# Patient Record
Sex: MALE | Race: WHITE | Employment: OTHER | URBAN - METROPOLITAN AREA
[De-identification: names, ages, dates, MRNs, and addresses within clinical notes are randomized per-mention and may not be internally consistent; named-entity substitution may affect disease eponyms.]

---

## 2017-02-22 ENCOUNTER — HOSPITAL ENCOUNTER (OUTPATIENT)
Dept: GENERAL RADIOLOGY | Age: 72
Discharge: HOME | End: 2017-02-22
Payer: MEDICARE

## 2017-02-22 DIAGNOSIS — J84.112 UIP (USUAL INTERSTITIAL PNEUMONITIS) (HCC): Chronic | ICD-10-CM

## 2017-02-22 DIAGNOSIS — J44.9 CHRONIC OBSTRUCTIVE PULMONARY DISEASE, UNSPECIFIED COPD TYPE (HCC): Chronic | ICD-10-CM

## 2017-02-22 PROCEDURE — 71020 XR CHEST PA LAT: CPT

## 2017-02-28 ENCOUNTER — HOSPITAL ENCOUNTER (OUTPATIENT)
Dept: LAB | Age: 72
Discharge: HOME | End: 2017-02-28

## 2017-02-28 PROCEDURE — 88305 TISSUE EXAM BY PATHOLOGIST: CPT | Performed by: INTERNAL MEDICINE

## 2017-11-09 PROBLEM — J01.91 ACUTE RECURRENT SINUSITIS: Status: ACTIVE | Noted: 2017-11-09

## 2017-11-27 PROBLEM — G47.9 SLEEP DISTURBANCE: Status: ACTIVE | Noted: 2017-11-27

## 2017-11-27 PROBLEM — N40.1 BENIGN PROSTATIC HYPERPLASIA WITH LOWER URINARY TRACT SYMPTOMS: Status: ACTIVE | Noted: 2017-11-27

## 2017-11-27 PROBLEM — H61.21 IMPACTED CERUMEN OF RIGHT EAR: Status: ACTIVE | Noted: 2017-11-27

## 2017-11-27 PROBLEM — Z12.5 PROSTATE CANCER SCREENING: Status: ACTIVE | Noted: 2017-11-27

## 2017-11-27 PROBLEM — I71.40 ABDOMINAL AORTIC ANEURYSM (AAA) WITHOUT RUPTURE: Status: ACTIVE | Noted: 2017-11-27

## 2017-12-14 ENCOUNTER — HOSPITAL ENCOUNTER (OUTPATIENT)
Dept: ULTRASOUND IMAGING | Age: 72
Discharge: HOME | End: 2017-12-14
Attending: INTERNAL MEDICINE
Payer: MEDICARE

## 2017-12-14 DIAGNOSIS — I71.40 ABDOMINAL AORTIC ANEURYSM (AAA) WITHOUT RUPTURE: ICD-10-CM

## 2017-12-14 PROCEDURE — 76700 US EXAM ABDOM COMPLETE: CPT

## 2017-12-14 PROCEDURE — 93978 VASCULAR STUDY: CPT

## 2018-06-04 PROBLEM — Z12.83 SKIN EXAM FOR MALIGNANT NEOPLASM: Status: ACTIVE | Noted: 2018-06-04

## 2019-04-30 ENCOUNTER — HOSPITAL ENCOUNTER (OUTPATIENT)
Dept: CT IMAGING | Age: 74
Discharge: HOME | End: 2019-04-30
Attending: OTOLARYNGOLOGY

## 2019-04-30 DIAGNOSIS — J32.9 CHRONIC SINUSITIS, UNSPECIFIED LOCATION: ICD-10-CM

## 2019-05-13 PROBLEM — R26.89 BALANCE PROBLEM: Status: ACTIVE | Noted: 2019-05-13

## 2019-05-13 PROBLEM — J32.9 CHRONIC SINUSITIS: Status: ACTIVE | Noted: 2019-05-13

## 2019-11-13 ENCOUNTER — HOSPITAL ENCOUNTER (INPATIENT)
Age: 74
LOS: 1 days | Discharge: HOME | DRG: 687 | End: 2019-11-16
Attending: EMERGENCY MEDICINE | Admitting: HOSPITALIST
Payer: MEDICARE

## 2019-11-13 ENCOUNTER — APPOINTMENT (OUTPATIENT)
Dept: ULTRASOUND IMAGING | Age: 74
DRG: 687 | End: 2019-11-13
Attending: EMERGENCY MEDICINE
Payer: MEDICARE

## 2019-11-13 ENCOUNTER — APPOINTMENT (OUTPATIENT)
Dept: CT IMAGING | Age: 74
DRG: 687 | End: 2019-11-13
Attending: EMERGENCY MEDICINE
Payer: MEDICARE

## 2019-11-13 ENCOUNTER — APPOINTMENT (OUTPATIENT)
Dept: GENERAL RADIOLOGY | Age: 74
DRG: 687 | End: 2019-11-13
Attending: EMERGENCY MEDICINE
Payer: MEDICARE

## 2019-11-13 DIAGNOSIS — R31.9 HEMATURIA, UNSPECIFIED TYPE: ICD-10-CM

## 2019-11-13 DIAGNOSIS — R33.9 URINARY RETENTION: ICD-10-CM

## 2019-11-13 DIAGNOSIS — N28.89 RIGHT RENAL MASS: Primary | ICD-10-CM

## 2019-11-13 DIAGNOSIS — Z87.09 HISTORY OF PULMONARY FIBROSIS: ICD-10-CM

## 2019-11-13 DIAGNOSIS — R09.02 HYPOXIA: ICD-10-CM

## 2019-11-13 LAB
ABO + RH BLD: NORMAL
ALBUMIN SERPL-MCNC: 4 G/DL (ref 3.2–4.6)
ALBUMIN/GLOB SERPL: 1 {RATIO} (ref 1.2–3.5)
ALP SERPL-CCNC: 89 U/L (ref 50–136)
ALT SERPL-CCNC: 19 U/L (ref 12–65)
ANION GAP SERPL CALC-SCNC: 10 MMOL/L (ref 7–16)
APPEARANCE UR: ABNORMAL
AST SERPL-CCNC: 15 U/L (ref 15–37)
BACTERIA URNS QL MICRO: 0 /HPF
BASOPHILS # BLD: 0.1 K/UL (ref 0–0.2)
BASOPHILS NFR BLD: 0 % (ref 0–2)
BILIRUB SERPL-MCNC: 0.8 MG/DL (ref 0.2–1.1)
BILIRUB UR QL: ABNORMAL
BLOOD GROUP ANTIBODIES SERPL: NORMAL
BUN SERPL-MCNC: 28 MG/DL (ref 8–23)
CALCIUM SERPL-MCNC: 9.3 MG/DL (ref 8.3–10.4)
CASTS URNS QL MICRO: 0 /LPF
CHLORIDE SERPL-SCNC: 102 MMOL/L (ref 98–107)
CO2 SERPL-SCNC: 25 MMOL/L (ref 21–32)
COLOR UR: ABNORMAL
CREAT SERPL-MCNC: 1.22 MG/DL (ref 0.8–1.5)
CRYSTALS URNS QL MICRO: 0 /LPF
DIFFERENTIAL METHOD BLD: ABNORMAL
EOSINOPHIL # BLD: 0 K/UL (ref 0–0.8)
EOSINOPHIL NFR BLD: 0 % (ref 0.5–7.8)
EPI CELLS #/AREA URNS HPF: NORMAL /HPF
ERYTHROCYTE [DISTWIDTH] IN BLOOD BY AUTOMATED COUNT: 13 % (ref 11.9–14.6)
GLOBULIN SER CALC-MCNC: 3.9 G/DL (ref 2.3–3.5)
GLUCOSE BLD STRIP.AUTO-MCNC: 205 MG/DL (ref 65–100)
GLUCOSE SERPL-MCNC: 137 MG/DL (ref 65–100)
GLUCOSE UR STRIP.AUTO-MCNC: NEGATIVE MG/DL
HCT VFR BLD AUTO: 50.8 % (ref 41.1–50.3)
HGB BLD-MCNC: 17.8 G/DL (ref 13.6–17.2)
HGB UR QL STRIP: ABNORMAL
IMM GRANULOCYTES # BLD AUTO: 0.1 K/UL (ref 0–0.5)
IMM GRANULOCYTES NFR BLD AUTO: 0 % (ref 0–5)
KETONES UR QL STRIP.AUTO: 40 MG/DL
LEUKOCYTE ESTERASE UR QL STRIP.AUTO: NEGATIVE
LYMPHOCYTES # BLD: 1.3 K/UL (ref 0.5–4.6)
LYMPHOCYTES NFR BLD: 9 % (ref 13–44)
MCH RBC QN AUTO: 33.1 PG (ref 26.1–32.9)
MCHC RBC AUTO-ENTMCNC: 35 G/DL (ref 31.4–35)
MCV RBC AUTO: 94.6 FL (ref 79.6–97.8)
MONOCYTES # BLD: 0.8 K/UL (ref 0.1–1.3)
MONOCYTES NFR BLD: 6 % (ref 4–12)
MUCOUS THREADS URNS QL MICRO: 0 /LPF
NEUTS SEG # BLD: 12.6 K/UL (ref 1.7–8.2)
NEUTS SEG NFR BLD: 85 % (ref 43–78)
NITRITE UR QL STRIP.AUTO: POSITIVE
NRBC # BLD: 0 K/UL (ref 0–0.2)
PH UR STRIP: 6 [PH] (ref 5–9)
PLATELET # BLD AUTO: 183 K/UL (ref 150–450)
PMV BLD AUTO: 10.5 FL (ref 9.4–12.3)
POTASSIUM SERPL-SCNC: 4.1 MMOL/L (ref 3.5–5.1)
PROT SERPL-MCNC: 7.9 G/DL (ref 6.3–8.2)
PROT UR STRIP-MCNC: 100 MG/DL
RBC # BLD AUTO: 5.37 M/UL (ref 4.23–5.6)
RBC #/AREA URNS HPF: >100 /HPF
SODIUM SERPL-SCNC: 137 MMOL/L (ref 136–145)
SP GR UR REFRACTOMETRY: 1.02 (ref 1–1.02)
SPECIMEN EXP DATE BLD: NORMAL
TROPONIN I SERPL-MCNC: <0.02 NG/ML (ref 0.02–0.05)
UROBILINOGEN UR QL STRIP.AUTO: 0.2 EU/DL (ref 0.2–1)
WBC # BLD AUTO: 14.8 K/UL (ref 4.3–11.1)
WBC URNS QL MICRO: NORMAL /HPF

## 2019-11-13 PROCEDURE — 74011000250 HC RX REV CODE- 250: Performed by: UROLOGY

## 2019-11-13 PROCEDURE — 74011250636 HC RX REV CODE- 250/636: Performed by: FAMILY MEDICINE

## 2019-11-13 PROCEDURE — 96361 HYDRATE IV INFUSION ADD-ON: CPT | Performed by: EMERGENCY MEDICINE

## 2019-11-13 PROCEDURE — 77030005546 HC CATH URETH FOL 3W BARD -A

## 2019-11-13 PROCEDURE — 77030019927 HC TBNG IRR CYSTO BAXT -A

## 2019-11-13 PROCEDURE — 74011000250 HC RX REV CODE- 250: Performed by: EMERGENCY MEDICINE

## 2019-11-13 PROCEDURE — 51702 INSERT TEMP BLADDER CATH: CPT | Performed by: EMERGENCY MEDICINE

## 2019-11-13 PROCEDURE — 74011250637 HC RX REV CODE- 250/637: Performed by: FAMILY MEDICINE

## 2019-11-13 PROCEDURE — 84484 ASSAY OF TROPONIN QUANT: CPT

## 2019-11-13 PROCEDURE — 93005 ELECTROCARDIOGRAM TRACING: CPT | Performed by: EMERGENCY MEDICINE

## 2019-11-13 PROCEDURE — 82962 GLUCOSE BLOOD TEST: CPT

## 2019-11-13 PROCEDURE — 77030020263 HC SOL INJ SOD CL0.9% LFCR 1000ML

## 2019-11-13 PROCEDURE — 77030010545

## 2019-11-13 PROCEDURE — 85025 COMPLETE CBC W/AUTO DIFF WBC: CPT

## 2019-11-13 PROCEDURE — 94640 AIRWAY INHALATION TREATMENT: CPT

## 2019-11-13 PROCEDURE — 80053 COMPREHEN METABOLIC PANEL: CPT

## 2019-11-13 PROCEDURE — 99218 HC RM OBSERVATION: CPT

## 2019-11-13 PROCEDURE — 74177 CT ABD & PELVIS W/CONTRAST: CPT

## 2019-11-13 PROCEDURE — 74011636320 HC RX REV CODE- 636/320: Performed by: EMERGENCY MEDICINE

## 2019-11-13 PROCEDURE — 74011250636 HC RX REV CODE- 250/636: Performed by: EMERGENCY MEDICINE

## 2019-11-13 PROCEDURE — 76870 US EXAM SCROTUM: CPT

## 2019-11-13 PROCEDURE — 74011000258 HC RX REV CODE- 258: Performed by: EMERGENCY MEDICINE

## 2019-11-13 PROCEDURE — 77030005518 HC CATH URETH FOL 2W BARD -B

## 2019-11-13 PROCEDURE — 77030018836 HC SOL IRR NACL ICUM -A

## 2019-11-13 PROCEDURE — 96374 THER/PROPH/DIAG INJ IV PUSH: CPT | Performed by: EMERGENCY MEDICINE

## 2019-11-13 PROCEDURE — 71046 X-RAY EXAM CHEST 2 VIEWS: CPT

## 2019-11-13 PROCEDURE — 74011250636 HC RX REV CODE- 250/636: Performed by: UROLOGY

## 2019-11-13 PROCEDURE — 36415 COLL VENOUS BLD VENIPUNCTURE: CPT

## 2019-11-13 PROCEDURE — 74011636637 HC RX REV CODE- 636/637: Performed by: FAMILY MEDICINE

## 2019-11-13 PROCEDURE — 74176 CT ABD & PELVIS W/O CONTRAST: CPT

## 2019-11-13 PROCEDURE — 81003 URINALYSIS AUTO W/O SCOPE: CPT

## 2019-11-13 PROCEDURE — 96375 TX/PRO/DX INJ NEW DRUG ADDON: CPT | Performed by: EMERGENCY MEDICINE

## 2019-11-13 PROCEDURE — 99285 EMERGENCY DEPT VISIT HI MDM: CPT | Performed by: EMERGENCY MEDICINE

## 2019-11-13 PROCEDURE — 81015 MICROSCOPIC EXAM OF URINE: CPT

## 2019-11-13 PROCEDURE — 86900 BLOOD TYPING SEROLOGIC ABO: CPT

## 2019-11-13 PROCEDURE — 96375 TX/PRO/DX INJ NEW DRUG ADDON: CPT

## 2019-11-13 RX ORDER — HYDROCODONE BITARTRATE AND ACETAMINOPHEN 5; 325 MG/1; MG/1
1 TABLET ORAL
Status: DISCONTINUED | OUTPATIENT
Start: 2019-11-13 | End: 2019-11-16 | Stop reason: HOSPADM

## 2019-11-13 RX ORDER — ALBUTEROL SULFATE 0.83 MG/ML
2.5 SOLUTION RESPIRATORY (INHALATION)
Status: DISCONTINUED | OUTPATIENT
Start: 2019-11-13 | End: 2019-11-16 | Stop reason: HOSPADM

## 2019-11-13 RX ORDER — ACETAMINOPHEN 325 MG/1
650 TABLET ORAL
Status: DISCONTINUED | OUTPATIENT
Start: 2019-11-13 | End: 2019-11-16 | Stop reason: HOSPADM

## 2019-11-13 RX ORDER — SODIUM CHLORIDE 0.9 % (FLUSH) 0.9 %
10 SYRINGE (ML) INJECTION
Status: COMPLETED | OUTPATIENT
Start: 2019-11-13 | End: 2019-11-13

## 2019-11-13 RX ORDER — SODIUM CHLORIDE 9 MG/ML
100 INJECTION, SOLUTION INTRAVENOUS CONTINUOUS
Status: DISPENSED | OUTPATIENT
Start: 2019-11-14 | End: 2019-11-14

## 2019-11-13 RX ORDER — NALOXONE HYDROCHLORIDE 0.4 MG/ML
0.4 INJECTION, SOLUTION INTRAMUSCULAR; INTRAVENOUS; SUBCUTANEOUS AS NEEDED
Status: DISCONTINUED | OUTPATIENT
Start: 2019-11-13 | End: 2019-11-16 | Stop reason: HOSPADM

## 2019-11-13 RX ORDER — LORAZEPAM 2 MG/ML
1 INJECTION INTRAMUSCULAR ONCE
Status: COMPLETED | OUTPATIENT
Start: 2019-11-13 | End: 2019-11-13

## 2019-11-13 RX ORDER — CARVEDILOL 12.5 MG/1
12.5 TABLET ORAL 2 TIMES DAILY WITH MEALS
Status: DISCONTINUED | OUTPATIENT
Start: 2019-11-14 | End: 2019-11-16 | Stop reason: HOSPADM

## 2019-11-13 RX ORDER — INSULIN LISPRO 100 [IU]/ML
INJECTION, SOLUTION INTRAVENOUS; SUBCUTANEOUS
Status: DISCONTINUED | OUTPATIENT
Start: 2019-11-13 | End: 2019-11-16 | Stop reason: HOSPADM

## 2019-11-13 RX ORDER — LEVOTHYROXINE SODIUM 88 UG/1
88 TABLET ORAL
Status: DISCONTINUED | OUTPATIENT
Start: 2019-11-14 | End: 2019-11-16 | Stop reason: HOSPADM

## 2019-11-13 RX ORDER — ONDANSETRON 2 MG/ML
4 INJECTION INTRAMUSCULAR; INTRAVENOUS
Status: DISCONTINUED | OUTPATIENT
Start: 2019-11-13 | End: 2019-11-16 | Stop reason: HOSPADM

## 2019-11-13 RX ORDER — IBUPROFEN 200 MG
1 TABLET ORAL EVERY 24 HOURS
Status: DISCONTINUED | OUTPATIENT
Start: 2019-11-13 | End: 2019-11-16 | Stop reason: HOSPADM

## 2019-11-13 RX ORDER — MORPHINE SULFATE 2 MG/ML
4 INJECTION, SOLUTION INTRAMUSCULAR; INTRAVENOUS ONCE
Status: COMPLETED | OUTPATIENT
Start: 2019-11-13 | End: 2019-11-13

## 2019-11-13 RX ORDER — AZELASTINE HYDROCHLORIDE, FLUTICASONE PROPIONATE 137; 50 UG/1; UG/1
2 SPRAY, METERED NASAL 2 TIMES DAILY
Status: DISCONTINUED | OUTPATIENT
Start: 2019-11-13 | End: 2019-11-16 | Stop reason: HOSPADM

## 2019-11-13 RX ORDER — ASPIRIN 81 MG/1
81 TABLET ORAL DAILY
Status: DISCONTINUED | OUTPATIENT
Start: 2019-11-14 | End: 2019-11-16 | Stop reason: HOSPADM

## 2019-11-13 RX ORDER — LIDOCAINE HYDROCHLORIDE 20 MG/ML
JELLY TOPICAL ONCE
Status: COMPLETED | OUTPATIENT
Start: 2019-11-13 | End: 2019-11-13

## 2019-11-13 RX ORDER — PRAVASTATIN SODIUM 20 MG/1
80 TABLET ORAL
Status: DISCONTINUED | OUTPATIENT
Start: 2019-11-13 | End: 2019-11-16 | Stop reason: HOSPADM

## 2019-11-13 RX ORDER — ASPIRIN 81 MG/1
162 TABLET ORAL DAILY
Status: DISCONTINUED | OUTPATIENT
Start: 2019-11-14 | End: 2019-11-13

## 2019-11-13 RX ORDER — IPRATROPIUM BROMIDE AND ALBUTEROL SULFATE 2.5; .5 MG/3ML; MG/3ML
3 SOLUTION RESPIRATORY (INHALATION)
Status: COMPLETED | OUTPATIENT
Start: 2019-11-13 | End: 2019-11-13

## 2019-11-13 RX ORDER — ONDANSETRON 2 MG/ML
4 INJECTION INTRAMUSCULAR; INTRAVENOUS
Status: COMPLETED | OUTPATIENT
Start: 2019-11-13 | End: 2019-11-13

## 2019-11-13 RX ADMIN — SODIUM CHLORIDE 1000 ML: 900 INJECTION, SOLUTION INTRAVENOUS at 14:25

## 2019-11-13 RX ADMIN — SODIUM CHLORIDE 100 ML: 900 INJECTION, SOLUTION INTRAVENOUS at 18:12

## 2019-11-13 RX ADMIN — METHYLPREDNISOLONE SODIUM SUCCINATE 125 MG: 125 INJECTION, POWDER, FOR SOLUTION INTRAMUSCULAR; INTRAVENOUS at 18:44

## 2019-11-13 RX ADMIN — ONDANSETRON 4 MG: 2 INJECTION INTRAMUSCULAR; INTRAVENOUS at 14:24

## 2019-11-13 RX ADMIN — SODIUM CHLORIDE 100 ML/HR: 900 INJECTION, SOLUTION INTRAVENOUS at 21:22

## 2019-11-13 RX ADMIN — INSULIN LISPRO 4 UNITS: 100 INJECTION, SOLUTION INTRAVENOUS; SUBCUTANEOUS at 22:45

## 2019-11-13 RX ADMIN — LIDOCAINE HYDROCHLORIDE: 20 JELLY TOPICAL at 22:52

## 2019-11-13 RX ADMIN — MORPHINE SULFATE 4 MG: 2 INJECTION, SOLUTION INTRAMUSCULAR; INTRAVENOUS at 14:24

## 2019-11-13 RX ADMIN — Medication 10 ML: at 18:12

## 2019-11-13 RX ADMIN — LORAZEPAM 1 MG: 2 INJECTION, SOLUTION INTRAMUSCULAR; INTRAVENOUS at 22:38

## 2019-11-13 RX ADMIN — PRAVASTATIN SODIUM 80 MG: 20 TABLET ORAL at 22:37

## 2019-11-13 RX ADMIN — IPRATROPIUM BROMIDE AND ALBUTEROL SULFATE 3 ML: .5; 3 SOLUTION RESPIRATORY (INHALATION) at 18:38

## 2019-11-13 RX ADMIN — IOPAMIDOL 100 ML: 755 INJECTION, SOLUTION INTRAVENOUS at 18:11

## 2019-11-13 NOTE — ED PROVIDER NOTES
Patient is a 68 yo male who presents with blood in urine. Patient states began about 2 days ago after a dog ran into his groin area. States about 2 hours later he had some blood and clots in his urine and then improved until yesterday afternoon again with blood and clots. States pain in his right lower back as well. Denies any vomiting however has had some nausea, no fevers or chills, no further complaints. Patient is not on blood thinners. Patient seen by me briefly in triage to begin workup until further evaluation and management by another provider once a room becomes available. The history is provided by the patient. No  was used. Blood in Urine    This is a new problem. The current episode started more than 2 days ago. The problem occurs every urination. The problem has not changed since onset. The quality of the pain is described as burning. The pain is at a severity of 2/10. The pain is mild. There has been no fever. Associated symptoms include hematuria, flank pain and abdominal pain. Pertinent negatives include no chills, no sweats, no nausea, no vomiting, no discharge, no frequency, no hesitancy, no urgency, no penile discharge and no back pain. He has tried nothing for the symptoms. The treatment provided no relief. Past Medical History:   Diagnosis Date    Abnormal chest x-ray 3/19/2013    Adenopathy 3/19/2013    Lymph glands    Allergic rhinitis, cause unspecified 3/19/2013    CAD (coronary artery disease)     Chronic airway obstruction, not elsewhere classified 3/19/2013    COPD     Diabetes ([address]     Diabetes mellitus ([address] 3/19/2013    Hashimoto's disease     Pulmonary fibrosis ([address]     Sinus problem     Thyroid disease     Hypothyroid    Tobacco use disorder 3/19/2013    Unspecified essential hypertension 3/19/2013    Unspecified hypothyroidism 3/19/2013       History reviewed. No pertinent surgical history.       Family History:   Problem Relation Age of Onset    Diabetes Other        Social History     Socioeconomic History    Marital status:      Spouse name: Not on file    Number of children: Not on file    Years of education: Not on file    Highest education level: Not on file   Occupational History    Not on file   Social Needs    Financial resource strain: Not on file    Food insecurity:     Worry: Not on file     Inability: Not on file    Transportation needs:     Medical: Not on file     Non-medical: Not on file   Tobacco Use    Smoking status: Current Every Day Smoker     Packs/day: 0.50     Years: 40.00     Pack years: 20.00     Types: Cigarettes    Smokeless tobacco: Never Used    Tobacco comment: 4-5 cigarettes per qd   Substance and Sexual Activity    Alcohol use: Yes    Drug use: No    Sexual activity: Not on file   Lifestyle    Physical activity:     Days per week: Not on file     Minutes per session: Not on file    Stress: Not on file   Relationships    Social connections:     Talks on phone: Not on file     Gets together: Not on file     Attends Yarsanism service: Not on file     Active member of club or organization: Not on file     Attends meetings of clubs or organizations: Not on file     Relationship status: Not on file    Intimate partner violence:     Fear of current or ex partner: Not on file     Emotionally abused: Not on file     Physically abused: Not on file     Forced sexual activity: Not on file   Other Topics Concern    Not on file   Social History Narrative    Not on file         ALLERGIES: Augmentin [amoxicillin-pot clavulanate]; Chantix [varenicline]; Symbicort [budesonide-formoterol]; and Wellbutrin [bupropion hcl]    Review of Systems   Constitutional: Negative for chills, fatigue and fever. Respiratory: Negative for cough and shortness of breath. Cardiovascular: Negative for chest pain and palpitations. Gastrointestinal: Positive for abdominal pain.  Negative for blood in stool, constipation, diarrhea, nausea and vomiting. Genitourinary: Positive for flank pain, hematuria and testicular pain. Negative for dysuria, frequency, hesitancy, penile discharge, penile pain, penile swelling, scrotal swelling and urgency. Musculoskeletal: Negative for back pain, myalgias and neck pain. Skin: Negative for rash and wound. Neurological: Negative for dizziness, syncope, light-headedness and headaches. Vitals:    11/13/19 1324   BP: (!) 169/94   Pulse: 87   Resp: 16   SpO2: 92%   Weight: 84.4 kg (186 lb)   Height: 5' 10\" (1.778 m)            Physical Exam   Constitutional: He is oriented to person, place, and time. He appears well-developed and well-nourished. Well appearing and in NAD. HENT:   Head: Normocephalic. Mouth/Throat: Oropharynx is clear and moist.   MMM. Eyes: Pupils are equal, round, and reactive to light. Neck: No JVD present. Cardiovascular: Normal rate, regular rhythm, normal heart sounds and intact distal pulses. Pulmonary/Chest: Effort normal and breath sounds normal.   CTAB. Abdominal: Soft. Soft, NTND. No rebound or guarding. Moderate R CVAT. Genitourinary: Penis normal. No penile tenderness. Genitourinary Comments: Mild right testicular swelling and tenderness to patient. No overlying warmth or erythema noted. No evidence of cellulitis or Kali's gangrene. Musculoskeletal: Normal range of motion. Neurological: He is alert and oriented to person, place, and time. No cranial nerve deficit. Strength 5/5 throughout. Normal sensory. Skin: Skin is warm and dry. No rash. Nursing note and vitals reviewed. MDM  Number of Diagnoses or Management Options  Hematuria, unspecified type: new and requires workup  Right renal mass: new and requires workup  Urinary retention: new and requires workup  Diagnosis management comments: Patient reports history of pulmonary fibrosis. States not on any oxygen at home.   Bedside ultrasound with evidence of cyst/mass to right kidney. Patient with urinary retention unable to provide any urine at this time. Creatinine 1.22. Previous creatinine around 1. White blood cell count 14.8. UA pending. CT urogram with findings of 6.7 cm mass midpole right kidney with highly suspicious for malignancy with blood in the renal collecting system and a 2 centimeter blood clot in the urinary bladder. Additionally there is indeterminate 2 cm exophytic hyperattenuating mass off the left kidney. Urology has been consulted. Patient unable to provide any urine at this time. Will place three-way Bryant at this time for irrigation.  ==============================  Dr. Ernestina Vasques on call for urology. Recommends obtaining CT abdomen and pelvis with IV contrast.  Dr. Lita Prado instructed on need to follow-up on CT abdomen pelvis and Dr. Mejias Fort recommendations. On reexamination patient found to be extremely hypoxic. Patient placed on 4 L O2. O2 sats improved to 90% on 4 L. Order placed for DuoNeb, Solu-Medrol, chest x-ray, EKG. Trop added on at this time. Hospitalist consulted for admission. Amount and/or Complexity of Data Reviewed  Clinical lab tests: ordered and reviewed  Tests in the radiology section of CPT®: ordered and reviewed  Tests in the medicine section of CPT®: ordered and reviewed  Review and summarize past medical records: yes  Discuss the patient with other providers: yes  Independent visualization of images, tracings, or specimens: yes    Risk of Complications, Morbidity, and/or Mortality  Presenting problems: moderate  Diagnostic procedures: moderate  Management options: moderate    Patient Progress  Patient progress: stable    ED Course as of Nov 13 1716 Wed Nov 13, 2019   1542 CT urogram IMPRESSION:   1) A 6.7 cm mass midpole right kidney highly suspicious for a malignancy with  blood in the renal collecting system and a 2 cm blood clot in the urinary  bladder.   2) A 3.2 cm abdominal aortic aneurysm. 3) Indeterminate 2 cm exophytic hyperattenuating mass off the left kidney. [DF]   [id number] Urology consulted. Patient attempting to provide urine sample. Bedside ultrasound with around 200 to 300 cc in bladder.    [DF]   1550 US IMPRESSION: Negative for testicular torsion. Small right-sided hydrocele. Small  epididymal cyst on the left. [DF]   1634 Dr. Bud Marinelli requests CT abdomen pelvis with IV contrast.     [DF]      ED Course User Index  [DF] Tracey Salinas MD       Procedures    Results Include:    Recent Results (from the past 24 hour(s))   CBC WITH AUTOMATED DIFF    Collection Time: 11/13/19  1:27 PM   Result Value Ref Range    WBC 14.8 (H) 4.3 - 11.1 K/uL    RBC 5.37 4.23 - 5.6 M/uL    HGB 17.8 (H) 13.6 - 17.2 g/dL    HCT 50.8 (H) 41.1 - 50.3 %    MCV 94.6 79.6 - 97.8 FL    MCH 33.1 (H) 26.1 - 32.9 PG    MCHC 35.0 31.4 - 35.0 g/dL    RDW 13.0 11.9 - 14.6 %    PLATELET 798 098 - 888 K/uL    MPV 10.5 9.4 - 12.3 FL    ABSOLUTE NRBC 0.00 0.0 - 0.2 K/uL    DF AUTOMATED      NEUTROPHILS 85 (H) 43 - 78 %    LYMPHOCYTES 9 (L) 13 - 44 %    MONOCYTES 6 4.0 - 12.0 %    EOSINOPHILS 0 (L) 0.5 - 7.8 %    BASOPHILS 0 0.0 - 2.0 %    IMMATURE GRANULOCYTES 0 0.0 - 5.0 %    ABS. NEUTROPHILS 12.6 (H) 1.7 - 8.2 K/UL    ABS. LYMPHOCYTES 1.3 0.5 - 4.6 K/UL    ABS. MONOCYTES 0.8 0.1 - 1.3 K/UL    ABS. EOSINOPHILS 0.0 0.0 - 0.8 K/UL    ABS. BASOPHILS 0.1 0.0 - 0.2 K/UL    ABS. IMM.  GRANS. 0.1 0.0 - 0.5 K/UL   METABOLIC PANEL, COMPREHENSIVE    Collection Time: 11/13/19  1:27 PM   Result Value Ref Range    Sodium 137 136 - 145 mmol/L    Potassium 4.1 3.5 - 5.1 mmol/L    Chloride 102 98 - 107 mmol/L    CO2 25 21 - 32 mmol/L    Anion gap 10 7 - 16 mmol/L    Glucose 137 (H) 65 - 100 mg/dL    BUN 28 (H) 8 - 23 MG/DL    Creatinine 1.22 0.8 - 1.5 MG/DL    GFR est AA >60 >60 ml/min/1.73m2    GFR est non-AA >60 >60 ml/min/1.73m2    Calcium 9.3 8.3 - 10.4 MG/DL    Bilirubin, total 0.8 0.2 - 1.1 MG/DL    ALT (SGPT) 19 12 - 65 U/L    AST (SGOT) 15 15 - 37 U/L    Alk. phosphatase 89 50 - 136 U/L    Protein, total 7.9 6.3 - 8.2 g/dL    Albumin 4.0 3.2 - 4.6 g/dL    Globulin 3.9 (H) 2.3 - 3.5 g/dL    A-G Ratio 1.0 (L) 1.2 - 3.5                   Des Stuart MD; 11/13/2019 @2:21 PM Voice dictation software was used during the making of this note. This software is not perfect and grammatical and other typographical errors may be present.   This note has not been proofread for errors.  ===================================================================

## 2019-11-13 NOTE — ED TRIAGE NOTES
Pt states he has been urinating blood and blood clots since Monday afternoon. Seen at  and sent to ER. States a dog ran into his crotch on Monday afternoon and 2 hours later he began urinating blood. Having back pain.

## 2019-11-14 ENCOUNTER — ANESTHESIA EVENT (OUTPATIENT)
Dept: SURGERY | Age: 74
DRG: 687 | End: 2019-11-14
Payer: MEDICARE

## 2019-11-14 LAB
ANION GAP SERPL CALC-SCNC: 9 MMOL/L (ref 7–16)
ATRIAL RATE: 87 BPM
BASOPHILS # BLD: 0 K/UL (ref 0–0.2)
BASOPHILS NFR BLD: 0 % (ref 0–2)
BUN SERPL-MCNC: 25 MG/DL (ref 8–23)
CALCIUM SERPL-MCNC: 8.3 MG/DL (ref 8.3–10.4)
CALCULATED P AXIS, ECG09: 60 DEGREES
CALCULATED R AXIS, ECG10: 63 DEGREES
CALCULATED T AXIS, ECG11: 53 DEGREES
CHLORIDE SERPL-SCNC: 108 MMOL/L (ref 98–107)
CO2 SERPL-SCNC: 24 MMOL/L (ref 21–32)
CREAT SERPL-MCNC: 1.27 MG/DL (ref 0.8–1.5)
DIAGNOSIS, 93000: NORMAL
DIFFERENTIAL METHOD BLD: ABNORMAL
EOSINOPHIL # BLD: 0 K/UL (ref 0–0.8)
EOSINOPHIL NFR BLD: 0 % (ref 0.5–7.8)
ERYTHROCYTE [DISTWIDTH] IN BLOOD BY AUTOMATED COUNT: 13.1 % (ref 11.9–14.6)
GLUCOSE BLD STRIP.AUTO-MCNC: 113 MG/DL (ref 65–100)
GLUCOSE BLD STRIP.AUTO-MCNC: 117 MG/DL (ref 65–100)
GLUCOSE BLD STRIP.AUTO-MCNC: 141 MG/DL (ref 65–100)
GLUCOSE BLD STRIP.AUTO-MCNC: 167 MG/DL (ref 65–100)
GLUCOSE SERPL-MCNC: 144 MG/DL (ref 65–100)
HCT VFR BLD AUTO: 47.3 % (ref 41.1–50.3)
HGB BLD-MCNC: 16.1 G/DL (ref 13.6–17.2)
IMM GRANULOCYTES # BLD AUTO: 0.1 K/UL (ref 0–0.5)
IMM GRANULOCYTES NFR BLD AUTO: 1 % (ref 0–5)
LYMPHOCYTES # BLD: 1.1 K/UL (ref 0.5–4.6)
LYMPHOCYTES NFR BLD: 9 % (ref 13–44)
MCH RBC QN AUTO: 32.6 PG (ref 26.1–32.9)
MCHC RBC AUTO-ENTMCNC: 34 G/DL (ref 31.4–35)
MCV RBC AUTO: 95.7 FL (ref 79.6–97.8)
MONOCYTES # BLD: 0.9 K/UL (ref 0.1–1.3)
MONOCYTES NFR BLD: 8 % (ref 4–12)
NEUTS SEG # BLD: 9.4 K/UL (ref 1.7–8.2)
NEUTS SEG NFR BLD: 82 % (ref 43–78)
NRBC # BLD: 0 K/UL (ref 0–0.2)
P-R INTERVAL, ECG05: 172 MS
PLATELET # BLD AUTO: 163 K/UL (ref 150–450)
PMV BLD AUTO: 10.6 FL (ref 9.4–12.3)
POTASSIUM SERPL-SCNC: 4.4 MMOL/L (ref 3.5–5.1)
Q-T INTERVAL, ECG07: 358 MS
QRS DURATION, ECG06: 88 MS
QTC CALCULATION (BEZET), ECG08: 430 MS
RBC # BLD AUTO: 4.94 M/UL (ref 4.23–5.6)
SODIUM SERPL-SCNC: 141 MMOL/L (ref 136–145)
VENTRICULAR RATE, ECG03: 87 BPM
WBC # BLD AUTO: 11.4 K/UL (ref 4.3–11.1)

## 2019-11-14 PROCEDURE — 74011250637 HC RX REV CODE- 250/637: Performed by: FAMILY MEDICINE

## 2019-11-14 PROCEDURE — 94760 N-INVAS EAR/PLS OXIMETRY 1: CPT

## 2019-11-14 PROCEDURE — 77010033678 HC OXYGEN DAILY

## 2019-11-14 PROCEDURE — 85025 COMPLETE CBC W/AUTO DIFF WBC: CPT

## 2019-11-14 PROCEDURE — 36415 COLL VENOUS BLD VENIPUNCTURE: CPT

## 2019-11-14 PROCEDURE — 74011250636 HC RX REV CODE- 250/636: Performed by: UROLOGY

## 2019-11-14 PROCEDURE — 74011636637 HC RX REV CODE- 636/637: Performed by: FAMILY MEDICINE

## 2019-11-14 PROCEDURE — 77030018836 HC SOL IRR NACL ICUM -A

## 2019-11-14 PROCEDURE — 94640 AIRWAY INHALATION TREATMENT: CPT

## 2019-11-14 PROCEDURE — 80048 BASIC METABOLIC PNL TOTAL CA: CPT

## 2019-11-14 PROCEDURE — 74011250636 HC RX REV CODE- 250/636: Performed by: HOSPITALIST

## 2019-11-14 PROCEDURE — 99218 HC RM OBSERVATION: CPT

## 2019-11-14 PROCEDURE — 74011250636 HC RX REV CODE- 250/636: Performed by: FAMILY MEDICINE

## 2019-11-14 PROCEDURE — 74011000250 HC RX REV CODE- 250: Performed by: UROLOGY

## 2019-11-14 PROCEDURE — 82962 GLUCOSE BLOOD TEST: CPT

## 2019-11-14 PROCEDURE — 96375 TX/PRO/DX INJ NEW DRUG ADDON: CPT

## 2019-11-14 RX ORDER — LIDOCAINE HYDROCHLORIDE 20 MG/ML
JELLY TOPICAL ONCE
Status: COMPLETED | OUTPATIENT
Start: 2019-11-14 | End: 2019-11-14

## 2019-11-14 RX ORDER — HYDROMORPHONE HYDROCHLORIDE 1 MG/ML
0.5 INJECTION, SOLUTION INTRAMUSCULAR; INTRAVENOUS; SUBCUTANEOUS ONCE
Status: COMPLETED | OUTPATIENT
Start: 2019-11-14 | End: 2019-11-14

## 2019-11-14 RX ORDER — SODIUM CHLORIDE 9 MG/ML
100 INJECTION, SOLUTION INTRAVENOUS CONTINUOUS
Status: DISPENSED | OUTPATIENT
Start: 2019-11-14 | End: 2019-11-15

## 2019-11-14 RX ADMIN — SODIUM CHLORIDE 100 ML/HR: 900 INJECTION, SOLUTION INTRAVENOUS at 08:06

## 2019-11-14 RX ADMIN — PRAVASTATIN SODIUM 80 MG: 20 TABLET ORAL at 21:50

## 2019-11-14 RX ADMIN — LIDOCAINE HYDROCHLORIDE: 20 JELLY TOPICAL at 01:10

## 2019-11-14 RX ADMIN — HYDROMORPHONE HYDROCHLORIDE 0.5 MG: 1 INJECTION, SOLUTION INTRAMUSCULAR; INTRAVENOUS; SUBCUTANEOUS at 01:49

## 2019-11-14 RX ADMIN — ASPIRIN 81 MG: 81 TABLET ORAL at 08:50

## 2019-11-14 RX ADMIN — INSULIN LISPRO 2 UNITS: 100 INJECTION, SOLUTION INTRAVENOUS; SUBCUTANEOUS at 16:57

## 2019-11-14 RX ADMIN — SODIUM CHLORIDE 100 ML/HR: 900 INJECTION, SOLUTION INTRAVENOUS at 16:41

## 2019-11-14 RX ADMIN — CARVEDILOL 12.5 MG: 12.5 TABLET, FILM COATED ORAL at 08:50

## 2019-11-14 RX ADMIN — LEVOTHYROXINE SODIUM 88 MCG: 88 TABLET ORAL at 05:37

## 2019-11-14 RX ADMIN — CARVEDILOL 12.5 MG: 12.5 TABLET, FILM COATED ORAL at 16:43

## 2019-11-14 RX ADMIN — TIOTROPIUM BROMIDE 18 MCG: 18 CAPSULE ORAL; RESPIRATORY (INHALATION) at 08:29

## 2019-11-14 NOTE — PROGRESS NOTES
11/13/19 2056   Dual Skin Pressure Injury Assessment   Dual Skin Pressure Injury Assessment WDL   Second Care Provider (Based on [address] [place] [address]) Melissa Chamberlain RN   Dual skin assessment with Melissa Chamberlain RN, patient skin is intact. No signs of skin breakdown.

## 2019-11-14 NOTE — PROGRESS NOTES
END OF SHIFT NOTE:    INTAKE/OUTPUT  11/13 0701 - 11/14 0700  In: -   Out: 2700 [Urine:2700]  Voiding: NO  Catheter: YES  Color: clear  Drain:              DIET  regular    Flatus: Patient does have flatus present. Stool:  0 occurrences. Characteristics:       Ambulating  No, pt refused ambulation when offered, pt stood at bedside. Emesis: 0 occurrences. Characteristics:          VITAL SIGNS  Patient Vitals for the past 12 hrs:   Temp Pulse Resp BP SpO2   11/14/19 1548 98.3 °F (36.8 °C) 83 20 121/69 93 %   11/14/19 1102 98 °F (36.7 °C) 81 20 133/68 91 %   11/14/19 0830     93 %   11/14/19 0710 97.6 °F (36.4 °C) 86 20 114/69 95 %       Pain Assessment  Pain Intensity 1: 0 (11/14/19 1400)  Pain Location 1: Back     Patient Stated Pain Goal: 0      Hourly rounds completed this shift, will give report to oncoming nurse. Pt and wife aware of surgery tomorrow and NPO at MN.       Scooter Grant RN

## 2019-11-14 NOTE — H&P
Hospitalist Admission History and Physical     NAME:  Clarinda Fabry   Age:  69 y.o.  :   1945   MRN:   [id number]  PCP: Aquilino Amin MD  Consulting MD:  Treatment Team: Attending Provider: Vandana Suarez DO; Primary Nurse: Jose M Joya RN    Chief Complaint   Patient presents with    Blood in Urine         HPI:   Patient is a 69 y.o. male who presented to the ED for cc hematuria after his dog ran into his right groin area two days ago. Patient has been passing dark blood in urine with clot formation along with being unable to urinate starting today. Patient also having pain in his right lower back. Hx of current tobacco abuse 1 pack per day, HTN, CAD, pulmonary fibrosis, DM type II, depression, and hypothyroidism. Patient also noted to have one time episode of hypoxia 78% on RA. Currently satting well on 2L O2. Vitals - stable    Labs- WBC 14.8, hg 17.8, UA with moderate blood, WBC 5-10. CT abdomen pelvis with contrast showed - 6.7 cm mass midpole right kidney highly suspicious for a malignancy with blood in the renal collecting system and a 2 cm blood clot in the urinary  Bladder. A 3.2 cm abdominal aortic aneurysm. Indeterminate 2 cm exophytic hyperattenuating mass off the left kidney. Past Medical History:   Diagnosis Date    Abnormal chest x-ray 3/19/2013    Adenopathy 3/19/2013    Lymph glands    Allergic rhinitis, cause unspecified 3/19/2013    CAD (coronary artery disease)     Chronic airway obstruction, not elsewhere classified 3/19/2013    COPD     Diabetes ([address]     Diabetes mellitus ([address] 3/19/2013    Hashimoto's disease     Pulmonary fibrosis ([address]     Sinus problem     Thyroid disease     Hypothyroid    Tobacco use disorder 3/19/2013    Unspecified essential hypertension 3/19/2013    Unspecified hypothyroidism 3/19/2013        History reviewed. No pertinent surgical history.      Family History   Problem Relation Age of Onset    Diabetes Other Social History     Social History Narrative    Not on file        Social History     Tobacco Use    Smoking status: Current Every Day Smoker     Packs/day: 0.50     Years: 40.00     Pack years: 20.00     Types: Cigarettes    Smokeless tobacco: Never Used    Tobacco comment: 4-5 cigarettes per qd   Substance Use Topics    Alcohol use: Yes        Social History     Substance and Sexual Activity   Drug Use No         Allergies   Allergen Reactions    Augmentin [Amoxicillin-Pot Clavulanate] Nausea Only     Pt states he is not allergic      Chantix [Varenicline] Other (comments)     [place]    Symbicort [Budesonide-Formoterol] Other (comments)     Thrush in mouth    Wellbutrin [Bupropion Hcl] Hives       Prior to Admission medications    Medication Sig Start Date End Date Taking? Authorizing Provider   levothyroxine (SYNTHROID) 88 mcg tablet Take 1 Tab by mouth Daily (before breakfast). 9/30/19   Maggie Burton MD   pravastatin (PRAVACHOL) 80 mg tablet Take 1 Tab by mouth nightly. 5/13/19   Maggie Burton MD   carvedilol (COREG) 12.5 mg tablet Take 1 Tab by mouth two (2) times daily (with meals). 5/13/19   Maggie Burton MD   metFORMIN (GLUCOPHAGE) 500 mg tablet TAKE TWO TABLETS BY MOUTH TWICE A DAY WITH MEAL(S) 5/13/19   Maggie Burton MD   clindamycin-benzoyl peroxide (BENZACLIN) 1-5 % topical gel Apply  to affected area two (2) times a day. Apply to affected area after the skin has been cleansed and dried. 5/13/19   Maggie Burton MD   azelastine (ASTEPRO) 0.15 % (205.5 mcg) 2 Sprays by Both Nostrils route two (2) times a day. Indications: Seasonal Runny Nose 5/6/19   Maida Oropeza MD   tiotropium-olodaterol (STIOLTO RESPIMAT) 2.5-2.5 mcg/actuation inhaler Take 2 Act by inhalation daily. 3/12/19   Nasima Saleh MD   albuterol (PROVENTIL HFA, VENTOLIN HFA, PROAIR HFA) 90 mcg/actuation inhaler Take 2 Puffs by inhalation every four (4) hours as needed for Wheezing.  3/8/19   Nasima Saleh MD   b complex vitamins (B COMPLEX 1) tablet Take 1 Tab by mouth daily. Unknown dose    Provider, Historical   aspirin delayed-release 81 mg tablet Take 162 mg by mouth daily. Provider, Historical   saw palmetto 160 mg cap Take  by mouth. Provider, Historical   co-enzyme Q-10 (CO Q-10) 100 mg capsule Take 100 mg by mouth daily. Provider, Historical   ginkgo biloba 120 mg tab Take  by mouth. Provider, Historical   multivitamin (ONE A DAY) tablet Take 1 Tab by mouth daily. Provider, Historical   Cholecalciferol, Vitamin D3, (VITAMIN D3) 1,000 unit cap Take  by mouth. Provider, Historical   ascorbic acid (VITAMIN C WITH JURGEN HIPS) 1,000 mg tablet Take  by mouth.  3 tablets daily    Provider, Historical   OTHER,NON-FORMULARY, Tumeric po-1 cap po qd    Provider, Historical           Review of Systems    Constitutional: NAD  Eyes:  no change in visual acuity, no photophobia  Ears, nose, mouth, throat, and face: no  Odynphagia, dysphagia, no thrush or exudate, negative for chronic sinus congestion, recurrent headaches  Respiratory: negative for SOB, hemoptysis or cough  Cardiovascular: negative for CP, palpitations, or PND  Gastrointestinal: negative for abdominal pain, no hematemesis, hematochezia or BRBPR  Genitourinary: hematuria with clot formation, urinary obstruction  Integument/breast: negative for skin rash or skin lesions  Hematologic/lymphatic: negative for known bleeding disorder  Musculoskeletal:negative for joint pain or joint tenderness  Neurological: negative for lightheadedness, syncope or presyncopal events, no seizure or CVA history  Behavioral/Psych: negative for depression or chronic anxiety,   Endocrine: negative for polydyspia, polyuria or intolerance to heat or cold  Allergic/Immunologic: negative for chronic allergic rhinitis, or known connective tissue disorder      Objective:     Visit Vitals  /62 (BP 1 Location: Right arm, BP Patient Position: At rest)   Pulse 87   Temp 98.8 °F (37.1 °C)   Resp 18   Ht 5' 10\" (1.778 m)   Wt 84.4 kg (186 lb)   SpO2 92%   BMI 26.69 kg/m²        11/13 1901 - 11/14 0700  In: -   Out: 1200 [Urine:1200]  No intake/output data recorded. Data Review:   Recent Results (from the past 24 hour(s))   CBC WITH AUTOMATED DIFF    Collection Time: 11/13/19  1:27 PM   Result Value Ref Range    WBC 14.8 (H) 4.3 - 11.1 K/uL    RBC 5.37 4.23 - 5.6 M/uL    HGB 17.8 (H) 13.6 - 17.2 g/dL    HCT 50.8 (H) 41.1 - 50.3 %    MCV 94.6 79.6 - 97.8 FL    MCH 33.1 (H) 26.1 - 32.9 PG    MCHC 35.0 31.4 - 35.0 g/dL    RDW 13.0 11.9 - 14.6 %    PLATELET 963 117 - 331 K/uL    MPV 10.5 9.4 - 12.3 FL    ABSOLUTE NRBC 0.00 0.0 - 0.2 K/uL    DF AUTOMATED      NEUTROPHILS 85 (H) 43 - 78 %    LYMPHOCYTES 9 (L) 13 - 44 %    MONOCYTES 6 4.0 - 12.0 %    EOSINOPHILS 0 (L) 0.5 - 7.8 %    BASOPHILS 0 0.0 - 2.0 %    IMMATURE GRANULOCYTES 0 0.0 - 5.0 %    ABS. NEUTROPHILS 12.6 (H) 1.7 - 8.2 K/UL    ABS. LYMPHOCYTES 1.3 0.5 - 4.6 K/UL    ABS. MONOCYTES 0.8 0.1 - 1.3 K/UL    ABS. EOSINOPHILS 0.0 0.0 - 0.8 K/UL    ABS. BASOPHILS 0.1 0.0 - 0.2 K/UL    ABS. IMM. GRANS. 0.1 0.0 - 0.5 K/UL   METABOLIC PANEL, COMPREHENSIVE    Collection Time: 11/13/19  1:27 PM   Result Value Ref Range    Sodium 137 136 - 145 mmol/L    Potassium 4.1 3.5 - 5.1 mmol/L    Chloride 102 98 - 107 mmol/L    CO2 25 21 - 32 mmol/L    Anion gap 10 7 - 16 mmol/L    Glucose 137 (H) 65 - 100 mg/dL    BUN 28 (H) 8 - 23 MG/DL    Creatinine 1.22 0.8 - 1.5 MG/DL    GFR est AA >60 >60 ml/min/1.73m2    GFR est non-AA >60 >60 ml/min/1.73m2    Calcium 9.3 8.3 - 10.4 MG/DL    Bilirubin, total 0.8 0.2 - 1.1 MG/DL    ALT (SGPT) 19 12 - 65 U/L    AST (SGOT) 15 15 - 37 U/L    Alk.  phosphatase 89 50 - 136 U/L    Protein, total 7.9 6.3 - 8.2 g/dL    Albumin 4.0 3.2 - 4.6 g/dL    Globulin 3.9 (H) 2.3 - 3.5 g/dL    A-G Ratio 1.0 (L) 1.2 - 3.5     URINALYSIS W/ RFLX MICROSCOPIC    Collection Time: 11/13/19  1:27 PM   Result Value Ref Range    Color RED Appearance CLOUDY      Specific gravity 1.025 (H) 1.001 - 1.023      pH (UA) 6.0 5.0 - 9.0      Protein 100 (A) NEG mg/dL    Glucose NEGATIVE  NEG mg/dL    Ketone 40 (A) NEG mg/dL    Bilirubin MODERATE (A) NEG      Blood MODERATE (A) NEG      Urobilinogen 0.2 0.2 - 1.0 EU/dL    Nitrites POSITIVE (A) NEG      Leukocyte Esterase NEGATIVE  NEG     URINE MICROSCOPIC    Collection Time: 11/13/19  1:27 PM   Result Value Ref Range    WBC 5-10 0 /hpf    RBC >100 0 /hpf    Epithelial cells 0-3 0 /hpf    Bacteria 0 0 /hpf    Casts 0 0 /lpf    Crystals, urine 0 0 /LPF    Mucus 0 0 /lpf   TROPONIN I    Collection Time: 11/13/19  1:27 PM   Result Value Ref Range    Troponin-I, Qt. <0.02 (L) 0.02 - 0.05 NG/ML   EKG, 12 LEAD, INITIAL    Collection Time: 11/13/19  6:05 PM   Result Value Ref Range    Ventricular Rate 87 BPM    Atrial Rate 87 BPM    P-R Interval 172 ms    QRS Duration 88 ms    Q-T Interval 358 ms    QTC Calculation (Bezet) 430 ms    Calculated P Axis 60 degrees    Calculated R Axis 63 degrees    Calculated T Axis 53 degrees    Diagnosis       !! AGE AND GENDER SPECIFIC ECG ANALYSIS !! Normal sinus rhythm  Cannot rule out Inferior infarct , age undetermined  Abnormal ECG  No previous ECGs available         Physical Exam:     General:  Alert, cooperative, no distress, appears stated age. Eyes:  Conjunctivae/corneas clear. Ears:  Normal TMs and external ear canals both ears. Nose: Nares normal. Septum midline. Mouth/Throat: Lips, mucosa, and tongue normal. Teeth and gums normal.   Neck:  no JVD. Back:   No CVA tenderness. Lungs:   Clear to auscultation bilaterally. Heart:  Regular rate and rhythm, S1, S2 normal, no murmur, click, rub or gallop. Abdomen:   Soft, non-tender. Bowel sounds normal. No masses,  No organomegaly. Bryant in place with dark urine output. Extremities: Extremities normal, atraumatic, no cyanosis or edema. Pulses: 2+ and symmetric all extremities.    Skin: Skin color, texture, turgor normal. No rashes or lesions   Lymph nodes: Cervical, supraclavicular, and axillary nodes normal.   Neurologic: CNII-XII intact. Assessment and Plan     Principal Problem:    Renal mass (11/13/2019)    Active Problems:    Hypothyroidism (3/19/2013)      Overview: TSH 3.43; increase Levoxyl to 88 mcg po daily. Recheck TSH      Diabetes mellitus ([facility] (3/19/2013)      Overview: HA1c 6.1; continue with annual eye exams and daily self foot exams. Tobacco use disorder (3/19/2013)      Overview: Told pt to quit smoking now. Pt knows he needs to quit. Essential hypertension (3/19/2013)      Overview: At goal.       Coronary artery disease involving native coronary artery of native heart (3/19/2013)      Overview: Refer to Cardiology to evaluate. Last stress testing was over 6 years       ago. Multiple risk factors: male gender, DM, HTN, Smoking, HDL <39. Depression (2/11/2015)      COPD (chronic obstructive pulmonary disease) ([facility] (3/9/2015)      Overview: Pt following with Pulmonology. Discussed smoking cessation plan with pt       today. Abdominal aortic aneurysm (AAA) without rupture ([facility] (11/27/2017)      Overview: Check US       Hematuria (11/13/2019)    Renal mass with hematuria- Urology has seen at bedside. Plan for continuous irrigation tonight. Also plan for potential cystoscopy tomorrow to further evaluate bladder and mass seen on CT abdomen/pelvis. I appreciate their help. NPO past midnight. Gentle hydration once NPO. Hypoxia - One time episode. Possibly holding breathe from pain? Physical exam benign on lung exam. Chest x ray clear. Continue inhalers from home. HTN with hx of CAD- ASA, carvedilol 12.5mg BID    Dm type II - Hold metformin. SS.     HLD- statin    Hypothyroidism - Levothyroxine 88mcg daily    Tobacco abuse - Nicotine patch    AAA- Needs to stop smoking. BP control. Follows AAA with PCP    Leukocytosis also noted, no fever, and UA with no pyuria. Monitor WBC trend. Possibly stress demargination    DVT prophylaxis - SCDs.      Signed By: Bessy Sharpe,    November 13, 2019

## 2019-11-14 NOTE — PROGRESS NOTES
Pt requesting to eat and drink. Richard Rocha NP, at nurses station, informed pt was placed NPO last night for possible urology surgical procedure this am. Per NP, no surgical procedure today. NP ordering regular diet and NPO after MN.

## 2019-11-14 NOTE — PROGRESS NOTES
Pt and wife requesting to speak with doctor. Call to Dr. Sami Cavanaugh, he will see pt. Informed Dr. Sami Cavanaugh, IVF needs to be renewed, new order received to continue NS at 100ml/hr.

## 2019-11-14 NOTE — PROGRESS NOTES
CM met with patient to discuss d/c plan. Patient alert/orient to name, place and . Patient verified information no changes needed. Patient states he lives in a one level home with spouse with two steps to enter into the home. States he's independent with his ADL's and has no DME's in the home. Patient will be returning back home with no needs identified  CM will continue to monitor. Care Management Interventions  PCP Verified by CM: Yes(Keila Whtie MD)  Mode of Transport at Discharge: Other (see comment)  Transition of Care Consult (CM Consult): Discharge Planning  Discharge Durable Medical Equipment: No  Physical Therapy Consult: No  Occupational Therapy Consult: No  Speech Therapy Consult: No  Current Support Network: Own Home, Lives with Spouse  Confirm Follow Up Transport: Family  Plan discussed with Pt/Family/Caregiver: Yes  Freedom of Choice Offered: Yes  [address] Provided?: No  Discharge Location  Discharge Placement: Home.

## 2019-11-14 NOTE — PROGRESS NOTES
TRANSFER - IN REPORT:    Verbal report received from [employer] on [employer]  being received from ED for routine progression of care      Report consisted of patients Situation, Background, Assessment and   Recommendations(SBAR). Information from the following report(s) SBAR, Kardex and ED Summary was reviewed with the receiving nurse. Opportunity for questions and clarification was provided. Assessment completed upon patients arrival to unit and care assumed.

## 2019-11-14 NOTE — PROGRESS NOTES
Consent filled out and placed on paper chart for pt's consent with anesthesia in am for cystoscopy with possible TURBT.

## 2019-11-14 NOTE — PROGRESS NOTES
Hospitalist Note     Admit Date:  2019  1:57 PM   Name:  Josafat Santana   Age:  69 y.o.  :  1945   MRN:  [id number]   PCP:  Briana Wen MD  Treatment Team: Attending Provider: Crista Ariza MD; Primary Nurse: Nori Perez RN; Utilization Review: Eric Ellis; Care Manager: Jere Helm    HPI/Subjective:   Patient is a 69 y.o. male who presented to the ED for cc hematuria after his dog ran into his right groin area two days ago. Patient has been passing dark blood in urine with clot formation along with being unable to urinate starting today. Patient also having pain in his right lower back. Hx of current tobacco abuse 1 pack per day, HTN, CAD, pulmonary fibrosis, DM type II, depression, and hypothyroidism.      Patient also noted to have one time episode of hypoxia 78% on RA. Currently satting well on 2L O2.      Vitals - stable     Labs- WBC 14.8, hg 17.8, UA with moderate blood, WBC 5-10.      CT abdomen pelvis with contrast showed - 6.7 cm mass midpole right kidney highly suspicious for a malignancy with blood in the renal collecting system and a 2 cm blood clot in the urinary  Bladder. A 3.2 cm abdominal aortic aneurysm. Indeterminate 2 cm exophytic hyperattenuating mass off the left kidney.  Pt reports feeling better. Hematuria improved. Currently on continuous bladder irrigation. No nausea, no vomiting. No abdominal pain.    Objective:     Patient Vitals for the past 24 hrs:   Temp Pulse Resp BP SpO2   19 1548 98.3 °F (36.8 °C) 83 20 121/69 93 %   19 1102 98 °F (36.7 °C) 81 20 133/68 91 %   19 0830     93 %   19 0710 97.6 °F (36.4 °C) 86 20 114/69 95 %   19 0440 98.2 °F (36.8 °C) 87 20 129/68 90 %   19 0051 98.4 °F (36.9 °C) 89 20 133/70 91 %   19 2116 98.6 °F (37 °C) 95 18 137/70 91 %   19  93  135/63 91 %   19 1926     92 %   19 1900 98.8 °F (37.1 °C) 87 18 133/62 90 %   19 1855  89   92 %   11/13/19 1839     93 %     Oxygen Therapy  O2 Sat (%): 93 % (11/14/19 1548)  Pulse via Oximetry: 84 beats per minute (11/14/19 0830)  O2 Device: Nasal cannula (11/14/19 0830)  O2 Flow Rate (L/min): 3 l/min (11/14/19 0830)      Intake/Output Summary (Last 24 hours) at 11/14/2019 1601  Last data filed at 11/14/2019 1400  Gross per 24 hour   Intake 4688 ml   Output 6900 ml   Net -2212 ml       *Note that automatically entered I/Os may not be accurate; dependent on patient compliance with collection and accurate  by techs. General:    Well nourished. Alert. CV:   RRR. No murmur, rub, or gallop. Lungs:   CTAB. No wheezing, rhonchi, or rales. Abdomen:   Soft, nontender, nondistended. Extremities: Warm and dry. No cyanosis or edema. Skin:     No rashes or jaundice.    Neuro:  No gross focal deficits    Data Review:  I have reviewed all labs, meds, and studies from the last 24 hours:    Recent Results (from the past 24 hour(s))   EKG, 12 LEAD, INITIAL    Collection Time: 11/13/19  6:05 PM   Result Value Ref Range    Ventricular Rate 87 BPM    Atrial Rate 87 BPM    P-R Interval 172 ms    QRS Duration 88 ms    Q-T Interval 358 ms    QTC Calculation (Bezet) 430 ms    Calculated P Axis 60 degrees    Calculated R Axis 63 degrees    Calculated T Axis 53 degrees    Diagnosis         Normal sinus rhythm  small inferior q wave noted   Otherwise normal ECG  No previous ECGs available  Confirmed by Brooek Moreno ([id number]) on 11/14/2019 6:51:16 AM     TYPE & SCREEN    Collection Time: 11/13/19  9:24 PM   Result Value Ref Range    Crossmatch Expiration 11/16/2019     ABO/Rh(D) A POSITIVE     Antibody screen NEG    GLUCOSE, POC    Collection Time: 11/13/19 10:43 PM   Result Value Ref Range    Glucose (POC) 205 (H) 65 - 100 mg/dL   GLUCOSE, POC    Collection Time: 11/14/19  7:14 AM   Result Value Ref Range    Glucose (POC) 141 (H) 65 - 100 mg/dL   CBC WITH AUTOMATED DIFF    Collection Time: 11/14/19 7:29 AM   Result Value Ref Range    WBC 11.4 (H) 4.3 - 11.1 K/uL    RBC 4.94 4.23 - 5.6 M/uL    HGB 16.1 13.6 - 17.2 g/dL    HCT 47.3 41.1 - 50.3 %    MCV 95.7 79.6 - 97.8 FL    MCH 32.6 26.1 - 32.9 PG    MCHC 34.0 31.4 - 35.0 g/dL    RDW 13.1 11.9 - 14.6 %    PLATELET 107 647 - 020 K/uL    MPV 10.6 9.4 - 12.3 FL    ABSOLUTE NRBC 0.00 0.0 - 0.2 K/uL    DF AUTOMATED      NEUTROPHILS 82 (H) 43 - 78 %    LYMPHOCYTES 9 (L) 13 - 44 %    MONOCYTES 8 4.0 - 12.0 %    EOSINOPHILS 0 (L) 0.5 - 7.8 %    BASOPHILS 0 0.0 - 2.0 %    IMMATURE GRANULOCYTES 1 0.0 - 5.0 %    ABS. NEUTROPHILS 9.4 (H) 1.7 - 8.2 K/UL    ABS. LYMPHOCYTES 1.1 0.5 - 4.6 K/UL    ABS. MONOCYTES 0.9 0.1 - 1.3 K/UL    ABS. EOSINOPHILS 0.0 0.0 - 0.8 K/UL    ABS. BASOPHILS 0.0 0.0 - 0.2 K/UL    ABS. IMM. GRANS. 0.1 0.0 - 0.5 K/UL   METABOLIC PANEL, BASIC    Collection Time: 11/14/19  7:29 AM   Result Value Ref Range    Sodium 141 136 - 145 mmol/L    Potassium 4.4 3.5 - 5.1 mmol/L    Chloride 108 (H) 98 - 107 mmol/L    CO2 24 21 - 32 mmol/L    Anion gap 9 7 - 16 mmol/L    Glucose 144 (H) 65 - 100 mg/dL    BUN 25 (H) 8 - 23 MG/DL    Creatinine 1.27 0.8 - 1.5 MG/DL    GFR est AA >60 >60 ml/min/1.73m2    GFR est non-AA 59 (L) >60 ml/min/1.73m2    Calcium 8.3 8.3 - 10.4 MG/DL   GLUCOSE, POC    Collection Time: 11/14/19 11:04 AM   Result Value Ref Range    Glucose (POC) 117 (H) 65 - 100 mg/dL        All Micro Results     None          No results found for this visit on 11/13/19.     Current Meds:  Current Facility-Administered Medications   Medication Dose Route Frequency    0.9% sodium chloride infusion  100 mL/hr IntraVENous CONTINUOUS    acetaminophen (TYLENOL) tablet 650 mg  650 mg Oral Q4H PRN    HYDROcodone-acetaminophen (NORCO) 5-325 mg per tablet 1 Tab  1 Tab Oral Q4H PRN    naloxone (NARCAN) injection 0.4 mg  0.4 mg IntraVENous PRN    ondansetron (ZOFRAN) injection 4 mg  4 mg IntraVENous Q4H PRN    azelastine-fluticasone 137-50 mcg/spray spry 2 Spray (Patient Supplied)  2 Spray Both Nostrils BID    carvedilol (COREG) tablet 12.5 mg  12.5 mg Oral BID WITH MEALS    levothyroxine (SYNTHROID) tablet 88 mcg  88 mcg Oral ACB    pravastatin (PRAVACHOL) tablet 80 mg  80 mg Oral QHS    tiotropium (SPIRIVA) inhalation capsule 18 mcg  18 mcg Inhalation DAILY    albuterol (PROVENTIL VENTOLIN) nebulizer solution 2.5 mg  2.5 mg Nebulization Q4H PRN    nicotine (NICODERM CQ) 21 mg/24 hr patch 1 Patch  1 Patch TransDERmal Q24H    aspirin delayed-release tablet 81 mg  81 mg Oral DAILY    insulin lispro (HUMALOG) injection   SubCUTAneous AC&HS       Other Studies (last 24 hours):  Xr Chest Pa Lat    Result Date: 11/13/2019  History: SOB Two views chest COMPARISON: 2/22/2017 Findings: Senescent changes in the lungs noted. The lungs are otherwise well expanded and clear. The cardiac silhouette, and mediastinal contour, and osseous structures are stable. Impression: Stable two-view chest.     Ct Abd Pelv W Cont    Result Date: 11/13/2019  CT of the Abdomen and Pelvis with contrast CLINICAL INDICATION:  Acute moderate pain right flank, further evaluation of right renal mass, abdominal aortic aneurysm, left renal mass COMPARISON: Noncontrast CT earlier today, also CT PET 11/2/2017 TECHNIQUE: Automated exposure Control was used. Multiple axial images were obtained through the abdomen and pelvis after intravenous injection of 125cc of isovue 370. No oral contrast given per request, limiting evaluation of GI tract and surrounding structures. Coronal reformatted images obtained for further evaluation of organs. FINDINGS: Partially included lung bases again demonstrate chronic interstitial disease, and there is an indeterminate 4 mm nodule in the lateral right lower lobe (axial image 11). Partially visualized heart is enlarged, with calcifications of aorta and coronary arteries. Abdomen: Tiny hypodensities in the liver are too small to characterize but statistically benign. Adrenals, pancreas, spleen, gallbladder demonstrate no acute or suspicious finding. Right kidney again demonstrates a large heterogeneous mass involving the majority of the mid and lower pole and extending into the renal hilum but not definitely invading vasculature, or extending through the capsule. Overall this mass measures up to 6.3 cm AP, 7.8 cm transverse, 6.8 cm craniocaudal. Single renal artery and vein are present on the right. Vascular evaluation is somewhat limited given suboptimal bolus. Left kidney demonstrates exophytic hyperdense 2.5 cm mass laterally (in 2012 this measured 3.8 cm) compatible with a benign proteinaceous cyst, and a tiny nonobstructing 1 mm lower pole stone. Smaller hypodensities in the left renal cortex are incompletely characterized given size. There are diffuse calcifications of the aorta, and unchanged infrarenal dilatation measuring up to 3.5 cm diameter. GI tract is not well evaluated with no oral contrast. No obvious obstruction, hernia, or appendicitis. Prominent diverticulosis of large bowel which is tortuous, and moderate stool consistent with constipation. Normal right side appendix. No free air or overt ascites. Mild right renal perinephric fluid. No definite lymphadenopathy. Pelvis: Air and catheter within urinary bladder. Prostate not enlarged. Nonspecific bladder wall thickening could BE cystitis. No pelvic lymphadenopathy or focal fluid collection. No suspicious mass. Bones: No acute osseous lesions. IMPRESSION: 1. Right renal mass highly suspicious for malignancy. 2. Atherosclerotic vascular disease. Abdominal aortic aneurysm. 3. Tiny indeterminate right lung nodule. 4. Diverticulosis. 5. Urinary bladder wall thickening and intraluminal gas, possibly cystitis or iatrogenic.  GI details are limited without oral contrast.      Assessment and Plan:     Hospital Problems as of 11/14/2019 Date Reviewed: 5/13/2019          Codes Class Noted - Resolved POA    * (Principal) Renal mass ICD-10-CM: N28.89  ICD-9-CM: 593.9  11/13/2019 - Present Unknown        Hematuria ICD-10-CM: R31.9  ICD-9-CM: 599.70  11/13/2019 - Present Unknown        Abdominal aortic aneurysm (AAA) without rupture ([facility] ICD-10-CM: I71.4  ICD-9-CM: 441.4  11/27/2017 - Present Yes    Overview Signed 11/27/2017 12:34 PM by Neelam Marks MD     Check US              COPD (chronic obstructive pulmonary disease) ([facility] (Chronic) ICD-10-CM: J44.9  ICD-9-CM: 672  3/9/2015 - Present Yes    Overview Signed 11/27/2017 12:33 PM by Neelam Marks MD     Pt following with Pulmonology. Discussed smoking cessation plan with pt today. Depression ICD-10-CM: F32.9  ICD-9-CM: 385  2/11/2015 - Present Yes        Hypothyroidism (Chronic) ICD-10-CM: E03.9  ICD-9-CM: 244.9  3/19/2013 - Present Yes    Overview Addendum 11/27/2017 12:32 PM by Neelam Marks MD     TSH 3.43; increase Levoxyl to 88 mcg po daily. Recheck TSH             Diabetes mellitus ([facility] ICD-10-CM: E11.9  ICD-9-CM: 250.00  3/19/2013 - Present Yes    Overview Addendum 11/27/2017 12:32 PM by Neelam Marks MD     HA1c 6.1; continue with annual eye exams and daily self foot exams. Tobacco use disorder (Chronic) ICD-10-CM: Q04.758  ICD-9-CM: 305.1  3/19/2013 - Present Yes    Overview Signed 11/9/2017  6:41 PM by Neelam Marks MD     Told pt to quit smoking now. Pt knows he needs to quit. Essential hypertension (Chronic) ICD-10-CM: I10  ICD-9-CM: 401.9  3/19/2013 - Present Yes    Overview Addendum 11/27/2017 12:31 PM by Neelam Marks MD     At goal.              Coronary artery disease involving native coronary artery of native heart (Chronic) ICD-10-CM: I25.10  ICD-9-CM: 414.01  3/19/2013 - Present Yes    Overview Signed 11/9/2017  6:33 PM by Neelam Marks MD     Refer to Cardiology to evaluate. Last stress testing was over 6 years ago. Multiple risk factors: male gender, DM, HTN, Smoking, HDL <39. Plan:     This is a 69 y male with:    Renal mass with hematuria- Urology consulted. Appreciate recs. Currently on continuous bladder irrigation. Continue IV fluids.      Hypoxia - One time episode. Possibly holding breathe from pain? Physical exam benign on lung exam. Chest x ray clear. Continue inhalers from home.      HTN with hx of CAD- ASA, carvedilol 12.5mg BID     Dm type II - Hold metformin. Sliding scale Insulin.      HLD- statin     Hypothyroidism - Levothyroxine 88mcg daily     Tobacco abuse - Nicotine patch    AAA- Needs to stop smoking. BP control. Follows AAA with PCP     Leukocytosis also noted, no fever, and UA with no pyuria. Monitor WBC trend. Possibly stress demargination     DVT prophylaxis - SCDs. DC planning/Dispo:   Anticipate dc in 24 hours once cleared by Urology,       Diet:  DIET REGULAR  DIET NPO  DIET NPO  DVT ppx:  SCDs    Signed:  Campbell Jones MD

## 2019-11-14 NOTE — CONSULTS
Urology Consult    Subjective:     Date of Consultation:  November 13, 2019    The patient noted gross hematuria earlier today. He also had some mild right flank pain. CT scan was obtained and this reveals an approximately 7 cm mass within the mid to lower pole of the right kidney that is extremely suspicious for renal cell carcinoma. I will see any retroperitoneal lymph nodes or liver involvement. There is no evidence of renal vein involvement. A tiny pulmonary nodule was noted within the right lung field. A 16 [place] Bryant catheter was placed after the patient was unable to void for an extended period of time. There was only about 300 cc in the bladder at the time and the patient was not particularly uncomfortable secondary to inability to void. Several clots were noted to come out and his urine is much clearer now. There is a long history of smoking. Hemoglobin and creatinine are normal.    Past Medical History:   Diagnosis Date    Abnormal chest x-ray 3/19/2013    Adenopathy 3/19/2013    Lymph glands    Allergic rhinitis, cause unspecified 3/19/2013    CAD (coronary artery disease)     Chronic airway obstruction, not elsewhere classified 3/19/2013    COPD     Diabetes ([address]     Diabetes mellitus ([address] 3/19/2013    Hashimoto's disease     Pulmonary fibrosis ([address]     Sinus problem     Thyroid disease     Hypothyroid    Tobacco use disorder 3/19/2013    Unspecified essential hypertension 3/19/2013    Unspecified hypothyroidism 3/19/2013      History reviewed. No pertinent surgical history. Family History   Problem Relation Age of Onset    Diabetes Other       Social History     Tobacco Use    Smoking status: Current Every Day Smoker     Packs/day: 0.50     Years: 40.00     Pack years: 20.00     Types: Cigarettes    Smokeless tobacco: Never Used    Tobacco comment: 4-5 cigarettes per qd   Substance Use Topics    Alcohol use:  Yes     Allergies   Allergen Reactions    Augmentin [Amoxicillin-Pot Clavulanate] Nausea Only     Pt states he is not allergic      Chantix [Varenicline] Other (comments)     [place]    Symbicort [Budesonide-Formoterol] Other (comments)     Thrush in mouth    Wellbutrin [Bupropion Hcl] Hives      Prior to Admission medications    Medication Sig Start Date End Date Taking? Authorizing Provider   levothyroxine (SYNTHROID) 88 mcg tablet Take 1 Tab by mouth Daily (before breakfast). 9/30/19   Palmira Alvarez MD   pravastatin (PRAVACHOL) 80 mg tablet Take 1 Tab by mouth nightly. 5/13/19   Palmira Alvarez MD   carvedilol (COREG) 12.5 mg tablet Take 1 Tab by mouth two (2) times daily (with meals). 5/13/19   Palmira Alvarez MD   metFORMIN (GLUCOPHAGE) 500 mg tablet TAKE TWO TABLETS BY MOUTH TWICE A DAY WITH MEAL(S) 5/13/19   Palmira Alvarez MD   clindamycin-benzoyl peroxide (BENZACLIN) 1-5 % topical gel Apply  to affected area two (2) times a day. Apply to affected area after the skin has been cleansed and dried. 5/13/19   Palmira Alvarez MD   azelastine (ASTEPRO) 0.15 % (205.5 mcg) 2 Sprays by Both Nostrils route two (2) times a day. Indications: Seasonal Runny Nose 5/6/19   Mela Vaca MD   tiotropium-olodaterol (STIOLTO RESPIMAT) 2.5-2.5 mcg/actuation inhaler Take 2 Act by inhalation daily. 3/12/19   Marina Urrutia MD   albuterol (PROVENTIL HFA, VENTOLIN HFA, PROAIR HFA) 90 mcg/actuation inhaler Take 2 Puffs by inhalation every four (4) hours as needed for Wheezing. 3/8/19   Marina Urrutia MD   b complex vitamins (B COMPLEX 1) tablet Take 1 Tab by mouth daily. Unknown dose    Provider, Historical   aspirin delayed-release 81 mg tablet Take 162 mg by mouth daily. Provider, Historical   saw palmetto 160 mg cap Take  by mouth. Provider, Historical   co-enzyme Q-10 (CO Q-10) 100 mg capsule Take 100 mg by mouth daily. Provider, Historical   ginkgo biloba 120 mg tab Take  by mouth.     Provider, Historical   multivitamin (ONE A DAY) tablet Take 1 Tab by mouth daily.    Provider, Historical   Cholecalciferol, Vitamin D3, (VITAMIN D3) 1,000 unit cap Take  by mouth. Provider, Historical   ascorbic acid (VITAMIN C WITH JURGEN HIPS) 1,000 mg tablet Take  by mouth. 3 tablets daily    Provider, Historical   OTHER,NON-FORMULARY, Tumeric po-1 cap po qd    Provider, Historical         Review of Systems: The patient denies fever or chills. No headache or visual changes. No dysphasia or dry mouth. No shortness of breath or chest pain. No abdominal pain or change in bowel habit. No swelling of the extremities. No polyphagia or polydipsia. He denies musculoskeletal pain or bony pain. Objective:     Patient Vitals for the past 8 hrs:   BP Temp Pulse Resp SpO2 Height Weight   19 135/63  93  91 %     19 1926     92 %     19 1900 133/62 98.8 °F (37.1 °C) 87 18 90 %     19 1855   89  92 %     19 1839     93 %     19 1528 124/70  93  (!) 78 %     19 1324 (!) 169/94  87 16 92 % 5' 10\" (1.778 m) 186 lb (84.4 kg)     Temp (24hrs), Av.8 °F (37.1 °C), Min:98.8 °F (37.1 °C), Max:98.8 °F (37.1 °C)      Intake and Output:   No intake/output data recorded. Physical exam: Patient is in no distress. Abdomen is soft and nontender. I cannot feel his renal mass or other mass within the abdomen. Phallus is unremarkable. 12 Yi Bryant catheter is in place draining relatively clear urine at this point. Extremities are without cyanosis or edema. Assessment:     Principal Problem:    Renal mass (2019)    Active Problems:    Hypothyroidism (3/19/2013)      Overview: TSH 3.43; increase Levoxyl to 88 mcg po daily. Recheck TSH      Diabetes mellitus ([facility] (3/19/2013)      Overview: HA1c 6.1; continue with annual eye exams and daily self foot exams. Tobacco use disorder (3/19/2013)      Overview: Told pt to quit smoking now. Pt knows he needs to quit.       Essential hypertension (3/19/2013) Overview: At goal.       Coronary artery disease involving native coronary artery of native heart (3/19/2013)      Overview: Refer to Cardiology to evaluate. Last stress testing was over 6 years       ago. Multiple risk factors: male gender, DM, HTN, Smoking, HDL <39. Depression (2/11/2015)      COPD (chronic obstructive pulmonary disease) ([facility] (3/9/2015)      Overview: Pt following with Pulmonology. Discussed smoking cessation plan with pt       today. Abdominal aortic aneurysm (AAA) without rupture ([facility] (11/27/2017)      Overview: Check US       Hematuria (11/13/2019)        The patient has a large right renal mass that is almost certainly renal cell carcinoma. I do not see any evidence of metastatic disease but with the size of the lesion he is at risk for dissemination. I think it wise to admit him for continuous bladder irrigation as the CT suggested that he did have clot within the bladder. Plan:     Once the patient is on the floor we will remove his 12 [place] Bryant catheter in place to  22 [place]yan three-way Bryant and leave this to continuous bladder irrigation.

## 2019-11-14 NOTE — PROGRESS NOTES
Interdisciplinary Rounds completed 11/14/19. Nursing, Case Management, Physician and PT present. Plan of care reviewed and updated.     For probable surgery in am.

## 2019-11-14 NOTE — PROGRESS NOTES
Initial visit to assess pt's spiritual needs. Feeling today? ok    Receiving good care? yes    Needs from Spiritual Care:  Nothing today    Ministry of presence & prayer to demonstrate caring & concern, convey emotional & spiritual support.     Chaplain Balwinder Martin, MDiv,[facility],PhD

## 2019-11-14 NOTE — ED NOTES
TRANSFER - OUT REPORT:    Verbal report given to Rebeca(name) on Armani Zambrano  being transferred to [facility]unit) for routine progression of care       Report consisted of patients Situation, Background, Assessment and   Recommendations(SBAR). Information from the following report(s) SBAR was reviewed with the receiving nurse. Lines:   Peripheral IV 11/13/19 Left Antecubital (Active)   Site Assessment Clean, dry, & intact 11/13/2019  7:24 PM   Phlebitis Assessment 0 11/13/2019  7:24 PM   Infiltration Assessment 0 11/13/2019  7:24 PM   Dressing Status Clean, dry, & intact 11/13/2019  7:24 PM        Opportunity for questions and clarification was provided.       Patient transported with:   [employer]

## 2019-11-15 ENCOUNTER — ANESTHESIA (OUTPATIENT)
Dept: SURGERY | Age: 74
DRG: 687 | End: 2019-11-15
Payer: MEDICARE

## 2019-11-15 LAB
ANION GAP SERPL CALC-SCNC: 7 MMOL/L (ref 7–16)
BASOPHILS # BLD: 0 K/UL (ref 0–0.2)
BASOPHILS NFR BLD: 0 % (ref 0–2)
BUN SERPL-MCNC: 20 MG/DL (ref 8–23)
CALCIUM SERPL-MCNC: 8.5 MG/DL (ref 8.3–10.4)
CHLORIDE SERPL-SCNC: 110 MMOL/L (ref 98–107)
CO2 SERPL-SCNC: 26 MMOL/L (ref 21–32)
CREAT SERPL-MCNC: 1.4 MG/DL (ref 0.8–1.5)
DIFFERENTIAL METHOD BLD: ABNORMAL
EOSINOPHIL # BLD: 0 K/UL (ref 0–0.8)
EOSINOPHIL NFR BLD: 0 % (ref 0.5–7.8)
ERYTHROCYTE [DISTWIDTH] IN BLOOD BY AUTOMATED COUNT: 13.3 % (ref 11.9–14.6)
GLUCOSE BLD STRIP.AUTO-MCNC: 104 MG/DL (ref 65–100)
GLUCOSE BLD STRIP.AUTO-MCNC: 140 MG/DL (ref 65–100)
GLUCOSE BLD STRIP.AUTO-MCNC: 141 MG/DL (ref 65–100)
GLUCOSE SERPL-MCNC: 141 MG/DL (ref 65–100)
HCT VFR BLD AUTO: 46.8 % (ref 41.1–50.3)
HGB BLD-MCNC: 16.1 G/DL (ref 13.6–17.2)
IMM GRANULOCYTES # BLD AUTO: 0 K/UL (ref 0–0.5)
IMM GRANULOCYTES NFR BLD AUTO: 0 % (ref 0–5)
LYMPHOCYTES # BLD: 1.6 K/UL (ref 0.5–4.6)
LYMPHOCYTES NFR BLD: 12 % (ref 13–44)
MCH RBC QN AUTO: 33.1 PG (ref 26.1–32.9)
MCHC RBC AUTO-ENTMCNC: 34.4 G/DL (ref 31.4–35)
MCV RBC AUTO: 96.1 FL (ref 79.6–97.8)
MONOCYTES # BLD: 1.5 K/UL (ref 0.1–1.3)
MONOCYTES NFR BLD: 12 % (ref 4–12)
NEUTS SEG # BLD: 9.7 K/UL (ref 1.7–8.2)
NEUTS SEG NFR BLD: 76 % (ref 43–78)
NRBC # BLD: 0 K/UL (ref 0–0.2)
PLATELET # BLD AUTO: 156 K/UL (ref 150–450)
PMV BLD AUTO: 10.3 FL (ref 9.4–12.3)
POTASSIUM SERPL-SCNC: 4.1 MMOL/L (ref 3.5–5.1)
RBC # BLD AUTO: 4.87 M/UL (ref 4.23–5.6)
SODIUM SERPL-SCNC: 143 MMOL/L (ref 136–145)
WBC # BLD AUTO: 12.9 K/UL (ref 4.3–11.1)

## 2019-11-15 PROCEDURE — 74011250636 HC RX REV CODE- 250/636: Performed by: NURSE ANESTHETIST, CERTIFIED REGISTERED

## 2019-11-15 PROCEDURE — 77030007880 HC KT SPN EPDRL BBMI -B: Performed by: ANESTHESIOLOGY

## 2019-11-15 PROCEDURE — 77030019927 HC TBNG IRR CYSTO BAXT -A: Performed by: UROLOGY

## 2019-11-15 PROCEDURE — 85025 COMPLETE CBC W/AUTO DIFF WBC: CPT

## 2019-11-15 PROCEDURE — 74011000250 HC RX REV CODE- 250: Performed by: ANESTHESIOLOGY

## 2019-11-15 PROCEDURE — 94640 AIRWAY INHALATION TREATMENT: CPT

## 2019-11-15 PROCEDURE — 76010000138 HC OR TIME 0.5 TO 1 HR: Performed by: UROLOGY

## 2019-11-15 PROCEDURE — 76210000016 HC OR PH I REC 1 TO 1.5 HR: Performed by: UROLOGY

## 2019-11-15 PROCEDURE — 65270000029 HC RM PRIVATE

## 2019-11-15 PROCEDURE — 77030020849 HC CATH URETH FOL 3 WAY  BARD -B: Performed by: UROLOGY

## 2019-11-15 PROCEDURE — 77010033678 HC OXYGEN DAILY

## 2019-11-15 PROCEDURE — 77030010545: Performed by: UROLOGY

## 2019-11-15 PROCEDURE — 94760 N-INVAS EAR/PLS OXIMETRY 1: CPT

## 2019-11-15 PROCEDURE — 0TJB8ZZ INSPECTION OF BLADDER, VIA NATURAL OR ARTIFICIAL OPENING ENDOSCOPIC: ICD-10-PCS | Performed by: UROLOGY

## 2019-11-15 PROCEDURE — 74011250636 HC RX REV CODE- 250/636: Performed by: UROLOGY

## 2019-11-15 PROCEDURE — 36415 COLL VENOUS BLD VENIPUNCTURE: CPT

## 2019-11-15 PROCEDURE — 82962 GLUCOSE BLOOD TEST: CPT

## 2019-11-15 PROCEDURE — 74011250636 HC RX REV CODE- 250/636: Performed by: HOSPITALIST

## 2019-11-15 PROCEDURE — 74011250636 HC RX REV CODE- 250/636: Performed by: ANESTHESIOLOGY

## 2019-11-15 PROCEDURE — 77030018832 HC SOL IRR H20 ICUM -A: Performed by: UROLOGY

## 2019-11-15 PROCEDURE — 80048 BASIC METABOLIC PNL TOTAL CA: CPT

## 2019-11-15 PROCEDURE — 74011250637 HC RX REV CODE- 250/637: Performed by: FAMILY MEDICINE

## 2019-11-15 PROCEDURE — 76060000032 HC ANESTHESIA 0.5 TO 1 HR: Performed by: UROLOGY

## 2019-11-15 PROCEDURE — 99218 HC RM OBSERVATION: CPT

## 2019-11-15 PROCEDURE — 77030040361 HC SLV COMPR DVT MDII -B: Performed by: UROLOGY

## 2019-11-15 RX ORDER — LIDOCAINE HYDROCHLORIDE 10 MG/ML
0.1 INJECTION INFILTRATION; PERINEURAL AS NEEDED
Status: DISCONTINUED | OUTPATIENT
Start: 2019-11-15 | End: 2019-11-15 | Stop reason: HOSPADM

## 2019-11-15 RX ORDER — OXYCODONE HYDROCHLORIDE 5 MG/1
5 TABLET ORAL
Status: DISCONTINUED | OUTPATIENT
Start: 2019-11-15 | End: 2019-11-15 | Stop reason: HOSPADM

## 2019-11-15 RX ORDER — NALOXONE HYDROCHLORIDE 0.4 MG/ML
0.1 INJECTION, SOLUTION INTRAMUSCULAR; INTRAVENOUS; SUBCUTANEOUS
Status: DISCONTINUED | OUTPATIENT
Start: 2019-11-15 | End: 2019-11-15 | Stop reason: HOSPADM

## 2019-11-15 RX ORDER — CEFAZOLIN SODIUM/WATER 2 G/20 ML
2 SYRINGE (ML) INTRAVENOUS
Status: COMPLETED | OUTPATIENT
Start: 2019-11-15 | End: 2019-11-15

## 2019-11-15 RX ORDER — HYDROMORPHONE HYDROCHLORIDE 2 MG/ML
0.5 INJECTION, SOLUTION INTRAMUSCULAR; INTRAVENOUS; SUBCUTANEOUS
Status: DISCONTINUED | OUTPATIENT
Start: 2019-11-15 | End: 2019-11-15 | Stop reason: HOSPADM

## 2019-11-15 RX ORDER — BUPIVACAINE HYDROCHLORIDE 7.5 MG/ML
INJECTION, SOLUTION INTRASPINAL
Status: COMPLETED | OUTPATIENT
Start: 2019-11-15 | End: 2019-11-15

## 2019-11-15 RX ORDER — FLUMAZENIL 0.1 MG/ML
0.2 INJECTION INTRAVENOUS
Status: DISCONTINUED | OUTPATIENT
Start: 2019-11-15 | End: 2019-11-15 | Stop reason: HOSPADM

## 2019-11-15 RX ORDER — ALBUTEROL SULFATE 0.83 MG/ML
2.5 SOLUTION RESPIRATORY (INHALATION)
Status: COMPLETED | OUTPATIENT
Start: 2019-11-15 | End: 2019-11-15

## 2019-11-15 RX ORDER — MIDAZOLAM HYDROCHLORIDE 1 MG/ML
INJECTION, SOLUTION INTRAMUSCULAR; INTRAVENOUS AS NEEDED
Status: DISCONTINUED | OUTPATIENT
Start: 2019-11-15 | End: 2019-11-15 | Stop reason: HOSPADM

## 2019-11-15 RX ORDER — SODIUM CHLORIDE, SODIUM LACTATE, POTASSIUM CHLORIDE, CALCIUM CHLORIDE 600; 310; 30; 20 MG/100ML; MG/100ML; MG/100ML; MG/100ML
75 INJECTION, SOLUTION INTRAVENOUS CONTINUOUS
Status: DISCONTINUED | OUTPATIENT
Start: 2019-11-15 | End: 2019-11-15 | Stop reason: HOSPADM

## 2019-11-15 RX ORDER — DIPHENHYDRAMINE HYDROCHLORIDE 50 MG/ML
12.5 INJECTION, SOLUTION INTRAMUSCULAR; INTRAVENOUS
Status: DISCONTINUED | OUTPATIENT
Start: 2019-11-15 | End: 2019-11-15 | Stop reason: HOSPADM

## 2019-11-15 RX ORDER — SODIUM CHLORIDE, SODIUM LACTATE, POTASSIUM CHLORIDE, CALCIUM CHLORIDE 600; 310; 30; 20 MG/100ML; MG/100ML; MG/100ML; MG/100ML
100 INJECTION, SOLUTION INTRAVENOUS CONTINUOUS
Status: DISCONTINUED | OUTPATIENT
Start: 2019-11-15 | End: 2019-11-15 | Stop reason: HOSPADM

## 2019-11-15 RX ORDER — PROPOFOL 10 MG/ML
INJECTION, EMULSION INTRAVENOUS
Status: DISCONTINUED | OUTPATIENT
Start: 2019-11-15 | End: 2019-11-15 | Stop reason: HOSPADM

## 2019-11-15 RX ORDER — MIDAZOLAM HYDROCHLORIDE 1 MG/ML
2 INJECTION, SOLUTION INTRAMUSCULAR; INTRAVENOUS
Status: COMPLETED | OUTPATIENT
Start: 2019-11-15 | End: 2019-11-15

## 2019-11-15 RX ADMIN — MIDAZOLAM HYDROCHLORIDE 1 MG: 2 INJECTION, SOLUTION INTRAMUSCULAR; INTRAVENOUS at 12:37

## 2019-11-15 RX ADMIN — TIOTROPIUM BROMIDE 18 MCG: 18 CAPSULE ORAL; RESPIRATORY (INHALATION) at 07:33

## 2019-11-15 RX ADMIN — PRAVASTATIN SODIUM 80 MG: 20 TABLET ORAL at 21:25

## 2019-11-15 RX ADMIN — MIDAZOLAM HYDROCHLORIDE 1 MG: 2 INJECTION, SOLUTION INTRAMUSCULAR; INTRAVENOUS at 12:44

## 2019-11-15 RX ADMIN — CARVEDILOL 12.5 MG: 12.5 TABLET, FILM COATED ORAL at 16:47

## 2019-11-15 RX ADMIN — MIDAZOLAM 2 MG: 1 INJECTION INTRAMUSCULAR; INTRAVENOUS at 10:40

## 2019-11-15 RX ADMIN — CARVEDILOL 12.5 MG: 12.5 TABLET, FILM COATED ORAL at 07:40

## 2019-11-15 RX ADMIN — SODIUM CHLORIDE 100 ML/HR: 900 INJECTION, SOLUTION INTRAVENOUS at 01:51

## 2019-11-15 RX ADMIN — ASPIRIN 81 MG: 81 TABLET ORAL at 08:34

## 2019-11-15 RX ADMIN — LEVOTHYROXINE SODIUM 88 MCG: 88 TABLET ORAL at 07:40

## 2019-11-15 RX ADMIN — PROPOFOL 180 MCG/KG/MIN: 10 INJECTION, EMULSION INTRAVENOUS at 12:46

## 2019-11-15 RX ADMIN — SODIUM CHLORIDE, SODIUM LACTATE, POTASSIUM CHLORIDE, AND CALCIUM CHLORIDE 100 ML/HR: 600; 310; 30; 20 INJECTION, SOLUTION INTRAVENOUS at 09:46

## 2019-11-15 RX ADMIN — BUPIVACAINE HYDROCHLORIDE IN DEXTROSE 13 MG: 7.5 INJECTION, SOLUTION SUBARACHNOID at 12:42

## 2019-11-15 RX ADMIN — Medication 2 G: at 12:49

## 2019-11-15 RX ADMIN — ALBUTEROL SULFATE 2.5 MG: 2.5 SOLUTION RESPIRATORY (INHALATION) at 10:40

## 2019-11-15 NOTE — ANESTHESIA POSTPROCEDURE EVALUATION
Procedure(s):  CYSTOSCOPY. spinal    Anesthesia Post Evaluation      Multimodal analgesia: multimodal analgesia used between 6 hours prior to anesthesia start to PACU discharge  Patient location during evaluation: bedside  Patient participation: complete - patient participated  Level of consciousness: awake and alert  Pain score: 3  Pain management: adequate  Airway patency: patent  Anesthetic complications: no  Cardiovascular status: acceptable and hemodynamically stable  Respiratory status: acceptable  Hydration status: acceptable  Post anesthesia nausea and vomiting:  none      Vitals Value Taken Time   /66 11/15/2019  1:57 PM   Temp 36.7 °C (98 °F) 11/15/2019  1:57 PM   Pulse 78 11/15/2019  2:00 PM   Resp 16 11/15/2019  1:47 PM   SpO2 94 % 11/15/2019  2:00 PM   Vitals shown include unvalidated device data.

## 2019-11-15 NOTE — PROGRESS NOTES
END OF SHIFT NOTE:    INTAKE/OUTPUT  11/14 0701 - 11/15 0700  In: 9308.2 [P.O.:240; I.V.:3068.2]  Out: 00983   Voiding: NO  Catheter: YES  Color: clear  Drain:              DIET  diabetic diet    Flatus: Patient does have flatus present. Stool:  0 occurrences. Characteristics:       Ambulating  Yes, in room. Emesis: 0 occurrences. Characteristics:          VITAL SIGNS  Patient Vitals for the past 12 hrs:   Temp Pulse Resp BP SpO2   11/15/19 1455 97.8 °F (36.6 °C) 77 18 128/78 92 %   11/15/19 1410  81   97 %   11/15/19 1408  75  133/62 94 %   11/15/19 1357 98 °F (36.7 °C) 80  129/66 94 %   11/15/19 1347  78 16 120/61 94 %   11/15/19 1342  81 16 124/67 92 %   11/15/19 1338  84 16  92 %   11/15/19 1337    123/63    11/15/19 1328  90 16 108/57 96 %   11/15/19 1327 97.9 °F (36.6 °C) 86 16 108/68 95 %   11/15/19 1323  93   91 %   11/15/19 0948 97.8 °F (36.6 °C) 83 22 151/75 93 %   11/15/19 0733     90 %   11/15/19 0704 98.1 °F (36.7 °C) 84 20 145/80 91 %       Pain Assessment  Pain Intensity 1: 0 (11/15/19 1500)  Pain Location 1: Back  Pain Intervention(s) 1: Repositioned  Patient Stated Pain Goal: 0    Hourly rounds completed this shift, will give report to oncoming nurse.         Nolberto Bustillo RN

## 2019-11-15 NOTE — PROGRESS NOTES
TRANSFER - OUT REPORT:    Verbal report given to [facility], RN on Migue Gutiérrez  being transferred to Pre-Op for ordered procedure       Report consisted of patients Situation, Background, Assessment and   Recommendations(SBAR). Information from the following report(s) SBAR was reviewed with the receiving nurse. Lines:   Peripheral IV 11/13/19 Left Antecubital (Active)   Site Assessment Clean, dry, & intact 11/15/2019  7:40 AM   Phlebitis Assessment 0 11/15/2019  7:40 AM   Infiltration Assessment 0 11/15/2019  7:40 AM   Dressing Status Clean, dry, & intact 11/15/2019  7:40 AM   Dressing Type Tape;Transparent 11/15/2019  7:40 AM   Hub Color/Line Status Pink; Infusing 11/15/2019  7:40 AM   Alcohol Cap Used No 11/15/2019  7:40 AM        Opportunity for questions and clarification was provided. Patient transported with:  O2 @ 2L/nc, CBI, woodruff catheter, extra bag of  Irrigant sent with pt to pre-op.    Tech

## 2019-11-15 NOTE — BRIEF OP NOTE
BRIEF OPERATIVE NOTE    Date of Procedure: 11/15/2019   Preoperative Diagnosis: HEMATURIA/ RIGHT RENAL MASS  Postoperative Diagnosis: HEMATURIA/ RIGHT RENAL MASS    Procedure(s):  CYSTOSCOPY  Surgeon(s) and Role:     Martin Winkler MD - Primary         Surgical Staff:  Circ-1: Raffaele Shah RN  Event Time In Time Out   Incision Start 11/15/2019  1:02 PM    Incision Close 11/15/2019  1:07 PM      Anesthesia: General   Estimated Blood Loss: Minimal  Specimens: None  Findings: See dictated note  Complications: None

## 2019-11-15 NOTE — PERIOP NOTES
TRANSFER - IN REPORT:    Verbal report received from Cody Souza, [facility] on Lan Mcelroy being received from [phone] for ordered procedure      Report consisted of patients Situation, Background, Assessment and Recommendations(SBAR). Information from the following report(s) SBAR, Kardex, [facility], Recent Results and Procedure Verification was reviewed with the receiving nurse. Opportunity for questions and clarification was provided. Assessment completed upon patients arrival to unit and care assumed.

## 2019-11-15 NOTE — PROGRESS NOTES
Hourly rounds completed this shift. Patient denies nausea, vomiting, & pain. CBI running at a slow rate with yellow, clear urine output. All needs met at this time. Will continue to monitor & give report to oncoming RN.

## 2019-11-15 NOTE — ANESTHESIA PROCEDURE NOTES
Spinal Block    Start time: 11/15/2019 12:41 PM  End time: 11/15/2019 12:42 PM  Performed by: Lui Beasley MD  Authorized by: Lui Beasley MD     Pre-procedure:   Indications: at surgeon's request and primary anesthetic  Preanesthetic Checklist: patient identified, risks and benefits discussed, anesthesia consent, site marked, patient being monitored and timeout performed    Timeout Time: 12:39          Spinal Block:   Patient Position:  Seated  Prep Region:  Lumbar  Prep: DuraPrep      Location:  L2-3  Technique:  Single shot    Local Dose (mL):  3    Needle:   Needle Type:  Quincke  Needle Gauge:  22 G  Attempts:  1      Events: CSF confirmed, no blood with aspiration and no paresthesia        Assessment:  Insertion:  Uncomplicated  Patient tolerance:  Patient tolerated the procedure well with no immediate complications

## 2019-11-15 NOTE — PROGRESS NOTES
TRANSFER - IN REPORT:    Verbal report received from TAMMY Berg on Cindy Vazquez  being received from PACU for routine post - op      Report consisted of patients Situation, Background, Assessment and   Recommendations(SBAR). Information from the following report(s) SBAR was reviewed with the receiving nurse. Opportunity for questions and clarification was provided. Assessment completed upon patients arrival to unit and care assumed.

## 2019-11-15 NOTE — ANESTHESIA PREPROCEDURE EVALUATION
Relevant Problems   No relevant active problems       Anesthetic History   No history of anesthetic complications            Review of Systems / Medical History  Patient summary reviewed and pertinent labs reviewed    Pulmonary    COPD      Smoker      Comments: pulm fibrosis   Neuro/Psych         Psychiatric history     Cardiovascular    Hypertension              Exercise tolerance: <4 METS     GI/Hepatic/Renal                Endo/Other    Diabetes  Hypothyroidism       Other Findings   Comments: Sinus drainage leading to cough per patient starting 11/14           Physical Exam    Airway  Mallampati: III  TM Distance: 4 - 6 cm  Neck ROM: normal range of motion        Cardiovascular    Rhythm: regular  Rate: normal      Pertinent negatives: No murmur and peripheral edema   Dental         Pulmonary    Rhonchi:bilateral        Prolonged expiration     Abdominal         Other Findings            Anesthetic Plan    ASA: 3  Anesthesia type: spinal          Induction: Intravenous  Anesthetic plan and risks discussed with: Patient and Spouse elbow

## 2019-11-15 NOTE — PROGRESS NOTES
Hospitalist Note     Admit Date:  2019  1:57 PM   Name:  Osvaldo Silverman   Age:  69 y.o.  :  1945   MRN:  [id number]   PCP:  Mag Estes MD  Treatment Team: Attending Provider: Nicky Gaxiola MD; Utilization Review: Krysta Corona; Care Manager: Kelly Mart; Consulting Provider: Dani Ramirez MD; Primary Nurse: Navid Zamora RN    HPI/Subjective:   Patient is a 69 y.o. male who presented to the ED for cc hematuria after his dog ran into his right groin area two days ago. Patient has been passing dark blood in urine with clot formation along with being unable to urinate starting today. Patient also having pain in his right lower back. Hx of current tobacco abuse 1 pack per day, HTN, CAD, pulmonary fibrosis, DM type II, depression, and hypothyroidism.      Patient also noted to have one time episode of hypoxia 78% on RA. Currently satting well on 2L O2.      Vitals - stable     Labs- WBC 14.8, hg 17.8, UA with moderate blood, WBC 5-10.      CT abdomen pelvis with contrast showed - 6.7 cm mass midpole right kidney highly suspicious for a malignancy with blood in the renal collecting system and a 2 cm blood clot in the urinary  Bladder. A 3.2 cm abdominal aortic aneurysm. Indeterminate 2 cm exophytic hyperattenuating mass off the left kidney. 11/15 Currently on continuous bladder irrigation. Hematuria improved. No nausea, no vomiting. Pt c/o Right flank pain.    Objective:     Patient Vitals for the past 24 hrs:   Temp Pulse Resp BP SpO2   11/15/19 0948 97.8 °F (36.6 °C) 83 22 151/75 93 %   11/15/19 0733     90 %   11/15/19 0704 98.1 °F (36.7 °C) 84 20 145/80 91 %   11/15/19 0454 97.7 °F (36.5 °C) 93 20 149/88 91 %   19 2339 98.3 °F (36.8 °C) 90 18 137/71 91 %   19 2117 97.8 °F (36.6 °C) 83 19 120/70 93 %   19 1548 98.3 °F (36.8 °C) 83 20 121/69 93 %     Oxygen Therapy  O2 Sat (%): 93 % (11/15/19 0905)  Pulse via Oximetry: 87 beats per minute (11/15/19 0788)  O2 Device: Nasal cannula (11/15/19 0948)  O2 Flow Rate (L/min): 4 l/min (11/15/19 0948)      Intake/Output Summary (Last 24 hours) at 11/15/2019 1136  Last data filed at 11/15/2019 0805  Gross per 24 hour   Intake 9308.22 ml   Output 96306 ml   Net -891.78 ml       *Note that automatically entered I/Os may not be accurate; dependent on patient compliance with collection and accurate  by techs. General:    Well nourished. Alert. CV:   RRR. No murmur, rub, or gallop. Lungs:   CTAB. No wheezing, rhonchi, or rales. Abdomen:   Soft, nontender, nondistended. Extremities: Warm and dry. No cyanosis or edema. Skin:     No rashes or jaundice. Neuro:  No gross focal deficits    Data Review:  I have reviewed all labs, meds, and studies from the last 24 hours:    Recent Results (from the past 24 hour(s))   GLUCOSE, POC    Collection Time: 11/14/19  3:52 PM   Result Value Ref Range    Glucose (POC) 167 (H) 65 - 100 mg/dL   GLUCOSE, POC    Collection Time: 11/14/19  9:21 PM   Result Value Ref Range    Glucose (POC) 113 (H) 65 - 100 mg/dL   CBC WITH AUTOMATED DIFF    Collection Time: 11/15/19  6:19 AM   Result Value Ref Range    WBC 12.9 (H) 4.3 - 11.1 K/uL    RBC 4.87 4.23 - 5.6 M/uL    HGB 16.1 13.6 - 17.2 g/dL    HCT 46.8 41.1 - 50.3 %    MCV 96.1 79.6 - 97.8 FL    MCH 33.1 (H) 26.1 - 32.9 PG    MCHC 34.4 31.4 - 35.0 g/dL    RDW 13.3 11.9 - 14.6 %    PLATELET 465 765 - 794 K/uL    MPV 10.3 9.4 - 12.3 FL    ABSOLUTE NRBC 0.00 0.0 - 0.2 K/uL    DF AUTOMATED      NEUTROPHILS 76 43 - 78 %    LYMPHOCYTES 12 (L) 13 - 44 %    MONOCYTES 12 4.0 - 12.0 %    EOSINOPHILS 0 (L) 0.5 - 7.8 %    BASOPHILS 0 0.0 - 2.0 %    IMMATURE GRANULOCYTES 0 0.0 - 5.0 %    ABS. NEUTROPHILS 9.7 (H) 1.7 - 8.2 K/UL    ABS. LYMPHOCYTES 1.6 0.5 - 4.6 K/UL    ABS. MONOCYTES 1.5 (H) 0.1 - 1.3 K/UL    ABS. EOSINOPHILS 0.0 0.0 - 0.8 K/UL    ABS. BASOPHILS 0.0 0.0 - 0.2 K/UL    ABS. IMM.  GRANS. 0.0 0.0 - 0.5 K/UL   METABOLIC PANEL, BASIC Collection Time: 11/15/19  6:19 AM   Result Value Ref Range    Sodium 143 136 - 145 mmol/L    Potassium 4.1 3.5 - 5.1 mmol/L    Chloride 110 (H) 98 - 107 mmol/L    CO2 26 21 - 32 mmol/L    Anion gap 7 7 - 16 mmol/L    Glucose 141 (H) 65 - 100 mg/dL    BUN 20 8 - 23 MG/DL    Creatinine 1.40 0.8 - 1.5 MG/DL    GFR est AA >60 >60 ml/min/1.73m2    GFR est non-AA 53 (L) >60 ml/min/1.73m2    Calcium 8.5 8.3 - 10.4 MG/DL   GLUCOSE, POC    Collection Time: 11/15/19  7:07 AM   Result Value Ref Range    Glucose (POC) 140 (H) 65 - 100 mg/dL        All Micro Results     None          No results found for this visit on 11/13/19.     Current Meds:  Current Facility-Administered Medications   Medication Dose Route Frequency    ceFAZolin (ANCEF) 2 g/20 mL in sterile water IV syringe  2 g IntraVENous ON CALL TO OR    lidocaine (XYLOCAINE) 10 mg/mL (1 %) injection 0.1 mL  0.1 mL SubCUTAneous PRN    lactated Ringers infusion  100 mL/hr IntraVENous CONTINUOUS    acetaminophen (TYLENOL) tablet 650 mg  650 mg Oral Q4H PRN    HYDROcodone-acetaminophen (NORCO) 5-325 mg per tablet 1 Tab  1 Tab Oral Q4H PRN    naloxone (NARCAN) injection 0.4 mg  0.4 mg IntraVENous PRN    ondansetron (ZOFRAN) injection 4 mg  4 mg IntraVENous Q4H PRN    azelastine-fluticasone 137-50 mcg/spray spry 2 Spray (Patient Supplied)  2 Spray Both Nostrils BID    carvedilol (COREG) tablet 12.5 mg  12.5 mg Oral BID WITH MEALS    levothyroxine (SYNTHROID) tablet 88 mcg  88 mcg Oral ACB    pravastatin (PRAVACHOL) tablet 80 mg  80 mg Oral QHS    tiotropium (SPIRIVA) inhalation capsule 18 mcg  18 mcg Inhalation DAILY    albuterol (PROVENTIL VENTOLIN) nebulizer solution 2.5 mg  2.5 mg Nebulization Q4H PRN    nicotine (NICODERM CQ) 21 mg/24 hr patch 1 Patch  1 Patch TransDERmal Q24H    aspirin delayed-release tablet 81 mg  81 mg Oral DAILY    insulin lispro (HUMALOG) injection   SubCUTAneous AC&HS       Other Studies (last 24 hours):  No results found.    Assessment and Plan:     Hospital Problems as of 11/15/2019 Date Reviewed: 5/13/2019          Codes Class Noted - Resolved POA    * (Principal) Renal mass ICD-10-CM: N28.89  ICD-9-CM: 593.9  11/13/2019 - Present Unknown        Hematuria ICD-10-CM: R31.9  ICD-9-CM: 599.70  11/13/2019 - Present Unknown        Abdominal aortic aneurysm (AAA) without rupture ([facility] ICD-10-CM: I71.4  ICD-9-CM: 441.4  11/27/2017 - Present Yes    Overview Signed 11/27/2017 12:34 PM by Briana Wen MD     Check US              COPD (chronic obstructive pulmonary disease) ([facility] (Chronic) ICD-10-CM: J44.9  ICD-9-CM: 625  3/9/2015 - Present Yes    Overview Signed 11/27/2017 12:33 PM by Briana Wen MD     Pt following with Pulmonology. Discussed smoking cessation plan with pt today. Depression ICD-10-CM: F32.9  ICD-9-CM: 295  2/11/2015 - Present Yes        Hypothyroidism (Chronic) ICD-10-CM: E03.9  ICD-9-CM: 244.9  3/19/2013 - Present Yes    Overview Addendum 11/27/2017 12:32 PM by Briana Wen MD     TSH 3.43; increase Levoxyl to 88 mcg po daily. Recheck TSH             Diabetes mellitus ([facility] ICD-10-CM: E11.9  ICD-9-CM: 250.00  3/19/2013 - Present Yes    Overview Addendum 11/27/2017 12:32 PM by Briana Wen MD     HA1c 6.1; continue with annual eye exams and daily self foot exams. Tobacco use disorder (Chronic) ICD-10-CM: E75.309  ICD-9-CM: 305.1  3/19/2013 - Present Yes    Overview Signed 11/9/2017  6:41 PM by Briana Wen MD     Told pt to quit smoking now. Pt knows he needs to quit.              Essential hypertension (Chronic) ICD-10-CM: I10  ICD-9-CM: 401.9  3/19/2013 - Present Yes    Overview Addendum 11/27/2017 12:31 PM by Briana Wen MD     At [facility].              Coronary artery disease involving native coronary artery of native heart (Chronic) ICD-10-CM: I25.10  ICD-9-CM: 414.01  3/19/2013 - Present Yes    Overview Signed 11/9/2017  6:33 PM by Briana Wen MD     Refer to Cardiology to evaluate. Last stress testing was over 6 years ago. Multiple risk factors: male gender, DM, HTN, Smoking, HDL <39. Plan: This is a 69 y male with:    Renal mass with hematuria- Urology consulted. Appreciate recs. Currently on continuous bladder irrigation. Plan for cystoscopy today per Urology. Continue IV fluids.      Hypoxia - One time episode. Possibly holding breathe from pain? Physical exam benign on lung exam. Chest x ray clear. Continue inhalers from home. ALINE POA:  Continue IV fluids.      HTN with hx of CAD- ASA, carvedilol 12.5mg BID     Dm type II - Hold metformin. Sliding scale Insulin.      HLD- statin     Hypothyroidism - Levothyroxine 88 mcg daily     Tobacco abuse - Nicotine patch    AAA- Needs to stop smoking. BP control. Follows AAA with PCP     Leukocytosis also noted, no fever, and UA with no pyuria. Monitor WBC trend. Possibly stress demargination     DVT prophylaxis - SCDs. DC planning/Dispo: Anticipate dc home in 24 hours once cleared by Urology,     Admission status was changed to inpatient because of ongoing evaluation and management of patient's hematuria. Past medical history surgical history family history social history and review of systems are reviewed of the [facility] on 11/13/2019 and no changes have been noticed.     Diet:  DIET NPO  DIET NPO  DVT ppx:  SCDs    Signed:  Ayana Zayas MD

## 2019-11-15 NOTE — PERIOP NOTES
TRANSFER - OUT REPORT:    Verbal report given to Win Jeffrey on Dinorah Blades  being transferred to [phone] for routine post - op       Report consisted of patients Situation, Background, Assessment and   Recommendations(SBAR). Information from the following report(s) SBAR, OR Summary, Procedure Summary, Intake/Output, MAR and Cardiac Rhythm NSR was reviewed with the receiving nurse. Lines:   Peripheral IV 11/13/19 Left Antecubital (Active)   Site Assessment Clean, dry, & intact 11/15/2019  1:27 PM   Phlebitis Assessment 0 11/15/2019  1:27 PM   Infiltration Assessment 0 11/15/2019  1:27 PM   Dressing Status Clean, dry, & intact 11/15/2019  1:27 PM   Dressing Type Transparent;Tape 11/15/2019  1:27 PM   Hub Color/Line Status Infusing;Flushed;Patent 11/15/2019  9:51 AM   Alcohol Cap Used No 11/15/2019  7:40 AM        Opportunity for questions and clarification was provided. Patient transported with:   O2 @ 3 liters  Tech    VTE prophylaxis orders have been written for Dinorah Blades.

## 2019-11-16 VITALS
BODY MASS INDEX: 31.18 KG/M2 | WEIGHT: 217.81 LBS | DIASTOLIC BLOOD PRESSURE: 84 MMHG | OXYGEN SATURATION: 91 % | SYSTOLIC BLOOD PRESSURE: 138 MMHG | HEART RATE: 74 BPM | HEIGHT: 70 IN | TEMPERATURE: 98.2 F | RESPIRATION RATE: 18 BRPM

## 2019-11-16 LAB
ANION GAP SERPL CALC-SCNC: 8 MMOL/L (ref 7–16)
BASOPHILS # BLD: 0 K/UL (ref 0–0.2)
BASOPHILS NFR BLD: 0 % (ref 0–2)
BUN SERPL-MCNC: 18 MG/DL (ref 8–23)
CALCIUM SERPL-MCNC: 8.4 MG/DL (ref 8.3–10.4)
CHLORIDE SERPL-SCNC: 106 MMOL/L (ref 98–107)
CO2 SERPL-SCNC: 27 MMOL/L (ref 21–32)
CREAT SERPL-MCNC: 1.21 MG/DL (ref 0.8–1.5)
DIFFERENTIAL METHOD BLD: ABNORMAL
EOSINOPHIL # BLD: 0.1 K/UL (ref 0–0.8)
EOSINOPHIL NFR BLD: 1 % (ref 0.5–7.8)
ERYTHROCYTE [DISTWIDTH] IN BLOOD BY AUTOMATED COUNT: 13.3 % (ref 11.9–14.6)
GLUCOSE BLD STRIP.AUTO-MCNC: 120 MG/DL (ref 65–100)
GLUCOSE BLD STRIP.AUTO-MCNC: 134 MG/DL (ref 65–100)
GLUCOSE SERPL-MCNC: 131 MG/DL (ref 65–100)
HCT VFR BLD AUTO: 45.4 % (ref 41.1–50.3)
HGB BLD-MCNC: 15.5 G/DL (ref 13.6–17.2)
IMM GRANULOCYTES # BLD AUTO: 0 K/UL (ref 0–0.5)
IMM GRANULOCYTES NFR BLD AUTO: 0 % (ref 0–5)
LYMPHOCYTES # BLD: 1.6 K/UL (ref 0.5–4.6)
LYMPHOCYTES NFR BLD: 17 % (ref 13–44)
MCH RBC QN AUTO: 32.4 PG (ref 26.1–32.9)
MCHC RBC AUTO-ENTMCNC: 34.1 G/DL (ref 31.4–35)
MCV RBC AUTO: 94.8 FL (ref 79.6–97.8)
MONOCYTES # BLD: 1.2 K/UL (ref 0.1–1.3)
MONOCYTES NFR BLD: 12 % (ref 4–12)
NEUTS SEG # BLD: 6.6 K/UL (ref 1.7–8.2)
NEUTS SEG NFR BLD: 70 % (ref 43–78)
NRBC # BLD: 0 K/UL (ref 0–0.2)
PLATELET # BLD AUTO: 144 K/UL (ref 150–450)
PMV BLD AUTO: 10.6 FL (ref 9.4–12.3)
POTASSIUM SERPL-SCNC: 3.7 MMOL/L (ref 3.5–5.1)
RBC # BLD AUTO: 4.79 M/UL (ref 4.23–5.6)
SODIUM SERPL-SCNC: 141 MMOL/L (ref 136–145)
WBC # BLD AUTO: 9.5 K/UL (ref 4.3–11.1)

## 2019-11-16 PROCEDURE — 94640 AIRWAY INHALATION TREATMENT: CPT

## 2019-11-16 PROCEDURE — 77010033678 HC OXYGEN DAILY

## 2019-11-16 PROCEDURE — 94760 N-INVAS EAR/PLS OXIMETRY 1: CPT

## 2019-11-16 PROCEDURE — 74011250637 HC RX REV CODE- 250/637: Performed by: FAMILY MEDICINE

## 2019-11-16 PROCEDURE — 80048 BASIC METABOLIC PNL TOTAL CA: CPT

## 2019-11-16 PROCEDURE — 82962 GLUCOSE BLOOD TEST: CPT

## 2019-11-16 PROCEDURE — 36415 COLL VENOUS BLD VENIPUNCTURE: CPT

## 2019-11-16 PROCEDURE — 85025 COMPLETE CBC W/AUTO DIFF WBC: CPT

## 2019-11-16 RX ADMIN — LEVOTHYROXINE SODIUM 88 MCG: 88 TABLET ORAL at 05:30

## 2019-11-16 RX ADMIN — ASPIRIN 81 MG: 81 TABLET ORAL at 08:53

## 2019-11-16 RX ADMIN — CARVEDILOL 12.5 MG: 12.5 TABLET, FILM COATED ORAL at 08:53

## 2019-11-16 RX ADMIN — TIOTROPIUM BROMIDE 18 MCG: 18 CAPSULE ORAL; RESPIRATORY (INHALATION) at 08:04

## 2019-11-16 NOTE — DISCHARGE SUMMARY
Hospitalist Discharge Summary     Admit Date:  2019  1:57 PM   Name:  Jeremiah Gonzalez   Age:  69 y.o.  :  1945   MRN:  [id number]   PCP:  Frederick Orr MD  Treatment Team: Utilization Review: Lolly Noel; Care Manager: Piedad Dominguez; Consulting Provider: Sofia Fairbanks MD    Problem List for this Hospitalization:  Hospital Problems as of 2019 Date Reviewed: 2019          Codes Class Noted - Resolved POA    * (Principal) Renal mass ICD-10-CM: N28.89  ICD-9-CM: 593.9  2019 - Present Unknown        Hematuria ICD-10-CM: R31.9  ICD-9-CM: 599.70  2019 - Present Unknown        Abdominal aortic aneurysm (AAA) without rupture ([facility] [address] ICD-10-CM: I71.4  ICD-9-CM: 441.4  2017 - Present Yes    Overview Signed 2017 12:34 PM by Frederick Orr MD     Check US              COPD (chronic obstructive pulmonary disease) ([address] (Chronic) ICD-10-CM: J44.9  ICD-9-CM: 162  3/9/2015 - Present Yes    Overview Signed 2017 12:33 PM by Frederick Orr MD     Pt following with Pulmonology. Discussed smoking cessation plan with pt today. Depression ICD-10-CM: F32.9  ICD-9-CM: 767  2015 - Present Yes        Hypothyroidism (Chronic) ICD-10-CM: E03.9  ICD-9-CM: 244.9  3/19/2013 - Present Yes    Overview Addendum 2017 12:32 PM by Frederick Orr MD     TSH 3.43; increase Levoxyl to 88 mcg po daily. Recheck TSH             Diabetes mellitus ([facility] ICD-10-CM: E11.9  ICD-9-CM: 250.00  3/19/2013 - Present Yes    Overview Addendum 2017 12:32 PM by Frederick Orr MD     HA1c 6.1; continue with annual eye exams and daily self foot exams. Tobacco use disorder (Chronic) ICD-10-CM: Z74.167  ICD-9-CM: 305.1  3/19/2013 - Present Yes    Overview Signed 2017  6:41 PM by Frederick Orr MD     Told pt to quit smoking now. Pt knows he needs to quit.              Essential hypertension (Chronic) ICD-10-CM: I10  ICD-9-CM: 401.9  3/19/2013 - Present Yes Overview Addendum 11/27/2017 12:31 PM by Valorie Robertson MD     At [facility].              Coronary artery disease involving native coronary artery of native heart (Chronic) ICD-10-CM: I25.10  ICD-9-CM: 414.01  3/19/2013 - Present Yes    Overview Signed 11/9/2017  6:33 PM by Valorie Robertson MD     Refer to Cardiology to evaluate. Last stress testing was over 6 years ago. Multiple risk factors: male gender, DM, HTN, Smoking, HDL <39. Admission HPI from 11/13/2019:    Patient is a 69 y.o. male who presented to the ED for cc hematuria after his dog ran into his right groin area two days ago. Patient has been passing dark blood in urine with clot formation along with being unable to urinate starting today. Patient also having pain in his right lower back. Hx of current tobacco abuse 1 pack per day, HTN, CAD, pulmonary fibrosis, DM type II, depression, and hypothyroidism.      Patient also noted to have one time episode of hypoxia 78% on RA. Currently satting well on 2L O2.      Vitals - stable     Labs- WBC 14.8, hg 17.8, UA with moderate blood, WBC 5-10.      CT abdomen pelvis with contrast showed - 6.7 cm mass midpole right kidney highly suspicious for a malignancy with blood in the renal collecting system and a 2 cm blood clot in the urinary  Bladder. A 3.2 cm abdominal aortic aneurysm. Indeterminate 2 cm exophytic hyperattenuating mass off the left kidney. Hospital Course:  Consultants: Urology    Procedures cystoscopy on 11/15/2019 by Dr. Anyi Juan    Renal mass: Suspected renal cancer (highly suspicious for malignancy) POA - scheduled for out-pt follow up with Urology in 1 week-     Pt was admitted for hematuria. He has Renal mass. Urology consulted. Patient was started on continuous bladder irrigation on admission and his hematuria cleared. Patient had cystoscopy on 11/15/2019 by Dr. Anyi Juan. Patient passed voiding trial after Bryant catheter was removed.   He will need outpatient follow-up with urology in 1 week for his right renal mass. Hypoxia on admission- One time episode. Possibly holding breathe from pain? Physical exam benign on lung exam. Chest x ray clear. Continue inhalers from home.  He also has mild ALINE POA which resolved with IV hydration. Other chronic medical problems which were stable including hypertension diabetes dyslipidemia hypothyroidism. He has abdominal aortic aneurysm counseled regarding quitting smoking. Leukocytosis on admission was reactive. Patient was discharged home in stable condition to follow-up with primary care physician in 3 to 5 days and urology in 1 week. Disposition: Home or Self Care  Activity: as tolerated  Diet: DIET DIABETIC CONSISTENT CARB Regular    Follow up instructions, discharge meds at bottom of this note. Plan was discussed with the pt. All questions answered. Patient was stable at time of discharge. Patient will call a physician or return if any concerns. Diagnostic Imaging/Tests:   Xr Chest Pa Lat    Result Date: 11/13/2019  History: SOB Two views chest COMPARISON: 2/22/2017 Findings: Senescent changes in the lungs noted. The lungs are otherwise well expanded and clear. The cardiac silhouette, and mediastinal contour, and osseous structures are stable. Impression: Stable two-view chest.     Ct Abd Pelv W Cont    Result Date: 11/13/2019  CT of the Abdomen and Pelvis with contrast CLINICAL INDICATION:  Acute moderate pain right flank, further evaluation of right renal mass, abdominal aortic aneurysm, left renal mass COMPARISON: Noncontrast CT earlier today, also CT PET 11/2/2017 TECHNIQUE: Automated exposure Control was used. Multiple axial images were obtained through the abdomen and pelvis after intravenous injection of 125cc of isovue 370. No oral contrast given per request, limiting evaluation of GI tract and surrounding structures. Coronal reformatted images obtained for further evaluation of organs.  FINDINGS: Partially included lung bases again demonstrate chronic interstitial disease, and there is an indeterminate 4 mm nodule in the lateral right lower lobe (axial image 11). Partially visualized heart is enlarged, with calcifications of aorta and coronary arteries. Abdomen: Tiny hypodensities in the liver are too small to characterize but statistically benign. Adrenals, pancreas, spleen, gallbladder demonstrate no acute or suspicious finding. Right kidney again demonstrates a large heterogeneous mass involving the majority of the mid and lower pole and extending into the renal hilum but not definitely invading vasculature, or extending through the capsule. Overall this mass measures up to 6.3 cm AP, 7.8 cm transverse, 6.8 cm craniocaudal. Single renal artery and vein are present on the right. Vascular evaluation is somewhat limited given suboptimal bolus. Left kidney demonstrates exophytic hyperdense 2.5 cm mass laterally (in 2012 this measured 3.8 cm) compatible with a benign proteinaceous cyst, and a tiny nonobstructing 1 mm lower pole stone. Smaller hypodensities in the left renal cortex are incompletely characterized given size. There are diffuse calcifications of the aorta, and unchanged infrarenal dilatation measuring up to 3.5 cm diameter. GI tract is not well evaluated with no oral contrast. No obvious obstruction, hernia, or appendicitis. Prominent diverticulosis of large bowel which is tortuous, and moderate stool consistent with constipation. Normal right side appendix. No free air or overt ascites. Mild right renal perinephric fluid. No definite lymphadenopathy. Pelvis: Air and catheter within urinary bladder. Prostate not enlarged. Nonspecific bladder wall thickening could BE cystitis. No pelvic lymphadenopathy or focal fluid collection. No suspicious mass. Bones: No acute osseous lesions. IMPRESSION: 1. Right renal mass highly suspicious for malignancy. 2. Atherosclerotic vascular disease.  Abdominal aortic aneurysm. 3. Tiny indeterminate right lung nodule. 4. Diverticulosis. 5. Urinary bladder wall thickening and intraluminal gas, possibly cystitis or iatrogenic. GI details are limited without oral contrast.    Us Scrotum/testicles    Result Date: 11/13/2019  ULTRASOUND SCROTUM. HISTORY: Moderate right-sided scrotal pain. TECHNIQUE: High resolution linear images of the scrotum with color flow Doppler. FINDINGS: Right testicle measures 4.6 x 3.4 x 3.6. Right epididymis measures 6mm. Echotexture of the testicle appears uniform. Color flow Doppler of the testicle and epididymis appears normal. Small hydrocele. Left testicle measures 4.7 x 2.7 x 3.3. Left epididymis measures 8mm. Small epididymal cyst, 13 mm. Echotexture of the testicle appears uniform. Color flow Doppler of the testicle and epididymis appears normal.     IMPRESSION: Negative for testicular torsion. Small right-sided hydrocele. Small epididymal cyst on the left. Ct Urogram Wo Cont    Result Date: 11/13/2019  CT ABDOMEN AND PELVIS FOR STONES WITHOUT CONTRAST INDICATION:  Right flank pain and hematuria. Multiple axial images were obtained through the abdomen and pelvis without intravenous or oral contrast.  Radiation dose reduction techniques were used for this study: All CT scans performed at this facility use one or all of the following: Automated exposure control, adjustment of the mA and/or kVp according to patient's size, iterative reconstruction. COMPARISON: None FINDINGS: -KIDNEYS/URETERS: Mild right hydronephrosis. High attenuation material within the right renal collecting system. There is a 5.2 x 6.7 cm mass midpole right kidney laterally. There is also a 2 cm exophytic hyperattenuating mass off the mid to lower pole left kidney. Small kidney stones on the left. -BLADDER: Demonstrates a hyperattenuating 3.4 cm focus. -LUNG BASES: Mild hypoventilatory changes.  -OTHER: No gross focal abnormality on this noncontrasted exam within the liver or spleen. No gallstones. No free air or free fluid. Aorta is calcified and mildly aneurysmal, 3.2 cm. .     IMPRESSION: 1) A 6.7 cm mass midpole right kidney highly suspicious for a malignancy with blood in the renal collecting system and a 2 cm blood clot in the urinary bladder. 2) A 3.2 cm abdominal aortic aneurysm. 3) Indeterminate 2 cm exophytic hyperattenuating mass off the left kidney. Echocardiogram results:  No results found for this visit on 11/13/19. Procedures done this admission:  Procedure(s):  CYSTOSCOPY    All Micro Results     None          Labs: Results:       BMP, Mg, Phos Recent Labs     11/16/19  0551 11/15/19  0619 11/14/19  0729    143 141   K 3.7 4.1 4.4    110* 108*   CO2 27 26 24   AGAP 8 7 9   BUN 18 20 25*   CREA 1.21 1.40 1.27   CA 8.4 8.5 8.3   * 141* 144*      CBC Recent Labs     11/16/19  0551 11/15/19  0619 11/14/19  0729   WBC 9.5 12.9* 11.4*   RBC 4.79 4.87 4.94   HGB 15.5 16.1 16.1   HCT 45.4 46.8 47.3   * 156 163   GRANS 70 76 82*   LYMPH 17 12* 9*   EOS 1 0* 0*   MONOS 12 12 8   BASOS 0 0 0   IG 0 0 1   ANEU 6.6 9.7* 9.4*   ABL 1.6 1.6 1.1   SOMMER 0.1 0.0 0.0   ABM 1.2 1.5* 0.9   ABB 0.0 0.0 0.0   AIG 0.0 0.0 0.1      LFT No results for input(s): SGOT, ALT, TBIL, AP, TP, ALB, GLOB, AGRAT, GPT in the last 72 hours.    Cardiac Testing Lab Results   Component Value Date/Time    Troponin-I, Qt. <0.02 (L) 11/13/2019 01:27 PM      Coagulation Tests No results found for: PTP, INR, APTT, INREXT   A1c Lab Results   Component Value Date/Time    Hemoglobin A1c 6.3 (H) 11/11/2019 11:01 AM    Hemoglobin A1c 6.8 (H) 05/08/2019 01:58 PM    Hemoglobin A1c 6.3 (H) 11/28/2018 10:29 AM      Lipid Panel Lab Results   Component Value Date/Time    Cholesterol, total 153 11/11/2019 11:01 AM    HDL Cholesterol 42 11/11/2019 11:01 AM    LDL, calculated 97 11/11/2019 11:01 AM    VLDL, calculated 14 11/11/2019 11:01 AM    Triglyceride 70 11/11/2019 11:01 AM      Thyroid Panel Lab Results   Component Value Date/Time    TSH 2.940 11/11/2019 11:01 AM    TSH 2.130 05/08/2019 01:58 PM        Most Recent UA Lab Results   Component Value Date/Time    Color RED 11/13/2019 01:27 PM    Appearance CLOUDY 11/13/2019 01:27 PM    pH (UA) 6.0 11/13/2019 01:27 PM    Protein 100 (A) 11/13/2019 01:27 PM    Glucose NEGATIVE  11/13/2019 01:27 PM    Ketone 40 (A) 11/13/2019 01:27 PM    Bilirubin MODERATE (A) 11/13/2019 01:27 PM    Blood MODERATE (A) 11/13/2019 01:27 PM    Urobilinogen 0.2 11/13/2019 01:27 PM    Nitrites POSITIVE (A) 11/13/2019 01:27 PM    Leukocyte Esterase NEGATIVE  11/13/2019 01:27 PM    WBC 5-10 11/13/2019 01:27 PM    RBC >100 11/13/2019 01:27 PM    Epithelial cells 0-3 11/13/2019 01:27 PM    Bacteria 0 11/13/2019 01:27 PM    Casts 0 11/13/2019 01:27 PM    Crystals, urine 0 11/13/2019 01:27 PM    Mucus 0 11/13/2019 01:27 PM        Allergies   Allergen Reactions    Chantix [Varenicline] Other (comments)     [place]    Symbicort [Budesonide-Formoterol] Other (comments)     Thrush in mouth    Wellbutrin [Bupropion Hcl] Hives     Immunization History   Administered Date(s) Administered    Influenza High Dose Vaccine PF 12/03/2018    Influenza Vaccine 10/01/2016    Pneumococcal Conjugate (PCV-13) 02/22/2018    Pneumococcal Vaccine (Unspecified Type) 01/01/2016       All Labs from Last 24 Hrs:  Recent Results (from the past 24 hour(s))   GLUCOSE, POC    Collection Time: 11/15/19  3:59 PM   Result Value Ref Range    Glucose (POC) 104 (H) 65 - 100 mg/dL   GLUCOSE, POC    Collection Time: 11/15/19  8:26 PM   Result Value Ref Range    Glucose (POC) 141 (H) 65 - 100 mg/dL   CBC WITH AUTOMATED DIFF    Collection Time: 11/16/19  5:51 AM   Result Value Ref Range    WBC 9.5 4.3 - 11.1 K/uL    RBC 4.79 4.23 - 5.6 M/uL    HGB 15.5 13.6 - 17.2 g/dL    HCT 45.4 41.1 - 50.3 %    MCV 94.8 79.6 - 97.8 FL    MCH 32.4 26.1 - 32.9 PG    MCHC 34.1 31.4 - 35.0 g/dL    RDW 13.3 11.9 - 14.6 % PLATELET 969 (L) 589 - 450 K/uL    MPV 10.6 9.4 - 12.3 FL    ABSOLUTE NRBC 0.00 0.0 - 0.2 K/uL    DF AUTOMATED      NEUTROPHILS 70 43 - 78 %    LYMPHOCYTES 17 13 - 44 %    MONOCYTES 12 4.0 - 12.0 %    EOSINOPHILS 1 0.5 - 7.8 %    BASOPHILS 0 0.0 - 2.0 %    IMMATURE GRANULOCYTES 0 0.0 - 5.0 %    ABS. NEUTROPHILS 6.6 1.7 - 8.2 K/UL    ABS. LYMPHOCYTES 1.6 0.5 - 4.6 K/UL    ABS. MONOCYTES 1.2 0.1 - 1.3 K/UL    ABS. EOSINOPHILS 0.1 0.0 - 0.8 K/UL    ABS. BASOPHILS 0.0 0.0 - 0.2 K/UL    ABS. IMM.  GRANS. 0.0 0.0 - 0.5 K/UL   METABOLIC PANEL, BASIC    Collection Time: 11/16/19  5:51 AM   Result Value Ref Range    Sodium 141 136 - 145 mmol/L    Potassium 3.7 3.5 - 5.1 mmol/L    Chloride 106 98 - 107 mmol/L    CO2 27 21 - 32 mmol/L    Anion gap 8 7 - 16 mmol/L    Glucose 131 (H) 65 - 100 mg/dL    BUN 18 8 - 23 MG/DL    Creatinine 1.21 0.8 - 1.5 MG/DL    GFR est AA >60 >60 ml/min/1.73m2    GFR est non-AA >60 >60 ml/min/1.73m2    Calcium 8.4 8.3 - 10.4 MG/DL   GLUCOSE, POC    Collection Time: 11/16/19  7:13 AM   Result Value Ref Range    Glucose (POC) 134 (H) 65 - 100 mg/dL   GLUCOSE, POC    Collection Time: 11/16/19 11:11 AM   Result Value Ref Range    Glucose (POC) 120 (H) 65 - 100 mg/dL       Current Med List in Hospital:   Current Facility-Administered Medications   Medication Dose Route Frequency    acetaminophen (TYLENOL) tablet 650 mg  650 mg Oral Q4H PRN    HYDROcodone-acetaminophen (NORCO) 5-325 mg per tablet 1 Tab  1 Tab Oral Q4H PRN    naloxone (NARCAN) injection 0.4 mg  0.4 mg IntraVENous PRN    ondansetron (ZOFRAN) injection 4 mg  4 mg IntraVENous Q4H PRN    azelastine-fluticasone 137-50 mcg/spray spry 2 Spray (Patient Supplied)  2 Spray Both Nostrils BID    carvedilol (COREG) tablet 12.5 mg  12.5 mg Oral BID WITH MEALS    levothyroxine (SYNTHROID) tablet 88 mcg  88 mcg Oral ACB    pravastatin (PRAVACHOL) tablet 80 mg  80 mg Oral QHS    tiotropium (SPIRIVA) inhalation capsule 18 mcg  18 mcg Inhalation DAILY    albuterol (PROVENTIL VENTOLIN) nebulizer solution 2.5 mg  2.5 mg Nebulization Q4H PRN    nicotine (NICODERM CQ) 21 mg/24 hr patch 1 Patch  1 Patch TransDERmal Q24H    aspirin delayed-release tablet 81 mg  81 mg Oral DAILY    insulin lispro (HUMALOG) injection   SubCUTAneous AC&HS     Current Outpatient Medications   Medication Sig    levothyroxine (SYNTHROID) 88 mcg tablet Take 1 Tab by mouth Daily (before breakfast).  pravastatin (PRAVACHOL) 80 mg tablet Take 1 Tab by mouth nightly.  carvedilol (COREG) 12.5 mg tablet Take 1 Tab by mouth two (2) times daily (with meals).  metFORMIN (GLUCOPHAGE) 500 mg tablet TAKE TWO TABLETS BY MOUTH TWICE A DAY WITH MEAL(S)    tiotropium-olodaterol (STIOLTO RESPIMAT) 2.5-2.5 mcg/actuation inhaler Take 2 Act by inhalation daily.  albuterol (PROVENTIL HFA, VENTOLIN HFA, PROAIR HFA) 90 mcg/actuation inhaler Take 2 Puffs by inhalation every four (4) hours as needed for Wheezing.  b complex vitamins (B COMPLEX 1) tablet Take 1 Tab by mouth daily. Unknown dose    aspirin delayed-release 81 mg tablet Take 162 mg by mouth daily.  saw palmetto 160 mg cap Take  by mouth.  co-enzyme Q-10 (CO Q-10) 100 mg capsule Take 100 mg by mouth daily.  ginkgo biloba 120 mg tab Take  by mouth.  multivitamin (ONE A DAY) tablet Take 1 Tab by mouth daily.  Cholecalciferol, Vitamin D3, (VITAMIN D3) 1,000 unit cap Take  by mouth.  ascorbic acid (VITAMIN C WITH JURGEN HIPS) 1,000 mg tablet Take  by mouth. 3 tablets daily    OTHER,NON-FORMULARY, Tumeric po-1 cap po qd    clindamycin-benzoyl peroxide (BENZACLIN) 1-5 % topical gel Apply  to affected area two (2) times a day. Apply to affected area after the skin has been cleansed and dried.  azelastine (ASTEPRO) 0.15 % (205.5 mcg) 2 Sprays by Both Nostrils route two (2) times a day.  Indications: Seasonal Runny Nose       Discharge Exam:  Patient Vitals for the past 24 hrs:   Temp Pulse Resp BP SpO2   11/16/19 0806     91 %   11/16/19 0715 98.2 °F (36.8 °C) 74 18 138/84 91 %   11/16/19 0550 97.8 °F (36.6 °C) 79 18 138/76 90 %   11/16/19 0007 98 °F (36.7 °C) 78 18 137/75 93 %   11/15/19 2001 97.8 °F (36.6 °C) 77 18 105/69 91 %   11/15/19 1455 97.8 °F (36.6 °C) 77 18 128/78 92 %   11/15/19 1410  81   97 %   11/15/19 1408  75  133/62 94 %   11/15/19 1357 98 °F (36.7 °C) 80  129/66 94 %   11/15/19 1347  78 16 120/61 94 %     Oxygen Therapy  O2 Sat (%): 91 % (11/16/19 0806)  Pulse via Oximetry: 84 beats per minute (11/16/19 0806)  O2 Device: Nasal cannula (11/15/19 1357)  O2 Flow Rate (L/min): 1 l/min (11/16/19 0806)    Intake/Output Summary (Last 24 hours) at 11/16/2019 1343  Last data filed at 11/16/2019 1155  Gross per 24 hour   Intake 880 ml   Output 3925 ml   Net -3045 ml       *Note that automatically entered I/Os may not be accurate; dependent on patient compliance with collection and accurate  by assistants. General:    Well nourished. Alert. Eyes:   Normal sclerae. Extraocular movements intact. ENT:  Normocephalic, atraumatic. Moist mucous membranes  CV:   Regular rate and rhythm. No murmur, rub, or gallop. Lungs:  Clear to auscultation bilaterally. No wheezing, rhonchi, or rales. Abdomen: Soft, nontender, nondistended. Bowel sounds normal.   Extremities: Warm and dry. No cyanosis or edema. Neurologic: CN II-XII grossly intact. Sensation intact. Skin:     No rashes or jaundice. Psych:  Normal mood and affect. Discharge Info:   Discharge Medication List as of 11/16/2019 11:59 AM      CONTINUE these medications which have NOT CHANGED    Details   levothyroxine (SYNTHROID) 88 mcg tablet Take 1 Tab by mouth Daily (before breakfast). , Normal, Disp-90 Tab, R-3      pravastatin (PRAVACHOL) 80 mg tablet Take 1 Tab by mouth nightly., Normal, Disp-90 Tab, R-3      carvedilol (COREG) 12.5 mg tablet Take 1 Tab by mouth two (2) times daily (with meals). , Normal, Disp-180 Tab, R-3 metFORMIN (GLUCOPHAGE) 500 mg tablet TAKE TWO TABLETS BY MOUTH TWICE A DAY WITH MEAL(S), Normal, Disp-360 Tab, R-3      tiotropium-olodaterol (STIOLTO RESPIMAT) 2.5-2.5 mcg/actuation inhaler Take 2 Act by inhalation daily. , Normal, Disp-3 Inhaler, R-3      albuterol (PROVENTIL HFA, VENTOLIN HFA, PROAIR HFA) 90 mcg/actuation inhaler Take 2 Puffs by inhalation every four (4) hours as needed for Wheezing., Normal, Disp-1 Inhaler, R-11      b complex vitamins (B COMPLEX 1) tablet Take 1 Tab by mouth daily. Unknown dose, Historical Med      aspirin delayed-release 81 mg tablet Take 162 mg by mouth daily. , Historical Med      saw palmetto 160 mg cap Take  by mouth., Historical Med      co-enzyme Q-10 (CO Q-10) 100 mg capsule Take 100 mg by mouth daily. , Historical Med      ginkgo biloba 120 mg tab Take  by mouth., Historical Med      multivitamin (ONE A DAY) tablet Take 1 Tab by mouth daily. , Historical Med      Cholecalciferol, Vitamin D3, (VITAMIN D3) 1,000 unit cap Take  by mouth., Historical Med      ascorbic acid (VITAMIN C WITH JURGEN HIPS) 1,000 mg tablet Take  by mouth. 3 tablets daily, Historical Med      OTHER,NON-FORMULARY, Tumeric po-1 cap po qd, Historical Med      clindamycin-benzoyl peroxide (BENZACLIN) 1-5 % topical gel Apply  to affected area two (2) times a day. Apply to affected area after the skin has been cleansed and dried. , Normal, Disp-1 Tube, R-3      azelastine (ASTEPRO) 0.15 % (205.5 mcg) 2 Sprays by Both Nostrils route two (2) times a day. Indications: Seasonal Runny Nose, Normal, Disp-3 Bottle, R-4             Follow Up Orders:   Follow-up Appointments   Procedures    FOLLOW UP VISIT Appointment in: 3 - 5 Days F/U WITH PCP in 3-5 days F/u with Urology in 1 week     F/U WITH PCP in 3-5 days  F/u with Urology in 1 week     Standing Status:   Standing     Number of Occurrences:   1     Order Specific Question:   Appointment in     Answer:   3 - 5 Days       Follow-up Information     Follow up With Specialties Details Why Contact Info    Palmira Alvarez MD Internal Medicine   [address]  [place] [address]  Joan Roldan MD Internal Medicine   [address] [place] [address] [phone] [facility] 160  [phone]            Time spent in patient discharge planning and coordination 38 minutes.     Signed:  Melvin Up MD

## 2019-11-16 NOTE — PROGRESS NOTES
No complaints. AF.  VSS  Abd soft  UOP stable and pink  Labs reviewed  Gross hematuria/R renal mass. Cysto neg. Voiding trial today. Office will call to schedule R nephrectomy.

## 2019-11-16 NOTE — PROGRESS NOTES
Hourly rounds completed this shift. Patient denies nausea, vomiting, &pain. Bryant cathter patent & draining bloody, clear output with minimal clots. All needs met at this time. Will continue to monitor & give report to oncoming RN.

## 2019-11-16 NOTE — PROGRESS NOTES
Discharge teaching done with pt and wife, Instructed on all care, medications, and appointments. Written instructions, medication teaching sheet, and education sheet given. Verbalizes understanding, denies questions.

## 2019-11-18 ENCOUNTER — PATIENT OUTREACH (OUTPATIENT)
Dept: CASE MANAGEMENT | Age: 74
End: 2019-11-18

## 2019-11-18 NOTE — PROGRESS NOTES
This note will not be viewable in [address] Date/Time of Call: 11/18/19 1037am   What was the patient hospitalized for? Renal Mass   Consent for RAVI GAITAN Call       Does the patient understand his/her diagnosis and/or treatment and what happened during the hospitalization? Called and spoke with patient, he agrees to call and states that he is starting to recover     Yes, patient verbalizes understanding of hospitalization and diagnosis      Did the patient receive discharge instructions? Yes   CM Assessed Risk for Readmission:         Patient stated Risk for Readmission:  Patient is a high risk for readmissionscheduled admission 11/20/19 for Right Nephrectomy    No concerns voiced at this time      Review any discharge instructions (see discharge instructions/AVS in [facility]). Ask patient if they understand these. Do they have any questions? reviewed DC instructions   Were home services ordered (nursing, PT, OT, ST, etc.)? No    If so, has the first visit occurred? If not, why? (Assist with coordination of services if necessary. )   NA   Was any DME ordered? No    If so, has it been received? If not, why?  (Assist patient in obtaining DME orders &/or equipment if necessary. ) NA    Complete a review of all medications (new, continued and discontinued meds per the D/C instructions and medication tab in [facility]). Medications Reviewed           Were all new prescriptions filled? If not, why?  (Assist patient in obtaining medications if necessary  escalate for CCM &/or SW if ongoing issues are verbalized by pt or anticipated)   No    Does the patient understand the purpose and dosing instructions for all medications? (If patient has questions, provide explanation and education.)   Patient verbalizes understanding of medications   Does the patient have any problems in performing ADLs? (If patient is unable to perform ADLs  what is the limiting factor(s)?   Do they have a support system that can assist? If no support system is present, discuss possible assistance that they may be able to obtain. Escalate for CCM/SW if ongoing issues are verbalized by pt or anticipated)   patient independent with ADLs    Does the patient have all follow-up appointments scheduled? 7 day f/up with PCP?   (f/up with PCP may be w/in 14 days if patient has a f/up with their specialist w/in 7 days)    7-14 day f/up with specialist?   (or per discharge instructions)            If f/up has not been made  what actions has the care coordinator made to accomplish this? Has transportation been arranged? Yes       Will schedule after surgery         UrologyRight Nephrectomy 11/20/19patient requesting that surgery be pushed back one more day. [facility] felice Goodson, Surgery scheduler to contact patient to discuss changing appointment. Reviewed appointment information and importance of FUs with patient       Yes, no concerns   Any other questions or concerns expressed by the patient? Patient and spouse deny any questions or concerns at this time. Care Coordinator contact information provided should needs arise. Schedule next appointment with RAVI GAITAN Coordinator or refer to RN Case Manager/ per the workflow guidelines. When is care coordinators next follow-up call scheduled? If referred for CCM  what RN care manager was the referral assigned?  Within 7 - 14 days if patient is not reassigned pending DC disposition        Within 7 - 14 days  if patient is not reassigned pending DC disposition    NA     JESSE Call Completed By: Ana Stephen, [facility] Coordinator

## 2019-11-18 NOTE — OP NOTES
[facility]  OPERATIVE REPORT    Name:  Bry Matos  MR#:  [id number]  :  1945  ACCOUNT #:  [de-identified]  DATE OF SERVICE:  11/15/2019    PREOPERATIVE DIAGNOSIS:  Gross hematuria. POSTOPERATIVE DIAGNOSIS:  Gross hematuria, probable origin right renal mass. PROCEDURE PERFORMED:  Cystoscopy under anesthesia. SURGEON:  Jorge Talbert MD      ANESTHESIA:  Spinal.    COMPLICATIONS:  None. SPECIMENS REMOVED:  None. ESTIMATED BLOOD LOSS:  Minimal.    DRAINS:  A 22-Maltese three-way Bryant catheter to gravity. NARRATIVE:  The patient was taken to the OR and after adequate spinal anesthesia was achieved, he was placed in dorsal lithotomy position and prepped and draped in the usual sterile fashion for a cystoscopy case. Preliminary cystourethroscopy was performed with a 22-Maltese cystoscope. This revealed normal pendulous bulbar urethra. The prostate had no real hypertrophy, but there was relatively high bladder neck. Once within the bladder it was noted that there were no mucosal lesions. Both ureteral orifices were normal in size, shape and position. There was a small amount of blood seen within the right ureteral orifice. The left ureteral orifice was unremarkable. At that point the bladder was thoroughly emptied. All instruments were removed. The patient was taken down out of dorsal lithotomy position, awakened, extubated, taken to the OR without any incident or complaint.         Yudi Amaya MD    [id number]  D:  2019 19:04  T:  2019 0:13  JOB #:  [id number]

## 2019-11-19 ENCOUNTER — HOSPITAL ENCOUNTER (OUTPATIENT)
Dept: SURGERY | Age: 74
Discharge: HOME | DRG: 657 | End: 2019-11-19
Payer: MEDICARE

## 2019-11-19 ENCOUNTER — ANESTHESIA EVENT (OUTPATIENT)
Dept: SURGERY | Age: 74
DRG: 657 | End: 2019-11-19
Payer: MEDICARE

## 2019-11-19 VITALS
TEMPERATURE: 97.6 F | SYSTOLIC BLOOD PRESSURE: 153 MMHG | DIASTOLIC BLOOD PRESSURE: 79 MMHG | BODY MASS INDEX: 29.13 KG/M2 | WEIGHT: 203.44 LBS | OXYGEN SATURATION: 98 % | HEIGHT: 70 IN | RESPIRATION RATE: 18 BRPM | HEART RATE: 72 BPM

## 2019-11-19 LAB
ALBUMIN SERPL-MCNC: 3.4 G/DL (ref 3.2–4.6)
ALBUMIN/GLOB SERPL: 0.8 {RATIO} (ref 1.2–3.5)
ALP SERPL-CCNC: 85 U/L (ref 50–136)
ALT SERPL-CCNC: 19 U/L (ref 12–65)
ANION GAP SERPL CALC-SCNC: 6 MMOL/L (ref 7–16)
AST SERPL-CCNC: 13 U/L (ref 15–37)
BILIRUB SERPL-MCNC: 0.6 MG/DL (ref 0.2–1.1)
BUN SERPL-MCNC: 27 MG/DL (ref 8–23)
CALCIUM SERPL-MCNC: 9.2 MG/DL (ref 8.3–10.4)
CHLORIDE SERPL-SCNC: 106 MMOL/L (ref 98–107)
CO2 SERPL-SCNC: 29 MMOL/L (ref 21–32)
CREAT SERPL-MCNC: 1.22 MG/DL (ref 0.8–1.5)
ERYTHROCYTE [DISTWIDTH] IN BLOOD BY AUTOMATED COUNT: 12.9 % (ref 11.9–14.6)
GLOBULIN SER CALC-MCNC: 4.1 G/DL (ref 2.3–3.5)
GLUCOSE BLD STRIP.AUTO-MCNC: 106 MG/DL (ref 65–100)
GLUCOSE SERPL-MCNC: 107 MG/DL (ref 65–100)
HCT VFR BLD AUTO: 48.7 % (ref 41.1–50.3)
HGB BLD-MCNC: 16.7 G/DL (ref 13.6–17.2)
MCH RBC QN AUTO: 32.9 PG (ref 26.1–32.9)
MCHC RBC AUTO-ENTMCNC: 34.3 G/DL (ref 31.4–35)
MCV RBC AUTO: 95.9 FL (ref 79.6–97.8)
NRBC # BLD: 0 K/UL (ref 0–0.2)
PLATELET # BLD AUTO: 189 K/UL (ref 150–450)
PMV BLD AUTO: 10.5 FL (ref 9.4–12.3)
POTASSIUM SERPL-SCNC: 4.5 MMOL/L (ref 3.5–5.1)
PROT SERPL-MCNC: 7.5 G/DL (ref 6.3–8.2)
RBC # BLD AUTO: 5.08 M/UL (ref 4.23–5.6)
SODIUM SERPL-SCNC: 141 MMOL/L (ref 136–145)
WBC # BLD AUTO: 8.7 K/UL (ref 4.3–11.1)

## 2019-11-19 PROCEDURE — 85027 COMPLETE CBC AUTOMATED: CPT

## 2019-11-19 PROCEDURE — 80053 COMPREHEN METABOLIC PANEL: CPT

## 2019-11-19 PROCEDURE — 82962 GLUCOSE BLOOD TEST: CPT

## 2019-11-19 PROCEDURE — 86923 COMPATIBILITY TEST ELECTRIC: CPT

## 2019-11-19 PROCEDURE — 86900 BLOOD TYPING SEROLOGIC ABO: CPT

## 2019-11-19 NOTE — PERIOP NOTES
Patient verified name and . Patient provided medical/health information and PTA medications to the best of their ability. TYPE  CASE:3  Order for consent  found in EHR and matches case posting. Labs per surgeon:cbc,bmp,T/C. Results: pending  Labs per anesthesia protocol: cbc,bmp, CMHG=119. Results pending  EKG  :  19    Patient provided with and instructed on education handouts including Guide to Surgery, blood transfusions, pain management, and hand hygiene for the family and community, and [facility] brochure. Hibiclens and instructions given per hospital policy. Instructed patient to continue previous medications as prescribed prior to surgery unless otherwise directed and to take the following medications the day of surgery according to anesthesia guidelines : Stiolto Respimat, Albuterol,carvedilol, levothyroxine . Instructed patient to hold  the following medications: Vitamin B,D,C. CoQ10,ASA,Ginkgo,Multivitamin, Saw Palmetto. No Metformin Morning of surgery. Original medication prescription bottles not visualized during patient appointment. Patient teach back successful and patient demonstrates knowledge of instruction.

## 2019-11-19 NOTE — PERIOP NOTES
Recent Results (from the past 12 hour(s))   TYPE + CROSSMATCH    Collection Time: 11/19/19 11:44 AM   Result Value Ref Range    Crossmatch Expiration 11/22/2019     ABO/Rh(D) A POSITIVE     Antibody screen NEG     Unit number [id number]     Blood component type RC LR     Unit division 00     Status of unit ALLOCATED     Crossmatch result Compatible    GLUCOSE, POC    Collection Time: 11/19/19 11:50 AM   Result Value Ref Range    Glucose (POC) 106 (H) 65 - 100 mg/dL   CBC W/O DIFF    Collection Time: 11/19/19 11:52 AM   Result Value Ref Range    WBC 8.7 4.3 - 11.1 K/uL    RBC 5.08 4.23 - 5.6 M/uL    HGB 16.7 13.6 - 17.2 g/dL    HCT 48.7 41.1 - 50.3 %    MCV 95.9 79.6 - 97.8 FL    MCH 32.9 26.1 - 32.9 PG    MCHC 34.3 31.4 - 35.0 g/dL    RDW 12.9 11.9 - 14.6 %    PLATELET 464 590 - 490 K/uL    MPV 10.5 9.4 - 12.3 FL    ABSOLUTE NRBC 0.00 0.0 - 0.2 K/uL   METABOLIC PANEL, COMPREHENSIVE    Collection Time: 11/19/19 11:52 AM   Result Value Ref Range    Sodium 141 136 - 145 mmol/L    Potassium 4.5 3.5 - 5.1 mmol/L    Chloride 106 98 - 107 mmol/L    CO2 29 21 - 32 mmol/L    Anion gap 6 (L) 7 - 16 mmol/L    Glucose 107 (H) 65 - 100 mg/dL    BUN 27 (H) 8 - 23 MG/DL    Creatinine 1.22 0.8 - 1.5 MG/DL    GFR est AA >60 >60 ml/min/1.73m2    GFR est non-AA >60 >60 ml/min/1.73m2    Calcium 9.2 8.3 - 10.4 MG/DL    Bilirubin, total 0.6 0.2 - 1.1 MG/DL    ALT (SGPT) 19 12 - 65 U/L    AST (SGOT) 13 (L) 15 - 37 U/L    Alk.  phosphatase 85 50 - 136 U/L    Protein, total 7.5 6.3 - 8.2 g/dL    Albumin 3.4 3.2 - 4.6 g/dL    Globulin 4.1 (H) 2.3 - 3.5 g/dL    A-G Ratio 0.8 (L) 1.2 - 3.5     Reviewed

## 2019-11-19 NOTE — PERIOP NOTES
PLEASE CONTINUE TAKING ALL PRESCRIPTION MEDICATIONS UP TO THE DAY OF SURGERY. DISCONTINUE all vitamins and supplements 7 days prior to surgery. Home Medications to take  the day of surgery    Stiolto Respimat, Albuterol,carvedilol, levothyroxine            Home Medications   to Hold   Vitamins, Supplements, and Herbals. Non-Steriodal Anti-Inflammatory (NSAIDS) such as Advil and Aleve. Comments   No Metformin morning of surgery             Please do not bring home medications with you on the day of surgery unless otherwise directed by your nurse. If you are instructed to bring home medications, please give them to your nurse as they will be administered by the nursing staff. If you have any questions, please call [address] [place] [address] [phone] or [facility] [phone].

## 2019-11-20 ENCOUNTER — ANESTHESIA (OUTPATIENT)
Dept: SURGERY | Age: 74
DRG: 657 | End: 2019-11-20
Payer: MEDICARE

## 2019-11-20 ENCOUNTER — HOSPITAL ENCOUNTER (INPATIENT)
Age: 74
LOS: 5 days | Discharge: HOME | DRG: 657 | End: 2019-11-25
Attending: UROLOGY | Admitting: UROLOGY
Payer: MEDICARE

## 2019-11-20 DIAGNOSIS — N28.89 RENAL MASS: Primary | ICD-10-CM

## 2019-11-20 LAB — GLUCOSE BLD STRIP.AUTO-MCNC: 149 MG/DL (ref 65–100)

## 2019-11-20 PROCEDURE — 77030007956 HC PCH ENDOSC SPEC COVD -C: Performed by: UROLOGY

## 2019-11-20 PROCEDURE — 77030020407 HC IV BLD WRMR ST 3M -A: Performed by: ANESTHESIOLOGY

## 2019-11-20 PROCEDURE — 77030016151 HC PROTCTR LNS DFOG COVD -B: Performed by: UROLOGY

## 2019-11-20 PROCEDURE — 94640 AIRWAY INHALATION TREATMENT: CPT

## 2019-11-20 PROCEDURE — 77030018876 HC APPL CLP LIG4 COVD -B: Performed by: UROLOGY

## 2019-11-20 PROCEDURE — 74011250636 HC RX REV CODE- 250/636: Performed by: ANESTHESIOLOGY

## 2019-11-20 PROCEDURE — 74011250637 HC RX REV CODE- 250/637: Performed by: ANESTHESIOLOGY

## 2019-11-20 PROCEDURE — 77030034154 HC SHR COAG HARM ACE J&J -F: Performed by: UROLOGY

## 2019-11-20 PROCEDURE — 77030039425 HC BLD LARYNG TRULITE DISP TELE -A: Performed by: ANESTHESIOLOGY

## 2019-11-20 PROCEDURE — 77030037088 HC TUBE ENDOTRACH ORAL NSL COVD-A: Performed by: ANESTHESIOLOGY

## 2019-11-20 PROCEDURE — 77030036732 HC RELD STPLR VASC J&J -F: Performed by: UROLOGY

## 2019-11-20 PROCEDURE — 77030002966 HC SUT PDS J&J -A: Performed by: UROLOGY

## 2019-11-20 PROCEDURE — 74011000250 HC RX REV CODE- 250: Performed by: UROLOGY

## 2019-11-20 PROCEDURE — 77030018673: Performed by: UROLOGY

## 2019-11-20 PROCEDURE — 94760 N-INVAS EAR/PLS OXIMETRY 1: CPT

## 2019-11-20 PROCEDURE — 77030035045 HC TRCR ENDOSC VRSPRT BLDLSS COVD -B: Performed by: UROLOGY

## 2019-11-20 PROCEDURE — 76060000037 HC ANESTHESIA 3 TO 3.5 HR: Performed by: UROLOGY

## 2019-11-20 PROCEDURE — 77030010513 HC APPL CLP LIG J&J -C: Performed by: UROLOGY

## 2019-11-20 PROCEDURE — 76010000172 HC OR TIME 2.5 TO 3 HR INTENSV-TIER 1: Performed by: UROLOGY

## 2019-11-20 PROCEDURE — 77030031139 HC SUT VCRL2 J&J -A: Performed by: UROLOGY

## 2019-11-20 PROCEDURE — 74011000258 HC RX REV CODE- 258: Performed by: UROLOGY

## 2019-11-20 PROCEDURE — 77030008467 HC STPLR SKN COVD -B: Performed by: UROLOGY

## 2019-11-20 PROCEDURE — 82962 GLUCOSE BLOOD TEST: CPT

## 2019-11-20 PROCEDURE — 77030019908 HC STETH ESOPH SIMS -A: Performed by: ANESTHESIOLOGY

## 2019-11-20 PROCEDURE — 77030040361 HC SLV COMPR DVT MDII -B: Performed by: UROLOGY

## 2019-11-20 PROCEDURE — 77030022473 HC HNDL ENDO GIA UNIV USDA -C: Performed by: UROLOGY

## 2019-11-20 PROCEDURE — 77030008606 HC TRCR ENDOSC KII AMR -B: Performed by: UROLOGY

## 2019-11-20 PROCEDURE — 77030018836 HC SOL IRR NACL ICUM -A: Performed by: UROLOGY

## 2019-11-20 PROCEDURE — 77030034850: Performed by: UROLOGY

## 2019-11-20 PROCEDURE — 77030018390 HC SPNG HEMSTAT2 J&J -B: Performed by: UROLOGY

## 2019-11-20 PROCEDURE — 88307 TISSUE EXAM BY PATHOLOGIST: CPT

## 2019-11-20 PROCEDURE — 77030014008 HC SPNG HEMSTAT J&J -C: Performed by: UROLOGY

## 2019-11-20 PROCEDURE — 74011250637 HC RX REV CODE- 250/637: Performed by: NURSE ANESTHETIST, CERTIFIED REGISTERED

## 2019-11-20 PROCEDURE — 74011250637 HC RX REV CODE- 250/637: Performed by: UROLOGY

## 2019-11-20 PROCEDURE — 77030008522 HC TBNG INSUF LAPRO STRY -B: Performed by: UROLOGY

## 2019-11-20 PROCEDURE — 77030035048 HC TRCR ENDOSC OPTCL COVD -B: Performed by: UROLOGY

## 2019-11-20 PROCEDURE — 74011250636 HC RX REV CODE- 250/636: Performed by: NURSE ANESTHETIST, CERTIFIED REGISTERED

## 2019-11-20 PROCEDURE — 77030006984: Performed by: UROLOGY

## 2019-11-20 PROCEDURE — 76210000005 HC OR PH I REC 5 TO 5.5 HR: Performed by: UROLOGY

## 2019-11-20 PROCEDURE — 74011000250 HC RX REV CODE- 250: Performed by: NURSE ANESTHETIST, CERTIFIED REGISTERED

## 2019-11-20 PROCEDURE — 77030000038 HC TIP SCIS LAPSCP SURI -B: Performed by: UROLOGY

## 2019-11-20 PROCEDURE — 77030025088 HC APPL CLP LIG 1 COVD -B: Performed by: UROLOGY

## 2019-11-20 PROCEDURE — 65270000029 HC RM PRIVATE

## 2019-11-20 PROCEDURE — 74011250636 HC RX REV CODE- 250/636: Performed by: UROLOGY

## 2019-11-20 PROCEDURE — 77030027876 HC STPLR ENDOSC FLX PWR J&J -G1: Performed by: UROLOGY

## 2019-11-20 PROCEDURE — 77030009527 HC GEL PRT SYS AMR -E: Performed by: UROLOGY

## 2019-11-20 PROCEDURE — 77030008462 HC STPLR SKN PROX J&J -A: Performed by: UROLOGY

## 2019-11-20 PROCEDURE — 77010033678 HC OXYGEN DAILY

## 2019-11-20 RX ORDER — PROPOFOL 10 MG/ML
INJECTION, EMULSION INTRAVENOUS AS NEEDED
Status: DISCONTINUED | OUTPATIENT
Start: 2019-11-20 | End: 2019-11-20 | Stop reason: HOSPADM

## 2019-11-20 RX ORDER — ACETAMINOPHEN 10 MG/ML
INJECTION, SOLUTION INTRAVENOUS AS NEEDED
Status: DISCONTINUED | OUTPATIENT
Start: 2019-11-20 | End: 2019-11-20 | Stop reason: HOSPADM

## 2019-11-20 RX ORDER — ALBUTEROL SULFATE 90 UG/1
AEROSOL, METERED RESPIRATORY (INHALATION) AS NEEDED
Status: DISCONTINUED | OUTPATIENT
Start: 2019-11-20 | End: 2019-11-20 | Stop reason: HOSPADM

## 2019-11-20 RX ORDER — HYDROMORPHONE HYDROCHLORIDE 1 MG/ML
1 INJECTION, SOLUTION INTRAMUSCULAR; INTRAVENOUS; SUBCUTANEOUS
Status: DISCONTINUED | OUTPATIENT
Start: 2019-11-20 | End: 2019-11-21

## 2019-11-20 RX ORDER — ALBUTEROL SULFATE 0.83 MG/ML
2.5 SOLUTION RESPIRATORY (INHALATION)
Status: DISCONTINUED | OUTPATIENT
Start: 2019-11-20 | End: 2019-11-25 | Stop reason: HOSPADM

## 2019-11-20 RX ORDER — DEXAMETHASONE SODIUM PHOSPHATE 4 MG/ML
INJECTION, SOLUTION INTRA-ARTICULAR; INTRALESIONAL; INTRAMUSCULAR; INTRAVENOUS; SOFT TISSUE AS NEEDED
Status: DISCONTINUED | OUTPATIENT
Start: 2019-11-20 | End: 2019-11-20 | Stop reason: HOSPADM

## 2019-11-20 RX ORDER — ONDANSETRON 2 MG/ML
4 INJECTION INTRAMUSCULAR; INTRAVENOUS
Status: DISCONTINUED | OUTPATIENT
Start: 2019-11-20 | End: 2019-11-25 | Stop reason: HOSPADM

## 2019-11-20 RX ORDER — LEVOTHYROXINE SODIUM 88 UG/1
88 TABLET ORAL
Status: DISCONTINUED | OUTPATIENT
Start: 2019-11-21 | End: 2019-11-25 | Stop reason: HOSPADM

## 2019-11-20 RX ORDER — OXYCODONE HYDROCHLORIDE 5 MG/1
10 TABLET ORAL
Status: COMPLETED | OUTPATIENT
Start: 2019-11-20 | End: 2019-11-20

## 2019-11-20 RX ORDER — ATROPA BELLADONNA AND OPIUM 16.2; 6 MG/1; MG/1
1 SUPPOSITORY RECTAL
Status: DISCONTINUED | OUTPATIENT
Start: 2019-11-20 | End: 2019-11-25 | Stop reason: HOSPADM

## 2019-11-20 RX ORDER — NEOSTIGMINE METHYLSULFATE 1 MG/ML
INJECTION, SOLUTION INTRAVENOUS AS NEEDED
Status: DISCONTINUED | OUTPATIENT
Start: 2019-11-20 | End: 2019-11-20 | Stop reason: HOSPADM

## 2019-11-20 RX ORDER — MIDAZOLAM HYDROCHLORIDE 1 MG/ML
2 INJECTION, SOLUTION INTRAMUSCULAR; INTRAVENOUS
Status: DISCONTINUED | OUTPATIENT
Start: 2019-11-20 | End: 2019-11-20 | Stop reason: HOSPADM

## 2019-11-20 RX ORDER — ALBUTEROL SULFATE 0.83 MG/ML
2.5 SOLUTION RESPIRATORY (INHALATION) AS NEEDED
Status: DISCONTINUED | OUTPATIENT
Start: 2019-11-20 | End: 2019-11-20 | Stop reason: HOSPADM

## 2019-11-20 RX ORDER — ONDANSETRON 2 MG/ML
4 INJECTION INTRAMUSCULAR; INTRAVENOUS ONCE
Status: DISCONTINUED | OUTPATIENT
Start: 2019-11-20 | End: 2019-11-20 | Stop reason: HOSPADM

## 2019-11-20 RX ORDER — CARVEDILOL 12.5 MG/1
12.5 TABLET ORAL 2 TIMES DAILY WITH MEALS
Status: DISCONTINUED | OUTPATIENT
Start: 2019-11-20 | End: 2019-11-22

## 2019-11-20 RX ORDER — SODIUM CHLORIDE 0.9 % (FLUSH) 0.9 %
5-40 SYRINGE (ML) INJECTION AS NEEDED
Status: DISCONTINUED | OUTPATIENT
Start: 2019-11-20 | End: 2019-11-25 | Stop reason: HOSPADM

## 2019-11-20 RX ORDER — ROCURONIUM BROMIDE 10 MG/ML
INJECTION, SOLUTION INTRAVENOUS AS NEEDED
Status: DISCONTINUED | OUTPATIENT
Start: 2019-11-20 | End: 2019-11-20 | Stop reason: HOSPADM

## 2019-11-20 RX ORDER — NALOXONE HYDROCHLORIDE 0.4 MG/ML
0.4 INJECTION, SOLUTION INTRAMUSCULAR; INTRAVENOUS; SUBCUTANEOUS AS NEEDED
Status: DISCONTINUED | OUTPATIENT
Start: 2019-11-20 | End: 2019-11-25 | Stop reason: HOSPADM

## 2019-11-20 RX ORDER — EPHEDRINE SULFATE/0.9% NACL/PF 50 MG/5 ML
SYRINGE (ML) INTRAVENOUS AS NEEDED
Status: DISCONTINUED | OUTPATIENT
Start: 2019-11-20 | End: 2019-11-20 | Stop reason: HOSPADM

## 2019-11-20 RX ORDER — FENTANYL CITRATE 50 UG/ML
100 INJECTION, SOLUTION INTRAMUSCULAR; INTRAVENOUS ONCE
Status: DISCONTINUED | OUTPATIENT
Start: 2019-11-20 | End: 2019-11-20 | Stop reason: HOSPADM

## 2019-11-20 RX ORDER — FACIAL-BODY WIPES
10 EACH TOPICAL DAILY PRN
Status: DISCONTINUED | OUTPATIENT
Start: 2019-11-20 | End: 2019-11-25 | Stop reason: SDUPTHER

## 2019-11-20 RX ORDER — DIPHENHYDRAMINE HYDROCHLORIDE 50 MG/ML
12.5 INJECTION, SOLUTION INTRAMUSCULAR; INTRAVENOUS
Status: DISCONTINUED | OUTPATIENT
Start: 2019-11-20 | End: 2019-11-25 | Stop reason: HOSPADM

## 2019-11-20 RX ORDER — OXYCODONE HYDROCHLORIDE 5 MG/1
5 TABLET ORAL
Status: DISCONTINUED | OUTPATIENT
Start: 2019-11-20 | End: 2019-11-20 | Stop reason: HOSPADM

## 2019-11-20 RX ORDER — LIDOCAINE HYDROCHLORIDE 20 MG/ML
INJECTION, SOLUTION EPIDURAL; INFILTRATION; INTRACAUDAL; PERINEURAL AS NEEDED
Status: DISCONTINUED | OUTPATIENT
Start: 2019-11-20 | End: 2019-11-20 | Stop reason: HOSPADM

## 2019-11-20 RX ORDER — ONDANSETRON 2 MG/ML
INJECTION INTRAMUSCULAR; INTRAVENOUS AS NEEDED
Status: DISCONTINUED | OUTPATIENT
Start: 2019-11-20 | End: 2019-11-20 | Stop reason: HOSPADM

## 2019-11-20 RX ORDER — SODIUM CHLORIDE, SODIUM LACTATE, POTASSIUM CHLORIDE, CALCIUM CHLORIDE 600; 310; 30; 20 MG/100ML; MG/100ML; MG/100ML; MG/100ML
100 INJECTION, SOLUTION INTRAVENOUS CONTINUOUS
Status: DISCONTINUED | OUTPATIENT
Start: 2019-11-20 | End: 2019-11-20 | Stop reason: HOSPADM

## 2019-11-20 RX ORDER — LIDOCAINE HYDROCHLORIDE 10 MG/ML
0.1 INJECTION INFILTRATION; PERINEURAL AS NEEDED
Status: DISCONTINUED | OUTPATIENT
Start: 2019-11-20 | End: 2019-11-20 | Stop reason: HOSPADM

## 2019-11-20 RX ORDER — FENTANYL CITRATE 50 UG/ML
INJECTION, SOLUTION INTRAMUSCULAR; INTRAVENOUS AS NEEDED
Status: DISCONTINUED | OUTPATIENT
Start: 2019-11-20 | End: 2019-11-20 | Stop reason: HOSPADM

## 2019-11-20 RX ORDER — MIDAZOLAM HYDROCHLORIDE 1 MG/ML
2 INJECTION, SOLUTION INTRAMUSCULAR; INTRAVENOUS ONCE
Status: DISCONTINUED | OUTPATIENT
Start: 2019-11-20 | End: 2019-11-20 | Stop reason: HOSPADM

## 2019-11-20 RX ORDER — SODIUM CHLORIDE 0.9 % (FLUSH) 0.9 %
5-40 SYRINGE (ML) INJECTION EVERY 8 HOURS
Status: DISCONTINUED | OUTPATIENT
Start: 2019-11-20 | End: 2019-11-25 | Stop reason: HOSPADM

## 2019-11-20 RX ORDER — SODIUM CHLORIDE 9 MG/ML
250 INJECTION, SOLUTION INTRAVENOUS AS NEEDED
Status: DISCONTINUED | OUTPATIENT
Start: 2019-11-20 | End: 2019-11-20 | Stop reason: HOSPADM

## 2019-11-20 RX ORDER — HYDROMORPHONE HYDROCHLORIDE 2 MG/ML
0.5 INJECTION, SOLUTION INTRAMUSCULAR; INTRAVENOUS; SUBCUTANEOUS
Status: DISCONTINUED | OUTPATIENT
Start: 2019-11-20 | End: 2019-11-20 | Stop reason: HOSPADM

## 2019-11-20 RX ORDER — LORAZEPAM 2 MG/ML
1 INJECTION INTRAMUSCULAR
Status: DISCONTINUED | OUTPATIENT
Start: 2019-11-20 | End: 2019-11-22

## 2019-11-20 RX ORDER — DIPHENHYDRAMINE HYDROCHLORIDE 50 MG/ML
12.5 INJECTION, SOLUTION INTRAMUSCULAR; INTRAVENOUS
Status: DISCONTINUED | OUTPATIENT
Start: 2019-11-20 | End: 2019-11-20 | Stop reason: HOSPADM

## 2019-11-20 RX ORDER — CEFAZOLIN SODIUM/WATER 2 G/20 ML
2 SYRINGE (ML) INTRAVENOUS
Status: COMPLETED | OUTPATIENT
Start: 2019-11-20 | End: 2019-11-20

## 2019-11-20 RX ORDER — NALOXONE HYDROCHLORIDE 0.4 MG/ML
0.1 INJECTION, SOLUTION INTRAMUSCULAR; INTRAVENOUS; SUBCUTANEOUS AS NEEDED
Status: DISCONTINUED | OUTPATIENT
Start: 2019-11-20 | End: 2019-11-20 | Stop reason: HOSPADM

## 2019-11-20 RX ORDER — BUPIVACAINE HYDROCHLORIDE 5 MG/ML
INJECTION, SOLUTION EPIDURAL; INTRACAUDAL AS NEEDED
Status: DISCONTINUED | OUTPATIENT
Start: 2019-11-20 | End: 2019-11-20 | Stop reason: HOSPADM

## 2019-11-20 RX ORDER — GLYCOPYRROLATE 0.2 MG/ML
INJECTION INTRAMUSCULAR; INTRAVENOUS AS NEEDED
Status: DISCONTINUED | OUTPATIENT
Start: 2019-11-20 | End: 2019-11-20 | Stop reason: HOSPADM

## 2019-11-20 RX ADMIN — GLYCOPYRROLATE 0.6 MG: 0.2 INJECTION, SOLUTION INTRAMUSCULAR; INTRAVENOUS at 10:33

## 2019-11-20 RX ADMIN — ONDANSETRON 4 MG: 2 INJECTION INTRAMUSCULAR; INTRAVENOUS at 10:24

## 2019-11-20 RX ADMIN — HYDROMORPHONE HYDROCHLORIDE 0.5 MG: 2 INJECTION INTRAMUSCULAR; INTRAVENOUS; SUBCUTANEOUS at 11:45

## 2019-11-20 RX ADMIN — SODIUM BICARBONATE: 84 INJECTION, SOLUTION INTRAVENOUS at 17:43

## 2019-11-20 RX ADMIN — Medication 4 MG: at 10:33

## 2019-11-20 RX ADMIN — ROCURONIUM BROMIDE 10 MG: 10 INJECTION, SOLUTION INTRAVENOUS at 10:02

## 2019-11-20 RX ADMIN — PHENYLEPHRINE HYDROCHLORIDE 150 MCG: 10 INJECTION INTRAVENOUS at 08:15

## 2019-11-20 RX ADMIN — ROCURONIUM BROMIDE 20 MG: 10 INJECTION, SOLUTION INTRAVENOUS at 08:31

## 2019-11-20 RX ADMIN — Medication 10 MG: at 08:28

## 2019-11-20 RX ADMIN — DEXAMETHASONE SODIUM PHOSPHATE 4 MG: 4 INJECTION, SOLUTION INTRAMUSCULAR; INTRAVENOUS at 08:30

## 2019-11-20 RX ADMIN — ROCURONIUM BROMIDE 10 MG: 10 INJECTION, SOLUTION INTRAVENOUS at 09:02

## 2019-11-20 RX ADMIN — ROCURONIUM BROMIDE 50 MG: 10 INJECTION, SOLUTION INTRAVENOUS at 08:00

## 2019-11-20 RX ADMIN — FENTANYL CITRATE 100 MCG: 50 INJECTION INTRAMUSCULAR; INTRAVENOUS at 07:59

## 2019-11-20 RX ADMIN — SODIUM CHLORIDE, SODIUM LACTATE, POTASSIUM CHLORIDE, AND CALCIUM CHLORIDE 100 ML/HR: 600; 310; 30; 20 INJECTION, SOLUTION INTRAVENOUS at 06:39

## 2019-11-20 RX ADMIN — Medication 10 ML: at 22:00

## 2019-11-20 RX ADMIN — Medication 10 MG: at 08:34

## 2019-11-20 RX ADMIN — HYDROMORPHONE HYDROCHLORIDE 0.5 MG: 2 INJECTION INTRAMUSCULAR; INTRAVENOUS; SUBCUTANEOUS at 10:58

## 2019-11-20 RX ADMIN — HYDROMORPHONE HYDROCHLORIDE 0.5 MG: 2 INJECTION INTRAMUSCULAR; INTRAVENOUS; SUBCUTANEOUS at 11:03

## 2019-11-20 RX ADMIN — HYDROMORPHONE HYDROCHLORIDE 1 MG: 1 INJECTION, SOLUTION INTRAMUSCULAR; INTRAVENOUS; SUBCUTANEOUS at 13:51

## 2019-11-20 RX ADMIN — ROCURONIUM BROMIDE 10 MG: 10 INJECTION, SOLUTION INTRAVENOUS at 09:32

## 2019-11-20 RX ADMIN — PHENYLEPHRINE HYDROCHLORIDE 100 MCG: 10 INJECTION INTRAVENOUS at 10:01

## 2019-11-20 RX ADMIN — HYDROMORPHONE HYDROCHLORIDE 0.5 MG: 2 INJECTION INTRAMUSCULAR; INTRAVENOUS; SUBCUTANEOUS at 11:27

## 2019-11-20 RX ADMIN — PROPOFOL 130 MG: 10 INJECTION, EMULSION INTRAVENOUS at 07:59

## 2019-11-20 RX ADMIN — ALBUTEROL SULFATE 6 PUFF: 90 AEROSOL, METERED RESPIRATORY (INHALATION) at 10:43

## 2019-11-20 RX ADMIN — SODIUM CHLORIDE, SODIUM LACTATE, POTASSIUM CHLORIDE, AND CALCIUM CHLORIDE: 600; 310; 30; 20 INJECTION, SOLUTION INTRAVENOUS at 09:15

## 2019-11-20 RX ADMIN — OXYCODONE HYDROCHLORIDE 10 MG: 5 TABLET ORAL at 12:17

## 2019-11-20 RX ADMIN — LIDOCAINE HYDROCHLORIDE 100 MG: 20 INJECTION, SOLUTION EPIDURAL; INFILTRATION; INTRACAUDAL; PERINEURAL at 07:59

## 2019-11-20 RX ADMIN — PHENYLEPHRINE HYDROCHLORIDE 100 MCG: 10 INJECTION INTRAVENOUS at 08:07

## 2019-11-20 RX ADMIN — ALBUTEROL SULFATE 2.5 MG: 2.5 SOLUTION RESPIRATORY (INHALATION) at 19:21

## 2019-11-20 RX ADMIN — PHENYLEPHRINE HYDROCHLORIDE 100 MCG: 10 INJECTION INTRAVENOUS at 08:05

## 2019-11-20 RX ADMIN — PHENYLEPHRINE HYDROCHLORIDE 100 MCG: 10 INJECTION INTRAVENOUS at 08:28

## 2019-11-20 RX ADMIN — ACETAMINOPHEN 1000 MG: 10 INJECTION, SOLUTION INTRAVENOUS at 10:32

## 2019-11-20 RX ADMIN — Medication 2 G: at 08:27

## 2019-11-20 RX ADMIN — HYDROMORPHONE HYDROCHLORIDE 1 MG: 1 INJECTION, SOLUTION INTRAMUSCULAR; INTRAVENOUS; SUBCUTANEOUS at 20:01

## 2019-11-20 RX ADMIN — SODIUM CHLORIDE 1000 MG: 900 INJECTION, SOLUTION INTRAVENOUS at 17:32

## 2019-11-20 NOTE — H&P
History and Physical    Patient: Hyland Harada  MRN: [id number]  SSN: [id number]   YOB: 1945  Age: 69 y.o. Sex: male     The pt presented with gross hematuria and CT has revealed a 7 cm right renal mass. This is suspicious for malignancy. Past Medical History:   Diagnosis Date    Abnormal chest x-ray 3/19/2013    Adenopathy 3/19/2013    Lymph glands    Allergic rhinitis, cause unspecified 3/19/2013    CAD (coronary artery disease)     Chronic airway obstruction, not elsewhere classified 3/19/2013    COPD     Diabetes ([facility]     does not check sugars, last A1C 6.3    Diabetes mellitus ([facility] 3/19/2013    Hashimoto's disease     [facility]     Sinus problem     Thyroid disease     Hypothyroid    Tobacco use disorder 3/19/2013    Unspecified essential hypertension 3/19/2013    Unspecified hypothyroidism 3/19/2013     History reviewed. No pertinent surgical history.   Allergies   Allergen Reactions    Augmentin [Amoxicillin-Pot Clavulanate] Nausea Only     Pt states he is not allergic      Chantix [Varenicline] Other (comments)     [place]    Symbicort [Budesonide-Formoterol] Other (comments)     Thrush in mouth    Wellbutrin [Bupropion Hcl] Hives     Current Facility-Administered Medications   Medication Dose Route Frequency Provider Last Rate Last Dose    ceFAZolin (ANCEF) 2 g/20 mL in sterile water IV syringe  2 g IntraVENous ON CALL TO OR Kasi Box MD   Stopped at 11/20/19 6330    0.9% sodium chloride infusion 250 mL  250 mL IntraVENous PRN Kasi Box MD        lidocaine (XYLOCAINE) 10 mg/mL (1 %) injection 0.1 mL  0.1 mL SubCUTAneous PRN Raquel Dawson MD        lactated Ringers infusion  100 mL/hr IntraVENous CONTINUOUS Raquel Dawson  mL/hr at 11/20/19 0639 100 mL/hr at 11/20/19 4448    fentaNYL citrate (PF) injection 100 mcg  100 mcg IntraVENous ONCE Raquel Dawson MD        midazolam (VERSED) injection 2 mg  2 mg IntraVENous ONCE PRN Lenka Nguyen MD        midazolam (VERSED) injection 2 mg  2 mg IntraVENous ONCE Lenka Nguyen MD             Physical Examination    Visit Vitals  /76 (BP 1 Location: Right arm, BP Patient Position: At rest)   Pulse 69   Temp 97.8 °F (36.6 °C)   Resp 18   Wt 208 lb 14.4 oz (94.8 kg)   SpO2 95%   BMI 29.97 kg/m²     Gen: Well developed, well nourished 69 y.o. male in no acute distress  Head: normocephalic, atraumatic  Mouth: Clear, no overt lesions, oral mucosa pink and moist  Neck: supple, no masses, no adenopathy or carotid bruits, trachea midline  Resp: clear to auscultation bilaterally, no wheeze, rhonchi or rales, excursions normal and symmetrical  Cardio: Regular rate and rhythm, no murmurs, clicks, gallops or rubs, no edema or varicosities  Abdomen: soft, nontender, nondistended, normoactive bowel sounds, no hernias, no hepatosplenomegaly   Extremeties: warm, well-perfused, no tenderness or swelling, normal gait/station  Neuro: sensation and strength grossly intact and symmetrical  Psych: alert and oriented to person, place and time    Impression: Right renal mass  Plan: Right laparoscopic hand assisted nephrectomy

## 2019-11-20 NOTE — PERIOP NOTES
TRANSFER - OUT REPORT:    Verbal report given to Shannon Hoffmann on Missy Nielson  being transferred to [phone] for routine post - op       Report consisted of patients Situation, Background, Assessment and   Recommendations(SBAR). Information from the following report(s) SBAR, OR Summary, Procedure Summary, Intake/Output and MAR was reviewed with the receiving nurse. Lines:   Peripheral IV 11/20/19 Left Forearm (Active)   Site Assessment Clean, dry, & intact 11/20/2019 12:01 PM   Phlebitis Assessment 0 11/20/2019 12:01 PM   Infiltration Assessment 0 11/20/2019 12:01 PM   Dressing Status Clean, dry, & intact 11/20/2019 12:01 PM   Dressing Type Transparent;Tape 11/20/2019 12:01 PM   Hub Color/Line Status Patent; Infusing;Green 11/20/2019 12:01 PM   Alcohol Cap Used No 11/20/2019 12:01 PM        Opportunity for questions and clarification was provided. Patient transported with:   O2 @ 2 liters    VTE prophylaxis orders have been written for [facility].

## 2019-11-20 NOTE — PROGRESS NOTES
Patient requesting ice chips at this time. Coreg to give PO. Was told by Morena in PACU that Dr. Josh Thomson does not want oral medications given at this time. Clarification from Dr. Meaghan Barlow and will remain to hold oral medications at this time. Patient to have very minimal ice chips per Dr. Meaghan Barlow.

## 2019-11-20 NOTE — BRIEF OP NOTE
BRIEF OPERATIVE NOTE    Date of Procedure: 11/20/2019   Preoperative Diagnosis: Renal mass [N28.89]  Postoperative Diagnosis: Renal mass [N28.89]    Procedure(s):  RIGHT NEPHRECTOMY LAPAROSCOPIC HAND ASSISTED   Surgeon(s) and Role:     Kasia Bernard MD - Primary     * Iliana Tovar MD - Assisting         Surgical Staff:  Circ-1: Claudia Kerr RN  Circ-Relief: Moise Kelly RN  Scrub Tech-1: Tian Moss  Scrub Tech-2: Suad Jackson CNA  Event Time In Time Out   Incision Start  8:35 AM    Incision Close 10:37 AM      Anesthesia: General   Estimated Blood Loss: 50 cc  Specimens:   ID Type Source Tests Collected by Time Destination   1 : RIGHT KIDNEY Fresh Kidney  Robin Argueta MD 11/20/2019 10:20 AM Pathology      Findings:See dictated note   Complications: None

## 2019-11-20 NOTE — ANESTHESIA POSTPROCEDURE EVALUATION
Procedure(s):  RIGHT NEPHRECTOMY LAPAROSCOPIC HAND ASSISTED . general    Anesthesia Post Evaluation      Multimodal analgesia: multimodal analgesia used between 6 hours prior to anesthesia start to PACU discharge  Patient location during evaluation: bedside  Patient participation: complete - patient participated  Level of consciousness: responsive to verbal stimuli  Pain management: adequate  Airway patency: patent  Anesthetic complications: no  Cardiovascular status: hemodynamically stable  Respiratory status: spontaneous ventilation  Hydration status: stable  Comments: Pulmonary status appears to be at preoperative baseline. Titrating narcotics carefully. Will continue to monitor in house. Vitals Value Taken Time   /58 11/20/2019 12:16 PM   Temp 36.9 °C (98.5 °F) 11/20/2019 10:55 AM   Pulse 68 11/20/2019 12:19 PM   Resp 13 11/20/2019 12:19 PM   SpO2 99 % 11/20/2019 12:19 PM   Vitals shown include unvalidated device data.

## 2019-11-20 NOTE — ANESTHESIA PREPROCEDURE EVALUATION
Relevant Problems   No relevant active problems       Anesthetic History               Review of Systems / Medical History  Patient summary reviewed, nursing notes reviewed and pertinent labs reviewed    Pulmonary    COPD               Neuro/Psych              Cardiovascular    Hypertension: well controlled          Past MI and CAD    Exercise tolerance: >4 METS     GI/Hepatic/Renal                Endo/Other    Diabetes: well controlled, type 2  Hypothyroidism: well controlled       Other Findings              Physical Exam    Airway  Mallampati: III  TM Distance: 4 - 6 cm  Neck ROM: normal range of motion   Mouth opening: Normal     Cardiovascular  Regular rate and rhythm,  S1 and S2 normal,  no murmur, click, rub, or gallop             Dental  No notable dental hx       Pulmonary  Breath sounds clear to auscultation               Abdominal         Other Findings            Anesthetic Plan    ASA: 3  Anesthesia type: general          Induction: Intravenous  Anesthetic plan and risks discussed with: Patient

## 2019-11-20 NOTE — OP NOTES
Operative Note                Patient: Reva Agee, [id number]    Date of Surgery: 11/20/19    Preoperative Diagnosis: RIGHT renal mass    Postoperative Diagnosis:  same    Surgeon and Role:     * Veena Lindsay MD - ASSIST    Anesthesia:  General     Procedure:RIGHT laparoscopic hand assisted nephrectomy       Procedure:  I was present for the ENTIRE procedure from skin incision to final closure. I assisted Dr. Leyda Ribera with ALL portions of the procedure.                Signed By: Ayo Millard MD

## 2019-11-20 NOTE — PROGRESS NOTES
TRANSFER - IN REPORT:    Verbal report received from TAMMY Berg(name) on Nelli Carrington  being received from [facility]) for routine progression of care      Report consisted of patients Situation, Background, Assessment and   Recommendations(SBAR). Information from the following report(s) SBAR was reviewed with the receiving nurse. Opportunity for questions and clarification was provided. Assessment completed upon patients arrival to unit and care assumed.

## 2019-11-20 NOTE — PROGRESS NOTES
11/20/19 1640   Dual Skin Pressure Injury Assessment   Dual Skin Pressure Injury Assessment WDL   Second Care Provider (Based on [address] [place] [address]) Trice Hou, TAMMY   Skin Integumentary   Skin Integumentary (WDL) X   Skin Color Appropriate for ethnicity   Skin Condition/Temp Warm;Dry   Skin Integrity Incision (comment)  (s/p rt nephrectomy lap incision sites x5)   Turgor Non-tenting   Hair Growth Present   Varicosities Absent   Wound Prevention and Protection Methods   Orientation of Wound Prevention Posterior   Location of Wound Prevention Sacrum/Coccyx   Dressing Present  No   Wound Offloading (Prevention Methods) Bed, pressure reduction mattress   Primary Nurse Criselda Ortega and Trice Hou RN performed a dual skin assessment on this patient. No breakdown noted. S/p abdominal incision sites x5. C/d/i.

## 2019-11-20 NOTE — PROGRESS NOTES
Hourly rounds performed this shift. Bed lowered and locked, side rails x2, and call light in reach. All patient needs are met at this time.

## 2019-11-21 LAB
ANION GAP SERPL CALC-SCNC: 4 MMOL/L (ref 7–16)
BUN SERPL-MCNC: 28 MG/DL (ref 8–23)
CALCIUM SERPL-MCNC: 8.4 MG/DL (ref 8.3–10.4)
CHLORIDE SERPL-SCNC: 107 MMOL/L (ref 98–107)
CO2 SERPL-SCNC: 30 MMOL/L (ref 21–32)
CREAT SERPL-MCNC: 1.27 MG/DL (ref 0.8–1.5)
GLUCOSE SERPL-MCNC: 124 MG/DL (ref 65–100)
HCT VFR BLD AUTO: 46.2 % (ref 41.1–50.3)
POTASSIUM SERPL-SCNC: 4.2 MMOL/L (ref 3.5–5.1)
SODIUM SERPL-SCNC: 141 MMOL/L (ref 136–145)

## 2019-11-21 PROCEDURE — 74011250636 HC RX REV CODE- 250/636: Performed by: UROLOGY

## 2019-11-21 PROCEDURE — 74011000250 HC RX REV CODE- 250: Performed by: UROLOGY

## 2019-11-21 PROCEDURE — 36415 COLL VENOUS BLD VENIPUNCTURE: CPT

## 2019-11-21 PROCEDURE — 0TT04ZZ RESECTION OF RIGHT KIDNEY, PERCUTANEOUS ENDOSCOPIC APPROACH: ICD-10-PCS | Performed by: UROLOGY

## 2019-11-21 PROCEDURE — 85014 HEMATOCRIT: CPT

## 2019-11-21 PROCEDURE — 97530 THERAPEUTIC ACTIVITIES: CPT

## 2019-11-21 PROCEDURE — 74011250637 HC RX REV CODE- 250/637: Performed by: UROLOGY

## 2019-11-21 PROCEDURE — 94760 N-INVAS EAR/PLS OXIMETRY 1: CPT

## 2019-11-21 PROCEDURE — 74011000258 HC RX REV CODE- 258: Performed by: UROLOGY

## 2019-11-21 PROCEDURE — 65270000029 HC RM PRIVATE

## 2019-11-21 PROCEDURE — 80048 BASIC METABOLIC PNL TOTAL CA: CPT

## 2019-11-21 PROCEDURE — 94640 AIRWAY INHALATION TREATMENT: CPT

## 2019-11-21 PROCEDURE — 77010033678 HC OXYGEN DAILY

## 2019-11-21 PROCEDURE — 97161 PT EVAL LOW COMPLEX 20 MIN: CPT

## 2019-11-21 RX ORDER — HYDROMORPHONE HYDROCHLORIDE 1 MG/ML
1 INJECTION, SOLUTION INTRAMUSCULAR; INTRAVENOUS; SUBCUTANEOUS
Status: DISCONTINUED | OUTPATIENT
Start: 2019-11-21 | End: 2019-11-22

## 2019-11-21 RX ADMIN — SODIUM BICARBONATE: 84 INJECTION, SOLUTION INTRAVENOUS at 15:29

## 2019-11-21 RX ADMIN — SODIUM CHLORIDE 1000 MG: 900 INJECTION, SOLUTION INTRAVENOUS at 16:31

## 2019-11-21 RX ADMIN — CARVEDILOL 12.5 MG: 12.5 TABLET, FILM COATED ORAL at 09:49

## 2019-11-21 RX ADMIN — HYDROMORPHONE HYDROCHLORIDE 1 MG: 1 INJECTION, SOLUTION INTRAMUSCULAR; INTRAVENOUS; SUBCUTANEOUS at 19:26

## 2019-11-21 RX ADMIN — HYDROMORPHONE HYDROCHLORIDE 1 MG: 1 INJECTION, SOLUTION INTRAMUSCULAR; INTRAVENOUS; SUBCUTANEOUS at 23:03

## 2019-11-21 RX ADMIN — HYDROMORPHONE HYDROCHLORIDE 1 MG: 1 INJECTION, SOLUTION INTRAMUSCULAR; INTRAVENOUS; SUBCUTANEOUS at 09:49

## 2019-11-21 RX ADMIN — SODIUM BICARBONATE: 84 INJECTION, SOLUTION INTRAVENOUS at 23:43

## 2019-11-21 RX ADMIN — LEVOTHYROXINE SODIUM 88 MCG: 88 TABLET ORAL at 08:54

## 2019-11-21 RX ADMIN — HYDROMORPHONE HYDROCHLORIDE 1 MG: 1 INJECTION, SOLUTION INTRAMUSCULAR; INTRAVENOUS; SUBCUTANEOUS at 00:35

## 2019-11-21 RX ADMIN — ALBUTEROL SULFATE 2.5 MG: 2.5 SOLUTION RESPIRATORY (INHALATION) at 14:00

## 2019-11-21 RX ADMIN — Medication 10 ML: at 13:27

## 2019-11-21 RX ADMIN — HYDROMORPHONE HYDROCHLORIDE 1 MG: 1 INJECTION, SOLUTION INTRAMUSCULAR; INTRAVENOUS; SUBCUTANEOUS at 13:25

## 2019-11-21 RX ADMIN — ALBUTEROL SULFATE 2.5 MG: 2.5 SOLUTION RESPIRATORY (INHALATION) at 08:26

## 2019-11-21 RX ADMIN — HYDROMORPHONE HYDROCHLORIDE 1 MG: 1 INJECTION, SOLUTION INTRAMUSCULAR; INTRAVENOUS; SUBCUTANEOUS at 16:31

## 2019-11-21 RX ADMIN — Medication 10 ML: at 21:03

## 2019-11-21 RX ADMIN — SODIUM CHLORIDE 1000 MG: 900 INJECTION, SOLUTION INTRAVENOUS at 00:35

## 2019-11-21 RX ADMIN — CARVEDILOL 12.5 MG: 12.5 TABLET, FILM COATED ORAL at 16:32

## 2019-11-21 RX ADMIN — HYDROMORPHONE HYDROCHLORIDE 1 MG: 1 INJECTION, SOLUTION INTRAMUSCULAR; INTRAVENOUS; SUBCUTANEOUS at 05:58

## 2019-11-21 RX ADMIN — SODIUM CHLORIDE 1000 MG: 900 INJECTION, SOLUTION INTRAVENOUS at 08:54

## 2019-11-21 NOTE — PROGRESS NOTES
Initial visit by  to convey care and concern and encourage patient that  services are available if desired. No needs were voiced during the visit. Provided 's business card for future reference. Chaplains remain available for support.      Linda Steele 70  Board Certified

## 2019-11-21 NOTE — PROGRESS NOTES
Problem: Mobility Impaired (Adult and Pediatric)  Goal: *Acute Goals and Plan of Care (Insert Text)  Description  STG:  (1.)Mr. Pérez will move from supine to sit and sit to supine  with CONTACT GUARD ASSIST within 3 treatment day(s). (2.)Mr. Pérez will transfer from bed to chair and chair to bed with STAND BY ASSIST using the least restrictive device within 3 treatment day(s). (3.)Mr. Pérez will ambulate with CONTACT GUARD ASSIST for 250 feet with the least restrictive device within 3 treatment day(s). LTG:  (1.)Mr. Pérez will move from supine to sit and sit to supine  in bed with INDEPENDENT within 7 treatment day(s). (2.)Mr. Pérez will transfer from bed to chair and chair to bed with INDEPENDENT using the least restrictive device within 7 treatment day(s). (3.)Mr. Pérez will ambulate with SUPERVISION for 500+ feet with the least restrictive device within 7 treatment day(s). ________________________________________________________________________________________________     Outcome: Progressing Towards Goal       PHYSICAL THERAPY: Initial Assessment and PM 11/21/2019  INPATIENT: PT Visit Days : 1  [place] / [place] / Product Type: Medicare /       NAME/AGE/GENDER: Sabrina Stovall is a 69 y.o. male   PRIMARY DIAGNOSIS: Renal mass [N28.89]  Renal mass, right [N28.89] <principal problem not specified> <principal problem not specified>  Procedure(s) (LRB):  RIGHT NEPHRECTOMY LAPAROSCOPIC HAND ASSISTED  (Right)  1 Day Post-Op  ICD-10: Treatment Diagnosis:    Generalized Muscle Weakness (M62.81)  Difficulty in walking, Not elsewhere classified (R26.2)   Precaution/Allergies:  Augmentin [amoxicillin-pot clavulanate]; Chantix [varenicline]; Symbicort [budesonide-formoterol]; and Wellbutrin [bupropion hcl]      ASSESSMENT:     Mr. Michela Alvares is supine in bed upon contact and agreeable to PT evaluation and treatment this afternoon.  Pt with reports of 1-2/10 pain prior to mobility. Pt reports pain is much more controlled now. Pt verbalizes anxiety with fear of mobility increasing pain. Pt lives with his wife in 2 story home with 2 steps to enter and bedroom on 1st floor. Pt office on 2nd floor with 18 steps. Pt is independent with gait and ADLs and reports working out 5 days per week. Pt transitioned supine to sit EOB with Mica requiring max cues for technique. Pt demonstrates good static sitting balance EOB and required Mica for scooting to EOB to allow for feet flat on floor. Pt with increased RR sitting EOB with pt reports of increased anxiety. Pt performed STS to RW with Mica again requiring cues for ease and safety of transfer. Pt demonstrates good static standing balance. Pt requiring cues for walker management and safety as pt wants to keep one hand on walker and one hand on hand railing on wall in room. Pt responsive to PT cues. Pt ambulated 5 ft bed to chair with RW and CGA. Pt ambulates with narrowed JOLENE, shuffling gait pattern but good balance noted. Pt returned to sitting in chair requiring cues for transfer and able to scoot self back in chair. Pt positioned for comfort and left sitting up with all needs met and within reach and wife at bedside. Mar More will benefit from skilled PT (medically necessary) to address decreased strength, decreased balance, decreased functional tolerance, decreased cardiopulmonary endurance affecting participation in basic ADLs and functional tasks.          This section established at most recent assessment   PROBLEM LIST (Impairments causing functional limitations):  Decreased Strength  Decreased ADL/Functional Activities  Decreased Transfer Abilities  Decreased Ambulation Ability/Technique  Decreased Balance  Increased Pain  Decreased Activity Tolerance  Decreased Pacing Skills  Increased Fatigue  Increased Shortness of Breath  Decreased Knowledge of Precautions  Decreased Cecil with Home Exercise Program   INTERVENTIONS PLANNED: (Benefits and precautions of physical therapy have been discussed with the patient.)  Balance Exercise  Bed Mobility  Family Education  Gait Training  Home Exercise Program (HEP)  Neuromuscular Re-education/Strengthening  Therapeutic Activites  Therapeutic Exercise/Strengthening  Transfer Training     TREATMENT PLAN: Frequency/Duration: 3 times a week for duration of hospital stay  Rehabilitation Potential For Stated Goals: Good     REHAB RECOMMENDATIONS (at time of discharge pending progress):    Placement: It is my opinion, based on this patient's performance to date, that Mr. Talat Santacruz may benefit from [address] after discharge due to the functional deficits listed above that are likely to improve with skilled rehabilitation because he/she has multiple medical issues that affect his/her functional mobility in the community. Equipment:   May benefit from [address] [place] [address] pending progress               HISTORY:   History of Present Injury/Illness (Reason for Referral):  S/p Procedure(s) (LRB):  RIGHT NEPHRECTOMY LAPAROSCOPIC HAND ASSISTED  (Right)  The pt presented with gross hematuria and CT has revealed a 7 cm right renal mass. This is suspicious for malignancy. Past Medical History/Comorbidities:   Mr. Talat Santacruz  has a past medical history of Abnormal chest x-ray (3/19/2013), Adenopathy (3/19/2013), Allergic rhinitis, cause unspecified (3/19/2013), CAD (coronary artery disease), Chronic airway obstruction, not elsewhere classified (3/19/2013), COPD, Diabetes ([address] Diabetes mellitus ([address] (3/19/2013), Hashimoto's disease, Pulmonary fibrosis ([address] Sinus problem, Thyroid disease, Tobacco use disorder (3/19/2013), Unspecified essential hypertension (3/19/2013), and Unspecified hypothyroidism (3/19/2013). He also has no past medical history of Difficult intubation, Malignant hyperthermia due to anesthesia, Nausea & vomiting, or Pseudocholinesterase deficiency.   Mr. Talat Santacruz  has no past surgical history on file.  Social History/Living Environment:   Home Environment: Private residence  # Steps to Enter: 2  One/Two Story Residence: One story  Living Alone: No  Support Systems: Child(wilfrido), Family member(s)  Patient Expects to be Discharged to[de-identified] Private residence  Current DME Used/Available at Home: None  Prior Level of Function/Work/Activity:  Independent with all mobility, working out 5 days per week   Number of Personal Factors/Comorbidities that affect the Plan of Care: 3+: HIGH COMPLEXITY   EXAMINATION:   Most Recent Physical Functioning:   Gross Assessment:  AROM: Generally decreased, functional  Strength: Generally decreased, functional  Coordination: Generally decreased, functional               Posture:     Balance:  Sitting: Intact; Without support  Standing: Impaired;Pull to stand; With support  Standing - Static: Good;Constant support  Standing - Dynamic : Fair;Constant support Bed Mobility:  Supine to Sit: Minimum assistance  Scooting: Minimum assistance  Wheelchair Mobility:     Transfers:  Sit to Stand: Contact guard assistance;Minimum assistance  Stand to Sit: Contact guard assistance;Minimum assistance  Gait:     Base of Support: Narrowed  Speed/Naya: Slow;Shuffled  Step Length: Left shortened;Right shortened  Gait Abnormalities: Decreased step clearance;Shuffling gait  Distance (ft): 5 Feet (ft)  Assistive Device: Walker, rolling  Ambulation - Level of Assistance: Contact guard assistance;Stand-by assistance  Interventions: Safety awareness training;Verbal cues; Tactile cues      Body Structures Involved:  Nerves  Lungs  Bones  Joints  Muscles  Ligaments Body Functions Affected:  Sensory/Pain  Cardio  Respiratory  Neuromusculoskeletal  Movement Related Activities and Participation Affected:  General Tasks and Demands  Mobility  Self Care  Domestic Life  Interpersonal Interactions and Relationships  Community, Social and Civic Life   Number of elements that affect the Plan of Care: 4+: HIGH COMPLEXITY CLINICAL PRESENTATION:   Presentation: Evolving clinical presentation with changing clinical characteristics: MODERATE COMPLEXITY   CLINICAL DECISION MAKIN [facility] Inpatient Short Form  How much difficulty does the patient currently have. .. Unable A Lot A Little None   1. Turning over in bed (including adjusting bedclothes, sheets and blankets)? [] 1   [] 2   [x] 3   [] 4   2. Sitting down on and standing up from a chair with arms ( e.g., wheelchair, bedside commode, etc.)   [] 1   [] 2   [x] 3   [] 4   3. Moving from lying on back to sitting on the side of the bed? [] 1   [] 2   [x] 3   [] 4   How much help from another person does the patient currently need. .. Total A Lot A Little None   4. Moving to and from a bed to a chair (including a wheelchair)? [] 1   [] 2   [x] 3   [] 4   5. Need to walk in hospital room? [] 1   [x] 2   [] 3   [] 4   6. Climbing 3-5 steps with a railing? [] 1   [x] 2   [] 3   [] 4   © , Trustees of [address] [place] [address], under license to [employer]. All rights reserved      Score:  Initial: 16 Most Recent: X (Date: -- )    Interpretation of Tool:  Represents activities that are increasingly more difficult (i.e. Bed mobility, Transfers, Gait). Medical Necessity:     Patient is expected to demonstrate progress in   strength, balance, coordination, and functional technique   to   decrease assistance required with gait, transfers, and functional mobility. .  Reason for Services/Other Comments:  Patient continues to require skilled intervention due to   decreased strength, decreased balance, decreased functional tolerance, decreased cardiopulmonary endurance affecting participation in basic ADLs and functional tasks   .    Use of outcome tool(s) and clinical judgement create a POC that gives a: Clear prediction of patient's progress: LOW COMPLEXITY            TREATMENT:   (In addition to Assessment/Re-Assessment sessions the following treatments were rendered)   Pre-treatment Symptoms/Complaints:  post op pain  Pain: Initial:   Pain Intensity 1: 2  Post Session:  3/10 with mobility. Therapeutic Activity: (    10 minutes): Therapeutic activities including Bed transfers, Chair transfers, Ambulation on level ground, and cues for ease and safety of transfers to improve mobility, strength, balance, and coordination. Required minimal Safety awareness training;Verbal cues; Tactile cues to promote static and dynamic balance in standing. Braces/Orthotics/Lines/Etc:   IV  woodruff catheter  O2 Device: Nasal cannula  Treatment/Session Assessment:    Response to Treatment:  bed to chair with RW and CGA-Mica, max cues  Interdisciplinary Collaboration:   Physical Therapist  Registered Nurse  After treatment position/precautions:   Up in chair  Bed/Chair-wheels locked  Bed in low position  Call light within reach  RN notified  Family at bedside   Compliance with Program/Exercises: Will assess as treatment progresses  Recommendations/Intent for next treatment session: \"Next visit will focus on advancements to more challenging activities and reduction in assistance provided\".   Total Treatment Duration:  PT Patient Time In/Time Out  Time In: 1502  Time Out: [phone]

## 2019-11-21 NOTE — PROGRESS NOTES
Patient states pain medication only lasting approximately 2 1/2 - 3 hours. Pain 10/10 reluctant to move. Spoke with Sabas MADRID, dilaudid 1 mg IV changed to every 3 hours.

## 2019-11-21 NOTE — PROGRESS NOTES
Admit Date: 11/20/2019    Subjective:     Sae Becerra is resting, currently on O2 via NC. He is tolerating ice chips/water. No flatus. Active BS. Woodruff catheter in place, urine is clear yellow. Objective:     Patient Vitals for the past 8 hrs:   BP Temp Pulse Resp SpO2   11/21/19 0848 129/69 96.7 °F (35.9 °C) 84 19 91 %   11/21/19 0826     92 %   11/21/19 0508 136/69 98 °F (36.7 °C) 80 18 92 %     No intake/output data recorded. 11/19 1901 - 11/21 0700  In: 1250 [I.V.:1250]  Out: 2175 [Urine:2100]    Physical Exam:  GENERAL: alert, cooperative, no distress  LUNG: clear to auscultation bilaterally  HEART: regular rate and rhythm, S1, S2  ABDOMEN: soft, non-tender, dressing c/d/i, active BS, no flatus  NEUROLOGIC: AOx3    Data Review   Recent Results (from the past 24 hour(s))   METABOLIC PANEL, BASIC    Collection Time: 11/21/19  7:12 AM   Result Value Ref Range    Sodium 141 136 - 145 mmol/L    Potassium 4.2 3.5 - 5.1 mmol/L    Chloride 107 98 - 107 mmol/L    CO2 30 21 - 32 mmol/L    Anion gap 4 (L) 7 - 16 mmol/L    Glucose 124 (H) 65 - 100 mg/dL    BUN 28 (H) 8 - 23 MG/DL    Creatinine 1.27 0.8 - 1.5 MG/DL    GFR est AA >60 >60 ml/min/1.73m2    GFR est non-AA 59 (L) >60 ml/min/1.73m2    Calcium 8.4 8.3 - 10.4 MG/DL   HEMATOCRIT    Collection Time: 11/21/19  7:12 AM   Result Value Ref Range    HCT 46.2 41.1 - 50.3 %       Assessment:     Active Problems:    Renal mass, right (11/20/2019)    POD 1:    POSTOPERATIVE DIAGNOSIS:  Right renal mass measuring approximately 7-8 cm.     PROCEDURE PERFORMED:  Right hand-assisted laparoscopic nephrectomy. Afebrile, VSS  Cn 1.27  Hct- 46.2    Plan:     Continue water/ice chips only. Ambulate to chair today. PT consulted. Continue incentive spirometer. Continue woodruff catheter.             Veronica Gillette NP  [facility] Urology

## 2019-11-21 NOTE — PROGRESS NOTES
Hourly rounds completed this shift. Patient resting in bed. Medicated for pain per MAR X3 this shift. All needs met at this time. Will continue to monitor and give report to oncoming RN.

## 2019-11-21 NOTE — CDMP QUERY
Patient presented with hematuria. They were found on CT of the abdomen/pelvis to have a 6.7 cm mass - midpole right kidney - highly suspicious for a malignancy with blood in the renal collecting system. Plans were made for future nephrectomy. Discharge summary noted renal mass. After study, was this patient 
suspected to have: 
 
·Suspected renal cancer ·Renal mass unable to further specify ·Other________ ·Unable to determine Risk factors: hematuria, pain Clinical indicators: CT shows 6.7 cm mass midpole right kidney Treatment: Urology consult Thank you, [facility] Team/ Clinical Documentation Improvement [phone]

## 2019-11-21 NOTE — OP NOTES
[address] [facility]  OPERATIVE REPORT    Name:  Maninder Young  MR#:  [id number]  :  1945  ACCOUNT #:  [de-identified]  DATE OF SERVICE:  2019    PREOPERATIVE DIAGNOSIS:  Right renal mass measuring approximately 7-8 cm. POSTOPERATIVE DIAGNOSIS:  Right renal mass measuring approximately 7-8 cm. PROCEDURE PERFORMED:  Right hand-assisted laparoscopic nephrectomy. SURGEON:  Deion Purvis. Jersey Borrego MD    ASSISTANT:  Lillie Gutierrez. Nathanel Frankel, MD    ANESTHESIA:  General.    COMPLICATIONS:  None. SPECIMENS REMOVED:  Right radical nephrectomy specimen. ESTIMATED BLOOD LOSS:  Approximately 50 mL. DRAINS:  None. NARRATIVE:  The patient was taken to the OR and after adequate general anesthesia was achieved, he was placed in the right lateral decubitus position with the right side up. He was then prepped and draped in the usual sterile fashion. I then made an incision centered on the umbilicus in the midline measuring around 7-8 cm. This was done with a 15 blade. Bovie electrocautery was used to divide the subcutaneous fat. The fascia was divided as was the parietal peritoneum, and the peritoneum was entered. The GelPort was then placed. We then placed a 12-mm port through the GelPort and insufflated the abdomen. I was then able to place my left hand into the abdomen. Two additional ports were placed on the midclavicular line more superiorly; one was used for the camera and another as a working port. It became evident that the liver was going to be in our way, so a 5-mm port was placed just inferior to the xiphoid. A ratcheting self-grasping retractor was used to pull back the lower liver edge. This gave us excellent exposure. The line of Toldt was then incised with the harmonic and the colon was reflected medially. The duodenum was then Kocherized and reflected medially as well.   I dissected out the lower pole and was able to identify the ureter, which was then divided just inferior to the lower pole. Dissecting from the lower pole superiorly, I soon encountered the renal vein. With patience, we were able to dissect out the renal vein and the renal artery. The renal artery was directly posterior to the vein. I dissected quite a while and was unable to develop an adequate space between the artery and the vein in order to place our stapler. So, I stapled both artery and vein together. We then dissected out the upper pole. The right adrenal was left in situ. We then used an EndoCatch bag to remove the kidney and the surrounding Gerota's fascia. Ports were then closed with 0 Vicryl using the needle closure device. The midline wound for the hand port was then closed with double-stranded #1 PDS. Staples were placed on the wounds as were sterile dressings. The patient was then placed back in the supine position, awakened, extubated and taken from the OR without any further incident or complaint.       Sae Ladd MD      [id number]  D:  11/20/2019 15:10  T:  11/20/2019 23:06  JOB #:  [id number]

## 2019-11-22 PROBLEM — I48.91 ATRIAL FIBRILLATION WITH RAPID VENTRICULAR RESPONSE (HCC): Status: ACTIVE | Noted: 2019-11-22

## 2019-11-22 PROBLEM — J84.10 PULMONARY FIBROSIS (HCC): Status: ACTIVE | Noted: 2019-11-22

## 2019-11-22 LAB
ANION GAP SERPL CALC-SCNC: 14 MMOL/L (ref 7–16)
ANION GAP SERPL CALC-SCNC: 6 MMOL/L (ref 7–16)
BASOPHILS # BLD: 0 K/UL (ref 0–0.2)
BASOPHILS NFR BLD: 0 % (ref 0–2)
BUN SERPL-MCNC: 20 MG/DL (ref 8–23)
BUN SERPL-MCNC: 21 MG/DL (ref 8–23)
CALCIUM SERPL-MCNC: 7.9 MG/DL (ref 8.3–10.4)
CALCIUM SERPL-MCNC: 8.5 MG/DL (ref 8.3–10.4)
CHLORIDE SERPL-SCNC: 101 MMOL/L (ref 98–107)
CHLORIDE SERPL-SCNC: 103 MMOL/L (ref 98–107)
CO2 SERPL-SCNC: 21 MMOL/L (ref 21–32)
CO2 SERPL-SCNC: 28 MMOL/L (ref 21–32)
CREAT SERPL-MCNC: 1.46 MG/DL (ref 0.8–1.5)
CREAT SERPL-MCNC: 1.57 MG/DL (ref 0.8–1.5)
DIFFERENTIAL METHOD BLD: ABNORMAL
EOSINOPHIL # BLD: 0 K/UL (ref 0–0.8)
EOSINOPHIL NFR BLD: 0 % (ref 0.5–7.8)
ERYTHROCYTE [DISTWIDTH] IN BLOOD BY AUTOMATED COUNT: 12.8 % (ref 11.9–14.6)
ERYTHROCYTE [DISTWIDTH] IN BLOOD BY AUTOMATED COUNT: 12.8 % (ref 11.9–14.6)
GLUCOSE SERPL-MCNC: 148 MG/DL (ref 65–100)
GLUCOSE SERPL-MCNC: 173 MG/DL (ref 65–100)
HCT VFR BLD AUTO: 40.4 % (ref 41.1–50.3)
HCT VFR BLD AUTO: 43.6 % (ref 41.1–50.3)
HGB BLD-MCNC: 14 G/DL (ref 13.6–17.2)
HGB BLD-MCNC: 14.9 G/DL (ref 13.6–17.2)
IMM GRANULOCYTES # BLD AUTO: 0.1 K/UL (ref 0–0.5)
IMM GRANULOCYTES NFR BLD AUTO: 0 % (ref 0–5)
LYMPHOCYTES # BLD: 1 K/UL (ref 0.5–4.6)
LYMPHOCYTES NFR BLD: 8 % (ref 13–44)
MAGNESIUM SERPL-MCNC: 1.9 MG/DL (ref 1.8–2.4)
MCH RBC QN AUTO: 32.5 PG (ref 26.1–32.9)
MCH RBC QN AUTO: 33.3 PG (ref 26.1–32.9)
MCHC RBC AUTO-ENTMCNC: 34.2 G/DL (ref 31.4–35)
MCHC RBC AUTO-ENTMCNC: 34.7 G/DL (ref 31.4–35)
MCV RBC AUTO: 95.2 FL (ref 79.6–97.8)
MCV RBC AUTO: 96 FL (ref 79.6–97.8)
MONOCYTES # BLD: 1.1 K/UL (ref 0.1–1.3)
MONOCYTES NFR BLD: 9 % (ref 4–12)
NEUTS SEG # BLD: 10.1 K/UL (ref 1.7–8.2)
NEUTS SEG NFR BLD: 82 % (ref 43–78)
NRBC # BLD: 0 K/UL (ref 0–0.2)
NRBC # BLD: 0 K/UL (ref 0–0.2)
PLATELET # BLD AUTO: 170 K/UL (ref 150–450)
PLATELET # BLD AUTO: 176 K/UL (ref 150–450)
PMV BLD AUTO: 10.7 FL (ref 9.4–12.3)
PMV BLD AUTO: 10.7 FL (ref 9.4–12.3)
POTASSIUM SERPL-SCNC: 4.1 MMOL/L (ref 3.5–5.1)
POTASSIUM SERPL-SCNC: 4.2 MMOL/L (ref 3.5–5.1)
RBC # BLD AUTO: 4.21 M/UL (ref 4.23–5.6)
RBC # BLD AUTO: 4.58 M/UL (ref 4.23–5.6)
SODIUM SERPL-SCNC: 136 MMOL/L (ref 136–145)
SODIUM SERPL-SCNC: 137 MMOL/L (ref 136–145)
WBC # BLD AUTO: 12.4 K/UL (ref 4.3–11.1)
WBC # BLD AUTO: 15 K/UL (ref 4.3–11.1)

## 2019-11-22 PROCEDURE — 74011250637 HC RX REV CODE- 250/637: Performed by: UROLOGY

## 2019-11-22 PROCEDURE — 65660000000 HC RM CCU STEPDOWN

## 2019-11-22 PROCEDURE — 94640 AIRWAY INHALATION TREATMENT: CPT

## 2019-11-22 PROCEDURE — 74011250636 HC RX REV CODE- 250/636: Performed by: UROLOGY

## 2019-11-22 PROCEDURE — 36415 COLL VENOUS BLD VENIPUNCTURE: CPT

## 2019-11-22 PROCEDURE — 74011250636 HC RX REV CODE- 250/636: Performed by: PHYSICIAN ASSISTANT

## 2019-11-22 PROCEDURE — 85730 THROMBOPLASTIN TIME PARTIAL: CPT

## 2019-11-22 PROCEDURE — 99153 MOD SED SAME PHYS/QHP EA: CPT

## 2019-11-22 PROCEDURE — 77030040830 HC CATH URETH FOL MDII -A

## 2019-11-22 PROCEDURE — 74011250636 HC RX REV CODE- 250/636: Performed by: INTERNAL MEDICINE

## 2019-11-22 PROCEDURE — BT40ZZZ ULTRASONOGRAPHY OF BLADDER: ICD-10-PCS | Performed by: UROLOGY

## 2019-11-22 PROCEDURE — 94760 N-INVAS EAR/PLS OXIMETRY 1: CPT

## 2019-11-22 PROCEDURE — 85027 COMPLETE CBC AUTOMATED: CPT

## 2019-11-22 PROCEDURE — 83735 ASSAY OF MAGNESIUM: CPT

## 2019-11-22 PROCEDURE — 97530 THERAPEUTIC ACTIVITIES: CPT

## 2019-11-22 PROCEDURE — 74011000258 HC RX REV CODE- 258: Performed by: UROLOGY

## 2019-11-22 PROCEDURE — 74011250636 HC RX REV CODE- 250/636: Performed by: NURSE PRACTITIONER

## 2019-11-22 PROCEDURE — 74011000250 HC RX REV CODE- 250: Performed by: UROLOGY

## 2019-11-22 PROCEDURE — 93312 ECHO TRANSESOPHAGEAL: CPT

## 2019-11-22 PROCEDURE — 80048 BASIC METABOLIC PNL TOTAL CA: CPT

## 2019-11-22 PROCEDURE — 93005 ELECTROCARDIOGRAM TRACING: CPT | Performed by: UROLOGY

## 2019-11-22 PROCEDURE — 84443 ASSAY THYROID STIM HORMONE: CPT

## 2019-11-22 PROCEDURE — 51798 US URINE CAPACITY MEASURE: CPT

## 2019-11-22 PROCEDURE — 74011000250 HC RX REV CODE- 250: Performed by: PHYSICIAN ASSISTANT

## 2019-11-22 PROCEDURE — 92960 CARDIOVERSION ELECTRIC EXT: CPT

## 2019-11-22 PROCEDURE — 85025 COMPLETE CBC W/AUTO DIFF WBC: CPT

## 2019-11-22 PROCEDURE — 77030020263 HC SOL INJ SOD CL0.9% LFCR 1000ML

## 2019-11-22 PROCEDURE — 99152 MOD SED SAME PHYS/QHP 5/>YRS: CPT

## 2019-11-22 RX ORDER — HYDROCODONE BITARTRATE AND ACETAMINOPHEN 10; 325 MG/1; MG/1
1 TABLET ORAL
Status: DISCONTINUED | OUTPATIENT
Start: 2019-11-22 | End: 2019-11-25 | Stop reason: HOSPADM

## 2019-11-22 RX ORDER — HEPARIN SODIUM 5000 [USP'U]/100ML
12-25 INJECTION, SOLUTION INTRAVENOUS
Status: DISCONTINUED | OUTPATIENT
Start: 2019-11-22 | End: 2019-11-24

## 2019-11-22 RX ORDER — HEPARIN SODIUM 5000 [USP'U]/ML
4000 INJECTION, SOLUTION INTRAVENOUS; SUBCUTANEOUS ONCE
Status: COMPLETED | OUTPATIENT
Start: 2019-11-22 | End: 2019-11-22

## 2019-11-22 RX ORDER — FENTANYL CITRATE 50 UG/ML
25-50 INJECTION, SOLUTION INTRAMUSCULAR; INTRAVENOUS
Status: DISCONTINUED | OUTPATIENT
Start: 2019-11-22 | End: 2019-11-23 | Stop reason: HOSPADM

## 2019-11-22 RX ORDER — LORAZEPAM 2 MG/ML
1 INJECTION INTRAMUSCULAR
Status: DISCONTINUED | OUTPATIENT
Start: 2019-11-22 | End: 2019-11-25 | Stop reason: HOSPADM

## 2019-11-22 RX ORDER — DILTIAZEM HYDROCHLORIDE 5 MG/ML
10 INJECTION INTRAVENOUS ONCE
Status: COMPLETED | OUTPATIENT
Start: 2019-11-22 | End: 2019-11-22

## 2019-11-22 RX ORDER — ADENOSINE 3 MG/ML
12 INJECTION, SOLUTION INTRAVENOUS ONCE
Status: DISCONTINUED | OUTPATIENT
Start: 2019-11-22 | End: 2019-11-22

## 2019-11-22 RX ORDER — FUROSEMIDE 10 MG/ML
20 INJECTION INTRAMUSCULAR; INTRAVENOUS ONCE
Status: COMPLETED | OUTPATIENT
Start: 2019-11-22 | End: 2019-11-22

## 2019-11-22 RX ORDER — SODIUM CHLORIDE 9 MG/ML
100 INJECTION, SOLUTION INTRAVENOUS CONTINUOUS
Status: DISCONTINUED | OUTPATIENT
Start: 2019-11-22 | End: 2019-11-25 | Stop reason: HOSPADM

## 2019-11-22 RX ORDER — HEPARIN SODIUM 5000 [USP'U]/ML
5000 INJECTION, SOLUTION INTRAVENOUS; SUBCUTANEOUS EVERY 12 HOURS
Status: DISCONTINUED | OUTPATIENT
Start: 2019-11-22 | End: 2019-11-22 | Stop reason: ALTCHOICE

## 2019-11-22 RX ORDER — HYDROMORPHONE HYDROCHLORIDE 1 MG/ML
0.5 INJECTION, SOLUTION INTRAMUSCULAR; INTRAVENOUS; SUBCUTANEOUS
Status: DISCONTINUED | OUTPATIENT
Start: 2019-11-22 | End: 2019-11-25 | Stop reason: HOSPADM

## 2019-11-22 RX ORDER — HEPARIN SODIUM 10000 [USP'U]/ML
40000 INJECTION, SOLUTION INTRAVENOUS; SUBCUTANEOUS ONCE
Status: DISCONTINUED | OUTPATIENT
Start: 2019-11-22 | End: 2019-11-22

## 2019-11-22 RX ORDER — MIDAZOLAM HYDROCHLORIDE 1 MG/ML
.5-2 INJECTION, SOLUTION INTRAMUSCULAR; INTRAVENOUS
Status: DISCONTINUED | OUTPATIENT
Start: 2019-11-22 | End: 2019-11-23 | Stop reason: HOSPADM

## 2019-11-22 RX ADMIN — HYDROMORPHONE HYDROCHLORIDE 1 MG: 1 INJECTION, SOLUTION INTRAMUSCULAR; INTRAVENOUS; SUBCUTANEOUS at 02:53

## 2019-11-22 RX ADMIN — HEPARIN SODIUM 12 UNITS/KG/HR: 5000 INJECTION, SOLUTION INTRAVENOUS at 17:30

## 2019-11-22 RX ADMIN — LORAZEPAM 1 MG: 2 INJECTION INTRAMUSCULAR; INTRAVENOUS at 22:15

## 2019-11-22 RX ADMIN — SODIUM CHLORIDE 1000 MG: 900 INJECTION, SOLUTION INTRAVENOUS at 08:00

## 2019-11-22 RX ADMIN — FENTANYL CITRATE 50 MCG: 50 INJECTION, SOLUTION INTRAMUSCULAR; INTRAVENOUS at 18:05

## 2019-11-22 RX ADMIN — SODIUM BICARBONATE: 84 INJECTION, SOLUTION INTRAVENOUS at 08:52

## 2019-11-22 RX ADMIN — HEPARIN SODIUM 4000 UNITS: 5000 INJECTION INTRAVENOUS; SUBCUTANEOUS at 17:25

## 2019-11-22 RX ADMIN — MIDAZOLAM 2 MG: 1 INJECTION INTRAMUSCULAR; INTRAVENOUS at 18:07

## 2019-11-22 RX ADMIN — ALBUTEROL SULFATE 2.5 MG: 2.5 SOLUTION RESPIRATORY (INHALATION) at 20:14

## 2019-11-22 RX ADMIN — FENTANYL CITRATE 50 MCG: 50 INJECTION, SOLUTION INTRAMUSCULAR; INTRAVENOUS at 18:09

## 2019-11-22 RX ADMIN — Medication 5 ML: at 23:00

## 2019-11-22 RX ADMIN — SODIUM CHLORIDE 100 ML/HR: 900 INJECTION, SOLUTION INTRAVENOUS at 16:58

## 2019-11-22 RX ADMIN — Medication 10 ML: at 13:47

## 2019-11-22 RX ADMIN — DILTIAZEM HYDROCHLORIDE 10 MG: 5 INJECTION INTRAVENOUS at 16:37

## 2019-11-22 RX ADMIN — SODIUM CHLORIDE 1000 MG: 900 INJECTION, SOLUTION INTRAVENOUS at 01:24

## 2019-11-22 RX ADMIN — HYDROMORPHONE HYDROCHLORIDE 1 MG: 1 INJECTION, SOLUTION INTRAMUSCULAR; INTRAVENOUS; SUBCUTANEOUS at 08:05

## 2019-11-22 RX ADMIN — LEVOTHYROXINE SODIUM 88 MCG: 88 TABLET ORAL at 06:10

## 2019-11-22 RX ADMIN — HYDROCODONE BITARTRATE AND ACETAMINOPHEN 1 TABLET: 10; 325 TABLET ORAL at 11:35

## 2019-11-22 RX ADMIN — HEPARIN SODIUM 5000 UNITS: 5000 INJECTION INTRAVENOUS; SUBCUTANEOUS at 11:36

## 2019-11-22 RX ADMIN — MIDAZOLAM 2 MG: 1 INJECTION INTRAMUSCULAR; INTRAVENOUS at 18:09

## 2019-11-22 RX ADMIN — FUROSEMIDE 20 MG: 10 INJECTION, SOLUTION INTRAMUSCULAR; INTRAVENOUS at 20:20

## 2019-11-22 RX ADMIN — CARVEDILOL 12.5 MG: 12.5 TABLET, FILM COATED ORAL at 08:00

## 2019-11-22 RX ADMIN — MIDAZOLAM 2 MG: 1 INJECTION INTRAMUSCULAR; INTRAVENOUS at 18:05

## 2019-11-22 NOTE — PROGRESS NOTES
TRANSFER - OUT REPORT:    Verbal report given to Silvano(name) on Jamir Conroy  being transferred to telemetry(unit) for routine progression of care       Report consisted of patients Situation, Background, Assessment and   Recommendations(SBAR). Information from the following report(s) SBAR was reviewed with the receiving nurse. Opportunity for questions and clarification was provided. Procedure: aramis/cvn   Finding Summary: successful cardioversion to nsr(cath/pci/pacer settings)        Intra Procedure Meds:    Versed: 6mg  Fentanyl: 100mcg               Peripheral IV 11/20/19 Left Forearm (Active)   Site Assessment Clean, dry, & intact 11/22/2019  2:40 PM   Phlebitis Assessment 0 11/22/2019  2:40 PM   Infiltration Assessment 0 11/22/2019  2:40 PM   Dressing Status Clean, dry, & intact 11/22/2019  2:40 PM   Dressing Type Transparent;Tape 11/22/2019  2:40 PM   Hub Color/Line Status Green; Infusing;Patent 11/22/2019  2:40 PM   Alcohol Cap Used No 11/21/2019  3:48 AM                                is allergic to augmentin [amoxicillin-pot clavulanate]; chantix [varenicline]; symbicort [budesonide-formoterol]; and wellbutrin [bupropion hcl].     Past Medical History:   Diagnosis Date    Abnormal chest x-ray 3/19/2013    Adenopathy 3/19/2013    Lymph glands    Allergic rhinitis, cause unspecified 3/19/2013    CAD (coronary artery disease)     Chronic airway obstruction, not elsewhere classified 3/19/2013    COPD     Diabetes ([facility]     does not check sugars, last A1C 6.3    Diabetes mellitus ([facility] 3/19/2013    Hashimoto's disease     Pulmonary fibrosis ([facility])     Sinus problem     Thyroid disease     Hypothyroid    Tobacco use disorder 3/19/2013    Unspecified essential hypertension 3/19/2013    Unspecified hypothyroidism 3/19/2013     Visit Vitals  /56   Pulse 88   Temp 97.7 °F (36.5 °C)   Resp 20   Wt 94.8 kg (208 lb 14.4 oz)   SpO2 94%   BMI 29.97 kg/m²

## 2019-11-22 NOTE — PROGRESS NOTES
Pt has needed pain control this shift. Hourly  And PRN rounds performed during this shift; all needs met at this time. Bed in low/locked position and call light, personal items within reach.

## 2019-11-22 NOTE — PROGRESS NOTES
Tele notified this RN pt heartrate 212. Went to assess pt, pt was lying in bed with MEJIA Cassidy NP at bedside. Verbal order for 10mg IV Cardizem. This RN cannot push cardizem on this floor-Ran called to administer medication. Ran at bedside with cardio MD & NP. Pt transferred to 3rd floor tele with 3 RNs for further monitoring. Will call report to RN.

## 2019-11-22 NOTE — PROGRESS NOTES
Pt tachycardic at 132. Ramila Aranda NP notified. Order received for EKG. EKG states afib with RVR. STAT cardio consult placed. Remote tele ordered.

## 2019-11-22 NOTE — CONSULTS
[address] Cardiology Consult                Date of  Admission: 11/20/2019  5:56 AM     Primary Care Physician: Dr Savana Guerrero  Primary Cardiologist: None  Referring Physician: Dr Dyana Vines  Consulting Physician: Dr Dano Weber    CC/Reason for consult: a fib w RVR      Peg Watters is a 69 y.o. male admitted for Renal mass [N28.89]  Renal mass, right [N28.89]. He has a h/o pulmonary fibrosis, DM, smokes 1 1/2 ppd x 50 years (quit 11-9-19), hypothyroid, has an AAA, and h/o chest pain w [facility] which patient and wife state were unremarkable. He developed hematuria, was found to have a R renal mass and underwent R nephrectomy 11-20. Bryant was removed today. Pt got up to walk and BP dropped to 60/30, he felt extremely dizzy and light headed, almost lost consciousness but felt better laying back down. No CP, he always has mild chronic dyspnea with pulmonary fibrosis, no cough or LE edema. He did not notice any palpitations or irregular HR. EKG showed new a fib w rate 156 w NSST/T wave changes, BP now 105/53. Pt has not had symptoms like this before. No note of a fib during surgery monitoring. Pt has been placed on remote tele. No h/o CVA or TIA, no further hematuria.       Patient Active Problem List   Diagnosis Code    Hypothyroidism E03.9    Diabetes mellitus ([facility] [address] E11.9    Tobacco use disorder F17.200    Essential hypertension I10    Coronary artery disease involving native coronary artery of native heart I25.10    Allergic rhinitis J30.9    Mediastinal adenopathy R59.0    UIP (usual interstitial pneumonitis) ([facility]) J84.112    Anxiety F41.9    Depression F32.9    COPD (chronic obstructive pulmonary disease) ([facility]) J44.9    Acute recurrent sinusitis J01.91    Abdominal aortic aneurysm (AAA) without rupture ([facility]) I71.4    Prostate cancer screening Z12.5    Benign prostatic hyperplasia with lower urinary tract symptoms N40.1    Sleep disturbance G47.9    Impacted cerumen of right ear H61.21    Skin exam for malignant neoplasm Z12.83    Chronic sinusitis J32.9    Balance problem R26.89    Renal mass N28.89    Hematuria R31.9    Renal mass, right N28.89    Atrial fibrillation with rapid ventricular response (HCC) I48.91    Pulmonary fibrosis (HCC) J84.10       Past Medical History:   Diagnosis Date    Abnormal chest x-ray 3/19/2013    Adenopathy 3/19/2013    Lymph glands    Allergic rhinitis, cause unspecified 3/19/2013    CAD (coronary artery disease)     Chronic airway obstruction, not elsewhere classified 3/19/2013    COPD     Diabetes ([facility] [address]     does not check sugars, last A1C 6.3    Diabetes mellitus ([facility] [address] 3/19/2013    Hashimoto's disease     Pulmonary fibrosis (HCC)     Sinus problem     Thyroid disease     Hypothyroid    Tobacco use disorder 3/19/2013    Unspecified essential hypertension 3/19/2013    Unspecified hypothyroidism 3/19/2013      History reviewed. No pertinent surgical history.   Allergies   Allergen Reactions    Augmentin [Amoxicillin-Pot Clavulanate] Nausea Only     Pt states he is not allergic      Chantix [Varenicline] Other (comments)     [place]    Symbicort [Budesonide-Formoterol] Other (comments)     Thrush in mouth    Wellbutrin [Bupropion Hcl] Hives      Family History   Problem Relation Age of Onset    Diabetes Other       Social History     Tobacco Use    Smoking status: Current Every Day Smoker     Packs/day: 0.50     Years: 40.00     Pack years: 20.00     Types: Cigarettes    Smokeless tobacco: Never Used    Tobacco comment: 4-5 cigarettes per qd   Substance Use Topics    Alcohol use: Yes        Current Facility-Administered Medications   Medication Dose Route Frequency    HYDROmorphone (PF) (DILAUDID) injection 0.5 mg  0.5 mg IntraVENous Q3H PRN    HYDROcodone-acetaminophen (NORCO)  mg tablet 1 Tab  1 Tab Oral Q4H PRN    heparin (porcine) injection 5,000 Units  5,000 Units SubCUTAneous Q12H    albuterol (PROVENTIL VENTOLIN) nebulizer solution 2.5 mg  2.5 mg Nebulization Q6H PRN    carvedilol (COREG) tablet 12.5 mg  12.5 mg Oral BID WITH MEALS    levothyroxine (SYNTHROID) tablet 88 mcg  88 mcg Oral ACB    sodium chloride (NS) flush 5-40 mL  5-40 mL IntraVENous Q8H    sodium chloride (NS) flush 5-40 mL  5-40 mL IntraVENous PRN    naloxone (NARCAN) injection 0.4 mg  0.4 mg IntraVENous PRN    opium-belladonna (B&O 16-A SUPPRETTE) 16.2-60 mg suppository 1 Suppository  1 Suppository Rectal Q12H PRN    LORazepam (ATIVAN) injection 1 mg  1 mg IntraVENous Q6H PRN    0.45% sodium chloride 1,000 mL with sodium bicarbonate (8.4%) 20 mEq infusion   IntraVENous CONTINUOUS    ondansetron (ZOFRAN) injection 4 mg  4 mg IntraVENous Q4H PRN    diphenhydrAMINE (BENADRYL) injection 12.5 mg  12.5 mg IntraVENous Q4H PRN    bisacodyl (DULCOLAX) suppository 10 mg  10 mg Rectal DAILY PRN    tiotropium (SPIRIVA) inhalation capsule 18 mcg  1 Cap Inhalation DAILY    And    albuterol (PROVENTIL VENTOLIN) nebulizer solution 2.5 mg  2.5 mg Nebulization Q6HWA RT       Review of Symptoms:  General: no weight change,  + weakness, no fever or chills  Skin: no rashes, lumps, or other skin changes  HEENT: no headache, dizziness, lightheadedness, vision changes, hearing changes, tinnitus, vertigo, sinus pressure/pain, bleeding gums, sore throat, or hoarseness  Neck: no swollen glands, goiter, pain or stiffness  Respiratory: no cough, sputum, hemoptysis, + chronic dyspnea, wheezing  Cardiovascular: + as per HPI  Gastrointestinal: + a lot of belching   Urinary: + renal cell ca   Peripheral Vascular: no claudication, leg cramps, prior DVTs, swelling of calves, legs, or feet, color change, or swelling with redness or tenderness  Musculoskeletal: no muscle or joint pain/stiffness, joint swelling, erythema of joints, or back pain  Psychiatric: + anxiety  Neurological: no sensory or motor loss, seizures, syncope, tremors, numbness, no dementia  Hematologic: no anemia, easy bruising or bleeding  Endocrine: + thyroid problems, heat or cold intolerance, excessive sweating, polyuria, polydipsia, +  diabetes. Physical Exam  Vitals:    11/22/19 0430 11/22/19 0720 11/22/19 1102 11/22/19 1450   BP:  151/81 134/74 105/53   Pulse: 87 (!) 106 (!) 110 (!) 132   Resp: 18 22 20 20   Temp: 98.1 °F (36.7 °C) 98.3 °F (36.8 °C) 97.9 °F (36.6 °C) 97.7 °F (36.5 °C)   SpO2: 90% 91% 91% 93%   Weight:           Physical Exam:  General: Well Developed, Well Nourished, No Acute Distress  HEENT: pupils equal and round, no abnormalities noted  Neck: supple, no JVD, no carotid bruits  Heart: S1S2 irregularly irregular  Lungs: Moderately distended and tympanic, diffusely tender w palpation no rebound   Abd: soft, nontender, nondistended, with good bowel sounds  Ext: warm, no edema, calves supple/nontender, pulses 2+ bilaterally  Skin: warm and dry  Psychiatric: Appears anxious  Neurologic: Normal muscle tone     Labs:   Recent Labs     11/21/19  0712      K 4.2   BUN 28*   CREA 1.27   *   HCT 46.2        Assessment/Plan:     Assessment:   Renal mass, right (11/20/2019)- Renal cell ca s/p R nephrectomy     Essential hypertension -- hold coreg w borderline BP and need for rate control     Atrial fibrillation with rapid ventricular response ([facility] [address] (11/22/2019)- pt w new dx of symptomatic a fib w RVR, check CBC, BMP, mag and TSH, discussed with Jose Luis Weinberg urology and ok to anticoagulate, start heparin drip, will give IV cardizem bolus and if BP tolerates and HR remains high will start cardizem drip, start heparin; check echo in AM     Addendum: Left floor and called for  w CP and dizziness- no monitor at bedside or recent BP, pt calmed, on monitor HR flutter 140, will transfer to tele     Pulmonary fibrosis ([facility] [address] (11/22/2019)- cont current meds     Thank you very much for this referral. We appreciate the opportunity to participate in this patient's care.  We will follow along with above stated plan.     Landry Balrow PA-C  Consulting MD: Richi Guido

## 2019-11-22 NOTE — PROGRESS NOTES
Pt recently had episode of orthostatic hypotension and hypoxia. Vital signs stabilized. Pt now SOB , apical heart sounds are irregular with tachycardia. EKG shows afib with RVR. Stat cardiology consult placed and spoke with PA who is aware. Remote telemetry ordered. Stat labs ordered.

## 2019-11-22 NOTE — PROGRESS NOTES
TRANSFER - OUT REPORT:    Verbal report given to Deuce Abdul RN(name[facility] [address] [facility]  [facility] Tele(unit) for urgent transfer       Report consisted of patients Situation, Background, Assessment and   Recommendations(SBAR). Information from the following report(s) SBAR, Kardex and MAR was reviewed with the receiving nurse. Lines:   Peripheral IV 11/20/19 Left Forearm (Active)   Site Assessment Clean, dry, & intact 11/22/2019  2:40 PM   Phlebitis Assessment 0 11/22/2019  2:40 PM   Infiltration Assessment 0 11/22/2019  2:40 PM   Dressing Status Clean, dry, & intact 11/22/2019  2:40 PM   Dressing Type Transparent;Tape 11/22/2019  2:40 PM   Hub Color/Line Status Green; Infusing;Patent 11/22/2019  2:40 PM   Alcohol Cap Used No 11/21/2019  3:48 AM        Opportunity for questions and clarification was provided.       Patient transported with:   Registered Nurse

## 2019-11-22 NOTE — PROGRESS NOTES
11/22/19 1450   Vital Signs   Temp 97.7 °F (36.5 °C)   Temp Source Oral   Pulse (Heart Rate) (!) 132   Heart Rate Source Monitor   Resp Rate 20   O2 Sat (%) 93 %   Level of Consciousness Alert   /53   MAP (Calculated) 70   BP 1 Method Automatic   BP 1 Location Left arm   BP Patient Position At rest   MEWS Score 4     Luz NP aware. Pt being treated.

## 2019-11-22 NOTE — PROGRESS NOTES
Problem: Mobility Impaired (Adult and Pediatric)  Goal: *Acute Goals and Plan of Care (Insert Text)  Description  STG:  (1.)Mr. Pérez will move from supine to sit and sit to supine  with CONTACT GUARD ASSIST within 3 treatment day(s). (2.)Mr. Préez will transfer from bed to chair and chair to bed with STAND BY ASSIST using the least restrictive device within 3 treatment day(s). (3.)Mr. Pérez will ambulate with CONTACT GUARD ASSIST for 250 feet with the least restrictive device within 3 treatment day(s). LTG:  (1.)Mr. Pérez will move from supine to sit and sit to supine  in bed with INDEPENDENT within 7 treatment day(s). (2.)Mr. Pérez will transfer from bed to chair and chair to bed with INDEPENDENT using the least restrictive device within 7 treatment day(s). (3.)Mr. Pérez will ambulate with SUPERVISION for 500+ feet with the least restrictive device within 7 treatment day(s). ________________________________________________________________________________________________     Outcome: Progressing Towards Goal       PHYSICAL THERAPY: Daily Note and PM 11/22/2019  INPATIENT: PT Visit Days : 2  [place] / [place] / Product Type: Medicare /       NAME/AGE/GENDER: Hyland Harada is a 69 y.o. male   PRIMARY DIAGNOSIS: Renal mass [N28.89]  Renal mass, right [N28.89] <principal problem not specified> <principal problem not specified>  Procedure(s) (LRB):  RIGHT NEPHRECTOMY LAPAROSCOPIC HAND ASSISTED  (Right)  2 Days Post-Op  ICD-10: Treatment Diagnosis:    · Generalized Muscle Weakness (M62.81)  · Difficulty in walking, Not elsewhere classified (R26.2)   Precaution/Allergies:  Augmentin [amoxicillin-pot clavulanate]; Chantix [varenicline]; Symbicort [budesonide-formoterol]; and Wellbutrin [bupropion hcl]      ASSESSMENT:     Mr. Kayce Forrest is sitting up in bedside chair upon contact with reports of increasing anxiety requesting to just return to bed.  Wife at bedside stating they attempted to take a small walk earlier but limited by increased anxiety with mobility. Nursing aware. Pt performed STS to RW with SBA and ambulated 5 ft chair to bed with RW and SBA. Pt returned to sitting and assisted into supine with Mica. Pt requiring cues for technique with bed mobility. Pt left supine with all needs met and within reach with wife at bedside. Pt is making slow progress towards goals mostly limited by anxiety and fear of mobility increasing pain. This section established at most recent assessment   PROBLEM LIST (Impairments causing functional limitations):  1. Decreased Strength  2. Decreased ADL/Functional Activities  3. Decreased Transfer Abilities  4. Decreased Ambulation Ability/Technique  5. Decreased Balance  6. Increased Pain  7. Decreased Activity Tolerance  8. Decreased Pacing Skills  9. Increased Fatigue  10. Increased Shortness of Breath  11. Decreased Knowledge of Precautions  12. Decreased [place] with Home Exercise Program   INTERVENTIONS PLANNED: (Benefits and precautions of physical therapy have been discussed with the patient.)  1. Balance Exercise  2. Bed Mobility  3. Family Education  4. Gait Training  5. Home Exercise Program (HEP)  6. Neuromuscular Re-education/Strengthening  7. Therapeutic Activites  8. Therapeutic Exercise/Strengthening  9. Transfer Training     TREATMENT PLAN: Frequency/Duration: 3 times a week for duration of hospital stay  Rehabilitation Potential For Stated Goals: Good     REHAB RECOMMENDATIONS (at time of discharge pending progress):    Placement: It is my opinion, based on this patient's performance to date, that [facility] Morena [facility]sey [facility] due to the functional deficits listed above that are likely to improve with skilled rehabilitation because he/she has multiple medical issues that affect his/her functional mobility in the community.   Equipment:    May benefit from [address] [place] [address] pending progress HISTORY:   History of Present Injury/Illness (Reason for Referral):  S/p Procedure(s) (LRB):  RIGHT NEPHRECTOMY LAPAROSCOPIC HAND ASSISTED  (Right)  The pt presented with gross hematuria and CT has revealed a 7 cm right renal mass. This is suspicious for malignancy. Past Medical History/Comorbidities:   Mr. Nidhi Pham  has a past medical history of Abnormal chest x-ray (3/19/2013), Adenopathy (3/19/2013), Allergic rhinitis, cause unspecified (3/19/2013), CAD (coronary artery disease), Chronic airway obstruction, not elsewhere classified (3/19/2013), COPD, Diabetes ([facility] [address] Diabetes mellitus ([facility] [address] (3/19/2013), Hashimoto's disease, Pulmonary fibrosis ([facility] [address] Sinus problem, Thyroid disease, Tobacco use disorder (3/19/2013), Unspecified essential hypertension (3/19/2013), and Unspecified hypothyroidism (3/19/2013). He also has no past medical history of Difficult intubation, Malignant hyperthermia due to anesthesia, Nausea & vomiting, or Pseudocholinesterase deficiency. Mr. Nidhi Pham  has no past surgical history on file.   Social History/Living Environment:   Home Environment: Private residence  # Steps to Enter: 2  One/Two Story Residence: One story  Living Alone: No  Support Systems: Child(wilfrido), Family member(s)  Patient Expects to be Discharged to[de-identified] Private residence  Current DME Used/Available at Home: None  Prior Level of Function/Work/Activity:  Independent with all mobility, working out 5 days per week   Number of Personal Factors/Comorbidities that affect the Plan of Care: 3+: HIGH COMPLEXITY   EXAMINATION:   Most Recent Physical Functioning:   Gross Assessment:  AROM: Generally decreased, functional  Strength: Generally decreased, functional  Coordination: Generally decreased, functional               Posture:     Balance:  Standing - Static: Good;Constant support  Standing - Dynamic : Fair;Constant support Bed Mobility:  Sit to Supine: Minimum assistance  Wheelchair Mobility:     Transfers:  Sit to Stand: Contact guard assistance;Stand-by assistance  Stand to Sit: Stand-by assistance  Gait:     Base of Support: Narrowed  Speed/Naya: Slow;Shuffled  Step Length: Left shortened;Right shortened  Gait Abnormalities: Decreased step clearance;Shuffling gait  Distance (ft): 5 Feet (ft)  Assistive Device: Walker, rolling  Ambulation - Level of Assistance: Stand-by assistance  Interventions: Safety awareness training;Verbal cues      Body Structures Involved:  1. Nerves  2. Lungs  3. Bones  4. Joints  5. Muscles  6. Ligaments Body Functions Affected:  1. Sensory/Pain  2. Cardio  3. Respiratory  4. Neuromusculoskeletal  5. Movement Related Activities and Participation Affected:  1. General Tasks and Demands  2. Mobility  3. Self Care  4. Domestic Life  5. Interpersonal Interactions and Relationships  6. Community, Social and [place]   Number of elements that affect the Plan of Care: 4+: HIGH COMPLEXITY   CLINICAL PRESENTATION:   Presentation: Evolving clinical presentation with changing clinical characteristics: MODERATE COMPLEXITY   CLINICAL DECISION MAKIN [facility] Inpatient Short Form  How much difficulty does the patient currently have. .. Unable A Lot A Little None   1. Turning over in bed (including adjusting bedclothes, sheets and blankets)? [] 1   [] 2   [x] 3   [] 4   2. Sitting down on and standing up from a chair with arms ( e.g., wheelchair, bedside commode, etc.)   [] 1   [] 2   [x] 3   [] 4   3. Moving from lying on back to sitting on the side of the bed? [] 1   [] 2   [x] 3   [] 4   How much help from another person does the patient currently need. .. Total A Lot A Little None   4. Moving to and from a bed to a chair (including a wheelchair)? [] 1   [] 2   [x] 3   [] 4   5. Need to walk in hospital room? [] 1   [x] 2   [] 3   [] 4   6. Climbing 3-5 steps with a railing?    [] 1   [x] 2   [] 3   [] 4   © , Trustees of [address] [place] [address] [address] [address], under license to [email or web address]. All rights reserved      Score:  Initial: 16 Most Recent: X (Date: -- )    Interpretation of Tool:  Represents activities that are increasingly more difficult (i.e. Bed mobility, Transfers, Gait). Medical Necessity:     · Patient is expected to demonstrate progress in   · strength, balance, coordination, and functional technique  ·  to   · decrease assistance required with gait, transfers, and functional mobility. · .  Reason for Services/Other Comments:  · Patient continues to require skilled intervention due to   · decreased strength, decreased balance, decreased functional tolerance, decreased cardiopulmonary endurance affecting participation in basic ADLs and functional tasks   · . Use of outcome tool(s) and clinical judgement create a POC that gives a: Clear prediction of patient's progress: LOW COMPLEXITY            TREATMENT:   (In addition to Assessment/Re-Assessment sessions the following treatments were rendered)   Pre-treatment Symptoms/Complaints:  post op pain  Pain: Initial:   Pain Intensity 1: 3  Post Session:  3/10 with mobility. Therapeutic Activity: (    13 minutes): Therapeutic activities including Bed transfers, Chair transfers, Ambulation on level ground, and cues for ease and safety of transfers to improve mobility, strength, balance, and coordination. Required minimal Safety awareness training;Verbal cues to promote static and dynamic balance in standing.        Braces/Orthotics/Lines/Etc:   · IV  · O2 Device: Nasal cannula  Treatment/Session Assessment:    · Response to Treatment:  amb 5 ft with RW and SBA  · Interdisciplinary Collaboration:   o Physical Therapist  o Registered Nurse  · After treatment position/precautions:   o Supine in bed  o Bed/Chair-wheels locked  o Bed in low position  o Call light within reach  o Family at bedside   · Compliance with Program/Exercises: Compliant most of the time  · Recommendations/Intent for next treatment session: \"Next visit will focus on advancements to more challenging activities and reduction in assistance provided\".   Total Treatment Duration:  PT Patient Time In/Time Out  Time In: 1410  Time Out: [facility]

## 2019-11-22 NOTE — PROGRESS NOTES
Pt had an episode of orthostatic hypotension. BP dropped to 84/57, pt became SOB. O2 sat 85%-after 2 minutes on 2L via NC pt up to 93%. BP currently . Jean Claude Colon NP aware of situation. Pt wife stating he has anxiety, and gets worked up when he cannot control things. Wife requesting upon discharge pt be sent with ativan script for a few days.

## 2019-11-22 NOTE — PROGRESS NOTES
TRANSFER - IN REPORT:    Verbal report received from TAMMY Wilson on [address] being received from [facility] for routine progression of care. Report consisted of patients Situation, Background, Assessment and Recommendations(SBAR). Information from the following report(s) SBAR, Kardex, Procedure Summary, MAR and Cardiac Rhythm SR was reviewed. Opportunity for questions and clarification was provided. Updated wife on pt condition.

## 2019-11-22 NOTE — PROGRESS NOTES
Admit Date: 11/20/2019    Subjective:     Manda Dahl is sitting in the chair. Has required IV pain meds consistently, tolerating water/ice with no nausea, active BS. Woodruff catheter in place, urine is clear. On O2 via NC. Eager to ambulate. Objective:     Patient Vitals for the past 8 hrs:   BP Temp Pulse Resp SpO2   11/22/19 1102 134/74 97.9 °F (36.6 °C) (!) 110 20 91 %   11/22/19 0720 151/81 98.3 °F (36.8 °C) (!) 106 22 91 %   11/22/19 0430  98.1 °F (36.7 °C) 87 18 90 %     11/22 0701 - 11/22 1900  In: -   Out: 500 [Urine:500]  11/20 1901 - 11/22 0700  In: 0   Out: 3100 [Urine:3100]    Physical Exam:  GENERAL: alert, cooperative, no distress  LUNG: clear to auscultation bilaterally  HEART: regular rate and rhythm, S1, S2   ABDOMEN: soft, non-tender, dressing in place, active BS   NEUROLOGIC: AOx3    Data Review No results found for this or any previous visit (from the past 24 hour(s)). Assessment:     Active Problems:    Renal mass, right (11/20/2019)      POD 2:     POSTOPERATIVE DIAGNOSIS:  Right renal mass measuring approximately 7-8 cm.     PROCEDURE PERFORMED:  Right hand-assisted laparoscopic nephrectomy.     Afebrile, VSS    Plan:     Start clear liquids. Start heparin. Ambulate pt. Continue incentive spirometer. Remove woodruff. Monitor voiding.         Kamilah Palomares NP  [facility]

## 2019-11-22 NOTE — PROGRESS NOTES
Patient picked up from 6th floor and transported to room 304 per MD TERRY and Tena Maldonado CC. Patient -150's, appearing to be A flutter on monitor. SBP 90's. Cardizem bolus of 10mg given per order. SBP 85 after bolus, cardizem gtt held per order and Roxanne Vaughan NP notified. NS started at 100ml/hr per Roxanne BONNER. Woodruff inserted per PA order. RN scanned bladder on 6th foor that shoed 500mL retention s/p woodruff removal. Woodruff placed draining clear yellow urine. MD TERRY at bedside. Cath lab RN's at bedside. Per MD TERRY patient to receive 1 L bolus at 999ml/hr, rate adjusted. Patient started on heparin gtt with bolus per MD TERRY and sent to cath lab to DAWN cardioversion. TRANSFER - IN REPORT:    Verbal report received from [address] [place] [address]  RN(name) on Sana Lo  being received from 6th floor(unit) for urgent transfer. Report consisted of patients Situation, Background, Assessment and   Recommendations(SBAR). Information from the following report(s) SBAR, Kardex and MAR was reviewed with the receiving nurse. Opportunity for questions and clarification was provided. Assessment completed upon patients arrival to unit and care assumed. Dual skin assessment completed on admission. Heels and sacrum intact. 4 small incisions on mid and R abdomen CDI with tegaderm and gauze. One larger incision covered with abd pad noted to mid lower abdomen s/p R nephrectomy. No drainage noted on any dressings. #18 to L arm. No other impairments at this time.

## 2019-11-23 LAB
ABO + RH BLD: NORMAL
ANION GAP SERPL CALC-SCNC: 7 MMOL/L (ref 7–16)
APTT PPP: 53.2 SEC (ref 24.7–39.8)
APTT PPP: 53.6 SEC (ref 24.7–39.8)
APTT PPP: 67.1 SEC (ref 24.7–39.8)
ATRIAL RATE: 163 BPM
BLD PROD TYP BPU: NORMAL
BLOOD GROUP ANTIBODIES SERPL: NORMAL
BPU ID: NORMAL
BUN SERPL-MCNC: 20 MG/DL (ref 8–23)
CALCIUM SERPL-MCNC: 8.2 MG/DL (ref 8.3–10.4)
CALCULATED R AXIS, ECG10: 63 DEGREES
CALCULATED T AXIS, ECG11: 66 DEGREES
CHLORIDE SERPL-SCNC: 103 MMOL/L (ref 98–107)
CO2 SERPL-SCNC: 27 MMOL/L (ref 21–32)
CREAT SERPL-MCNC: 1.2 MG/DL (ref 0.8–1.5)
CROSSMATCH RESULT,%XM: NORMAL
DIAGNOSIS, 93000: NORMAL
ERYTHROCYTE [DISTWIDTH] IN BLOOD BY AUTOMATED COUNT: 12.6 % (ref 11.9–14.6)
GLUCOSE SERPL-MCNC: 135 MG/DL (ref 65–100)
HCT VFR BLD AUTO: 38.3 % (ref 41.1–50.3)
HGB BLD-MCNC: 13.3 G/DL (ref 13.6–17.2)
MAGNESIUM SERPL-MCNC: 1.9 MG/DL (ref 1.8–2.4)
MCH RBC QN AUTO: 32.8 PG (ref 26.1–32.9)
MCHC RBC AUTO-ENTMCNC: 34.7 G/DL (ref 31.4–35)
MCV RBC AUTO: 94.3 FL (ref 79.6–97.8)
NRBC # BLD: 0 K/UL (ref 0–0.2)
PLATELET # BLD AUTO: 155 K/UL (ref 150–450)
PMV BLD AUTO: 10.8 FL (ref 9.4–12.3)
POTASSIUM SERPL-SCNC: 3.7 MMOL/L (ref 3.5–5.1)
Q-T INTERVAL, ECG07: 250 MS
QRS DURATION, ECG06: 84 MS
QTC CALCULATION (BEZET), ECG08: 402 MS
RBC # BLD AUTO: 4.06 M/UL (ref 4.23–5.6)
SODIUM SERPL-SCNC: 137 MMOL/L (ref 136–145)
SPECIMEN EXP DATE BLD: NORMAL
STATUS OF UNIT,%ST: NORMAL
TSH SERPL DL<=0.005 MIU/L-ACNC: 1.91 UIU/ML (ref 0.36–3.74)
UNIT DIVISION, %UDIV: 0
VENTRICULAR RATE, ECG03: 156 BPM
WBC # BLD AUTO: 11.3 K/UL (ref 4.3–11.1)

## 2019-11-23 PROCEDURE — 74011250636 HC RX REV CODE- 250/636: Performed by: PHYSICIAN ASSISTANT

## 2019-11-23 PROCEDURE — 85027 COMPLETE CBC AUTOMATED: CPT

## 2019-11-23 PROCEDURE — 80048 BASIC METABOLIC PNL TOTAL CA: CPT

## 2019-11-23 PROCEDURE — 65660000000 HC RM CCU STEPDOWN

## 2019-11-23 PROCEDURE — 94760 N-INVAS EAR/PLS OXIMETRY 1: CPT

## 2019-11-23 PROCEDURE — 74011250636 HC RX REV CODE- 250/636: Performed by: UROLOGY

## 2019-11-23 PROCEDURE — 83735 ASSAY OF MAGNESIUM: CPT

## 2019-11-23 PROCEDURE — 85730 THROMBOPLASTIN TIME PARTIAL: CPT

## 2019-11-23 PROCEDURE — 74011250636 HC RX REV CODE- 250/636

## 2019-11-23 PROCEDURE — 94640 AIRWAY INHALATION TREATMENT: CPT

## 2019-11-23 PROCEDURE — 74011250636 HC RX REV CODE- 250/636: Performed by: INTERNAL MEDICINE

## 2019-11-23 PROCEDURE — C8929 TTE W OR WO FOL WCON,DOPPLER: HCPCS

## 2019-11-23 PROCEDURE — 74011250637 HC RX REV CODE- 250/637: Performed by: UROLOGY

## 2019-11-23 PROCEDURE — 74011250637 HC RX REV CODE- 250/637: Performed by: INTERNAL MEDICINE

## 2019-11-23 PROCEDURE — 74011000250 HC RX REV CODE- 250: Performed by: UROLOGY

## 2019-11-23 PROCEDURE — 77010033678 HC OXYGEN DAILY

## 2019-11-23 RX ORDER — HEPARIN SODIUM 5000 [USP'U]/ML
35 INJECTION, SOLUTION INTRAVENOUS; SUBCUTANEOUS ONCE
Status: COMPLETED | OUTPATIENT
Start: 2019-11-23 | End: 2019-11-23

## 2019-11-23 RX ORDER — TRIAMCINOLONE ACETONIDE 55 UG/1
1 SPRAY, METERED NASAL
COMMUNITY

## 2019-11-23 RX ORDER — HEPARIN SODIUM 5000 [USP'U]/ML
INJECTION, SOLUTION INTRAVENOUS; SUBCUTANEOUS
Status: COMPLETED
Start: 2019-11-23 | End: 2019-11-23

## 2019-11-23 RX ORDER — METOPROLOL TARTRATE 25 MG/1
25 TABLET, FILM COATED ORAL 2 TIMES DAILY
Status: DISCONTINUED | OUTPATIENT
Start: 2019-11-23 | End: 2019-11-25

## 2019-11-23 RX ORDER — POTASSIUM CHLORIDE 20 MEQ/1
40 TABLET, EXTENDED RELEASE ORAL
Status: COMPLETED | OUTPATIENT
Start: 2019-11-23 | End: 2019-11-23

## 2019-11-23 RX ORDER — MAGNESIUM SULFATE HEPTAHYDRATE 40 MG/ML
2 INJECTION, SOLUTION INTRAVENOUS ONCE
Status: COMPLETED | OUTPATIENT
Start: 2019-11-23 | End: 2019-11-23

## 2019-11-23 RX ADMIN — Medication 5 ML: at 06:18

## 2019-11-23 RX ADMIN — MAGNESIUM SULFATE HEPTAHYDRATE 2 G: 40 INJECTION, SOLUTION INTRAVENOUS at 11:29

## 2019-11-23 RX ADMIN — Medication 5 ML: at 21:17

## 2019-11-23 RX ADMIN — TIOTROPIUM BROMIDE 18 MCG: 18 CAPSULE ORAL; RESPIRATORY (INHALATION) at 07:11

## 2019-11-23 RX ADMIN — METOPROLOL TARTRATE 25 MG: 25 TABLET ORAL at 11:26

## 2019-11-23 RX ADMIN — ALBUTEROL SULFATE 2.5 MG: 2.5 SOLUTION RESPIRATORY (INHALATION) at 13:43

## 2019-11-23 RX ADMIN — HEPARIN SODIUM 16 UNITS/KG/HR: 5000 INJECTION, SOLUTION INTRAVENOUS at 16:06

## 2019-11-23 RX ADMIN — PERFLUTREN 1 ML: 6.52 INJECTION, SUSPENSION INTRAVENOUS at 10:00

## 2019-11-23 RX ADMIN — HEPARIN SODIUM 3300 UNITS: 5000 INJECTION INTRAVENOUS; SUBCUTANEOUS at 11:28

## 2019-11-23 RX ADMIN — ALBUTEROL SULFATE 2.5 MG: 2.5 SOLUTION RESPIRATORY (INHALATION) at 20:28

## 2019-11-23 RX ADMIN — HYDROCODONE BITARTRATE AND ACETAMINOPHEN 1 TABLET: 10; 325 TABLET ORAL at 17:02

## 2019-11-23 RX ADMIN — HEPARIN SODIUM 3300 UNITS: 5000 INJECTION INTRAVENOUS; SUBCUTANEOUS at 01:24

## 2019-11-23 RX ADMIN — SALINE NASAL SPRAY 2 SPRAY: 1.5 SOLUTION NASAL at 10:00

## 2019-11-23 RX ADMIN — METOPROLOL TARTRATE 25 MG: 25 TABLET ORAL at 17:02

## 2019-11-23 RX ADMIN — LORAZEPAM 1 MG: 2 INJECTION INTRAMUSCULAR; INTRAVENOUS at 21:17

## 2019-11-23 RX ADMIN — Medication 10 ML: at 14:00

## 2019-11-23 RX ADMIN — LEVOTHYROXINE SODIUM 88 MCG: 88 TABLET ORAL at 11:26

## 2019-11-23 RX ADMIN — HEPARIN SODIUM: 5000 INJECTION INTRAVENOUS; SUBCUTANEOUS at 01:24

## 2019-11-23 RX ADMIN — POTASSIUM CHLORIDE 40 MEQ: 20 TABLET, EXTENDED RELEASE ORAL at 11:26

## 2019-11-23 RX ADMIN — ALBUTEROL SULFATE 2.5 MG: 2.5 SOLUTION RESPIRATORY (INHALATION) at 07:11

## 2019-11-23 RX ADMIN — HEPARIN SODIUM 3300 UNITS: 5000 INJECTION INTRAVENOUS; SUBCUTANEOUS at 19:25

## 2019-11-23 NOTE — PROCEDURES
[address] [facility]  CARDIAC CATH    Name:  Brinda Jarquin  MR#:  [id number]  :  1945  ACCOUNT #:  [de-identified]  DATE OF SERVICE:  2019    PRIMARY CARDIOLOGIST:  None. PRIMARY CARE PHYSICIAN:  Suzanna Espinosa MD    BRIEF HISTORY:  The patient is a 74-year-old gentleman with no prior cardiac history who was admitted for renal cell carcinoma, status post resection. Postoperative day 1, he developed atrial fibrillation with rapid ventricular response. Heart rate was approaching 200 beats per minute with associated hypotension and diaphoresis. The patient was transferred to the third floor, given IV Cardizem with improved heart rates but the blood pressure remained low. Heart rate still persisted, 120-180 at times and he is brought for urgent DAWN-guided cardioversion. PREOPERATIVE DIAGNOSES:  Atrial fibrillation with rapid ventricular response, hemodynamic instability. POSTOPERATIVE DIAGNOSES:  Sinus rhythm, hemodynamic stability. PROCEDURES PERFORMED:  DAWN-guided cardioversion. SURGEON:  Rodney Corona MD    ASSISTANT:  None. COMPLICATIONS:  None. ANESTHESIA:  Conscious sedation. Start time [phone], end time 18. MEDICATIONS:  6 mg of Versed, 100 mcg of fentanyl. MONITORING RN:  James Mcgill. IMPLANTS:  None. SPECIMENS:  None. ESTIMATED BLOOD LOSS:  None. PROCEDURE:  After informed consent, the patient received oropharyngeal anesthesia. After adequate oropharyngeal anesthesia, he was sedated with Versed and fentanyl. After adequate sedation, DAWN probe was passed without complications. This demonstrated normal left ventricular systolic function. No significant valvular heart disease. Left atrium, left atrial appendage, right atrium, and right atrial appendage were all free of thrombus. Following completion of DAWN, the patient received 200 joules of synchronized biphasic energy with restoration of sinus rhythm. CONCLUSIONS:  1.   Successful DAWN-guided cardioversion. 2.  Normal left ventricular systolic function. 3.  No significant valvular heart disease. We will monitor now and IV heparin given the patient's recent surgical procedure. If the patient remains stable, we will transition to oral anticoagulation for which he can take for approximately 30 days and reassess for a recurrent atrial fibrillation/flutter. If the patient has recurrent event, we will need to initiate antiarrhythmic therapy. Thank you for allowing us to participate in the care of this patient. Any questions or concerns, please feel free to contact me.         Kelechi Goetz MD      [id number]  D:  11/22/2019 18:25  T:  11/22/2019 19:15  JOB #:  [id number]

## 2019-11-23 NOTE — PROGRESS NOTES
Verbal bedside report given to [facility], oncoming RN. Patient's situation, background, assessment and recommendations provided. Opportunity for questions provided. Oncoming RN assumed care of patient. Heparin gtt verified at bedside and in chart with oncoming RN. Bed alarm on.

## 2019-11-23 NOTE — PROGRESS NOTES
Patient arrived back from cardioversion, NSR on monitor, SBP in 110's. Pt O2 sat 86 on 2L NC, sat patient up in bed and titrated O2 to 4L NC sat 90. Lower and middle lobes sound coarse on ausculation. Patient states he does not feel short of breath. Patient received 1 L bolus prior to cardioversion. Notified Maki Arce NP, received order to give 20 IVP lasix now. Deedee Drummond oncclarke RN notified and order placed. Bedside verbal report given to Viky Cuello RN including SBAR, Kardex, and MAR. Heparin gtt verified. BP cycling q15min on eagle. Call light in reach. Oncoming RN assuming care.

## 2019-11-23 NOTE — PROGRESS NOTES
Verbal bedside report received from Susan Daly RN. Assumed care of patient. Heparin IV drip verified at bedside and in chart with outgoing RN. Pt back from cath lab post DAWN cardioversion. Pt attached to tele monitor, currently in SR. BP cycling [id number]. Instructed pt and family to stay NPO until this RN instructed so.

## 2019-11-23 NOTE — PROGRESS NOTES
Subjective:   Daily Progress Note: 2019 12:03 PM  No Complaints  Had atrial fib with RVR yesterday. Is s/p cardioversion. Also had woodruff placed for urinary retention. Has passed flatus, ambulated a little. Objective:     Visit Vitals  /60 (BP 1 Location: Left arm, BP Patient Position: At rest)   Pulse 93   Temp 98.1 °F (36.7 °C)   Resp 18   Wt 197 lb 4.8 oz (89.5 kg)   SpO2 92%   BMI 28.31 kg/m²    O2 Flow Rate (L/min): 4 l/min O2 Device: Nasal cannula    Temp (24hrs), Av.1 °F (36.7 °C), Min:97.4 °F (36.3 °C), Max:99.1 °F (37.3 °C)      1901 -  07  In: 100 [P.O.:100]  Out: 4200 [Urine:4200]  701 - 1900  In: 100 [P.O.:100]  Out: -     [unfilled]  [unfilled]  [unfilled]    Exam: sitting in chair. Abdomen mildly tender. Woodruff with clear urine. Data Review    Recent Results (from the past 24 hour(s))   EKG, 12 LEAD, INITIAL    Collection Time: 19  3:13 PM   Result Value Ref Range    Ventricular Rate 156 BPM    Atrial Rate 163 BPM    QRS Duration 84 ms    Q-T Interval 250 ms    QTC Calculation (Bezet) 402 ms    Calculated R Axis 63 degrees    Calculated T Axis 66 degrees    Diagnosis       Atrial fibrillation with rapid ventricular response  Nonspecific ST abnormality  Abnormal ECG  When compared with ECG of 2019 18:05,  Atrial fibrillation has replaced Sinus rhythm  Vent.  rate has increased BY  69 BPM  Minimal criteria for Inferior infarct are no longer Present  ST now depressed in Lateral leads  Confirmed by Randolph Atkinson ([id number]) on 2019 6:27:49 AM     CBC W/O DIFF    Collection Time: 19  3:35 PM   Result Value Ref Range    WBC 15.0 (H) 4.3 - 11.1 K/uL    RBC 4.58 4.23 - 5.6 M/uL    HGB 14.9 13.6 - 17.2 g/dL    HCT 43.6 41.1 - 50.3 %    MCV 95.2 79.6 - 97.8 FL    MCH 32.5 26.1 - 32.9 PG    MCHC 34.2 31.4 - 35.0 g/dL    RDW 12.8 11.9 - 14.6 %    PLATELET 925 043 - 522 K/uL    MPV 10.7 9.4 - 12.3 FL    ABSOLUTE NRBC 0.00 0.0 - 0.2 K/uL METABOLIC PANEL, BASIC    Collection Time: 11/22/19  3:35 PM   Result Value Ref Range    Sodium 136 136 - 145 mmol/L    Potassium 4.1 3.5 - 5.1 mmol/L    Chloride 101 98 - 107 mmol/L    CO2 21 21 - 32 mmol/L    Anion gap 14 7 - 16 mmol/L    Glucose 173 (H) 65 - 100 mg/dL    BUN 20 8 - 23 MG/DL    Creatinine 1.46 0.8 - 1.5 MG/DL    GFR est AA >60 >60 ml/min/1.73m2    GFR est non-AA 50 (L) >60 ml/min/1.73m2    Calcium 8.5 8.3 - 51.5 MG/DL   METABOLIC PANEL, BASIC    Collection Time: 11/22/19  7:37 PM   Result Value Ref Range    Sodium 137 136 - 145 mmol/L    Potassium 4.2 3.5 - 5.1 mmol/L    Chloride 103 98 - 107 mmol/L    CO2 28 21 - 32 mmol/L    Anion gap 6 (L) 7 - 16 mmol/L    Glucose 148 (H) 65 - 100 mg/dL    BUN 21 8 - 23 MG/DL    Creatinine 1.57 (H) 0.8 - 1.5 MG/DL    GFR est AA 56 (L) >60 ml/min/1.73m2    GFR est non-AA 46 (L) >60 ml/min/1.73m2    Calcium 7.9 (L) 8.3 - 10.4 MG/DL   MAGNESIUM    Collection Time: 11/22/19  7:37 PM   Result Value Ref Range    Magnesium 1.9 1.8 - 2.4 mg/dL   CBC WITH AUTOMATED DIFF    Collection Time: 11/22/19  7:37 PM   Result Value Ref Range    WBC 12.4 (H) 4.3 - 11.1 K/uL    RBC 4.21 (L) 4.23 - 5.6 M/uL    HGB 14.0 13.6 - 17.2 g/dL    HCT 40.4 (L) 41.1 - 50.3 %    MCV 96.0 79.6 - 97.8 FL    MCH 33.3 (H) 26.1 - 32.9 PG    MCHC 34.7 31.4 - 35.0 g/dL    RDW 12.8 11.9 - 14.6 %    PLATELET 731 624 - 957 K/uL    MPV 10.7 9.4 - 12.3 FL    ABSOLUTE NRBC 0.00 0.0 - 0.2 K/uL    DF AUTOMATED      NEUTROPHILS 82 (H) 43 - 78 %    LYMPHOCYTES 8 (L) 13 - 44 %    MONOCYTES 9 4.0 - 12.0 %    EOSINOPHILS 0 (L) 0.5 - 7.8 %    BASOPHILS 0 0.0 - 2.0 %    IMMATURE GRANULOCYTES 0 0.0 - 5.0 %    ABS. NEUTROPHILS 10.1 (H) 1.7 - 8.2 K/UL    ABS. LYMPHOCYTES 1.0 0.5 - 4.6 K/UL    ABS. MONOCYTES 1.1 0.1 - 1.3 K/UL    ABS. EOSINOPHILS 0.0 0.0 - 0.8 K/UL    ABS. BASOPHILS 0.0 0.0 - 0.2 K/UL    ABS. IMM.  GRANS. 0.1 0.0 - 0.5 K/UL   TSH 3RD GENERATION    Collection Time: 11/22/19 11:56 PM   Result Value Ref Range    TSH 1.910 0.358 - 3.740 uIU/mL   PTT    Collection Time: 11/22/19 11:56 PM   Result Value Ref Range    aPTT 67.1 (H) 24.7 - 21.3 SEC   METABOLIC PANEL, BASIC    Collection Time: 11/23/19  4:37 AM   Result Value Ref Range    Sodium 137 136 - 145 mmol/L    Potassium 3.7 3.5 - 5.1 mmol/L    Chloride 103 98 - 107 mmol/L    CO2 27 21 - 32 mmol/L    Anion gap 7 7 - 16 mmol/L    Glucose 135 (H) 65 - 100 mg/dL    BUN 20 8 - 23 MG/DL    Creatinine 1.20 0.8 - 1.5 MG/DL    GFR est AA >60 >60 ml/min/1.73m2    GFR est non-AA >60 >60 ml/min/1.73m2    Calcium 8.2 (L) 8.3 - 10.4 MG/DL   MAGNESIUM    Collection Time: 11/23/19  4:37 AM   Result Value Ref Range    Magnesium 1.9 1.8 - 2.4 mg/dL   CBC W/O DIFF    Collection Time: 11/23/19  4:37 AM   Result Value Ref Range    WBC 11.3 (H) 4.3 - 11.1 K/uL    RBC 4.06 (L) 4.23 - 5.6 M/uL    HGB 13.3 (L) 13.6 - 17.2 g/dL    HCT 38.3 (L) 41.1 - 50.3 %    MCV 94.3 79.6 - 97.8 FL    MCH 32.8 26.1 - 32.9 PG    MCHC 34.7 31.4 - 35.0 g/dL    RDW 12.6 11.9 - 14.6 %    PLATELET 552 537 - 173 K/uL    MPV 10.8 9.4 - 12.3 FL    ABSOLUTE NRBC 0.00 0.0 - 0.2 K/uL   PTT    Collection Time: 11/23/19  8:05 AM   Result Value Ref Range    aPTT 53.2 (H) 24.7 - 39.8 SEC       Assessment   Principal Problem:    Renal mass, right (11/20/2019)    Active Problems:    Essential hypertension (3/19/2013)      Overview: At goal.       Atrial fibrillation with rapid ventricular response ([facility] [address] (11/22/2019)      Pulmonary fibrosis ([facility] [address] (11/22/2019)        Plan: Will continue clear liquids and woodruff today. Is on heparin drip, no problems.

## 2019-11-23 NOTE — PROGRESS NOTES
[facility] CARDIOLOGY PROGRESS NOTE           11/23/2019 10:41 AM    Admit Date: 11/20/2019      Subjective:   Doing well, in sinus, some pain at surgery sites. No known bleeding issues. ROS:  Cardiovascular:  As noted above    Objective:      Vitals:    11/23/19 0048 11/23/19 0529 11/23/19 0711 11/23/19 0840   BP: 134/65 143/58  124/60   Pulse: 90 84  93   Resp: 20   18   Temp: 99.1 °F (37.3 °C) 97.9 °F (36.6 °C)  98.1 °F (36.7 °C)   SpO2: 90% 90% 92% 92%   Weight:  89.5 kg (197 lb 4.8 oz)         Physical Exam:  General-No Acute Distress  Neck- supple, no JVD  CV- regular rate and rhythm no MRG  Lung- clear bilaterally  Abd- soft, nontender, nondistended  Ext- no edema bilaterally. Skin- warm and dry    Data Review:   Recent Labs     11/23/19  0437 11/22/19  1937    137   K 3.7 4.2   MG 1.9 1.9   BUN 20 21   CREA 1.20 1.57*   * 148*   WBC 11.3* 12.4*   HGB 13.3* 14.0   HCT 38.3* 40.4*    170       Assessment/Plan:     Principal Problem:    Renal mass, right (11/20/2019)  S/p surgery, continue post-opm care. Active Problems:    Essential hypertension (3/19/2013)  Remain on meds. Follow. Atrial fibrillation with rapid ventricular response ([address] (11/22/2019)  S/p cardioversion. Continue IV heparin for another 24 hours, then change to po NOAC if Hb stable and no post-op bleeding issues. Add PO BB today, follow. EF normal.  Replace K, follow AM labs. Follow on tele for now, IV heparin for another 24 hours. Follow.            Tammy Bliss DO  11/23/2019 10:41 AM

## 2019-11-23 NOTE — PROGRESS NOTES
Pt has been anxious and restless since return from cath lab. Wife stated pt had repeatedly wanting to get out of bed despite being informed by RN to stay in bed post procedure. Pt requesting medication to help with his restlessness. Called urologist on call Dr Sherin Samuel. Received telephone order to modify existing ativan 1mg IV PRN order originally for sedation to also include indication for restlessness. 2215- Ativan 1mg IV administered. /61. Will continue to monitor.

## 2019-11-24 LAB
ANION GAP SERPL CALC-SCNC: 8 MMOL/L (ref 7–16)
APTT PPP: 54.3 SEC (ref 24.7–39.8)
APTT PPP: 98.1 SEC (ref 24.7–39.8)
BUN SERPL-MCNC: 14 MG/DL (ref 8–23)
CALCIUM SERPL-MCNC: 8.6 MG/DL (ref 8.3–10.4)
CHLORIDE SERPL-SCNC: 104 MMOL/L (ref 98–107)
CO2 SERPL-SCNC: 28 MMOL/L (ref 21–32)
CREAT SERPL-MCNC: 1.05 MG/DL (ref 0.8–1.5)
ERYTHROCYTE [DISTWIDTH] IN BLOOD BY AUTOMATED COUNT: 12.7 % (ref 11.9–14.6)
GLUCOSE SERPL-MCNC: 138 MG/DL (ref 65–100)
HCT VFR BLD AUTO: 38.7 % (ref 41.1–50.3)
HGB BLD-MCNC: 13.4 G/DL (ref 13.6–17.2)
MAGNESIUM SERPL-MCNC: 2 MG/DL (ref 1.8–2.4)
MCH RBC QN AUTO: 32.7 PG (ref 26.1–32.9)
MCHC RBC AUTO-ENTMCNC: 34.6 G/DL (ref 31.4–35)
MCV RBC AUTO: 94.4 FL (ref 79.6–97.8)
NRBC # BLD: 0 K/UL (ref 0–0.2)
PLATELET # BLD AUTO: 172 K/UL (ref 150–450)
PMV BLD AUTO: 10.1 FL (ref 9.4–12.3)
POTASSIUM SERPL-SCNC: 3.8 MMOL/L (ref 3.5–5.1)
RBC # BLD AUTO: 4.1 M/UL (ref 4.23–5.6)
SODIUM SERPL-SCNC: 140 MMOL/L (ref 136–145)
WBC # BLD AUTO: 9.3 K/UL (ref 4.3–11.1)

## 2019-11-24 PROCEDURE — 85730 THROMBOPLASTIN TIME PARTIAL: CPT

## 2019-11-24 PROCEDURE — 74011250637 HC RX REV CODE- 250/637: Performed by: UROLOGY

## 2019-11-24 PROCEDURE — 77010033678 HC OXYGEN DAILY

## 2019-11-24 PROCEDURE — 36415 COLL VENOUS BLD VENIPUNCTURE: CPT

## 2019-11-24 PROCEDURE — 80048 BASIC METABOLIC PNL TOTAL CA: CPT

## 2019-11-24 PROCEDURE — 74011000250 HC RX REV CODE- 250: Performed by: UROLOGY

## 2019-11-24 PROCEDURE — 74011250637 HC RX REV CODE- 250/637: Performed by: INTERNAL MEDICINE

## 2019-11-24 PROCEDURE — 74011250636 HC RX REV CODE- 250/636: Performed by: UROLOGY

## 2019-11-24 PROCEDURE — 94760 N-INVAS EAR/PLS OXIMETRY 1: CPT

## 2019-11-24 PROCEDURE — 83735 ASSAY OF MAGNESIUM: CPT

## 2019-11-24 PROCEDURE — 65660000000 HC RM CCU STEPDOWN

## 2019-11-24 PROCEDURE — 74011250636 HC RX REV CODE- 250/636: Performed by: PHYSICIAN ASSISTANT

## 2019-11-24 PROCEDURE — 85027 COMPLETE CBC AUTOMATED: CPT

## 2019-11-24 PROCEDURE — 94640 AIRWAY INHALATION TREATMENT: CPT

## 2019-11-24 RX ORDER — FACIAL-BODY WIPES
10 EACH TOPICAL DAILY PRN
Status: DISCONTINUED | OUTPATIENT
Start: 2019-11-24 | End: 2019-11-25 | Stop reason: HOSPADM

## 2019-11-24 RX ORDER — POTASSIUM CHLORIDE 20 MEQ/1
40 TABLET, EXTENDED RELEASE ORAL
Status: COMPLETED | OUTPATIENT
Start: 2019-11-24 | End: 2019-11-24

## 2019-11-24 RX ADMIN — HYDROCODONE BITARTRATE AND ACETAMINOPHEN 1 TABLET: 10; 325 TABLET ORAL at 19:43

## 2019-11-24 RX ADMIN — HEPARIN SODIUM 18 UNITS/KG/HR: 5000 INJECTION, SOLUTION INTRAVENOUS at 09:04

## 2019-11-24 RX ADMIN — APIXABAN 5 MG: 5 TABLET, FILM COATED ORAL at 21:14

## 2019-11-24 RX ADMIN — Medication 10 ML: at 21:22

## 2019-11-24 RX ADMIN — ALBUTEROL SULFATE 2.5 MG: 2.5 SOLUTION RESPIRATORY (INHALATION) at 14:48

## 2019-11-24 RX ADMIN — METOPROLOL TARTRATE 25 MG: 25 TABLET ORAL at 21:14

## 2019-11-24 RX ADMIN — TIOTROPIUM BROMIDE 18 MCG: 18 CAPSULE ORAL; RESPIRATORY (INHALATION) at 08:45

## 2019-11-24 RX ADMIN — LEVOTHYROXINE SODIUM 88 MCG: 88 TABLET ORAL at 07:09

## 2019-11-24 RX ADMIN — BISACODYL 10 MG: 10 SUPPOSITORY RECTAL at 11:18

## 2019-11-24 RX ADMIN — METOPROLOL TARTRATE 25 MG: 25 TABLET ORAL at 09:02

## 2019-11-24 RX ADMIN — POTASSIUM CHLORIDE 40 MEQ: 20 TABLET, EXTENDED RELEASE ORAL at 11:17

## 2019-11-24 RX ADMIN — APIXABAN 5 MG: 5 TABLET, FILM COATED ORAL at 11:17

## 2019-11-24 RX ADMIN — ALBUTEROL SULFATE 2.5 MG: 2.5 SOLUTION RESPIRATORY (INHALATION) at 19:23

## 2019-11-24 RX ADMIN — ALBUTEROL SULFATE 2.5 MG: 2.5 SOLUTION RESPIRATORY (INHALATION) at 08:38

## 2019-11-24 RX ADMIN — LORAZEPAM 1 MG: 2 INJECTION INTRAMUSCULAR; INTRAVENOUS at 21:14

## 2019-11-24 RX ADMIN — Medication 10 ML: at 05:38

## 2019-11-24 NOTE — PROGRESS NOTES
Subjective:   Daily Progress Note: 2019 12:29 PM  C/o gas pains; has passed flatus. No N/V. Objective:     Visit Vitals  BP (!) 130/93 (BP 1 Location: Left arm, BP Patient Position: At rest)   Pulse 79   Temp 97.4 °F (36.3 °C)   Resp 16   Wt 195 lb 9.6 oz (88.7 kg)   SpO2 93%   BMI 28.07 kg/m²    O2 Flow Rate (L/min): 2 l/min O2 Device: Nasal cannula    Temp (24hrs), Av.9 °F (36.6 °C), Min:97.4 °F (36.3 °C), Max:98.6 °F (37 °C)      1901 - 0700  In: 1191.4 [P.O.:560; I.V.:631.4]  Out: 4000 [Urine:4000]  701 - 1900  In: -   Out: 754 [Urine:325]    [unfilled]  [unfilled]  [unfilled]    Exam: abdomen slightly distended, incisions clean and dry. Moderate bowel sounds, appropriately tender.        Data Review    Recent Results (from the past 24 hour(s))   PTT    Collection Time: 19  5:40 PM   Result Value Ref Range    aPTT 53.6 (H) 24.7 - 07.1 SEC   METABOLIC PANEL, BASIC    Collection Time: 19  1:15 AM   Result Value Ref Range    Sodium 140 136 - 145 mmol/L    Potassium 3.8 3.5 - 5.1 mmol/L    Chloride 104 98 - 107 mmol/L    CO2 28 21 - 32 mmol/L    Anion gap 8 7 - 16 mmol/L    Glucose 138 (H) 65 - 100 mg/dL    BUN 14 8 - 23 MG/DL    Creatinine 1.05 0.8 - 1.5 MG/DL    GFR est AA >60 >60 ml/min/1.73m2    GFR est non-AA >60 >60 ml/min/1.73m2    Calcium 8.6 8.3 - 10.4 MG/DL   MAGNESIUM    Collection Time: 19  1:15 AM   Result Value Ref Range    Magnesium 2.0 1.8 - 2.4 mg/dL   CBC W/O DIFF    Collection Time: 19  1:15 AM   Result Value Ref Range    WBC 9.3 4.3 - 11.1 K/uL    RBC 4.10 (L) 4.23 - 5.6 M/uL    HGB 13.4 (L) 13.6 - 17.2 g/dL    HCT 38.7 (L) 41.1 - 50.3 %    MCV 94.4 79.6 - 97.8 FL    MCH 32.7 26.1 - 32.9 PG    MCHC 34.6 31.4 - 35.0 g/dL    RDW 12.7 11.9 - 14.6 %    PLATELET 628 385 - 550 K/uL    MPV 10.1 9.4 - 12.3 FL    ABSOLUTE NRBC 0.00 0.0 - 0.2 K/uL   PTT    Collection Time: 19  1:15 AM   Result Value Ref Range    aPTT 98.1 (H) 24.7 - 39.8 [address] [place] [address]   PTT    Collection Time: 11/24/19  8:50 AM   Result Value Ref Range    aPTT 54.3 (H) 24.7 - 39.8 SEC       Assessment   Principal Problem:    Renal mass, right (11/20/2019)    Active Problems:    Essential hypertension (3/19/2013)      Overview: At goal.       Atrial fibrillation with rapid ventricular response ([address] (11/22/2019)      Pulmonary fibrosis ([address] (11/22/2019)        Plan:  Postop right JANEE nephrectomy. S/p cardioversion. On Eliquis. Will give Dulcolax suppos, advance diet to regular. Possibly home tomorrow.

## 2019-11-24 NOTE — PROGRESS NOTES
Verbal bedside report given to [facility], oncoming RN. Patient's situation, background, assessment and recommendations provided. Opportunity for questions provided. Oncoming RN assumed care of patient. Heparin gtt verified at bedside and in chart with oncoming RN.

## 2019-11-24 NOTE — PROGRESS NOTES
[facility] CARDIOLOGY PROGRESS NOTE           11/24/2019 9:50 AM    Admit Date: 11/20/2019      Subjective:   Doing well, in sinus, some pain at surgery sites, but walking and feeling better now. Hb stable. No known bleeding issues. ROS:  Cardiovascular:  As noted above    Objective:      Vitals:    11/24/19 0506 11/24/19 0846 11/24/19 0902 11/24/19 0927   BP:    (!) 130/93   Pulse:   84 79   Resp:    16   Temp:    97.4 °F (36.3 °C)   SpO2: 92% 93%  93%   Weight:           Physical Exam:  General-No Acute Distress  Neck- supple, no JVD  CV- regular rate and rhythm no MRG  Lung- clear bilaterally  Abd- soft, nontender, nondistended  Ext- no edema bilaterally. Skin- warm and dry    Data Review:   Recent Labs     11/24/19  0115 11/23/19  0437    137   K 3.8 3.7   MG 2.0 1.9   BUN 14 20   CREA 1.05 1.20   * 135*   WBC 9.3 11.3*   HGB 13.4* 13.3*   HCT 38.7* 38.3*    155       Assessment/Plan:     Principal Problem:    Renal mass, right (11/20/2019)  S/p surgery, continue post-op care. Active Problems:    Essential hypertension (3/19/2013)  Remain on meds. Follow. HypoK: replace, follow. ARF:  Better with IVF. Following labs. Atrial fibrillation with rapid ventricular response ([address] (11/22/2019)  Postoperative day 1, he developed atrial fibrillation with rapid ventricular response, S/p cardioversion. Hb stable, will stop IV heparin gtt today and change to eliquis. MUST stay on eliquis for at least 4 weeks. Remain on BB as well. EF normal on echo. LA size mildly dilated. Stable for discharge from cardiac standpoint. Will need outpatient follow up in our office with EKG in 2-3 weeks. Remain on BB and eliquis.             Cornelius Hagen DO  11/24/2019 10:00 AM

## 2019-11-24 NOTE — PROGRESS NOTES
Bolus of 35 units /kg (3300 units) given IVP - Heparin infusion has been increased by 2 units/kg/hr to 18 units/kg/hr   And NEW rate = 34.1  ml/hr    Rate Adjustment based on most recent PTT lab value:   Lab Results   Component Value Date/Time    aPTT 53.6 (H) 11/23/2019 05:40 PM

## 2019-11-24 NOTE — PROGRESS NOTES
Verbal bedside report received from [place] [id number] [facility]. Assumed care of patient. Heparin IV drip verified at bedside and in chart with outgoing RN.

## 2019-11-25 ENCOUNTER — PATIENT OUTREACH (OUTPATIENT)
Dept: CASE MANAGEMENT | Age: 74
End: 2019-11-25

## 2019-11-25 VITALS
DIASTOLIC BLOOD PRESSURE: 66 MMHG | HEART RATE: 90 BPM | WEIGHT: 194.7 LBS | BODY MASS INDEX: 27.94 KG/M2 | TEMPERATURE: 98 F | OXYGEN SATURATION: 91 % | RESPIRATION RATE: 19 BRPM | SYSTOLIC BLOOD PRESSURE: 123 MMHG

## 2019-11-25 PROCEDURE — 94640 AIRWAY INHALATION TREATMENT: CPT

## 2019-11-25 PROCEDURE — B24BZZ4 ULTRASONOGRAPHY OF HEART WITH AORTA, TRANSESOPHAGEAL: ICD-10-PCS | Performed by: INTERNAL MEDICINE

## 2019-11-25 PROCEDURE — 94760 N-INVAS EAR/PLS OXIMETRY 1: CPT

## 2019-11-25 PROCEDURE — 74011000250 HC RX REV CODE- 250: Performed by: UROLOGY

## 2019-11-25 PROCEDURE — 5A2204Z RESTORATION OF CARDIAC RHYTHM, SINGLE: ICD-10-PCS | Performed by: INTERNAL MEDICINE

## 2019-11-25 PROCEDURE — 74011250637 HC RX REV CODE- 250/637: Performed by: INTERNAL MEDICINE

## 2019-11-25 PROCEDURE — 74011250637 HC RX REV CODE- 250/637: Performed by: UROLOGY

## 2019-11-25 RX ORDER — METOPROLOL TARTRATE 50 MG/1
50 TABLET ORAL 2 TIMES DAILY
Qty: 60 TAB | Refills: 12 | Status: SHIPPED | OUTPATIENT
Start: 2019-11-25 | End: 2019-12-16 | Stop reason: SDUPTHER

## 2019-11-25 RX ORDER — HYDROCODONE BITARTRATE AND ACETAMINOPHEN 5; 325 MG/1; MG/1
1-2 TABLET ORAL
Qty: 20 TAB | Refills: 0 | Status: SHIPPED | OUTPATIENT
Start: 2019-11-25 | End: 2019-12-02

## 2019-11-25 RX ORDER — METOPROLOL TARTRATE 50 MG/1
50 TABLET ORAL 2 TIMES DAILY
Status: DISCONTINUED | OUTPATIENT
Start: 2019-11-25 | End: 2019-11-25 | Stop reason: HOSPADM

## 2019-11-25 RX ADMIN — LEVOTHYROXINE SODIUM 88 MCG: 88 TABLET ORAL at 08:31

## 2019-11-25 RX ADMIN — METOPROLOL TARTRATE 50 MG: 50 TABLET, FILM COATED ORAL at 08:30

## 2019-11-25 RX ADMIN — ALBUTEROL SULFATE 2.5 MG: 2.5 SOLUTION RESPIRATORY (INHALATION) at 07:29

## 2019-11-25 RX ADMIN — APIXABAN 5 MG: 5 TABLET, FILM COATED ORAL at 08:31

## 2019-11-25 RX ADMIN — TIOTROPIUM BROMIDE 18 MCG: 18 CAPSULE ORAL; RESPIRATORY (INHALATION) at 07:32

## 2019-11-25 RX ADMIN — Medication 5 ML: at 05:32

## 2019-11-25 NOTE — PROGRESS NOTES
Verbal bedside report given to [facility], oncoming RN. Patient's situation, background, assessment and recommendations provided. Opportunity for questions provided. Oncoming RN assumed care of patient.

## 2019-11-25 NOTE — PROGRESS NOTES
Subjective:   Daily Progress Note: 2019 7:55 AM  No Complaints. Had small BM, passing flatus. Objective:     Visit Vitals  /74   Pulse 88   Temp 99 °F (37.2 °C)   Resp 20   Wt 194 lb 11.2 oz (88.3 kg)   SpO2 90%   BMI 27.94 kg/m²    O2 Flow Rate (L/min): 2 l/min O2 Device: Room air    Temp (24hrs), Av.9 °F (36.6 °C), Min:97.3 °F (36.3 °C), Max:99 °F (37.2 °C)       1901 -  0700  In: 1037.4 [P.O.:420; I.V.:617.4]  Out: 1725 [Urine:1725]  No intake/output data recorded. [unfilled]  [unfilled]  [unfilled]    Exam: abdomen soft nontender      Data Review    Recent Results (from the past 24 hour(s))   PTT    Collection Time: 19  8:50 AM   Result Value Ref Range    aPTT 54.3 (H) 24.7 - 39.8 SEC       Assessment   Principal Problem:    Renal mass, right (2019)    Active Problems:    Essential hypertension (3/19/2013)      Overview: At goal.       Atrial fibrillation with rapid ventricular response ([address] (2019)      Pulmonary fibrosis ([address] (2019)        Plan:  Discharge home. appt in one week for staple removal follow up with cardiology in 2-34 weeks.

## 2019-11-25 NOTE — DISCHARGE INSTRUCTIONS
Patient Education        Atrial Fibrillation: Care Instructions  Your Care Instructions    Atrial fibrillation is an irregular and often fast heartbeat. Treating this condition is important for several reasons. It can cause blood clots, which can travel from your heart to your brain and cause a stroke. If you have a fast heartbeat, you may feel lightheaded, dizzy, and weak. An irregular heartbeat can also increase your risk for heart failure. Atrial fibrillation is often the result of another heart condition, such as high blood pressure or coronary artery disease. Making changes to improve your heart condition will help you stay healthy and active. Follow-up care is a key part of your treatment and safety. Be sure to make and go to all appointments, and call your doctor if you are having problems. It's also a good idea to know your test results and keep a list of the medicines you take. How can you care for yourself at home? Medicines    · Take your medicines exactly as prescribed. Call your doctor if you think you are having a problem with your medicine. You will get more details on the specific medicines your doctor prescribes.     · If your doctor has given you a blood thinner to prevent a stroke, be sure you get instructions about how to take your medicine safely. Blood thinners can cause serious bleeding problems.     · Do not take any vitamins, over-the-counter drugs, or herbal products without talking to your doctor first.    Lifestyle changes    · Do not smoke. Smoking can increase your chance of a stroke and heart attack. If you need help quitting, talk to your doctor about stop-smoking programs and medicines. These can increase your chances of quitting for good.     · Eat a heart-healthy diet.     · Stay at a healthy weight. Lose weight if you need to.     · Limit alcohol to 2 drinks a day for men and 1 drink a day for women. Too much alcohol can cause health problems.     · Avoid colds and flu.  Get a pneumococcal vaccine shot. If you have had one before, ask your doctor whether you need another dose. Get a flu shot every year. If you must be around people with colds or flu, wash your hands often. Activity    · If your doctor recommends it, get more exercise. Walking is a good choice. Bit by bit, increase the amount you walk every day. Try for at least 30 minutes on most days of the week. You also may want to swim, bike, or do other activities. Your doctor may suggest that you join a cardiac rehabilitation program so that you can have help increasing your physical activity safely.     · Start light exercise if your doctor says it is okay. Even a small amount will help you get stronger, have more energy, and manage stress. Walking is an easy way to get exercise. Start out by walking a little more than you did in the hospital. Gradually increase the amount you walk.     · When you exercise, watch for signs that your heart is working too hard. You are pushing too hard if you cannot talk while you are exercising. If you become short of breath or dizzy or have chest pain, sit down and rest immediately.     · Check your pulse regularly. Place two fingers on the artery at the palm side of your wrist, in line with your thumb. If your heartbeat seems uneven or fast, talk to your doctor. When should you call for help? Call 911 anytime you think you may need emergency care. For example, call if:    · You have symptoms of a heart attack. These may include:  ? Chest pain or pressure, or a strange feeling in the chest.  ? Sweating. ? Shortness of breath. ? Nausea or vomiting. ? Pain, pressure, or a strange feeling in the back, neck, jaw, or upper belly or in one or both shoulders or arms. ? Lightheadedness or sudden weakness. ? A fast or irregular heartbeat. After you call 911, the  may tell you to chew 1 adult-strength or 2 to 4 low-dose aspirin. Wait for an ambulance.  Do not try to drive yourself.     · You have symptoms of a stroke. These may include:  ? Sudden numbness, tingling, weakness, or loss of movement in your face, arm, or leg, especially on only one side of your body. ? Sudden vision changes. ? Sudden trouble speaking. ? Sudden confusion or trouble understanding simple statements. ? Sudden problems with walking or balance. ? A sudden, severe headache that is different from past headaches.     · You passed out (lost consciousness).    Call your doctor now or seek immediate medical care if:    · You have new or increased shortness of breath.     · You feel dizzy or lightheaded, or you feel like you may faint.     · Your heart rate becomes irregular.     · You can feel your heart flutter in your chest or skip heartbeats. Tell your doctor if these symptoms are new or worse.    Watch closely for changes in your health, and be sure to contact your doctor if you have any problems. Patient Education        Electrical Cardioversion: What to Expect at Home  Your Recovery    Electrical cardioversion is a treatment for an abnormal heartbeat, such as atrial fibrillation, supraventricular tachycardia, or ventricular tachycardia (VT). It uses a brief electrical shock to reset your heart's rhythm. After cardioversion, you may have redness, like a sunburn, where the patches were. The medicines you got to make you sleepy may make you feel drowsy for the rest of the day. Your doctor may have you take medicines to help the heart beat normally and to prevent blood clots. This care sheet gives you a general idea about how long it will take for you to recover. But each person recovers at a different pace. Follow the steps below to feel better as quickly as possible. How can you care for yourself at home? Medicines    · Be safe with medicines. Take your medicines exactly as prescribed. Call your doctor if you think you are having a problem with your medicine.  You may take one or more of the following medicines:  ? Rate-control medicines to slow the heart rate. These include beta-blockers, calcium channel blockers, and digoxin. ? Rhythm control medicines that help the heart keep a normal rhythm. ? Blood thinners, also called anticoagulants, which help prevent blood clots. You will get more details on the specific medicines your doctor prescribes. Be sure you know how to take your medicines safely.     · Do not take any vitamins, over-the-counter medicines, or herbal products without talking to your doctor first.   Exercise    · Start light exercise if your doctor says that it is okay. Even a small amount will help you get stronger, have more energy, and manage your stress. Walking is an easy way to get exercise. Start out by walking a little more than you did in the hospital. Bit by bit, increase the amount you walk.     · When you exercise, watch for signs that your heart is working too hard. You are pushing too hard if you cannot talk while you are exercising. If you become short of breath or dizzy or have chest pain, sit down and rest right away.     · Check your pulse regularly. Place two fingers on the artery at the palm side of your wrist in line with your thumb. If your heartbeat seems uneven or fast, talk to your doctor. Other instructions    · Ask your doctor when you can drive again.     · Do not smoke. If you need help quitting, talk to your doctor about stop-smoking programs and medicines. These can increase your chances of quitting for good.     · Limit alcohol. Follow-up care is a key part of your treatment and safety. Be sure to make and go to all appointments, and call your doctor if you are having problems. It's also a good idea to know your test results and keep a list of the medicines you take. When should you call for help? Call 911 anytime you think you may need emergency care. For example, call if:    · You passed out (lost consciousness).     · You have chest pain or pressure.  This may occur with:  ? Sweating. ? Shortness of breath. ? Nausea or vomiting. ? Pain that spreads from the chest to the neck, jaw, or one or both shoulders or arms. ? A fast or uneven pulse. After calling 911, the  may tell you to chew 1 adult-strength or 2 to 4 low-dose aspirin. Wait for an ambulance. Do not try to drive yourself.     · You have symptoms of a stroke. These may include:  ? Sudden numbness, tingling, weakness, or loss of movement in your face, arm, or leg, especially on only one side of your body. ? Sudden vision changes. ? Sudden trouble speaking. ? Sudden confusion or trouble understanding simple statements. ? Sudden problems with walking or balance. ? A sudden, severe headache that is different from past headaches.    Call your doctor now or seek immediate medical care if:    · You feel dizzy or lightheaded, or you feel like you may faint.     · You have a fast or irregular heartbeat.    Watch closely for any changes in your health, and be sure to contact your doctor if you have any problems. Patient Education        Laparoscopic Nephrectomy: What to Expect at Home  Your Recovery  A nephrectomy is surgery to take out part or all of the kidney. One or both kidneys may be taken out. Sometimes other tissue near the kidney is taken out at the same time. Your belly will feel sore after the surgery. This usually lasts about 1 to 2 weeks. Your doctor will give you pain medicine for this. You may also have other symptoms such as nausea, diarrhea, constipation, gas, or a headache. At first, you may have low energy and get tired quickly. It may take 3 to 6 months for your energy to fully return. Your body can work fine with one healthy kidney. If both kidneys are removed or your remaining kidney is not healthy, your doctor will talk to you about the kind of treatment you will need after surgery. This care sheet gives you a general idea about how long it will take for you to recover. But each person recovers at a different pace. Follow the steps below to get better as quickly as possible. How can you care for yourself at home? Activity    · Rest when you feel tired. Getting enough sleep will help you recover.     · Try to walk each day. Start by walking a little more than you did the day before. Bit by bit, increase the amount you walk. Walking boosts blood flow and helps prevent pneumonia and constipation.     · Avoid exercises that use your belly muscles and strenuous activities such as bicycle riding, jogging, weight lifting, or aerobic exercise until your doctor says it is okay.     · For at least 4 weeks, avoid lifting anything that would make you strain. This may include a child, heavy grocery bags and milk containers, a heavy briefcase or backpack, cat litter or dog food bags, or a vacuum .     · Hold a pillow over the cuts the doctor made (incisions) when you cough or take deep breaths. This will support your belly and decrease your pain.     · Do breathing exercises at home as instructed by your doctor. This will help prevent pneumonia.     · Ask your doctor when you can drive again.     · You will probably need to take 4 to 6 weeks off from work. It depends on the type of work you do and how you feel.     · You may be able to take showers (unless you have a drainage tube near your incisions). If you have a drainage tube, follow your doctor's instructions to empty and care for it. Do not take a bath for the first 2 weeks, or until your doctor tells you it is okay.     · Ask your doctor when it is okay for you to have sex. Diet    · You can eat your normal diet.  If you were on a special diet for your kidneys before surgery, follow that diet until your doctor tells you to stop.     · If your stomach is upset, try bland, low-fat foods like plain rice, broiled chicken, toast, and yogurt.     · Drink plenty of fluids (unless your doctor tells you not to).     · You may notice that your bowel movements are not regular right after your surgery. This is common. Try to avoid constipation and straining with bowel movements. You may want to take a fiber supplement every day. If you have not had a bowel movement after a couple of days, ask your doctor about taking a mild laxative. Medicines    · Your doctor will tell you if and when you can restart your medicines. He or she will also give you instructions about taking any new medicines.     · If you take blood thinners, such as warfarin (Coumadin), clopidogrel (Plavix), or aspirin, be sure to talk to your doctor. He or she will tell you if and when to start taking those medicines again. Make sure that you understand exactly what your doctor wants you to do.     · Take pain medicines exactly as directed. ? If the doctor gave you a prescription medicine for pain, take it as prescribed. ? If you are not taking a prescription pain medicine, take an over-the-counter medicine that your doctor recommends. Read and follow all instructions on the label. ? Do not take aspirin, ibuprofen (Advil, Motrin), or naproxen (Aleve), or other nonsteroidal anti-inflammatory drugs (NSAIDs) unless your doctor says it is okay.     · If you think your pain medicine is making you sick to your stomach:  ? Take your medicine after meals (unless your doctor has told you not to). ? Ask your doctor for a different pain medicine.     · If your doctor prescribed antibiotics, take them as directed. Do not stop taking them just because you feel better. You need to take the full course of antibiotics. Incision care    · If you have strips of tape on the incisions, leave the tape on for a week or until it falls off.     · Wash the area around the incisions daily with warm, soapy water and pat it dry. Don't use hydrogen peroxide or alcohol, which can slow healing. You may cover the incisions with gauze bandages if they weep or rub against clothing. Change the bandages every day.   · Keep the area around the incisions clean and dry. Follow-up care is a key part of your treatment and safety. Be sure to make and go to all appointments, and call your doctor if you are having problems. It's also a good idea to know your test results and keep a list of the medicines you take. When should you call for help? Call 911 anytime you think you may need emergency care. For example, call if:    · You passed out (lost consciousness).     · You have chest pain, are short of breath, or cough up blood.    Call your doctor now or seek immediate medical care if:    · You have pain that does not get better after you take your pain medicine.     · You have symptoms of a urinary tract infection. These may include:  ? Pain or burning when you urinate. ? A frequent need to urinate without being able to pass much urine. ? Pain in the flank, which is just below the rib cage and above the waist on either side of the back. ? Blood in the urine. ? A fever.     · You have signs of infection, such as:  ? Increased pain, swelling, warmth, or redness. ? Red streaks leading from the incisions. ? Pus draining from the incisions. ? A fever.     · You have loose stitches, or your incisions come open.     · You are bleeding from the incisions.     · You cannot urinate.     · You are sick to your stomach or cannot drink fluids.     · You have signs of a blood clot in your leg (called a deep vein thrombosis), such as:  ? Pain in the calf, back of the knee, thigh, or groin. ? Redness and swelling in your leg.    Watch closely for changes in your health, and be sure to contact your doctor if you are having any problems. Patient Education   Patient Education   Metoprolol (By mouth)   Metoprolol (met-oh-PROE-lol)  Treats high blood pressure, angina (chest pain), and heart failure. May lower the risk of death after a heart attack. This medicine is a beta-blocker.    Brand Name(s): Lopressor, Toprol XL   There may be other brand names for this medicine. When This Medicine Should Not Be Used: This medicine is not right for everyone. Do not use if you had an allergic reaction to metoprolol or similar medicines. Do not use this medicine if you have certain blood circulation or heart problems. Ask your doctor about these problems. How to Use This Medicine:   Tablet, Long Acting Tablet  · Take your medicine as directed. Your dose may need to be changed several times to find what works best for you. · Take this medicine with a meal or right after a meal. Take this medicine the same way every day, at the same time. · Swallow the tablet whole with a glass of water. You may break the extended-release tablet in half, but do not chew or crush it. · Missed dose: Take a dose as soon as you remember. If it is almost time for your next dose, wait until then and take a regular dose. Do not take extra medicine to make up for a missed dose. · Store the medicine in a closed container at room temperature, away from heat, moisture, and direct light. Drugs and Foods to Avoid:   Ask your doctor or pharmacist before using any other medicine, including over-the-counter medicines, vitamins, and herbal products. · Some medicines can affect how metoprolol works.  Tell your doctor if you are taking any of the following:   ¨ Digoxin, dipyridamole, hydralazine, hydroxychloroquine, methyldopa, quinidine  ¨ Medicine to treat depression (such as bupropion, clomipramine, desipramine, fluoxetine, fluvoxamine, paroxetine, sertraline), medicine to treat mental illness (such as chlorpromazine, fluphenazine, haloperidol, thioridazine), medicine for heart rhythm problems (such as propafenone), HIV/AIDS medicine (such as ritonavir), medicine to treat a fungus infection (such as terbinafine), a monoamine oxidase inhibitor (MAOI), an ergot medicine for headaches, a calcium channel blocker (such as amlodipine, diltiazem, verapamil), or an alpha blocker (such as clonidine, prazosin, reserpine, guanethidine)  Warnings While Using This Medicine:   · Tell your doctor if you are pregnant or breastfeeding, or if you have blood vessel, heart, or circulation problems (such as heart failure, rhythm problems, or slow heartbeat). Tell your doctor if you have kidney disease, liver disease, diabetes, lung disease (such as asthma), an overactive thyroid, or a history of allergies. · This medicine may cause worse symptoms of heart failure while the dose is being adjusted. · Do not stop using this medicine suddenly. Your doctor will need to slowly decrease your dose before you stop it completely. · Tell any doctor or dentist who treats you that you are using this medicine. You may need to stop using this medicine several days before you have surgery or medical tests. · This medicine could lower your blood pressure too much, especially when you first use it or if you are dehydrated. Stand or sit up slowly if you feel lightheaded or dizzy. · This medicine may make you dizzy or drowsy. Do not drive or do anything else that could be dangerous until you know how this medicine affects you. · Your doctor will check your progress and the effects of this medicine at regular visits. Keep all appointments. · Keep all medicine out of the reach of children. Never share your medicine with anyone.   Possible Side Effects While Using This Medicine:   Call your doctor right away if you notice any of these side effects:  · Allergic reaction: Itching or hives, swelling in your face or hands, swelling or tingling in your mouth or throat, chest tightness, trouble breathing  · Lightheadedness, dizziness, or fainting  · Slow heartbeat  · Swelling in your hands, ankles, or feet, trouble breathing, tiredness  · Worsening chest pain  If you notice these less serious side effects, talk with your doctor:   · Diarrhea  · Mild dizziness or tiredness  If you notice other side effects that you think are caused by this medicine, tell your doctor. Call your doctor for medical advice about side effects. You may report side effects to FDA at 2-567-AZT-7511  © 2017 [facility] Information is for End User's use only and may not be sold, redistributed or otherwise used for commercial purposes. The above information is an  only. It is not intended as medical advice for individual conditions or treatments. Talk to your doctor, nurse or pharmacist before following any medical regimen to see if it is safe and effective for you. Apixaban (By mouth)   Apixaban (a-PIX-a-ban)  Treats and prevents blood clots. This medicine is a blood thinner. Brand Name(s): Eliquis   There may be other brand names for this medicine. When This Medicine Should Not Be Used: This medicine is not right for everyone. Do not use it if you had an allergic reaction to apixaban or you have active bleeding. How to Use This Medicine:   Tablet  · Your doctor will tell you how much medicine to use. Do not use more than directed. · If you are not able to swallow the tablets whole, they may be crushed and mixed in water, 5% dextrose in water (D5W), apple juice, or applesauce. The crushed tablets may be mixed with 60 mL of water or D5W dose and given through a nasogastric tube (NGT). · This medicine should come with a Medication Guide. Ask your pharmacist for a copy if you do not have one. · Missed dose: Take a dose as soon as you remember. If it is almost time for your next dose, wait until then and take a regular dose. Do not take extra medicine to make up for a missed dose. · Store the medicine in a closed container at room temperature, away from heat, moisture, and direct light. Drugs and Foods to Avoid:   Ask your doctor or pharmacist before using any other medicine, including over-the-counter medicines, vitamins, and herbal products. · Some medicines can affect how apixaban works.  Tell your doctor if you are using any of the following:   ¨ Carbamazepine, clarithromycin, itraconazole, ketoconazole, phenytoin, rifampin, ritonavir, Marybel's wort  ¨ Blood thinner (including clopidogrel, heparin, prasugrel, warfarin)  ¨ Medicine to treat depression  ¨ NSAID pain or arthritis medicine (including aspirin, celecoxib, diclofenac, ibuprofen, naproxen)  Warnings While Using This Medicine:   · Tell your doctor if you are pregnant or breastfeeding, or if you have kidney disease, liver disease, bleeding problems, or an artificial heart valve. · Do not stop using this medicine suddenly without asking your doctor. You might have a higher risk of stroke for a short time after you stop using this medicine. · This medicine increases your risk for bleeding that can become serious if not controlled. You may also bruise easily, and it may take longer than usual for bleeding to stop. · This medicine may increase your risk for blood clots in your spine or back if you undergo an epidural or spinal puncture. This could lead to paralysis. Tell your doctor if you ever had spine problems or back surgery. · Tell any doctor or dentist who treats you that you are using this medicine. With your doctor's supervision, you may need to stop using this medicine several days before you have surgery or medical tests. · Your doctor will do lab tests at regular visits to check on the effects of this medicine. Keep all appointments. · Keep all medicine out of the reach of children. Never share your medicine with anyone.   Possible Side Effects While Using This Medicine:   Call your doctor right away if you notice any of these side effects:  · Allergic reaction: Itching or hives, swelling in your face or hands, swelling or tingling in your mouth or throat, chest tightness, trouble breathing  · Change in how much or how often you urinate, red or pink urine  · Chest pain, trouble breathing  · Coughing up blood, vomiting blood or material that looks like coffee grounds  · Numbness, tingling, or muscle weakness in your legs or feet  · Red or black, tarry stools  · Unusual bleeding, bruising, or weakness  If you notice other side effects that you think are caused by this medicine, tell your doctor. Call your doctor for medical advice about side effects. You may report side effects to FDA at 0-093-EOV-6071  © 2017 [facility] Information is for End User's use only and may not be sold, redistributed or otherwise used for commercial purposes. The above information is an  only. It is not intended as medical advice for individual conditions or treatments. Talk to your doctor, nurse or pharmacist before following any medical regimen to see if it is safe and effective for you.

## 2019-11-25 NOTE — PROGRESS NOTES
[facility] CARDIOLOGY PROGRESS NOTE           11/25/2019   Admit Date: 11/20/2019      Subjective:   Doing well, in sinus, some pain at surgery sites, but walking and feeling better now. Hb stable. No known bleeding issues. ROS:  Cardiovascular:  As noted above    Objective:      Vitals:    11/24/19 2112 11/25/19 0108 11/25/19 0540 11/25/19 0732   BP: 142/87 113/66 138/74    Pulse: 88 80 88    Resp: 20 20 20    Temp: 97.8 °F (36.6 °C) 98.4 °F (36.9 °C) 99 °F (37.2 °C)    SpO2: 93% 90% 90% 90%   Weight:   88.3 kg (194 lb 11.2 oz)        Physical Exam:  General-No Acute Distress  Neck- supple, no JVD  CV- regular rate and rhythm no MRG  Lung- clear bilaterally  Abd- soft, nontender, nondistended  Ext- no edema bilaterally. Skin- warm and dry    Data Review:   Recent Labs     11/24/19  0115 11/23/19  0437    137   K 3.8 3.7   MG 2.0 1.9   BUN 14 20   CREA 1.05 1.20   * 135*   WBC 9.3 11.3*   HGB 13.4* 13.3*   HCT 38.7* 38.3*    155       Assessment/Plan:     Principal Problem:    Renal mass, right (11/20/2019)  S/p surgery, continue post-op care. Active Problems:    Essential hypertension (3/19/2013)  Remain on meds. Follow. Atrial fibrillation with rapid ventricular response ([address] (11/22/2019)  Postoperative day 1, he developed atrial fibrillation with rapid ventricular response, S/p cardioversion. Hb stable, EF normal on echo. LA size mildly dilated. Stable on Eliquis and metoprolol  Stable to DC per cardiology.           Vinny Ware MD  11/25/2019

## 2019-11-25 NOTE — PROGRESS NOTES
This note will not be viewable in [address] Patient is noted to currently be IP. No JESSE outreach indicated at this time. Patient will be reassigned pending DC disposition.

## 2019-11-25 NOTE — PROGRESS NOTES
Discharge note:  Spoke to Mr. Berna Diallo in room 304 about Case Management and discharge planing. He is going home s/p nephrectomy. He is ambulating without assistance or with assistive device. No additional discharge needs identified. Care Management Interventions  PCP Verified by CM: Yes(Dolan, Ottis Holstein, MD)  Mode of Transport at Discharge:  Other (see comment)(Family)  Transition of Care Consult (CM Consult): Discharge Planning  Discharge Durable Medical Equipment: No  Physical Therapy Consult: Yes  Occupational Therapy Consult: No  Speech Therapy Consult: No  Current Support Network: Lives with Spouse, Own Home  Confirm Follow Up Transport: Family  Plan discussed with Pt/Family/Caregiver: Yes  Freedom of Choice Offered: Yes  [address] Provided?: No  Discharge Location  Discharge Placement: Home(home with no additional needs)

## 2019-11-25 NOTE — PROGRESS NOTES
Problem: Falls - Risk of  Goal: *Absence of Falls  Description  Document Aditya Varela Fall Risk and appropriate interventions in the flowsheet.   Outcome: Progressing Towards Goal  Note: Fall Risk Interventions:  Mobility Interventions: Communicate number of staff needed for ambulation/transfer, Patient to call before getting OOB         Medication Interventions: Patient to call before getting OOB, Teach patient to arise slowly    Elimination Interventions: Call light in reach, Patient to call for help with toileting needs, Urinal in reach       Problem: Pain  Goal: *Control of Pain  Outcome: Progressing Towards Goal       Problem: Breathing Pattern - Ineffective  Goal: *Absence of hypoxia  Outcome: Progressing Towards Goal

## 2019-11-25 NOTE — PROGRESS NOTES
Discharge instructions reviewed with Patient. Prescriptions given for norco and med info sheets provided for all new medications. Opportunity for questions provided. Patient voiced understanding of all discharge instructions. IV and telemetry monitor removed by primary RN.

## 2019-11-26 ENCOUNTER — PATIENT OUTREACH (OUTPATIENT)
Dept: CASE MANAGEMENT | Age: 74
End: 2019-11-26

## 2019-11-26 NOTE — PROGRESS NOTES
This note will not be viewable in [address] Transition of Care Discharge Follow-up Questionnaire   Date/Time of Call:   11/26/19   1025am   What was the patient hospitalized for? Renal mass, right  s/p right nephrectomy     Does the patient understand his/her diagnosis and/or treatment and what happened during the hospitalization? Yes, spoke with patients wife, she states understanding of diagnosis and treatment; and is agreeable to call. Mrs. Moore Current states patient is doing well   Did the patient receive discharge instructions? Yes    CM Assessed Risk for Readmission:       Patient stated Risk for Readmission:      Low r/t diagnosis,  comorbidities and/or complications of surgery    None stated, planned surgery   Review any discharge instructions (see discharge instructions/AVS in [facility]). Ask patient if they understand these. Do they have any questions? Reviewed, understanding is stated, no questions at this time       Were home services ordered (nursing, PT, OT, ST, etc.)? No     If so, has the first visit occurred? If not, why? (Assist with coordination of services if necessary.)   N/A   Was any DME ordered? No     If so, has it been received? If not, why?  (Assist patient in obtaining DME orders &/or equipment if necessary.) N/A   Complete a review of all medications (new, continued and discontinued meds per the D/C instructions and medication tab in [facility]). Completed   START taking:  apixaban 5 mg tablet (ELIQUIS)  HYDROcodone-acetaminophen 5-325 mg per tablet (NORCO)  metoprolol tartrate 50 mg tablet (LOPRESSOR)   Were all new prescriptions filled? If not, why?  (Assist patient in obtaining medications if necessary  escalate for CCM &/or SW if ongoing issues are verbalized by pt or anticipated)   Yes    Does the patient understand the purpose and dosing instructions for all medications?   (If patient has questions, provide explanation and education.)   Yes      Does the patient have any problems in performing ADLs? (If patient is unable to perform ADLs  what is the limiting factor(s)? Do they have a support system that can assist? If no support system is present, discuss possible assistance that they may be able to obtain. Escalate for CCM/SW if ongoing issues are verbalized by pt or anticipated)   Independent with ADLs       Does the patient have all follow-up appointments scheduled? 7 day f/up with PCP?   (f/up with PCP may be w/in 14 days if patient has a f/up with their specialist w/in 7 days)    7-14 day f/up with specialist?   (or per discharge instructions)    If f/up has not been made  what actions has the care coordinator made to accomplish this? Has transportation been arranged? Yes        Dr. Rafael Griffith- as needed, Mrs. Rik Kerns will notify of surgery    Roxane Higgins, [facility] urology 12/3/19    Dr. Ana Luisa bustillo 12/16/19            Yes, no transportation needs are identified at this time   Any other questions or concerns expressed by the patient? No other needs or concerns identified. Mrs. Rik Kerns states her gratitude for follow up. Contact information for Care Coordinator was given, instructed to call with new questions or concerns. Schedule next appointment with RAVI Manzo or refer to RN Case Manager/ per the workflow guidelines. When is care coordinators next follow-up call scheduled? If referred for CCM  what RN care manager was the referral assigned? Care Coordinator will follow per workflow guidelines.           Within 14 days       JESSE Call Completed By: Niranjan Jay LPN  Care Coordinator

## 2019-12-10 ENCOUNTER — PATIENT OUTREACH (OUTPATIENT)
Dept: CASE MANAGEMENT | Age: 74
End: 2019-12-10

## 2019-12-10 NOTE — PROGRESS NOTES
This note will not be viewable in [address] Transitions of Care  Follow up Outreach Note   Outreach type Phone call: spoke with patient  Home visit:   Date/Time of Outreach: 12/9/19 1213pm     Has patient attended PCP or specialist follow-up appointments since last contact? What was outcome of appointment? When is next follow-up scheduled? Patient states he is progressing well, many steri strips still all over my belly. Patient attended follow up as scheduled, next visit Dr. Carmen Cantor 3/9/20. Review medications. Any medication changes since last outreach? Does patient have any questions or issues related to their medications? No changes stated. Not at this time. Home health active? If yes  any issue? Progress? No       Referrals needed?  (CM, SW, HH, etc. )   No      Other issues/Miscellaneous? (Transportation, access to meals, ability to perform ADLs, adequate caregiver support, etc.) No other needs or concerns at this time. Patient states his gratitude for follow up. Next Outreach Scheduled?     Graduation from program?   N/A    Yes       Next Steps/Goals (if applicable):   N/A     Outreach completed by:   Ana Luisa Shell LPN  Care Coordinator

## 2019-12-17 PROBLEM — F41.8 SITUATIONAL ANXIETY: Status: ACTIVE | Noted: 2019-12-17

## 2019-12-18 NOTE — DISCHARGE SUMMARY
.  Discharge Summary     Patient: Cinthia Lakhani MRN: [id number]  SSN: [id number]    YOB: 1945  Age: 69 y.o. Sex: male      Allergies: Augmentin [amoxicillin-pot clavulanate]; Chantix [varenicline]; Symbicort [budesonide-formoterol]; and Wellbutrin [bupropion hcl]    Admit Date: 11/20/2019    Discharge Date: 12/18/2019     * Admission Diagnoses:  Renal mass [N28.89]  Renal mass, right [N28.89]     * Discharge Diagnoses:   Hospital Problems as of 11/25/2019 Date Reviewed: 5/13/2019          Codes Class Noted - Resolved POA    Atrial fibrillation with rapid ventricular response ([facility] ICD-10-CM: I48.91  ICD-9-CM: 427.31  11/22/2019 - Present Unknown        Pulmonary fibrosis ([facility] ICD-10-CM: J84.10  ICD-9-CM: 515  11/22/2019 - Present Unknown        * (Principal) Renal mass, right ICD-10-CM: N28.89  ICD-9-CM: 593.9  11/20/2019 - Present Unknown        Essential hypertension (Chronic) ICD-10-CM: I10  ICD-9-CM: 401.9  3/19/2013 - Present Yes    Overview Addendum 11/27/2017 12:31 PM by Zeynep Zavala MD     At [facility].                     * Procedures for this admission:   Procedure(s):  [facility]   Cardiology and IR Orders (From admission to next 24h)     Start     Ordered    11/22/19 [place], ELECTIVE  [[id number]]  ONE TIME      11/22/19 1749    --  CARDIAC CATHETERIZATION  [[id number]]      11/20/19 0556    --  CARDIAC CATHETERIZATION  [[id number]]      11/25/19 1509                * Disposition: Home    * Discharged Condition: improved    * Hospital Course:    Cinthia Lakhani is a 69 y.o. male with hx of CAD, COPD, DM and hx of right renal mass and is s/p right hand-assisted laparoscopic nephrectomy on 11/20/19 by Dr. Kalpana Villanueva. He did have a-fib with RVR post-operatively and is s/p cardioversion, on eliquis. Pathology report shows renal cell carcinoma, clear cell type, grade 1 of 4, margins uninvolved.   Bryant removed and replaced due to urinary retention. Bryant later removed and he voided consistently. He tolerated solid foods with normal BMs. He ambulated. He will d/c home and RTO for staple removal, he will f/u with cardiology as scheduled. Patient Active Problem List   Diagnosis Code    Hypothyroidism E03.9    Diabetes mellitus ([facility] E11.9    Essential hypertension I10    Coronary artery disease involving native coronary artery of native heart I25.10    Allergic rhinitis J30.9    Mediastinal adenopathy R59.0    UIP (usual interstitial pneumonitis) ([facility]) J84.112    Anxiety F41.9    Depression F32.9    COPD (chronic obstructive pulmonary disease) ([facility]) J44.9    Acute recurrent sinusitis J01.91    Abdominal aortic aneurysm (AAA) without rupture ([facility]) I71.4    Prostate cancer screening Z12.5    Benign prostatic hyperplasia with lower urinary tract symptoms N40.1    Sleep disturbance G47.9    Impacted cerumen of right ear H61.21    Skin exam for malignant neoplasm Z12.83    Chronic sinusitis J32.9    Balance problem R26.89    Renal mass N28.89    Hematuria R31.9    Renal mass, right N28.89    Atrial fibrillation with rapid ventricular response ([facility]) I48.91    Pulmonary fibrosis ([facility]) J84.10    Situational anxiety F41.8         Discharge Medications:   Discharge Medication List as of 11/25/2019  8:27 AM      START taking these medications    Details   apixaban (ELIQUIS) 5 mg tablet Take 1 Tab by mouth two (2) times a day., Normal, Disp-60 Tab, R-12      metoprolol tartrate (LOPRESSOR) 50 mg tablet Take 1 Tab by mouth two (2) times a day., Normal, Disp-60 Tab, R-12      HYDROcodone-acetaminophen (NORCO) 5-325 mg per tablet Take 1-2 Tabs by mouth every four (4) hours as needed for Pain for up to 7 days. Max Daily Amount: 12 Tabs., Print, Disp-20 Tab, R-0         CONTINUE these medications which have NOT CHANGED    Details   triamcinolone (NASACORT AQ) 55 mcg nasal inhaler 1 [place] by Both Nostrils route two (2) times a day. , Historical Med      levothyroxine (SYNTHROID) 88 mcg tablet Take 1 Tab by mouth Daily (before breakfast). , Normal, Disp-90 Tab, R-3      pravastatin (PRAVACHOL) 80 mg tablet Take 1 Tab by mouth nightly., Normal, Disp-90 Tab, R-3      metFORMIN (GLUCOPHAGE) 500 mg tablet TAKE TWO TABLETS BY MOUTH TWICE A DAY WITH MEAL(S), Normal, Disp-360 Tab, R-3      tiotropium-olodaterol (STIOLTO RESPIMAT) 2.5-2.5 mcg/actuation inhaler Take 2 Act by inhalation daily. , Normal, Disp-3 Inhaler, R-3      albuterol (PROVENTIL HFA, VENTOLIN HFA, PROAIR HFA) 90 mcg/actuation inhaler Take 2 Puffs by inhalation every four (4) hours as needed for Wheezing., Normal, Disp-1 Inhaler, R-11      co-enzyme Q-10 (CO Q-10) 100 mg capsule Take 100 mg by mouth daily. , Historical Med      multivitamin (ONE A DAY) tablet Take 1 Tab by mouth daily. , Historical Med      Cholecalciferol, Vitamin D3, (VITAMIN D3) 1,000 unit cap Take  by mouth., Historical Med      ascorbic acid (VITAMIN C WITH JURGEN HIPS) 1,000 mg tablet Take  by mouth. 3 tablets daily, Historical Med      carvedilol (COREG) 12.5 mg tablet Take 1 Tab by mouth two (2) times daily (with meals). , Normal, Disp-180 Tab, R-3      b complex vitamins (B COMPLEX 1) tablet Take 1 Tab by mouth daily. Unknown dose, Historical Med      aspirin delayed-release 81 mg tablet Take 162 mg by mouth daily. , Historical Med      saw palmetto 160 mg cap Take  by mouth., Historical Med      ginkgo biloba 120 mg tab Take  by mouth., Historical Med      OTHER,NON-FORMULARY, Tumeric po-1 cap po qd, Historical Med              * Follow-up Care/Patient Instructions:   Activity: no heavy lifting, pushing, pulling, avoid straining    Diet: renal diet  Wound Care: None needed    Follow-up Information     Follow up With Specialties Details Why Contact Info    Kathryn Villanueva MD Internal Medicine   [address] [place] [address]  On 12/16/2019 Dr. Waller Headings 12/16 at 10:45 AM [place] office [address] [place] [place] [address]    Jerald Osler, NP Urology, Nurse Practitioner On 12/3/2019 For staple removal at 01:15 pm [address]  [address] [facility]  [phone]

## 2020-06-04 PROBLEM — E55.9 VITAMIN D DEFICIENCY: Status: ACTIVE | Noted: 2020-06-04

## 2020-06-04 PROBLEM — Z85.528 HISTORY OF KIDNEY CANCER: Status: ACTIVE | Noted: 2020-06-04

## 2020-07-21 PROBLEM — R79.89 LOW TESTOSTERONE: Status: ACTIVE | Noted: 2020-07-21

## 2020-07-21 PROBLEM — E11.9 DIABETES MELLITUS TYPE 2, DIET-CONTROLLED (HCC): Status: ACTIVE | Noted: 2020-07-21

## 2020-07-21 PROBLEM — N28.89 RENAL MASS: Status: RESOLVED | Noted: 2019-11-13 | Resolved: 2020-07-21

## 2020-07-21 PROBLEM — N28.89 RENAL MASS, RIGHT: Status: RESOLVED | Noted: 2019-11-20 | Resolved: 2020-07-21

## 2020-07-23 ENCOUNTER — HOSPITAL ENCOUNTER (OUTPATIENT)
Dept: LAB | Age: 75
Discharge: HOME | End: 2020-07-23
Payer: MEDICARE

## 2020-07-23 DIAGNOSIS — E03.4 HYPOTHYROIDISM DUE TO ACQUIRED ATROPHY OF THYROID: Chronic | ICD-10-CM

## 2020-07-23 DIAGNOSIS — Z12.5 PROSTATE CANCER SCREENING: ICD-10-CM

## 2020-07-23 DIAGNOSIS — E11.9 DIABETES MELLITUS TYPE 2, DIET-CONTROLLED (HCC): ICD-10-CM

## 2020-07-23 LAB
ALBUMIN SERPL-MCNC: 3.7 G/DL (ref 3.2–4.6)
ALBUMIN/GLOB SERPL: 1 {RATIO} (ref 1.2–3.5)
ALP SERPL-CCNC: 106 U/L (ref 50–136)
ALT SERPL-CCNC: 24 U/L (ref 12–65)
ANION GAP SERPL CALC-SCNC: 4 MMOL/L (ref 7–16)
AST SERPL-CCNC: 18 U/L (ref 15–37)
BASOPHILS # BLD: 0 K/UL (ref 0–0.2)
BASOPHILS NFR BLD: 0 % (ref 0–2)
BILIRUB SERPL-MCNC: 0.6 MG/DL (ref 0.2–1.1)
BUN SERPL-MCNC: 31 MG/DL (ref 8–23)
CALCIUM SERPL-MCNC: 8.7 MG/DL (ref 8.3–10.4)
CHLORIDE SERPL-SCNC: 105 MMOL/L (ref 98–107)
CHOLEST SERPL-MCNC: 154 MG/DL
CO2 SERPL-SCNC: 27 MMOL/L (ref 21–32)
COLLECTION COMMENT, COLCM: NORMAL
CREAT SERPL-MCNC: 1.37 MG/DL (ref 0.8–1.5)
DIFFERENTIAL METHOD BLD: NORMAL
EOSINOPHIL # BLD: 0.1 K/UL (ref 0–0.8)
EOSINOPHIL NFR BLD: 1 % (ref 0.5–7.8)
ERYTHROCYTE [DISTWIDTH] IN BLOOD BY AUTOMATED COUNT: 12.7 % (ref 11.9–14.6)
EST. AVERAGE GLUCOSE BLD GHB EST-MCNC: 140 MG/DL
GLOBULIN SER CALC-MCNC: 3.7 G/DL (ref 2.3–3.5)
GLUCOSE SERPL-MCNC: 138 MG/DL (ref 65–100)
HBA1C MFR BLD: 6.5 % (ref 4.8–6)
HCT VFR BLD AUTO: 50.3 % (ref 41.1–50.3)
HDLC SERPL-MCNC: 45 MG/DL (ref 40–60)
HDLC SERPL: 3.4 {RATIO}
HGB BLD-MCNC: 17.1 G/DL (ref 13.6–17.2)
IMM GRANULOCYTES # BLD AUTO: 0 K/UL (ref 0–0.5)
IMM GRANULOCYTES NFR BLD AUTO: 0 % (ref 0–5)
LDLC SERPL CALC-MCNC: 97 MG/DL
LIPID PROFILE,FLP: NORMAL
LYMPHOCYTES # BLD: 1.3 K/UL (ref 0.5–4.6)
LYMPHOCYTES NFR BLD: 18 % (ref 13–44)
MCH RBC QN AUTO: 32 PG (ref 26.1–32.9)
MCHC RBC AUTO-ENTMCNC: 34 G/DL (ref 31.4–35)
MCV RBC AUTO: 94.2 FL (ref 79.6–97.8)
MONOCYTES # BLD: 0.7 K/UL (ref 0.1–1.3)
MONOCYTES NFR BLD: 10 % (ref 4–12)
NEUTS SEG # BLD: 5.1 K/UL (ref 1.7–8.2)
NEUTS SEG NFR BLD: 71 % (ref 43–78)
NRBC # BLD: 0 K/UL (ref 0–0.2)
PLATELET # BLD AUTO: 166 K/UL (ref 150–450)
PMV BLD AUTO: 11 FL (ref 9.4–12.3)
POTASSIUM SERPL-SCNC: 4.4 MMOL/L (ref 3.5–5.1)
PROT SERPL-MCNC: 7.4 G/DL (ref 6.3–8.2)
PSA SERPL-MCNC: 1.4 NG/ML
RBC # BLD AUTO: 5.34 M/UL (ref 4.23–5.6)
SODIUM SERPL-SCNC: 136 MMOL/L (ref 136–145)
TRIGL SERPL-MCNC: 60 MG/DL (ref 35–150)
TSH SERPL DL<=0.005 MIU/L-ACNC: 3.03 UIU/ML (ref 0.36–3.74)
VLDLC SERPL CALC-MCNC: 12 MG/DL (ref 6–23)
WBC # BLD AUTO: 7.3 K/UL (ref 4.3–11.1)

## 2020-07-23 PROCEDURE — 84153 ASSAY OF PSA TOTAL: CPT

## 2020-07-23 PROCEDURE — 83036 HEMOGLOBIN GLYCOSYLATED A1C: CPT

## 2020-07-23 PROCEDURE — 36415 COLL VENOUS BLD VENIPUNCTURE: CPT

## 2020-07-23 PROCEDURE — 85025 COMPLETE CBC W/AUTO DIFF WBC: CPT

## 2020-07-23 PROCEDURE — 80053 COMPREHEN METABOLIC PANEL: CPT

## 2020-07-23 PROCEDURE — 84443 ASSAY THYROID STIM HORMONE: CPT

## 2020-07-23 PROCEDURE — 80061 LIPID PANEL: CPT

## 2020-11-23 PROBLEM — J01.91 ACUTE RECURRENT SINUSITIS: Status: RESOLVED | Noted: 2017-11-09 | Resolved: 2020-11-23

## 2021-02-22 ENCOUNTER — HOSPITAL ENCOUNTER (OUTPATIENT)
Dept: CT IMAGING | Age: 76
Discharge: HOME | End: 2021-02-22
Attending: UROLOGY

## 2021-02-22 DIAGNOSIS — C64.1 RENAL CELL CARCINOMA OF RIGHT KIDNEY (HCC): ICD-10-CM

## 2021-02-22 RX ORDER — SODIUM CHLORIDE 0.9 % (FLUSH) 0.9 %
10 SYRINGE (ML) INJECTION
Status: COMPLETED | OUTPATIENT
Start: 2021-02-22 | End: 2021-02-22

## 2021-02-22 RX ADMIN — Medication 10 ML: at 13:45

## 2021-02-23 PROBLEM — K43.2 INCISIONAL HERNIA, WITHOUT OBSTRUCTION OR GANGRENE: Status: ACTIVE | Noted: 2021-02-23

## 2021-02-23 PROBLEM — J31.0 RHINITIS MEDICAMENTOSA: Status: ACTIVE | Noted: 2021-02-23

## 2021-02-23 PROBLEM — T48.5X5A RHINITIS MEDICAMENTOSA: Status: ACTIVE | Noted: 2021-02-23

## 2021-04-21 ENCOUNTER — HOSPITAL ENCOUNTER (OUTPATIENT)
Dept: SURGERY | Age: 76
Discharge: HOME | End: 2021-04-21

## 2021-04-23 VITALS — WEIGHT: 188 LBS | BODY MASS INDEX: 26.32 KG/M2 | HEIGHT: 71 IN

## 2021-04-23 NOTE — H&P
Date: 2021      Name: Sharona Jo      MRN: [id number]       : 1945       Age: 70 y.o. Sex: male        Nicholas Anders MD       CC:  No chief complaint on file. HPI:     Sharona Jo is a 70 y.o. male who presents for evaluation of a ventral hernia as a referral from Dr. Alfred Marino. The hernia was noted as \"supraumbilical, 3-4 mm in size\" on Dr. Nahomy Juarez note. The patient has mild pain at the site which he notes is a \"bulge. \" No nausea, vomiting or change in bowel habits. No drainage or reports of infection. PMH:    Past Medical History:   Diagnosis Date    Abnormal chest x-ray 3/19/2013    Adenopathy 3/19/2013    Lymph glands    Allergic rhinitis, cause unspecified 3/19/2013    CAD (coronary artery disease)     Chronic airway obstruction, not elsewhere classified 3/19/2013    COPD     Diabetes ([facility]     does not check sugars, last A1C 6.3    Diabetes mellitus ([facility] 3/19/2013    Hashimoto's disease     Pulmonary fibrosis ([facility]     Sinus problem     Thyroid disease     Hypothyroid    Tobacco use disorder 3/19/2013    Unspecified essential hypertension 3/19/2013    Unspecified hypothyroidism 3/19/2013       PSH:    Past Surgical History:   Procedure Laterality Date    HX NEPHRECTOMY Right     renal cancer. MEDS:    No current facility-administered medications for this encounter. Current Outpatient Medications   Medication Sig    phenylephrine (NEOSYNEPHRINE) 0.5 % spry 1 [place] by Both Nostrils route two (2) times a day.  tiotropium-olodateroL (Stiolto Respimat) 2.5-2.5 mcg/actuation inhaler Take 2 Act by inhalation daily.  lancets misc E11.9 Non insulin dep testing bs 1times daily; bill to [facility] part B    glucose blood VI test strips (ASCENSIA AUTODISC VI, ONE TOUCH ULTRA TEST VI) strip E11.9 Non insulin dep testing bs 1 times daily: Bill to [facility] part B    metoprolol tartrate (LOPRESSOR) 50 mg tablet Take 1 Tab by mouth two (2) times a day.     TURMERIC PO Take  by mouth.  saw palmetto 500 mg cap Take  by mouth.  pravastatin (PRAVACHOL) 80 mg tablet Take 1 Tab by mouth nightly.  hydrocortisone (Proctozone-HC) 2.5 % rectal cream Insert  into rectum four (4) times daily.  levothyroxine (SYNTHROID) 88 mcg tablet Take 1 Tab by mouth Daily (before breakfast).  Blood-Glucose Meter monitoring kit E11.9 Non insulin dep testing bs 1-2 times daily: Bill to medicare part B    triamcinolone (NASACORT AQ) 55 mcg nasal inhaler 1 [place] by Both Nostrils route two (2) times a day.  albuterol (PROVENTIL HFA, VENTOLIN HFA, PROAIR HFA) 90 mcg/actuation inhaler Take 2 Puffs by inhalation every four (4) hours as needed for Wheezing.  co-enzyme Q-10 (CO Q-10) 100 mg capsule Take 100 mg by mouth daily.  multivitamin (ONE A DAY) tablet Take 1 Tab by mouth daily.  Cholecalciferol, Vitamin D3, (VITAMIN D3) 1,000 unit cap Take  by mouth.  ascorbic acid (VITAMIN C WITH JURGEN HIPS) 1,000 mg tablet Take  by mouth. 3 tablets daily       ALLERGIES:      Allergies   Allergen Reactions    Augmentin [Amoxicillin-Pot Clavulanate] Nausea Only     Pt states he is not allergic      Chantix [Varenicline] Other (comments)     [place]    Symbicort [Budesonide-Formoterol] Other (comments)     [place]rush in mouth    Wellbutrin [Bupropion Hcl] Hives       SH:    Social History     Tobacco Use    Smoking status: Former Smoker     Packs/day: 0.50     Years: 40.00     Pack years: 20.00     Types: Cigarettes     Quit date: 2019     Years since quittin.4    Smokeless tobacco: Never Used   Substance Use Topics    Alcohol use: Yes    Drug use: No       FH:    Family History   Problem Relation Age of Onset    Diabetes Other     Heart Disease Mother     Hypertension Mother     High Cholesterol Mother        ROS: The patient has no difficulty with chest pain or shortness of breath. No fever or chills.   Comprehensive 13 point review of systems was otherwise unremarkable except as noted above. Physical Exam:     There were no vitals taken for this visit. General: Alert, oriented, cooperative white male in no acute distress. Eyes: Sclera are clear. Conjunctiva and lids within normal limits. No icterus. Ears and Nose: no gross deformities to visual inspection, gross hearing intact  Neck: Supple, trachea midline, no appreciable thyromegaly  Resp: Breathing is  non-labored. Lungs clear to auscultation without wheezing or rhonchi   CV: RRR. No murmurs, rubs or gallops appreciated. Abd: soft, reducible supraumbilical ventral hernia, active BS'S. Psych:  Mood and affect appropriate. Short-term memory and understanding intact      Assessment/Plan:  Debborah Favre is a [address] y.o. male who has signs and symptoms consistent with reducible ventral hernia. 1. Pulmonary Clearance    2. Open ventral hernia repair with mesh. I went over the risks of bleeding, infection, anesthesia, injury to the small bowel, large bowel, bladder, actually any of the intraabdominal organs as well as the potential need to convert to an open procedure. Another potential problem mentioned was the risk of recurrence of the hernia. I went over the use of mesh. I discussed the importance of following the post-op instructions, particularly the lifting restrictions. The patient understood the risks and wished to proceed.     Maggy Foster MD    FACS   4/23/2021  8:29 AM

## 2021-04-23 NOTE — PERIOP NOTES
Patient verified name and . Order for consent not found in EHR and matches case posting; patient verifies procedure. Type 2 surgery, PAT phone assessment complete. Orders for PAT not received. Labs per surgeon: none found EHR  Labs per anesthesia protocol: Hgb  To be drawn DOS due to patient states not able to come prior to DOS    Patient has hx of pulmonary fibrosis and is followed by Dr. Nito Urban at [facility]. Message found in Chart Review and states the following:  Progress Notes    Rebeca Chacon at [phone]    Status: Sign when Signing Visit      Received verbal orders/recommendations regarding pulmonary clearance for ventral hernia repair from Dr. Candi Monroy.      No further pulmonary optimization required for this patient- running one mile a day.  There is no pulmonary contraindication for surgery.     Alex Hernandez MD        Patient is also followed by Dr Roberto Hurtado at [facility] and last office note  2020 and states following:  Cardiovascular Testing:  - Echo 19 normal LVEF >55%, mild LA enlargement, no valvular disease  - DAWN/DCCV 19: LVEF >55%, mod/severe atheroma, atrial septal aneurysm.   - ZIO 1/10/20 - 20: brief SVT no sustained arrhythmias  - AAA scan 20: 35 mm aorta    ASSESSMENT/PLAN:     1. Lone atrial fibrillation ([address]  - Echo 19 normal LVEF >55%  - Metoprolol will be continued for now given HTN, abd aortic aneurysm  - thought to be post - operative atrial fibrillation event following renal operation  - seen by EP recs to assess for 90 days (Has now been on Eliquis > 90 days)  if no palpitations or A fib on monitor stop anticoagulation. Currently only on ASA  - reasonable to DC eliquis per EP recs  - ASA 81 mg PO daily after cessation of Eliquis   - Keep metoprolol given abdominal AAA and SVT on monitor  - was very symptomatic, discussed stroke risk for a fib and that if he becomes symptomatic needs to seek evaluation as this would warrant restarting anticoagulation.      2. Abdominal aortic aneurysm (AAA) without rupture (HCC)  - 35 mm on CT scan, US stable 35 mm  - check US Abd in 6 months to eval for interval change  - BP controlled , on beta blocker  - Duplex aorta August 2021 to check for interval change in size   - US,RETROPERIT,REAL TIME,LIMITED; Future     3. Coronary artery disease involving native coronary artery of native heart without angina pectoris  - non obstructive on outside cath in [facility] per history   - on ASA, Pravastatin, Metoprolol   - exercising, no recent chest pain, dyspnea on exertion     4. Peripheral arterial disease (HCC)  - moderate atheroma on DAWN  - continue statin    Patient COVID test date 04/20/2021; Patient did  show for the appointment. The testing center is located at the [address], [place]. If appointment is needed-patient provided telephone number of [phone]. Patient answered medical/surgical history questions at their best of ability. All prior to admission medications documented in [facility]. Patient instructed to take the following medications the day of surgery according to anesthesia guidelines with a small sip of water:Albuterol and Stiolto inhalers and bring DOS, Metoprolol and Levothyroxine . Hold all vitamins 7 days prior to surgery and NSAIDS 5 days prior to surgery.  Prescription meds to hold:None    Patient instructed on the following:    > Arrive at A Entrance, time of arrival to be called the day before by 1700  > NPO after midnight including gum, mints, and ice chips  > Responsible adult must drive patient to the hospital, stay during surgery, and patient will need supervision 24 hours after anesthesia  > Use antibacterial soap in shower the night before surgery and on the morning of surgery  > All piercings must be removed prior to arrival.    > Leave all valuables (money and jewelry) at home but bring insurance card and ID on DOS.   > Do not wear make-up, nail polish, lotions, cologne, perfumes, powders, or oil on skin.

## 2021-04-26 ENCOUNTER — ANESTHESIA EVENT (OUTPATIENT)
Dept: SURGERY | Age: 76
End: 2021-04-26
Payer: MEDICARE

## 2021-04-27 ENCOUNTER — HOSPITAL ENCOUNTER (OUTPATIENT)
Age: 76
Setting detail: OUTPATIENT SURGERY
Discharge: HOME | End: 2021-04-27
Attending: SURGERY | Admitting: SURGERY
Payer: MEDICARE

## 2021-04-27 ENCOUNTER — ANESTHESIA (OUTPATIENT)
Dept: SURGERY | Age: 76
End: 2021-04-27
Payer: MEDICARE

## 2021-04-27 VITALS
WEIGHT: 187.2 LBS | TEMPERATURE: 98.6 F | SYSTOLIC BLOOD PRESSURE: 152 MMHG | HEART RATE: 82 BPM | OXYGEN SATURATION: 91 % | RESPIRATION RATE: 16 BRPM | DIASTOLIC BLOOD PRESSURE: 76 MMHG | BODY MASS INDEX: 26.48 KG/M2

## 2021-04-27 DIAGNOSIS — K43.2 INCISIONAL HERNIA, WITHOUT OBSTRUCTION OR GANGRENE: Primary | ICD-10-CM

## 2021-04-27 LAB
GLUCOSE BLD STRIP.AUTO-MCNC: 130 MG/DL (ref 65–100)
HGB BLD-MCNC: 18.1 G/DL (ref 13.6–17.2)
SERVICE CMNT-IMP: ABNORMAL

## 2021-04-27 PROCEDURE — 77030037088 HC TUBE ENDOTRACH ORAL NSL COVD-A: Performed by: ANESTHESIOLOGY

## 2021-04-27 PROCEDURE — 77030040922 HC BLNKT HYPOTHRM STRY -A: Performed by: ANESTHESIOLOGY

## 2021-04-27 PROCEDURE — 76210000016 HC OR PH I REC 1 TO 1.5 HR: Performed by: SURGERY

## 2021-04-27 PROCEDURE — 77030040361 HC SLV COMPR DVT MDII -B: Performed by: SURGERY

## 2021-04-27 PROCEDURE — 2709999900 HC NON-CHARGEABLE SUPPLY: Performed by: SURGERY

## 2021-04-27 PROCEDURE — 77030021678 HC GLIDESCP STAT DISP VERT -B: Performed by: ANESTHESIOLOGY

## 2021-04-27 PROCEDURE — 77030021917 HC STPL TCKR ABSRB COVD -E: Performed by: SURGERY

## 2021-04-27 PROCEDURE — 77030002996 HC SUT SLK J&J -A: Performed by: SURGERY

## 2021-04-27 PROCEDURE — 74011000250 HC RX REV CODE- 250: Performed by: ANESTHESIOLOGY

## 2021-04-27 PROCEDURE — C1781 MESH (IMPLANTABLE): HCPCS | Performed by: SURGERY

## 2021-04-27 PROCEDURE — 77030002986 HC SUT PROL J&J -A: Performed by: SURGERY

## 2021-04-27 PROCEDURE — 82962 GLUCOSE BLOOD TEST: CPT

## 2021-04-27 PROCEDURE — 77030036554: Performed by: SURGERY

## 2021-04-27 PROCEDURE — 74011250636 HC RX REV CODE- 250/636: Performed by: SURGERY

## 2021-04-27 PROCEDURE — 76210000020 HC REC RM PH II FIRST 0.5 HR: Performed by: SURGERY

## 2021-04-27 PROCEDURE — 74011000250 HC RX REV CODE- 250: Performed by: NURSE ANESTHETIST, CERTIFIED REGISTERED

## 2021-04-27 PROCEDURE — 76060000033 HC ANESTHESIA 1 TO 1.5 HR: Performed by: SURGERY

## 2021-04-27 PROCEDURE — 77030008462 HC STPLR SKN PROX J&J -A: Performed by: SURGERY

## 2021-04-27 PROCEDURE — 74011250636 HC RX REV CODE- 250/636: Performed by: ANESTHESIOLOGY

## 2021-04-27 PROCEDURE — 49560 PR REPAIR INCISIONAL HERNIA,REDUCIBLE: CPT | Performed by: SURGERY

## 2021-04-27 PROCEDURE — 49568 PR IMPLANTATION OF MESH OR OTHER PROSTHESIS FOR OPEN INCISIONAL OR VENTRAL HERNIA REPAIR OR MESH FOR CLOSURE OF DEBRIDEMENT FOR NECROTIZING SOFT TISSUE INFECTION: CPT | Performed by: SURGERY

## 2021-04-27 PROCEDURE — 77030002966 HC SUT PDS J&J -A: Performed by: SURGERY

## 2021-04-27 PROCEDURE — 74011000250 HC RX REV CODE- 250: Performed by: SURGERY

## 2021-04-27 PROCEDURE — 77030031139 HC SUT VCRL2 J&J -A: Performed by: SURGERY

## 2021-04-27 PROCEDURE — 74011000250 HC RX REV CODE- 250

## 2021-04-27 PROCEDURE — 74011250637 HC RX REV CODE- 250/637: Performed by: ANESTHESIOLOGY

## 2021-04-27 PROCEDURE — 76010000160 HC OR TIME 0.5 TO 1 HR INTENSV-TIER 1: Performed by: SURGERY

## 2021-04-27 PROCEDURE — 77030039425 HC BLD LARYNG TRULITE DISP TELE -A: Performed by: ANESTHESIOLOGY

## 2021-04-27 PROCEDURE — 85018 HEMOGLOBIN: CPT

## 2021-04-27 PROCEDURE — 74011250636 HC RX REV CODE- 250/636

## 2021-04-27 PROCEDURE — 74011250636 HC RX REV CODE- 250/636: Performed by: NURSE ANESTHETIST, CERTIFIED REGISTERED

## 2021-04-27 PROCEDURE — 88304 TISSUE EXAM BY PATHOLOGIST: CPT

## 2021-04-27 DEVICE — MESH HERN W6XL6IN INGUINAL POLYPR MFIL SQ NONABSORBABLE: Type: IMPLANTABLE DEVICE | Site: ABDOMEN | Status: FUNCTIONAL

## 2021-04-27 RX ORDER — LIDOCAINE HYDROCHLORIDE 20 MG/ML
INJECTION, SOLUTION EPIDURAL; INFILTRATION; INTRACAUDAL; PERINEURAL AS NEEDED
Status: DISCONTINUED | OUTPATIENT
Start: 2021-04-27 | End: 2021-04-27 | Stop reason: HOSPADM

## 2021-04-27 RX ORDER — EPHEDRINE SULFATE/0.9% NACL/PF 50 MG/5 ML
SYRINGE (ML) INTRAVENOUS AS NEEDED
Status: DISCONTINUED | OUTPATIENT
Start: 2021-04-27 | End: 2021-04-27 | Stop reason: HOSPADM

## 2021-04-27 RX ORDER — GLYCOPYRROLATE 0.2 MG/ML
INJECTION INTRAMUSCULAR; INTRAVENOUS AS NEEDED
Status: DISCONTINUED | OUTPATIENT
Start: 2021-04-27 | End: 2021-04-27 | Stop reason: HOSPADM

## 2021-04-27 RX ORDER — OXYCODONE AND ACETAMINOPHEN 5; 325 MG/1; MG/1
1-2 TABLET ORAL
Qty: 25 TAB | Refills: 0 | Status: SHIPPED | OUTPATIENT
Start: 2021-04-27 | End: 2021-04-30

## 2021-04-27 RX ORDER — FENTANYL CITRATE 50 UG/ML
INJECTION, SOLUTION INTRAMUSCULAR; INTRAVENOUS AS NEEDED
Status: DISCONTINUED | OUTPATIENT
Start: 2021-04-27 | End: 2021-04-27 | Stop reason: HOSPADM

## 2021-04-27 RX ORDER — OXYCODONE HYDROCHLORIDE 5 MG/1
5 TABLET ORAL
Status: COMPLETED | OUTPATIENT
Start: 2021-04-27 | End: 2021-04-27

## 2021-04-27 RX ORDER — BUPIVACAINE HYDROCHLORIDE 5 MG/ML
INJECTION, SOLUTION EPIDURAL; INTRACAUDAL AS NEEDED
Status: DISCONTINUED | OUTPATIENT
Start: 2021-04-27 | End: 2021-04-27 | Stop reason: HOSPADM

## 2021-04-27 RX ORDER — ONDANSETRON 2 MG/ML
INJECTION INTRAMUSCULAR; INTRAVENOUS AS NEEDED
Status: DISCONTINUED | OUTPATIENT
Start: 2021-04-27 | End: 2021-04-27 | Stop reason: HOSPADM

## 2021-04-27 RX ORDER — SODIUM CHLORIDE, SODIUM LACTATE, POTASSIUM CHLORIDE, CALCIUM CHLORIDE 600; 310; 30; 20 MG/100ML; MG/100ML; MG/100ML; MG/100ML
100 INJECTION, SOLUTION INTRAVENOUS CONTINUOUS
Status: DISCONTINUED | OUTPATIENT
Start: 2021-04-27 | End: 2021-04-28 | Stop reason: HOSPADM

## 2021-04-27 RX ORDER — NALOXONE HYDROCHLORIDE 0.4 MG/ML
0.04 INJECTION, SOLUTION INTRAMUSCULAR; INTRAVENOUS; SUBCUTANEOUS
Status: DISCONTINUED | OUTPATIENT
Start: 2021-04-27 | End: 2021-04-28 | Stop reason: HOSPADM

## 2021-04-27 RX ORDER — DEXAMETHASONE SODIUM PHOSPHATE 4 MG/ML
INJECTION, SOLUTION INTRA-ARTICULAR; INTRALESIONAL; INTRAMUSCULAR; INTRAVENOUS; SOFT TISSUE AS NEEDED
Status: DISCONTINUED | OUTPATIENT
Start: 2021-04-27 | End: 2021-04-27 | Stop reason: HOSPADM

## 2021-04-27 RX ORDER — SULFAMETHOXAZOLE AND TRIMETHOPRIM 800; 160 MG/1; MG/1
1 TABLET ORAL 2 TIMES DAILY
Qty: 14 TAB | Refills: 0 | Status: SHIPPED | OUTPATIENT
Start: 2021-04-27 | End: 2021-05-26 | Stop reason: ALTCHOICE

## 2021-04-27 RX ORDER — MIDAZOLAM HYDROCHLORIDE 1 MG/ML
2 INJECTION, SOLUTION INTRAMUSCULAR; INTRAVENOUS ONCE
Status: COMPLETED | OUTPATIENT
Start: 2021-04-27 | End: 2021-04-27

## 2021-04-27 RX ORDER — SODIUM CHLORIDE, SODIUM LACTATE, POTASSIUM CHLORIDE, CALCIUM CHLORIDE 600; 310; 30; 20 MG/100ML; MG/100ML; MG/100ML; MG/100ML
100 INJECTION, SOLUTION INTRAVENOUS CONTINUOUS
Status: DISCONTINUED | OUTPATIENT
Start: 2021-04-27 | End: 2021-04-27 | Stop reason: HOSPADM

## 2021-04-27 RX ORDER — MIDAZOLAM HYDROCHLORIDE 1 MG/ML
2 INJECTION, SOLUTION INTRAMUSCULAR; INTRAVENOUS
Status: DISCONTINUED | OUTPATIENT
Start: 2021-04-27 | End: 2021-04-27 | Stop reason: HOSPADM

## 2021-04-27 RX ORDER — NEOSTIGMINE METHYLSULFATE 1 MG/ML
INJECTION, SOLUTION INTRAVENOUS AS NEEDED
Status: DISCONTINUED | OUTPATIENT
Start: 2021-04-27 | End: 2021-04-27 | Stop reason: HOSPADM

## 2021-04-27 RX ORDER — LIDOCAINE HYDROCHLORIDE 10 MG/ML
0.1 INJECTION INFILTRATION; PERINEURAL AS NEEDED
Status: DISCONTINUED | OUTPATIENT
Start: 2021-04-27 | End: 2021-04-27 | Stop reason: HOSPADM

## 2021-04-27 RX ORDER — CLINDAMYCIN PHOSPHATE 900 MG/50ML
900 INJECTION INTRAVENOUS
Status: COMPLETED | OUTPATIENT
Start: 2021-04-27 | End: 2021-04-27

## 2021-04-27 RX ORDER — HYDROMORPHONE HYDROCHLORIDE 1 MG/ML
0.5 INJECTION, SOLUTION INTRAMUSCULAR; INTRAVENOUS; SUBCUTANEOUS
Status: DISCONTINUED | OUTPATIENT
Start: 2021-04-27 | End: 2021-04-28 | Stop reason: HOSPADM

## 2021-04-27 RX ORDER — ACETAMINOPHEN 500 MG
1000 TABLET ORAL ONCE
Status: COMPLETED | OUTPATIENT
Start: 2021-04-27 | End: 2021-04-27

## 2021-04-27 RX ORDER — PROPOFOL 10 MG/ML
INJECTION, EMULSION INTRAVENOUS AS NEEDED
Status: DISCONTINUED | OUTPATIENT
Start: 2021-04-27 | End: 2021-04-27 | Stop reason: HOSPADM

## 2021-04-27 RX ORDER — ROCURONIUM BROMIDE 10 MG/ML
INJECTION, SOLUTION INTRAVENOUS AS NEEDED
Status: DISCONTINUED | OUTPATIENT
Start: 2021-04-27 | End: 2021-04-27 | Stop reason: HOSPADM

## 2021-04-27 RX ORDER — FENTANYL CITRATE 50 UG/ML
100 INJECTION, SOLUTION INTRAMUSCULAR; INTRAVENOUS ONCE
Status: DISCONTINUED | OUTPATIENT
Start: 2021-04-27 | End: 2021-04-27 | Stop reason: HOSPADM

## 2021-04-27 RX ADMIN — FENTANYL CITRATE 100 MCG: 50 INJECTION INTRAMUSCULAR; INTRAVENOUS at 15:33

## 2021-04-27 RX ADMIN — DEXAMETHASONE SODIUM PHOSPHATE 4 MG: 4 INJECTION, SOLUTION INTRAMUSCULAR; INTRAVENOUS at 15:40

## 2021-04-27 RX ADMIN — OXYCODONE 5 MG: 5 TABLET ORAL at 17:27

## 2021-04-27 RX ADMIN — PROPOFOL 150 MG: 10 INJECTION, EMULSION INTRAVENOUS at 15:33

## 2021-04-27 RX ADMIN — Medication 10 MG: at 16:11

## 2021-04-27 RX ADMIN — ROCURONIUM BROMIDE 10 MG: 10 INJECTION, SOLUTION INTRAVENOUS at 16:01

## 2021-04-27 RX ADMIN — NEOSTIGMINE METHYLSULFATE 5 MG: 1 INJECTION, SOLUTION INTRAVENOUS at 16:17

## 2021-04-27 RX ADMIN — ROCURONIUM BROMIDE 30 MG: 10 INJECTION, SOLUTION INTRAVENOUS at 15:33

## 2021-04-27 RX ADMIN — SODIUM CHLORIDE, SODIUM LACTATE, POTASSIUM CHLORIDE, AND CALCIUM CHLORIDE 100 ML/HR: 600; 310; 30; 20 INJECTION, SOLUTION INTRAVENOUS at 13:37

## 2021-04-27 RX ADMIN — LIDOCAINE HYDROCHLORIDE 60 MG: 20 INJECTION, SOLUTION EPIDURAL; INFILTRATION; INTRACAUDAL; PERINEURAL at 15:33

## 2021-04-27 RX ADMIN — MIDAZOLAM 2 MG: 1 INJECTION INTRAMUSCULAR; INTRAVENOUS at 14:50

## 2021-04-27 RX ADMIN — SODIUM CHLORIDE, SODIUM LACTATE, POTASSIUM CHLORIDE, AND CALCIUM CHLORIDE: 600; 310; 30; 20 INJECTION, SOLUTION INTRAVENOUS at 15:40

## 2021-04-27 RX ADMIN — GLYCOPYRROLATE 0.8 MG: 0.2 INJECTION, SOLUTION INTRAMUSCULAR; INTRAVENOUS at 16:17

## 2021-04-27 RX ADMIN — ACETAMINOPHEN 1000 MG: 500 TABLET, FILM COATED ORAL at 13:30

## 2021-04-27 RX ADMIN — LIDOCAINE HYDROCHLORIDE 0.1 ML: 10 INJECTION, SOLUTION INFILTRATION; PERINEURAL at 13:37

## 2021-04-27 RX ADMIN — CLINDAMYCIN PHOSPHATE 900 MG: 900 INJECTION, SOLUTION INTRAVENOUS at 15:26

## 2021-04-27 RX ADMIN — ONDANSETRON 4 MG: 2 INJECTION INTRAMUSCULAR; INTRAVENOUS at 15:55

## 2021-04-27 NOTE — INTERVAL H&P NOTE
Update History & Physical    The Patient's History and Physical of 4/23/21 was reviewed with the patient and I examined the patient. There was no change. The surgical site was confirmed by the patient and me. Plan:  The risk, benefits, expected outcome, and alternative to the recommended procedure have been discussed with the patient. Patient understands and wants to proceed with the procedure.     Electronically signed by Tang Castro MD on 4/27/2021 at 2:48 PM

## 2021-04-27 NOTE — DISCHARGE INSTRUCTIONS
1. Diet as tolerated except for a  low fat diet after laparoscopic cholecystectomy. 2. Showering is allowed, but no tub baths, hot tubs or swimming. 3. Drainage is common from the wounds. Change the dressings as needed. Call our office if the wounds become reddened, tender, feel warm to the touch or pus starts to drain from the wound. 4. Take prescribed pain medication as directed, usually Percocet, Norco, Ultram or Dilaudid. Take over the counter medication for minor pain. 5. Ice may be applied intermittently to the surgical site or sites. 6. Call or office, [phone], if problems arise. 7. Follow up in the office at the assigned time. 8. Resume all medications as taken per surgery, unless specifically instructed not to take certain ones. 9. No lifting more than 25 pounds until told otherwise. 10. Driving is allowed 3 days after surgery as long as you feel comfortable enough to drive and have not taken any prescription pain medication prior to driving.

## 2021-04-27 NOTE — ANESTHESIA POSTPROCEDURE EVALUATION
Procedure(s):  OPEN HERNIA VENTRAL REPAIR WITH MESH.     general    Anesthesia Post Evaluation      Multimodal analgesia: multimodal analgesia used between 6 hours prior to anesthesia start to PACU discharge  Patient location during evaluation: PACU  Patient participation: complete - patient participated  Level of consciousness: awake and awake and alert  Pain management: adequate  Airway patency: patent  Anesthetic complications: no  Cardiovascular status: acceptable  Respiratory status: acceptable  Hydration status: acceptable  Post anesthesia nausea and vomiting:  controlled      INITIAL Post-op Vital signs:   Vitals Value Taken Time   /76 04/27/21 1700   Temp 37 °C (98.6 °F) 04/27/21 1629   Pulse 82 04/27/21 1700   Resp 16 04/27/21 1700   SpO2 91 % 04/27/21 1700

## 2021-04-27 NOTE — BRIEF OP NOTE
Brief Postoperative Note    Patient: Ortiz Dennis  YOB: 1945  MRN: [id number]    Date of Procedure: 4/27/2021     Pre-Op Diagnosis: Ventral hernia without obstruction or gangrene [K43.9]    Post-Op Diagnosis: Two ventral hernias     Procedure(s):  OPEN HERNIA VENTRAL REPAIR WITH ON-LAY MESH    Surgeon(s): Vikash Bender MD    Surgical Assistant: None    Anesthesia: General plus local    Estimated Blood Loss (mL): Minimal    Complications: None    Specimens:   ID Type Source Tests Collected by Time Destination   1 : [address] [place] [address] Preservative   Vikash Bender MD 4/27/2021 0909 Pathology        Implants:   Implant Name Type Inv.  Item Serial No.  Lot No. LRB No. Used Action   MESH ESTELLA [id number] INGUINAL POLYPR MFIL SQ NONABSORBABLE - [id number]  [address] [facility] MFIL SQ NONABSORBABLE [id number] BARD DAVOL_WD [id number] N/A 1 Implanted       Drains: * No LDAs found *    Findings: See dictated note    Electronically Signed by Ni Basurto MD on 4/27/2021 at 4:22 PM

## 2021-04-28 NOTE — OP NOTES
[address] [address]  OPERATIVE REPORT    Name:  Yocasta Murphy  MR#:  [id number]  :  1945  ACCOUNT #:  [de-identified]  DATE OF SERVICE:  2021    PREOPERATIVE DIAGNOSES:  Ventral hernia as well as pulmonary fibrosis. POSTOPERATIVE DIAGNOSES:  Two ventral hernias with pulmonary fibrosis. PROCEDURE PERFORMED:  Open ventral hernia repair with on-lay mesh. SURGEON:  June Muhammad. Ross Alarcon MD    ASSISTANT:  None. ANESTHESIA:  General endotracheal anesthesia with Dr. Tomas Morgan. COMPLICATIONS:  None. SPECIMENS REMOVED:  Part of ventral hernia sac. IMPLANTS:  None. ESTIMATED BLOOD LOSS:  25 mL. DRAINS:  None. HISTORY:  This is a 26-year-old male who underwent a laparoscopic-assisted nephrectomy for renal cell carcinoma several years ago. He has developed a ventral hernia superior to his umbilicus. He also had some laxity of the linea alba with slight beginning of a diastasis recti. The patient was having pain at this site generally when he worked out. He wanted to have it repaired, was scheduled for today. Went through risks of bleeding, infection, anesthesia; injury to the small bowel, large bowel; potential for recurrence of the hernia; wound infection. The patient was agreeable, signed a consent form and was scheduled for today. PROCEDURE:  The patient was seen in the preop area with his wife, then transported to room #7 [address] [place] [address] where he was placed on the operating room table in the supine position. General endotracheal anesthesia was administered without complications. He received 900 mg of Cleocin as prophylactic antibiotic coverage. The abdomen was prepped and draped in the usual sterile manner. Time-out was done identifying the patient, surgeon, procedure and his birth date of 1945. When everyone in the room agreed, we began the procedure. He had an incision around his umbilicus that curved to the right.   We made the incision slightly higher to this as I had marked out the hernia with a marking pen in the preop area. I used a 15 scalpel blade to incise the skin. Soft tissue was divided with electrocautery down to a hernia sac superior to the umbilicus in the midline at the superior aspect of the laparoscopic nephrectomy incision site. I opened the hernia sac. There was found to be omentum in it. I excised the hernia sac and sent part of it to Pathology for evaluation. I increased the incision on the fascia superiorly, I put my finger in the abdominal cavity. There was another hernia inferior to the umbilicus in the lower part of this laparoscopic nephrectomy incision, and I extended the skin and fascial incisions to join both of these together. I excised both hernia sacs, excised some attenuated fascia. I raised flaps circumferentially to place an on-lay piece of mesh. Lap and instrument counts were found to be correct x2, at which point I closed the fascia with two #1 PDS sutures. I did an on-lay patch of polypropylene mesh, it was sutured circumferentially with 0 Prolene sutures. The midportion of the mesh was tacked down with the AbsorbaTack device to promote ingrowth of granulation tissue. Wound was irrigated. Hemostasis achieved with electrocautery. The umbilicus was re-created with a #1 Vicryl. Overlying adipose tissue was tacked down to the mesh to decrease the dead space with interrupted sutures of #1 Vicryl. Skin was closed with surgical staples. I injected 30 mL of 0.5% Marcaine around the wound site for local wound anesthesia. The patient tolerated the procedure well, was extubated, and brought to the recovery room in stable condition. He should be able to go home later tonight.       MD ROSS [id number]  D:  04/27/2021 20:10  T:  04/28/2021 7:10  JOB #:  [id number]

## 2021-05-26 PROBLEM — R31.9 HEMATURIA: Status: RESOLVED | Noted: 2019-11-13 | Resolved: 2021-05-26

## 2021-09-03 ENCOUNTER — HOSPITAL ENCOUNTER (OUTPATIENT)
Dept: CT IMAGING | Age: 76
Discharge: HOME | End: 2021-09-03
Attending: UROLOGY

## 2021-09-03 DIAGNOSIS — R91.1 PULMONARY NODULE: ICD-10-CM

## 2021-09-03 DIAGNOSIS — C64.1 RENAL CELL CARCINOMA OF RIGHT KIDNEY (HCC): ICD-10-CM

## 2021-09-03 RX ORDER — SODIUM CHLORIDE 0.9 % (FLUSH) 0.9 %
10 SYRINGE (ML) INJECTION
Status: COMPLETED | OUTPATIENT
Start: 2021-09-03 | End: 2021-09-03

## 2021-09-03 RX ADMIN — Medication 10 ML: at 15:11

## 2021-09-23 ENCOUNTER — HOSPITAL ENCOUNTER (OUTPATIENT)
Dept: PET IMAGING | Age: 76
Discharge: HOME | End: 2021-09-23
Payer: MEDICARE

## 2021-09-23 DIAGNOSIS — R91.1 PULMONARY NODULE: ICD-10-CM

## 2021-09-23 LAB
GLUCOSE BLD STRIP.AUTO-MCNC: 149 MG/DL (ref 65–100)
SERVICE CMNT-IMP: ABNORMAL

## 2021-09-23 PROCEDURE — 74011000636 HC RX REV CODE- 636: Performed by: INTERNAL MEDICINE

## 2021-09-23 PROCEDURE — A9552 F18 FDG: HCPCS

## 2021-09-23 PROCEDURE — 82962 GLUCOSE BLOOD TEST: CPT

## 2021-09-23 RX ORDER — FLUDEOXYGLUCOSE F-18 200 MCI/ML
10 INJECTION INTRAVENOUS ONCE
Status: COMPLETED | OUTPATIENT
Start: 2021-09-23 | End: 2021-09-23

## 2021-09-23 RX ORDER — SODIUM CHLORIDE 0.9 % (FLUSH) 0.9 %
10 SYRINGE (ML) INJECTION
Status: COMPLETED | OUTPATIENT
Start: 2021-09-23 | End: 2021-09-23

## 2021-09-23 RX ADMIN — Medication 10 ML: at 10:42

## 2021-09-23 RX ADMIN — DIATRIZOATE MEGLUMINE AND DIATRIZOATE SODIUM 10 ML: 660; 100 LIQUID ORAL; RECTAL at 10:42

## 2021-09-23 RX ADMIN — FLUDEOXYGLUCOSE F-18 11.79 MILLICURIE: 200 INJECTION INTRAVENOUS at 10:42

## 2021-10-07 ENCOUNTER — APPOINTMENT (OUTPATIENT)
Dept: GENERAL RADIOLOGY | Age: 76
End: 2021-10-07
Attending: INTERNAL MEDICINE
Payer: MEDICARE

## 2021-10-07 ENCOUNTER — HOSPITAL ENCOUNTER (OUTPATIENT)
Age: 76
Setting detail: OUTPATIENT SURGERY
Discharge: HOME | End: 2021-10-07
Attending: INTERNAL MEDICINE | Admitting: INTERNAL MEDICINE
Payer: MEDICARE

## 2021-10-07 VITALS
RESPIRATION RATE: 16 BRPM | WEIGHT: 189 LBS | DIASTOLIC BLOOD PRESSURE: 64 MMHG | TEMPERATURE: 98.2 F | HEART RATE: 68 BPM | OXYGEN SATURATION: 91 % | HEIGHT: 70 IN | SYSTOLIC BLOOD PRESSURE: 123 MMHG | BODY MASS INDEX: 27.06 KG/M2

## 2021-10-07 DIAGNOSIS — R91.1 LUNG NODULE: ICD-10-CM

## 2021-10-07 PROCEDURE — 99152 MOD SED SAME PHYS/QHP 5/>YRS: CPT | Performed by: INTERNAL MEDICINE

## 2021-10-07 PROCEDURE — 77030031404 HC PTCH SENS SD DISP SPDM -A: Performed by: INTERNAL MEDICINE

## 2021-10-07 PROCEDURE — 77030003406 HC NDL ASPIR BIOP OCOA -C: Performed by: INTERNAL MEDICINE

## 2021-10-07 PROCEDURE — 31652 BRONCH EBUS SAMPLNG 1/2 NODE: CPT | Performed by: INTERNAL MEDICINE

## 2021-10-07 PROCEDURE — 77030012699 HC VLV SUC CNTRL OCOA -A: Performed by: INTERNAL MEDICINE

## 2021-10-07 PROCEDURE — 99153 MOD SED SAME PHYS/QHP EA: CPT | Performed by: INTERNAL MEDICINE

## 2021-10-07 PROCEDURE — 77030031420 HC NDL TIP BRSH ASP SD SPDM -B: Performed by: INTERNAL MEDICINE

## 2021-10-07 PROCEDURE — 77030009046 HC CATH BRNCH BLLN OCOA -B: Performed by: INTERNAL MEDICINE

## 2021-10-07 PROCEDURE — 77030028571 HC CATH ENDOBRNCH DISP BIOP KT SPMD -G1: Performed by: INTERNAL MEDICINE

## 2021-10-07 PROCEDURE — 74011250636 HC RX REV CODE- 250/636: Performed by: INTERNAL MEDICINE

## 2021-10-07 PROCEDURE — 88177 CYTP FNA EVAL EA ADDL: CPT

## 2021-10-07 PROCEDURE — 76040000026: Performed by: INTERNAL MEDICINE

## 2021-10-07 PROCEDURE — 74011000250 HC RX REV CODE- 250: Performed by: INTERNAL MEDICINE

## 2021-10-07 PROCEDURE — 88172 CYTP DX EVAL FNA 1ST EA SITE: CPT

## 2021-10-07 PROCEDURE — 88173 CYTOPATH EVAL FNA REPORT: CPT

## 2021-10-07 PROCEDURE — 2709999900 HC NON-CHARGEABLE SUPPLY: Performed by: INTERNAL MEDICINE

## 2021-10-07 RX ORDER — SODIUM CHLORIDE 0.9 % (FLUSH) 0.9 %
5-40 SYRINGE (ML) INJECTION AS NEEDED
Status: CANCELLED | OUTPATIENT
Start: 2021-10-07

## 2021-10-07 RX ORDER — SODIUM CHLORIDE 9 MG/ML
1000 INJECTION, SOLUTION INTRAVENOUS CONTINUOUS
Status: DISCONTINUED | OUTPATIENT
Start: 2021-10-07 | End: 2021-10-07 | Stop reason: HOSPADM

## 2021-10-07 RX ORDER — FENTANYL CITRATE 50 UG/ML
25-100 INJECTION, SOLUTION INTRAMUSCULAR; INTRAVENOUS
Status: DISCONTINUED | OUTPATIENT
Start: 2021-10-07 | End: 2021-10-07 | Stop reason: HOSPADM

## 2021-10-07 RX ORDER — MIDAZOLAM HYDROCHLORIDE 1 MG/ML
.25-5 INJECTION, SOLUTION INTRAMUSCULAR; INTRAVENOUS
Status: DISCONTINUED | OUTPATIENT
Start: 2021-10-07 | End: 2021-10-07 | Stop reason: HOSPADM

## 2021-10-07 RX ORDER — LIDOCAINE HYDROCHLORIDE 40 MG/ML
SOLUTION TOPICAL ONCE
Status: COMPLETED | OUTPATIENT
Start: 2021-10-07 | End: 2021-10-07

## 2021-10-07 RX ORDER — SODIUM CHLORIDE 0.9 % (FLUSH) 0.9 %
5-40 SYRINGE (ML) INJECTION EVERY 8 HOURS
Status: CANCELLED | OUTPATIENT
Start: 2021-10-07

## 2021-10-07 RX ORDER — LIDOCAINE HYDROCHLORIDE 20 MG/ML
JELLY TOPICAL ONCE
Status: COMPLETED | OUTPATIENT
Start: 2021-10-07 | End: 2021-10-07

## 2021-10-07 RX ADMIN — LIDOCAINE HYDROCHLORIDE: 40 SOLUTION TOPICAL at 08:45

## 2021-10-07 RX ADMIN — FENTANYL CITRATE 50 MCG: 0.05 INJECTION, SOLUTION INTRAMUSCULAR; INTRAVENOUS at 08:44

## 2021-10-07 RX ADMIN — SODIUM CHLORIDE 1000 ML: 900 INJECTION, SOLUTION INTRAVENOUS at 07:52

## 2021-10-07 RX ADMIN — LIDOCAINE HYDROCHLORIDE: 20 JELLY TOPICAL at 08:45

## 2021-10-07 RX ADMIN — FENTANYL CITRATE 50 MCG: 0.05 INJECTION, SOLUTION INTRAMUSCULAR; INTRAVENOUS at 08:48

## 2021-10-07 RX ADMIN — MIDAZOLAM 2 MG: 1 INJECTION INTRAMUSCULAR; INTRAVENOUS at 08:41

## 2021-10-07 RX ADMIN — FENTANYL CITRATE 50 MCG: 0.05 INJECTION, SOLUTION INTRAMUSCULAR; INTRAVENOUS at 08:41

## 2021-10-07 RX ADMIN — MIDAZOLAM 1 MG: 1 INJECTION INTRAMUSCULAR; INTRAVENOUS at 08:44

## 2021-10-07 NOTE — H&P
Dimitrios Badillo MD   Physician   Specialty:  Pulmonary Disease   Progress Notes   Signed   Encounter Date:  3/11/2021                    []Hide copied text    []Hossein for details          [address] Servando Adam. [address] [place] [address], [address] [place] [address]  [phone]     Patient Name:  Jimenez Healy  YOB: 1945     Office Visit 3/11/2021     PCP:  Dr. Casandra Kaiser:    No chief complaint on file.        HISTORY OF PRESENT ILLNESS:       Jimenez Healy is a 23ORT with combined pulmonary fibrosis with emphysema. He remains active and jogs 1 mile every day. He admits to dyspnea on hills or with exertion on stairs carrying a load. He is currently using Stiolto 2 puffs daily in addition to as needed albuterol. He says he rarely needs the albuterol except for when he plays golf in the summer time.     His last appointment at [facility] was in March 2020, and he was seen by Cass Carter. He quit smoking in the fall 2019.     Today he reports that he feels completely stable. He underwent complete pulmonary function testing which is slightly improved. No major cough. He has received his COVID-19 vaccination.     When his CT of the chest on February 22, 2021 which revealed several small nodules: Right lower lobe9 mm (image 49), right lower lobe5 mm (subpleural, image 47). Also some very slightly enlarged lymph nodes were noted in the mediastinum.   He has a CT arranged for follow-up on 9/3/2021.     Past Medical History:   Diagnosis Date    Abnormal chest x-ray 3/19/2013    Adenopathy 3/19/2013     Lymph glands    Allergic rhinitis, cause unspecified 3/19/2013    CAD (coronary artery disease)      Chronic airway obstruction, not elsewhere classified 3/19/2013    COPD      Diabetes ([address]       does not check sugars, last A1C 6.3    Diabetes mellitus ([address] 3/19/2013    Hashimoto's disease      Pulmonary fibrosis (HCC)      Sinus problem      Thyroid disease       Hypothyroid    Tobacco use disorder 3/19/2013    Unspecified essential hypertension 3/19/2013    Unspecified hypothyroidism 3/19/2013                           Past Surgical History:   Procedure Laterality Date    HX NEPHRECTOMY Right       renal cancer.         No flowsheet data found.           Social History      Socioeconomic History    Marital status:        Spouse name: Not on file    Number of children: Not on file    Years of education: Not on file    Highest education level: Not on file   Occupational History    Not on file   Social Needs    Financial resource strain: Not on file    Food insecurity       Worry: Not on file       Inability: Not on file    Transportation needs       Medical: Not on file       Non-medical: Not on file   Tobacco Use    Smoking status: Former Smoker       Packs/day: 0.50       Years: 40.00       Pack years: 20.00       Types: Cigarettes       Quit date: 2019       Years since quittin.3    Smokeless tobacco: Never Used   Substance and Sexual Activity    Alcohol use:  Yes    Drug use: No    Sexual activity: Not on file   Lifestyle    Physical activity       Days per week: Not on file       Minutes per session: Not on file    Stress: Not on file   Relationships    Social connections       Talks on phone: Not on file       Gets together: Not on file       Attends Congregation service: Not on file       Active member of club or organization: Not on file       Attends meetings of clubs or organizations: Not on file       Relationship status: Not on file    Intimate partner violence       Fear of current or ex partner: Not on file       Emotionally abused: Not on file       Physically abused: Not on file       Forced sexual activity: Not on file   Other Topics Concern    Not on file   Social History Narrative    Not on file                  Family History   Problem Relation Age of Onset    Diabetes Other      Heart Disease Mother      Hypertension Mother      High Cholesterol Mother                    Allergies   Allergen Reactions    Augmentin [Amoxicillin-Pot Clavulanate] Nausea Only       Pt states he is not allergic       Chantix [Varenicline] Other (comments)       [place]    Symbicort [Budesonide-Formoterol] Other (comments)       Thrush in mouth    Wellbutrin [Bupropion Hcl] Hives                 Current Outpatient Medications   Medication Sig    tiotropium-olodateroL (Stiolto Respimat) 2.5-2.5 mcg/actuation inhaler Take 2 Act by inhalation daily.  metoprolol tartrate (LOPRESSOR) 50 mg tablet Take 1 Tab by mouth two (2) times a day.  TURMERIC PO Take  by mouth.  saw palmetto 500 mg cap Take  by mouth.  pravastatin (PRAVACHOL) 80 mg tablet Take 1 Tab by mouth nightly.  levothyroxine (SYNTHROID) 88 mcg tablet Take 1 Tab by mouth Daily (before breakfast).  triamcinolone (NASACORT AQ) 55 mcg nasal inhaler 1 [place] by Both Nostrils route two (2) times a day.  albuterol (PROVENTIL HFA, VENTOLIN HFA, PROAIR HFA) 90 mcg/actuation inhaler Take 2 Puffs by inhalation every four (4) hours as needed for Wheezing.  co-enzyme Q-10 (CO Q-10) 100 mg capsule Take 100 mg by mouth daily.  multivitamin (ONE A DAY) tablet Take 1 Tab by mouth daily.  Cholecalciferol, Vitamin D3, (VITAMIN D3) 1,000 unit cap Take  by mouth.  ascorbic acid (VITAMIN C WITH JURGEN HIPS) 1,000 mg tablet Take  by mouth. 3 tablets daily    phenylephrine (NEOSYNEPHRINE) 0.5 % spry 1 [place] by Both Nostrils route two (2) times a day.  lancets misc E11.9 Non insulin dep testing bs 1times daily; bill to [facility] part B    glucose blood VI test strips (ASCENSIA AUTODISC VI, ONE TOUCH ULTRA TEST VI) strip E11.9 Non insulin dep testing bs 1 times daily: Bill to [facility] part B    hydrocortisone (Proctozone-HC) 2.5 % rectal cream Insert  into rectum four (4) times daily.     Blood-Glucose Meter monitoring kit E11.9 Non insulin dep testing bs 1-2 times daily: Bill to medicare part B      No current facility-administered medications for this visit.          Review of Systems   Constitutional: Negative for chills, diaphoresis, fever, malaise/fatigue and weight loss. Cardiovascular: Negative for chest pain, palpitations, claudication, leg swelling and PND. Gastrointestinal: Negative for abdominal pain, constipation, diarrhea, heartburn, nausea and vomiting. Neurological: Negative for dizziness, tremors, seizures, weakness and headaches.                           PHYSICAL EXAM:     Visit Vitals  /76 (BP 1 Location: Left upper arm, BP Patient Position: Sitting, BP Cuff Size: Large adult)   Pulse 93   Temp (!) 96.5 °F (35.8 °C) (Temporal)   Resp 16   Ht 5' 9\" (1.753 m)   Wt 189 lb (85.7 kg)   SpO2 93%   BMI 27.91 kg/m²                      GENERAL APPEARANCE:               The patient is normal weight and in no respiratory distress. He has somewhat clubbing               HEENT:               PERRL. Conjunctivae unremarkable. Nasal mucosa is without epistaxis, exudate, or polyps. Gums and dentition are unremarkable. There is no oropharyngeal narrowing. LUNGS:               Normal respiratory effort with symmetrical lung expansion. Breath sounds are clear. HEART:               There is a regular rate and rhythm. No murmur, rub, or gallop. There is no edema in lower extremities. DIAGNOSTIC TESTS:                  CT chest 2/22/2021  CHEST: A few mildly prominent mediastinal lymph nodes including an AP window node that measures 11 mm short axis (image 25) and a precarinal node that measures 10 mm short axis (image 26) appear unchanged compared to a chest CT 2/9/2015. Diffuse centrilobular emphysematous changes are present.     There is an irregular nodular opacity in the right lower lobe that measures 9 mm in diameter (image 49).  A subpleural nodule in the right lower lobe measures 5 mm in diameter (image 47).    Pulmonary function testing    2/2016 2/2018  6/14/2019  3/4/2020  3/11/2021   FVC 4.32(96%)  4.67 (106%)  4.29 (99%)  4.39 (102%)  4.47 (104%)   FEV1  2.13(64%)  3 L (88%)  2.79 (84%)  2.89 (88%)  3.02 (92%)   FEV1/FVC 0.49  0.64  0.65  0.66  0.67   TLC    6.5 (96%)  6.74 (99%)  6.7 (98%)  7.17 (105%)   FRC      3.57 (99%)    3.87 (107%)   RV      2.28 (94%)    2.65 (108%)   DLCO    12.6 (40%)  11.4 (37%)  10.3 (33%)  11.6 (38%)         ASSESSMENT & PLAN:  (Medical Decision Making)      ICD-10-CM ICD-9-CM     1. Pulmonary emphysema with fibrosis of lung ([facility]  J43.9 492.8     Continue Stiolto and as needed albuterol. Patient remains abstinent from smoking. Vaccinations against pneumonia are up-to-date. J84.10 515     2. Pulmonary nodules: Follow-up CT scan planned in September 2021. Needs RLL nodules re-assessed. R91.8 793.19           No orders of the defined types were placed in this encounter.     Over 50% of today's office visit was spent in face to face time reviewing test results/records, prognosis, importance of compliance, education about disease process, benefits of medications, instructions for management of acute flare-ups, and follow up plans.   Total time spent was 20 minutes.     Willard Olson MD  Electronically signed

## 2021-10-07 NOTE — DISCHARGE INSTRUCTIONS
RESPIRATORY CARE - BRONCHOSCOPY - DISCHARGE INSTRUCTIONS      You received a lot of numbing medication for your throat and nose, and you also received medication to make you sleepy during your procedure. Because of this and because the bronchoscopy may have irritated your airways, we ask that you follow these directions:    1. Do not eat or drink until  10:30 AM .   After that, you may have what you please. You may want to try some liquids first, because your throat may be a little sore. 2. Medication may cause drowsiness for several hours, therefore:  · Do not drive or operate machinery for remainder of the day. · No alcohol today  · Do not make any important or legal decisions for 24 hours  · Do not sign any legal documents for 24 hours    3. You may cough up more mucus than usual and you may see some blood, but                   this is expected and should subside by the following day. 4. If severe throat irritation, coughing, or bleeding continue, call your doctor. 5.         If you run a fever greater than 102, call [facility] at [phone]. 6.         Dr. Scott Hanson has asked that you:                A. Call the doctor's office as needed for follow up appointment. Any additional instructions:    [facility] will call you after scheduling an IR CT guided biopsy.

## 2021-10-07 NOTE — PROCEDURES
PROCEDURE  Bronchoscopy with Endobronchial Ultrasound Guided Fine Needle Aspiration Of Medistinal Lymph nodes and airway inspection. Navigation bronchoscopy for RLL PET + nodule biopsy. INDICATION   Staging of suspected Lung Cancer   Sampling of nodule for diagnosis of suspected cancer    POST OP DIAGNOSIS:  Station 11Rs was biopsied and negative for malignancy on JURGEN. Despite several attempts the nodule could not be accessed via navigation technique likely due to peripheral lung fibrosis and non pliable airways. Even the US probe could not be threaded through peripherally thus further attempts were aborted. Patient will need IR CT guided core biopsy for diagnosis    Based on CT chest/PET CT / and EBUS pt is a STAGE likely [T1b,N0, M0] IA2 NSC/SC lung Cancer if confirmed via CT guided Bx. He has a marginal DLCO with only an 8% reserve for safe lung surgery and thus can likely tolerate a wedge resection but not a RLL lobectomy. Cyber knife Rx is an alternative. Will need to be discussed in MDTTB. ANESTHESIA  Concious sedation with: Fentanyl 300 mcg IV; Versed 3 mg IV; Lidocaine 180 mg to tracheo-bronchial tree and vocal cords  AIRWAY INSPECTION  After obtaining informed consent, using a bite block, an Olympus Q 180 viedeo bronchoscope was  introduced into the trachea through the vocal cords without complications.          RIGHT  LOCATION NORM/ABNORMAL DESCRIPTION   VOCAL CORDS NL    TRACHEA NL    ONEIL NL    RMSB NL    RUL NL    BI NL    RML NL    SUP SEGM RLL NL    MED BASAL NL    ANTERIOR BASAL NL    LATERAL BASAL NL    POSTERIOR BASAL NL                                               LEFT  LOCATION NORMAL/ABNORMAL TYPE   LMSB NL    SALUD NL    LINGULA NL    SUPERIOR DIVISION NL    SUPERIOR SEG LLL NL    JACQUELYN-MEDIAL LLL NL    LATERAL LLL NL    POSTERIOR LLL NL                         EBUS  After completing the airway inspection an Olympus  F EBUS bronchoscope was introduced into the trachea through the vocal chords without complication. The balloon was inflated with saline and a mediastinal inspection commenced:      STATION SIZE IN CM   11R sup 1.5/1.5   11R inf No tgt   10R No tgt   7 No tgt   4R 1.2/0.3   11L No tgt   10L 0.2/0.2   4L 0.2/0.2   2L No tgt   2R No tgt         After identifying targets the following samples were obtained:    STATION PASS# LYMPHOCYTES MALIGNANT ATYPICAL GRANULOMA NONDGNSTC   11Rs 1 - - - - blood    2 + - - -     3 + - - -    © 2018 UpToDate, Inc. and/or its affiliates. All Rights Reserved. T, N, and M descriptors for the eighth edition of TNM classification for lung cancer    T: Primary tumor   Tx Primary tumor cannot be assessed or tumor proven by presence of malignant cells in sputum or bronchial washings but not visualized by imaging or bronchoscopy   T0 No evidence of primary tumor   Tis Carcinoma in situ   T1 Tumor ? 3 cm in greatest dimension surrounded by lung or visceral pleura without bronchoscopic evidence of invasion more proximal than the lobar bronchus (ie, not in the main bronchus)*   T1a(mi) Minimally invasive adenocarcinoma¶   T1a Tumor ? 1 cm in greatest dimension*   T1b Tumor >1 cm but ?2 cm in greatest dimension*   T1c Tumor >2 cm but ?3 cm in greatest dimension*   T2 Tumor >3 cm but ?5 cm or tumor with any of the following features:?  · Involves main bronchus regardless of distance from the ike but without involvement of the ike  · Invades visceral pleura  · Associated with atelectasis or obstructive pneumonitis that extends to the hilar region, involving part or all of the lung   T2a Tumor >3 cm but ?4 cm in greatest dimension   T2b Tumor >4 cm but ?5 cm in greatest dimension   T3 Tumor >5 cm but ?7 cm in greatest dimension or associated with separate tumor nodule(s) in the same lobe as the primary tumor or directly invades any of the following structures: chest wall (including the parietal pleura and superior sulcus tumors), phrenic nerve, parietal pericardium   T4 Tumor >7 cm in greatest dimension or associated with separate tumor nodule(s) in a different ipsilateral lobe than that of the primary tumor or invades any of the following structures: diaphragm, mediastinum, heart, great vessels, trachea, recurrent laryngeal nerve, esophagus, vertebral body, and ike   N: Regional lymph node involvement   Nx Regional lymph nodes cannot be assessed   N0 No regional lymph node metastasis   N1 Metastasis in ipsilateral peribronchial and/or ipsilateral hilar lymph nodes and intrapulmonary nodes, including involvement by direct extension   N2 Metastasis in ipsilateral mediastinal and/or subcarinal lymph node(s)   N3 Metastasis in contralateral mediastinal, contralateral hilar, ipsilateral or contralateral scalene, or supraclavicular lymph node(s)   M: Distant metastasis   M0 No distant metastasis   M1 Distant metastasis present   M1a Separate tumor nodule(s) in a contralateral lobe; tumor with pleural or pericardial nodule(s) or malignant pleural or pericardial effusion? M1b Single extrathoracic metastasis§   M1c Multiple extrathoracic metastases in one or more organs   Stage groupings   Occult carcinoma TX N0 M0   Stage 0 Tis N0 M0   Stage IA1 T1a(mi) N0 M0    T1a N0 M0   Stage IA2 T1b N0 M0   Stage IA3 T1c N0 M0   Stage IB T2a N0 M0   Stage IIA T2b N0 M0   Stage IIB T1a to c N1 M0    T2a N1 M0    T2b N1 M0    T3 N0 M0   Stage IIIA T1a to c N2 M0    T2a to b N2 M0    T3 N1 M0    T4 N0 M0    T4 N1 M0   Stage IIIB T1a to c N3 M0    T2a to b N3 M0    T3 N2 M0    T4 N2 M0   Stage IIIC T3 N3 M0    T4 N3 M0   Stage DYLON Any T Any N M1a    Any T Any N M1b   Stage IVB Any T Any N M1c   NOTE: Changes to the seventh edition are in bold. TNM: tumor, node, metastasis; Tis: carcinoma in situ; T1a(mi): minimally invasive adenocarcinoma.   * The uncommon superficial spreading tumor of any size with its invasive component limited to the bronchial wall, which may extend proximal to the main bronchus, is also classified as T1a. ¶ Solitary adenocarcinoma, ?3 cm with a predominately lepidic pattern and ?5 mm invasion in any one focus. ? T2 tumors with these features are classified as T2a if ?4 cm in greatest dimension or if size cannot be determined, and T2b if >4 cm but ?5 cm in greatest dimension. ? Most pleural (pericardial) effusions with lung cancer are due to tumor. In a few patients, however, multiple microscopic examinations of pleural (pericardial) fluid are negative for tumor and the fluid is nonbloody and not an exudate. When these elements and clinical judgment dictate that the effusion is not related to the tumor, the effusion should be excluded as a staging descriptor. § This includes involvement of a single distant (nonregional) lymph node. Reproduced from: Cristian Garcia et al. The IASLC Lung Cancer Staging Project: Proposals for Revision of the TNM Stage Groupings in the Forthcoming (Eighth) Edition of the TNM Classification for Lung Cancer. J Thorac Oncol 2016; 11:39. Table used with the permission of [employer]. All rights reserved.   Graphic 649385 Version 1.0       EBL: 2 ml    Shaheen Rice MD.

## 2021-10-14 ENCOUNTER — HOSPITAL ENCOUNTER (OUTPATIENT)
Dept: LAB | Age: 76
Discharge: HOME | End: 2021-10-14
Payer: MEDICARE

## 2021-10-14 DIAGNOSIS — R91.1 LUNG NODULE: ICD-10-CM

## 2021-10-14 LAB
ANION GAP SERPL CALC-SCNC: 2 MMOL/L (ref 7–16)
APTT PPP: 40.3 SEC (ref 24.1–35.1)
BASOPHILS # BLD: 0 K/UL (ref 0–0.2)
BASOPHILS NFR BLD: 1 % (ref 0–2)
BUN SERPL-MCNC: 31 MG/DL (ref 8–23)
CALCIUM SERPL-MCNC: 9.3 MG/DL (ref 8.3–10.4)
CHLORIDE SERPL-SCNC: 109 MMOL/L (ref 98–107)
CO2 SERPL-SCNC: 27 MMOL/L (ref 21–32)
CREAT SERPL-MCNC: 1.43 MG/DL (ref 0.8–1.5)
DIFFERENTIAL METHOD BLD: ABNORMAL
EOSINOPHIL # BLD: 0.1 K/UL (ref 0–0.8)
EOSINOPHIL NFR BLD: 1 % (ref 0.5–7.8)
ERYTHROCYTE [DISTWIDTH] IN BLOOD BY AUTOMATED COUNT: 12.9 % (ref 11.9–14.6)
GLUCOSE SERPL-MCNC: 100 MG/DL (ref 65–100)
HCT VFR BLD AUTO: 50.5 % (ref 41.1–50.3)
HGB BLD-MCNC: 16.7 G/DL (ref 13.6–17.2)
IMM GRANULOCYTES # BLD AUTO: 0 K/UL (ref 0–0.5)
IMM GRANULOCYTES NFR BLD AUTO: 0 % (ref 0–5)
INR PPP: 1
LYMPHOCYTES # BLD: 1.8 K/UL (ref 0.5–4.6)
LYMPHOCYTES NFR BLD: 22 % (ref 13–44)
MCH RBC QN AUTO: 31.5 PG (ref 26.1–32.9)
MCHC RBC AUTO-ENTMCNC: 33.1 G/DL (ref 31.4–35)
MCV RBC AUTO: 95.1 FL (ref 79.6–97.8)
MONOCYTES # BLD: 0.7 K/UL (ref 0.1–1.3)
MONOCYTES NFR BLD: 9 % (ref 4–12)
NEUTS SEG # BLD: 5.5 K/UL (ref 1.7–8.2)
NEUTS SEG NFR BLD: 67 % (ref 43–78)
NRBC # BLD: 0 K/UL (ref 0–0.2)
PLATELET # BLD AUTO: 180 K/UL (ref 150–450)
PMV BLD AUTO: 11 FL (ref 9.4–12.3)
POTASSIUM SERPL-SCNC: 4.8 MMOL/L (ref 3.5–5.1)
PROTHROMBIN TIME: 13.3 SEC (ref 12.6–14.5)
RBC # BLD AUTO: 5.31 M/UL (ref 4.23–5.6)
SODIUM SERPL-SCNC: 138 MMOL/L (ref 138–145)
WBC # BLD AUTO: 8.2 K/UL (ref 4.3–11.1)

## 2021-10-14 PROCEDURE — 85025 COMPLETE CBC W/AUTO DIFF WBC: CPT

## 2021-10-14 PROCEDURE — 85610 PROTHROMBIN TIME: CPT

## 2021-10-14 PROCEDURE — 36415 COLL VENOUS BLD VENIPUNCTURE: CPT

## 2021-10-14 PROCEDURE — 85730 THROMBOPLASTIN TIME PARTIAL: CPT

## 2021-10-14 PROCEDURE — 80048 BASIC METABOLIC PNL TOTAL CA: CPT

## 2021-10-18 ENCOUNTER — HOSPITAL ENCOUNTER (OUTPATIENT)
Dept: INTERVENTIONAL RADIOLOGY/VASCULAR | Age: 76
Discharge: HOME | End: 2021-10-18
Attending: RADIOLOGY
Payer: MEDICARE

## 2021-10-18 ENCOUNTER — HOSPITAL ENCOUNTER (OUTPATIENT)
Dept: GENERAL RADIOLOGY | Age: 76
Discharge: HOME | End: 2021-10-18
Attending: RADIOLOGY
Payer: MEDICARE

## 2021-10-18 ENCOUNTER — HOSPITAL ENCOUNTER (OUTPATIENT)
Dept: CT IMAGING | Age: 76
Discharge: HOME | End: 2021-10-18
Attending: INTERNAL MEDICINE
Payer: MEDICARE

## 2021-10-18 VITALS
HEART RATE: 72 BPM | RESPIRATION RATE: 16 BRPM | BODY MASS INDEX: 27.06 KG/M2 | SYSTOLIC BLOOD PRESSURE: 135 MMHG | DIASTOLIC BLOOD PRESSURE: 65 MMHG | WEIGHT: 189 LBS | TEMPERATURE: 97.6 F | OXYGEN SATURATION: 92 % | HEIGHT: 70 IN

## 2021-10-18 VITALS
RESPIRATION RATE: 16 BRPM | DIASTOLIC BLOOD PRESSURE: 79 MMHG | SYSTOLIC BLOOD PRESSURE: 135 MMHG | HEART RATE: 71 BPM | OXYGEN SATURATION: 91 %

## 2021-10-18 DIAGNOSIS — R91.1 LUNG NODULE: ICD-10-CM

## 2021-10-18 DIAGNOSIS — G89.18 POSTOPERATIVE PAIN: Primary | ICD-10-CM

## 2021-10-18 LAB
GLUCOSE BLD STRIP.AUTO-MCNC: 145 MG/DL (ref 65–100)
SERVICE CMNT-IMP: ABNORMAL

## 2021-10-18 PROCEDURE — 77030036720 CT BX LUNG NDL PERC W IMAGING

## 2021-10-18 PROCEDURE — C1729 CATH, DRAINAGE: HCPCS

## 2021-10-18 PROCEDURE — 74011000250 HC RX REV CODE- 250: Performed by: RADIOLOGY

## 2021-10-18 PROCEDURE — 49405 IMAGE CATH FLUID COLXN VISC: CPT

## 2021-10-18 PROCEDURE — 88342 IMHCHEM/IMCYTCHM 1ST ANTB: CPT

## 2021-10-18 PROCEDURE — 77030011229 HC DIL VESL COON COOK -A

## 2021-10-18 PROCEDURE — 88341 IMHCHEM/IMCYTCHM EA ADD ANTB: CPT

## 2021-10-18 PROCEDURE — 2709999900 HC NON-CHARGEABLE SUPPLY

## 2021-10-18 PROCEDURE — 88305 TISSUE EXAM BY PATHOLOGIST: CPT

## 2021-10-18 PROCEDURE — 77030012390 HC DRN CHST BTL GTNG -B

## 2021-10-18 PROCEDURE — C1769 GUIDE WIRE: HCPCS

## 2021-10-18 PROCEDURE — 82962 GLUCOSE BLOOD TEST: CPT

## 2021-10-18 PROCEDURE — 74011250636 HC RX REV CODE- 250/636: Performed by: RADIOLOGY

## 2021-10-18 PROCEDURE — 74011250637 HC RX REV CODE- 250/637: Performed by: RADIOLOGY

## 2021-10-18 PROCEDURE — C2613 LUNG BX PLUG W/DEL SYS: HCPCS

## 2021-10-18 PROCEDURE — 71045 X-RAY EXAM CHEST 1 VIEW: CPT

## 2021-10-18 RX ORDER — LIDOCAINE HYDROCHLORIDE 20 MG/ML
20-200 INJECTION, SOLUTION INFILTRATION; PERINEURAL
Status: DISCONTINUED | OUTPATIENT
Start: 2021-10-18 | End: 2021-10-18

## 2021-10-18 RX ORDER — MIDAZOLAM HYDROCHLORIDE 1 MG/ML
.5-2 INJECTION, SOLUTION INTRAMUSCULAR; INTRAVENOUS
Status: DISCONTINUED | OUTPATIENT
Start: 2021-10-18 | End: 2021-10-18

## 2021-10-18 RX ORDER — DIPHENHYDRAMINE HYDROCHLORIDE 50 MG/ML
25 INJECTION, SOLUTION INTRAMUSCULAR; INTRAVENOUS
Status: DISCONTINUED | OUTPATIENT
Start: 2021-10-18 | End: 2021-10-18

## 2021-10-18 RX ORDER — HYDROCODONE BITARTRATE AND ACETAMINOPHEN 7.5; 325 MG/1; MG/1
1 TABLET ORAL
Qty: 12 TABLET | Refills: 0 | Status: SHIPPED | OUTPATIENT
Start: 2021-10-18 | End: 2021-10-21

## 2021-10-18 RX ORDER — LIDOCAINE HYDROCHLORIDE 20 MG/ML
20-300 INJECTION, SOLUTION INFILTRATION; PERINEURAL
Status: DISCONTINUED | OUTPATIENT
Start: 2021-10-18 | End: 2021-10-18

## 2021-10-18 RX ORDER — FENTANYL CITRATE 50 UG/ML
25-100 INJECTION, SOLUTION INTRAMUSCULAR; INTRAVENOUS
Status: DISCONTINUED | OUTPATIENT
Start: 2021-10-18 | End: 2021-10-18

## 2021-10-18 RX ORDER — SODIUM CHLORIDE 9 MG/ML
150 INJECTION, SOLUTION INTRAVENOUS CONTINUOUS
Status: DISCONTINUED | OUTPATIENT
Start: 2021-10-18 | End: 2021-10-19 | Stop reason: HOSPADM

## 2021-10-18 RX ORDER — HYDROCODONE BITARTRATE AND ACETAMINOPHEN 7.5; 325 MG/1; MG/1
1 TABLET ORAL
Status: DISCONTINUED | OUTPATIENT
Start: 2021-10-18 | End: 2021-10-18

## 2021-10-18 RX ORDER — SODIUM CHLORIDE 9 MG/ML
25 INJECTION, SOLUTION INTRAVENOUS CONTINUOUS
Status: DISCONTINUED | OUTPATIENT
Start: 2021-10-18 | End: 2021-10-18

## 2021-10-18 RX ORDER — SODIUM CHLORIDE 9 MG/ML
25 INJECTION, SOLUTION INTRAVENOUS ONCE
Status: COMPLETED | OUTPATIENT
Start: 2021-10-18 | End: 2021-10-18

## 2021-10-18 RX ORDER — MORPHINE SULFATE 2 MG/ML
1 INJECTION, SOLUTION INTRAMUSCULAR; INTRAVENOUS
Status: DISCONTINUED | OUTPATIENT
Start: 2021-10-18 | End: 2021-10-22 | Stop reason: HOSPADM

## 2021-10-18 RX ORDER — HYDROCODONE BITARTRATE AND ACETAMINOPHEN 10; 325 MG/1; MG/1
1 TABLET ORAL
Status: DISCONTINUED | OUTPATIENT
Start: 2021-10-18 | End: 2021-10-22 | Stop reason: HOSPADM

## 2021-10-18 RX ADMIN — LIDOCAINE HYDROCHLORIDE 340 MG: 20 INJECTION, SOLUTION INFILTRATION; PERINEURAL at 12:51

## 2021-10-18 RX ADMIN — FENTANYL CITRATE 50 MCG: 0.05 INJECTION, SOLUTION INTRAMUSCULAR; INTRAVENOUS at 12:50

## 2021-10-18 RX ADMIN — MIDAZOLAM 1 MG: 1 INJECTION INTRAMUSCULAR; INTRAVENOUS at 08:49

## 2021-10-18 RX ADMIN — SODIUM CHLORIDE 25 ML/HR: 900 INJECTION, SOLUTION INTRAVENOUS at 08:49

## 2021-10-18 RX ADMIN — HYDROCODONE BITARTRATE AND ACETAMINOPHEN 1 TABLET: 10; 325 TABLET ORAL at 14:25

## 2021-10-18 RX ADMIN — FENTANYL CITRATE 50 MCG: 0.05 INJECTION, SOLUTION INTRAMUSCULAR; INTRAVENOUS at 08:49

## 2021-10-18 RX ADMIN — DIPHENHYDRAMINE HYDROCHLORIDE 25 MG: 50 INJECTION, SOLUTION INTRAMUSCULAR; INTRAVENOUS at 12:03

## 2021-10-18 RX ADMIN — MIDAZOLAM 1 MG: 1 INJECTION INTRAMUSCULAR; INTRAVENOUS at 08:59

## 2021-10-18 RX ADMIN — FENTANYL CITRATE 50 MCG: 0.05 INJECTION, SOLUTION INTRAMUSCULAR; INTRAVENOUS at 12:58

## 2021-10-18 RX ADMIN — MIDAZOLAM 1 MG: 1 INJECTION INTRAMUSCULAR; INTRAVENOUS at 12:03

## 2021-10-18 RX ADMIN — LIDOCAINE HYDROCHLORIDE 100 MG: 20 INJECTION, SOLUTION INFILTRATION; PERINEURAL at 09:00

## 2021-10-18 RX ADMIN — FENTANYL CITRATE 50 MCG: 0.05 INJECTION, SOLUTION INTRAMUSCULAR; INTRAVENOUS at 12:44

## 2021-10-18 RX ADMIN — MIDAZOLAM 1 MG: 1 INJECTION INTRAMUSCULAR; INTRAVENOUS at 12:58

## 2021-10-18 RX ADMIN — FENTANYL CITRATE 25 MCG: 0.05 INJECTION, SOLUTION INTRAMUSCULAR; INTRAVENOUS at 09:05

## 2021-10-18 RX ADMIN — FENTANYL CITRATE 50 MCG: 0.05 INJECTION, SOLUTION INTRAMUSCULAR; INTRAVENOUS at 08:59

## 2021-10-18 RX ADMIN — MIDAZOLAM 1 MG: 1 INJECTION INTRAMUSCULAR; INTRAVENOUS at 12:50

## 2021-10-18 RX ADMIN — SODIUM CHLORIDE 25 ML/HR: 900 INJECTION, SOLUTION INTRAVENOUS at 12:40

## 2021-10-18 NOTE — DISCHARGE INSTRUCTIONS
[address]  Department of Interventional Radiology  [facility]  [phone] Office  [phone] Fax    BIOPSY DISCHARGE INSTRUCTIONS    General Instructions:     A biopsy is the removal of a small piece of tissue for microscopic examination or testing. Healthy tissue can be obtained for the purpose of tissue-type matching for transplants. Unhealthy tissues are more commonly biopsied to diagnose disease. Lung Biopsy:     A needle lung biopsy is performed when there is a mass discovered in the lung area. The most serious risk is infection, bleeding, and pneumothorax (a collapsed lung). Signs of pneumothorax include shortness of breath, rapid heart rate, and blueness of the skin. If any of these occur, call 911. Liver Biopsy: This test helps detect cancer, infections, and the cause of an enlargement of the liver or elevated liver enzymes. It also helps to diagnose a number of liver diseases. The pain associated with the procedure may be felt in the shoulder. The risks include internal bleeding, pneumothorax, and injury to the surrounding organs. Renal Biopsy: This procedure is sometimes done to evaluate a transplanted kidney. It is also used to evaluate unexplained decrease in kidney function, blood, or protein in the urine. You may see bright red blood in the urine the first 24 hours after the test. If the bleeding lasts longer, you need to call your doctor. There is a risk of infection and bleeding into the muscle, which may cause soreness. Spinal Biopsy: This test is sometimes done in conjunction with other procedures. Your back will be sore, as we are taking a small sample of bone, which is slightly more difficult to sample than tissue. General Biopsy:     A mass can grow in any area of the body, and we are taking a specimen as ordered by your doctor. The risks are the same.  They include bleeding, pain, and infection. Home Care Instructions: You may resume your regular diet and medication regimen. Do not drink alcohol, drive, or make any important legal decisions in the next 24 hours. Do not lift anything heavier than a gallon of milk until the soreness goes away. You may use over the counter acetaminophen or ibuprofen for the soreness. You may apply an ice pack to the affected area for 20-30 minutes at time for the first 24 hours. After that, you may apply a heat pack. Call If: You should call your Physician and/or the Radiology Nurse if you have any questions or concerns about the biopsy site. Call if you should have increased pain, fever, redness, drainage, or bleeding more than a small spot on the bandage. Follow-Up Instructions: Please see your ordering doctor as he/she has requested. To Reach Us: If you have any questions about your procedure, please call the Interventional Radiology department at [phone]. After business hours (5pm) and weekends, call the answering service at [phone] and ask for the Radiologist on call to be paged. Si tiene Preguntas acerca del procedimiento, por favor llame al departamento de Radiología Intervencional al [phone]. Después de horas de oficina (5 pm) y los fines de [place], llamar al Dwight Estrada al [phone] y pregunte por el Radiologo de [facility]. Interventional Radiology General Nurse Discharge    After general anesthesia or intravenous sedation, for 24 hours or while taking prescription Narcotics:  · Limit your activities  · Do not drive and operate hazardous machinery  · Do not make important personal or business decisions  · Do  not drink alcoholic beverages  · If you have not urinated within 8 hours after discharge, please contact your surgeon on call. * Please give a list of your current medications to your Primary Care Provider.   * Please update this list whenever your medications are discontinued, doses are changed, or new medications (including over-the-counter products) are added. * Please carry medication information at all times in case of emergency situations. These are general instructions for a healthy lifestyle:    No smoking/ No tobacco products/ Avoid exposure to second hand smoke  Surgeon General's Warning:  Quitting smoking now greatly reduces serious risk to your health. Obesity, smoking, and sedentary lifestyle greatly increases your risk for illness  A healthy diet, regular physical exercise & weight monitoring are important for maintaining a healthy lifestyle    You may be retaining fluid if you have a history of heart failure or if you experience any of the following symptoms:  Weight gain of 3 pounds or more overnight or 5 pounds in a week, increased swelling in our hands or feet or shortness of breath while lying flat in bed. Please call your doctor as soon as you notice any of these symptoms; do not wait until your next office visit. Recognize signs and symptoms of STROKE:  F-face looks uneven    A-arms unable to move or move unevenly    S-speech slurred or non-existent    T-time-call 911 as soon as signs and symptoms begin-DO NOT go       Back to bed or wait to see if you get better-TIME IS BRAIN.       Patient Signature:  Date: 10/18/2021  Discharging Nurse: Laura Marin RN

## 2021-10-18 NOTE — PROGRESS NOTES
Pt moved from IR to [address] [place] [address] room for procedure. Placed on table with staff assist x 3. Secured to table for safety.

## 2021-10-18 NOTE — PROCEDURES
Department of Interventional Radiology  [phone]        Interventional Radiology Brief Procedure Note    Patient: Nehemiah Finley MRN: [id number]  SSN: [id number]    YOB: 1945  Age: 78 y.o. Sex: male      Date of Procedure: 10/18/2021    Pre-Procedure Diagnosis: Slowly expanding R PTX after lung nodule biopsy today. Post-Procedure Diagnosis: SAME    Procedure(s): R Tube Thoracostomy. Brief Description of Procedure: as above    Performed By: Theresa Salmeron MD     Assistants: None    Anesthesia:Moderate Sedation    Estimated Blood Loss: Less than 10ml    Specimens:  None    Implants: All Purpose Drain    Findings: Small PTX completely aspirated following tube placement. Complications: None    Recommendations: Heimlich valve. 1 hour bedrest.       Follow Up: Return for CXR tomorrow morning. Rx for Norco sent.      Signed By: Theresa Salmeron MD     October 18, 2021

## 2021-10-18 NOTE — PROGRESS NOTES
IR Nurse Pre-Procedure Checklist Part 2          Consent signed: Yes    H&P complete:  Yes    Antibiotics: Not applicable    Airway/Mallampati Done: Yes    Shaved: Yes    Pregnancy Form:Not applicable    Patient Position: Yes    MD Side: Yes     Biopsy Worksheet: Yes    Specimen Medium: Yes    Pt to CT via stretcher with RN.

## 2021-10-18 NOTE — PROGRESS NOTES
Recovery period without difficulty. Pt alert and oriented and denies pain. Dressing is clean, dry, and intact. Reviewed discharge instructions with patient and spouse, both verbalized understanding. Pt escorted to lobby discharge area via wheelchair. Vital signs and Ramesh score completed.

## 2021-10-18 NOTE — PROGRESS NOTES
TRANSFER - OUT REPORT:    Verbal report given to Mita Syed on [address] [facility]  being transferred to recovery for routine progression of care       Report consisted of patients Situation, Background, Assessment and   Recommendations(SBAR). Information from the following report(s) SBAR, Procedure Summary and MAR was reviewed with the receiving nurse. Opportunity for questions and clarification was provided. Conscious Sedation:   150 Mcg of Fentanyl administered  3 Mg of Versed administered  25 Mg of Benadryl    Pt tolerated procedure well.      Visit Vitals  /67   Pulse 82   Resp 16   SpO2 91%     Past Medical History:   Diagnosis Date    AAA (abdominal aortic aneurysm) (HCC)      35 mm on CT scan, US stable 35 mm    Abnormal chest x-ray 3/19/2013    Adenopathy 3/19/2013    Lymph glands    Allergic rhinitis, cause unspecified 3/19/2013    Atrial fibrillation (HCC)     after kidney surgery and wore monitor but no more a fib events noted    CAD (coronary artery disease)     Followed by Dr. Sachin Selby at [address])     right kidney cancer followed by right nephrectomy    Chronic airway obstruction, not elsewhere classified 3/19/2013    COPD     daily and prn inhalers    Diabetes ([address]     does not check sugars, last A1C 6.3    Diabetes mellitus ([address] 3/19/2013    Hashimoto's disease     Pulmonary fibrosis ([address]     Followed by [facility]. Taylor Smith at [facility]    Sinus problem     Thyroid disease     Hypothyroid, managed with medication    Tobacco use disorder 3/19/2013    Unspecified essential hypertension 3/19/2013    Unspecified hypothyroidism 3/19/2013     Peripheral IV 10/18/21 Right Antecubital (Active)

## 2021-10-18 NOTE — H&P
Department of Interventional Radiology  [phone]    History and Physical    Patient:  Marcio Ly MRN:  [id number]  SSN:  [id number]    YOB: 1945  Age:  78 y.o. Sex:  male      Primary Care Provider:  Jorje Snellen, MD  Referring Physician:  Jeff Chan MD    Subjective:     Chief Complaint: Right lung nodule    History of the Present Illness: The patient is a 78 y.o. male smoker s/p R Nephrectomy 2019 who presents for RIGHT lung mass biopsy. Images have been reviewed. The patient denies unintentional weight loss, fever, chills or chronic cough. Past Medical History:   Diagnosis Date    AAA (abdominal aortic aneurysm) (HCC)      35 mm on CT scan, US stable 35 mm    Abnormal chest x-ray 3/19/2013    Adenopathy 3/19/2013    Lymph glands    Allergic rhinitis, cause unspecified 3/19/2013    Atrial fibrillation (HCC)     after kidney surgery and wore monitor but no more a fib events noted    CAD (coronary artery disease)     Followed by Dr. Clifford Whelan at [address])     right kidney cancer followed by right nephrectomy    Chronic airway obstruction, not elsewhere classified 3/19/2013    COPD     daily and prn inhalers    Diabetes ([facility]     does not check sugars, last A1C 6.3    Diabetes mellitus ([address] 3/19/2013    Hashimoto's disease     Pulmonary fibrosis ([facility]     Followed by Dr. Wandy Hubbard at [facility] Pul[facility]    Sinus problem     Thyroid disease     Hypothyroid, managed with medication    Tobacco use disorder 3/19/2013    Unspecified essential hypertension 3/19/2013    Unspecified hypothyroidism 3/19/2013     Past Surgical History:   Procedure Laterality Date    HX HERNIA REPAIR      4/2021    HX NEPHRECTOMY Right     renal cancer. Review of Systems:    Pertinent items are noted in the History of Present Illness.     Current Outpatient Medications   Medication Sig    testosterone (ANDROGEL) 20.25 mg/1.25 gram (1.62 %) gel Apply 20 mg to affected area daily. Max Daily Amount: 20 mg.    rosuvastatin (CRESTOR) 20 mg tablet Take 1 Tablet by mouth nightly.  glucose blood VI test strips (ASCENSIA AUTODISC VI, ONE TOUCH ULTRA TEST VI) strip E11.9 Non insulin dep testing bs 1 times daily: Bill to [facility] part B    levothyroxine (SYNTHROID) 88 mcg tablet Take 1 Tablet by mouth Daily (before breakfast).  tiotropium-olodateroL (Stiolto Respimat) 2.5-2.5 mcg/actuation inhaler Take 2 Act by inhalation daily.  lancets misc E11.9 Non insulin dep testing bs 1times daily; [facility]    metoprolol tartrate (LOPRESSOR) 50 mg tablet Take 1 Tab by mouth two (2) times a day.  TURMERIC PO Take  by mouth.  saw palmetto 500 mg cap Take  by mouth.  hydrocortisone (Proctozone-HC) 2.5 % rectal cream Insert  into rectum four (4) times daily.  Blood-Glucose Meter monitoring kit E11.9 Non insulin dep testing bs 1-2 times daily: Bill to medicare part B    triamcinolone (NASACORT AQ) 55 mcg nasal inhaler 1 [place] by Both Nostrils route two (2) times daily as needed.  albuterol (PROVENTIL HFA, VENTOLIN HFA, PROAIR HFA) 90 mcg/actuation inhaler Take 2 Puffs by inhalation every four (4) hours as needed for Wheezing.  co-enzyme Q-10 (CO Q-10) 100 mg capsule Take 100 mg by mouth daily.  multivitamin (ONE A DAY) tablet Take 1 Tab by mouth daily.  Cholecalciferol, Vitamin D3, (VITAMIN D3) 1,000 unit cap Take  by mouth.  ascorbic acid (VITAMIN C WITH JURGEN HIPS) 1,000 mg tablet Take  by mouth.  3 tablets daily     Current Facility-Administered Medications   Medication Dose Route Frequency    0.9% sodium chloride infusion  25 mL/hr IntraVENous ONCE    lidocaine (XYLOCAINE) 20 mg/mL (2 %) injection  mg   mg IntraDERMal Rad Multiple    fentaNYL citrate (PF) injection  mcg   mcg IntraVENous Rad Multiple    midazolam (VERSED) injection 0.5-2 mg  0.5-2 mg IntraVENous Rad Multiple        Allergies Allergen Reactions    Augmentin [Amoxicillin-Pot Clavulanate] Nausea Only     Pt states he is not allergic  \"upsets my stomach\"      Chantix [Varenicline] Other (comments)     [place]    Symbicort [Budesonide-Formoterol] Other (comments)     [place] in mouth    Wellbutrin [Bupropion Hcl] Hives       Family History   Problem Relation Age of Onset    Diabetes Other     Heart Disease Mother     Hypertension Mother     High Cholesterol Mother      Social History     Tobacco Use    Smoking status: Current Every Day Smoker     Packs/day: 0.50     Years: 40.00     Pack years: 20.00     Types: Cigarettes    Smokeless tobacco: Never Used    Tobacco comment: 6-7 cigarettes per day   Substance Use Topics    Alcohol use: Yes        Objective:       Physical Examination:    Vitals:    10/18/21 0809   Weight: 85.7 kg (189 lb)   Height: 5' 10\" (1.778 m)     Height 5' 10\" (1.778 m), weight 85.7 kg (189 lb).   HEART: regular rate and rhythm  LUNG: clear to auscultation bilaterally, non labored  ABD: soft, ND  EXTREMITIES: no edema of the lower extremities    Laboratory:     Lab Results   Component Value Date/Time    Sodium 138 10/14/2021 01:44 PM    Sodium 137 05/26/2021 02:20 PM    Potassium 4.8 10/14/2021 01:44 PM    Potassium 4.2 05/26/2021 02:20 PM    Chloride 109 (H) 10/14/2021 01:44 PM    Chloride 102 05/26/2021 02:20 PM    CO2 27 10/14/2021 01:44 PM    CO2 21 05/26/2021 02:20 PM    Anion gap 2 (L) 10/14/2021 01:44 PM    Anion gap 4 (L) 07/23/2020 10:08 AM    Glucose 100 10/14/2021 01:44 PM    Glucose 106 (H) 05/26/2021 02:20 PM    BUN 31 (H) 10/14/2021 01:44 PM    BUN 29 (H) 05/26/2021 02:20 PM    Creatinine 1.43 10/14/2021 01:44 PM    Creatinine 1.23 05/26/2021 02:20 PM    GFR est AA >60 10/14/2021 01:44 PM    GFR est AA 67 05/26/2021 02:20 PM    GFR est non-AA 51 (L) 10/14/2021 01:44 PM    GFR est non-AA 58 (L) 05/26/2021 02:20 PM    Calcium 9.3 10/14/2021 01:44 PM    Calcium 9.3 05/26/2021 02:20 PM Magnesium 2.0 11/24/2019 01:15 AM    Magnesium 1.9 11/23/2019 04:37 AM    Albumin 3.7 07/23/2020 10:08 AM    Albumin 3.4 11/19/2019 11:52 AM    Protein, total 7.4 07/23/2020 10:08 AM    Protein, total 7.5 11/19/2019 11:52 AM    Globulin 3.7 (H) 07/23/2020 10:08 AM    Globulin 4.1 (H) 11/19/2019 11:52 AM    A-G Ratio 1.0 (L) 07/23/2020 10:08 AM    A-G Ratio 0.8 (L) 11/19/2019 11:52 AM    ALT (SGPT) 24 07/23/2020 10:08 AM    ALT (SGPT) 19 11/19/2019 11:52 AM     Lab Results   Component Value Date/Time    WBC 8.2 10/14/2021 01:44 PM    WBC 7.3 07/23/2020 10:08 AM    HGB 16.7 10/14/2021 01:44 PM    HGB 17.6 10/11/2021 11:26 AM    HCT 50.5 (H) 10/14/2021 01:44 PM    HCT 50.3 07/23/2020 10:08 AM    PLATELET 623 08/24/1188 01:44 PM    PLATELET 546 08/96/2502 10:08 AM     Lab Results   Component Value Date/Time    aPTT 40.3 (H) 10/14/2021 01:44 PM    aPTT 54.3 (H) 11/24/2019 08:50 AM    Prothrombin time 13.3 10/14/2021 01:44 PM    INR 1.0 10/14/2021 01:44 PM       Assessment:     78 yr old male smoker with history of  R Nephrectomy with R lung mass     Plan:     Planned Procedure:  CT guided Right lung mass biopsy    Risks, benefits, and alternatives reviewed with patient and he agrees to proceed with the procedure.       Signed By: Sachin Torres NP     October 18, 2021

## 2021-10-18 NOTE — PROCEDURES
Department of Interventional Radiology  [phone]        Interventional Radiology Brief Procedure Note    Patient: Rito Moreno MRN: [id number]  SSN: [id number]    YOB: 1945  Age: 78 y.o. Sex: male      Date of Procedure: 10/18/2021    Pre-Procedure Diagnosis: RLL pulmonary nodule. Pulmonary fibrosis. Post-Procedure Diagnosis: SAME    Procedure(s): Image Guided Biopsy    Brief Description of Procedure: as above    Performed By: Giuliana Harp MD     Assistants: None    Anesthesia:Moderate Sedation    Estimated Blood Loss: Less than 10ml    Specimens:  Pathology    Implants:  None    Findings: Tiny PTX.       Complications: None    Recommendations: 4 hour bedrest.  PA CXR at 10:00 and at 13:00     Follow Up: as above    Signed By: Giuliana Harp MD     October 18, 2021

## 2021-10-18 NOTE — PROGRESS NOTES
TRANSFER - OUT REPORT:    Verbal report given to Jazlyn Walls RN on [address]  being transferred to IR room 2 for routine post - op       Report consisted of patients Situation, Background, Assessment and   Recommendations(SBAR). Information from the following report(s) SBAR, Procedure Summary and MAR was reviewed with the receiving nurse. Opportunity for questions and clarification was provided. Conscious Sedation:   125 Mcg of Fentanyl administered  2 Mg of Versed administered    Pt tolerated procedure well.      Visit Vitals  /66   Pulse 81   Temp 97.6 °F (36.4 °C)   Resp 16   Ht 5' 10\" (1.778 m)   Wt 85.7 kg (189 lb)   SpO2 90%   BMI 27.12 kg/m²     Past Medical History:   Diagnosis Date    AAA (abdominal aortic aneurysm) (HCC)      35 mm on CT scan, US stable 35 mm    Abnormal chest x-ray 3/19/2013    Adenopathy 3/19/2013    Lymph glands    Allergic rhinitis, cause unspecified 3/19/2013    Atrial fibrillation (HCC)     after kidney surgery and wore monitor but no more a fib events noted    CAD (coronary artery disease)     Followed by Dr. Abelardo Smart at [address])     right kidney cancer followed by right nephrectomy    Chronic airway obstruction, not elsewhere classified 3/19/2013    COPD     daily and prn inhalers    Diabetes ([address]     does not check sugars, last A1C 6.3    Diabetes mellitus ([address] 3/19/2013    Hashimoto's disease     Pulmonary fibrosis ([facility]     Followed by Dr. Cristina Cotter at [facility]    Sinus problem     Thyroid disease     Hypothyroid, managed with medication    Tobacco use disorder 3/19/2013    Unspecified essential hypertension 3/19/2013    Unspecified hypothyroidism 3/19/2013     Peripheral IV 10/18/21 Right Antecubital (Active)

## 2021-10-18 NOTE — PROGRESS NOTES
Post Procedure CXR shows slowly enlarging R Pneumothorax. Patient will need a Chest Tube today.     Fabrizio Molina MD

## 2021-10-19 ENCOUNTER — HOSPITAL ENCOUNTER (OUTPATIENT)
Dept: GENERAL RADIOLOGY | Age: 76
Discharge: HOME | End: 2021-10-19
Payer: MEDICARE

## 2021-10-19 DIAGNOSIS — J93.9 PNEUMOTHORAX: ICD-10-CM

## 2021-10-19 PROCEDURE — 71045 X-RAY EXAM CHEST 1 VIEW: CPT

## 2021-10-19 NOTE — PROCEDURES
Department of Interventional Radiology  [phone]        Interventional Radiology Brief Procedure Note    Patient: Negra Schneider MRN: [id number]  SSN: [id number]    YOB: 1945  Age: 78 y.o. Sex: male      Date of Procedure: 10/19/2021    Pre-Procedure Diagnosis: Right Pneumothorax resolved. Post-Procedure Diagnosis: SAME    Procedure(s): Chest tube removal.     Brief Description of Procedure: Vaseline gauze and bandage applied. Performed By: Veronica Loving MD     Assistants: None    Anesthesia:None    Estimated Blood Loss: None    Specimens:  None    Implants:  None    Findings: Tube removed in its entirety. Complications: None    Recommendations: Remove bandage tomorrow. Follow Up: Dr Hannah Smith for biopsy results.      Signed By: Veronica Loving MD     October 19, 2021

## 2021-10-27 NOTE — PROGRESS NOTES
Patient: Chelle Fulton MRN: [id number]  SSN: [id number]    YOB: 1945  Age: 78 y.o. Sex: male      Other Providers:  Dr. Jaclyn Valles: Pulmonary nodules    DIAGNOSIS: RLL SCC, T1bN0 stage 1A2    PREVIOUS RADIATION TREATMENT:  1) None    HISTORY OF PRESENT ILLNESS:  Chelle Fulton is a 78 y.o. male who I am seeing at the request of Dr. Kevin Giles. Mr. Rometta Kocher is a former smoker (quit 2019) with a history renal cell carcinoma of the R kidney s/p resection by Dr. Torsten Doherty in 2019 as well as combined pulmonary fibrosis with emphysema who underwent CT chest/ abdomen/ pelvis for evaluation of a hydrocele 2/22/2021 which demonstrated an irregular nodular opacity in the RLL measuring 9mm in diameter. A follow up was recommended in 3-6 months. This was performed 9/3/21 and demonstrated enlargement of a 14mm irregular pulmonary nodule within the RLL suspicious for primary lung malignancy. A PET/CT was subsequently performed 9/23/21 which demonstrated a 1.2cm nodule with SUV max of 6.1. On 10/18/21 he underwent a needle biopsy of the RLL lesion which demonstrated squamous cell carcinoma. PFTs were previously performed 3/11/21 which demonstrated DLCO 38% predicted, FEV1/FVC 67% predicted. He currently reports a stable intermittent cough and shortness of breath. His energy level is at baseline and he remains very active, working out 5 days a week.     PAST MEDICAL HISTORY:    Past Medical History:   Diagnosis Date    AAA (abdominal aortic aneurysm) (HCC)      35 mm on CT scan, US stable 35 mm    Abnormal chest x-ray 3/19/2013    Adenopathy 3/19/2013    Lymph glands    Allergic rhinitis, cause unspecified 3/19/2013    Atrial fibrillation (HCC)     after kidney surgery and wore monitor but no more a fib events noted    CAD (coronary artery disease)     Followed by Dr. Russell Vaughn at [address])     right kidney cancer followed by right nephrectomy    Chronic airway obstruction, not elsewhere classified 3/19/2013    COPD     daily and prn inhalers    Diabetes ([facility]     does not check sugars, last A1C 6.3    Diabetes mellitus ([facility] 3/19/2013    Hashimoto's disease     Pulmonary fibrosis ([facility] [address] [facility]     Followed by Dr. Indra Mccollum at [facility]    Sinus problem     Thyroid disease     Hypothyroid, managed with medication    Tobacco use disorder 3/19/2013    Unspecified essential hypertension 3/19/2013    Unspecified hypothyroidism 3/19/2013     The patient denies history of collagen vascular diseases, pacemaker insertion, prior radiation or prior chemotherapy. PAST SURGICAL HISTORY:   Past Surgical History:   Procedure Laterality Date    HX HERNIA REPAIR      4/2021    HX NEPHRECTOMY Right     renal cancer.  IR THORACENTESIS/INSERT CHEST TUBE  10/18/2021     MEDICATIONS:     Current Outpatient Medications:     testosterone (ANDROGEL) 20.25 mg/1.25 gram (1.62 %) gel, Apply 20 mg to affected area daily. Max Daily Amount: 20 mg., Disp: 1 Each, Rfl: 5    rosuvastatin (CRESTOR) 20 mg tablet, Take 1 Tablet by mouth nightly., Disp: 30 Tablet, Rfl: 11    glucose blood VI test strips (ASCENSIA AUTODISC VI, ONE TOUCH ULTRA TEST VI) strip, E11.9 Non insulin dep testing bs 1 times daily: Bill to [facility] part B, Disp: 100 Strip, Rfl: 3    levothyroxine (SYNTHROID) 88 mcg tablet, Take 1 Tablet by mouth Daily (before breakfast). , Disp: 90 Tablet, Rfl: 3    tiotropium-olodateroL (Stiolto Respimat) 2.5-2.5 mcg/actuation inhaler, Take 2 Act by inhalation daily. , Disp: 3 Inhaler, Rfl: 3    lancets misc, E11.9 Non insulin dep testing bs 1times daily; bill to [facility] part B, Disp: 100 Each, Rfl: 3    metoprolol tartrate (LOPRESSOR) 50 mg tablet, Take 1 Tab by mouth two (2) times a day., Disp: 60 Tab, Rfl: 12    TURMERIC PO, Take  by mouth., Disp: , Rfl:     saw palmetto 500 mg cap, Take  by mouth., Disp: , Rfl:     hydrocortisone (Proctozone-HC) 2.5 % rectal cream, Insert  into rectum four (4) times daily. , Disp: 30 g, Rfl: 3    Blood-Glucose Meter monitoring kit, E11.9 Non insulin dep testing bs 1-2 times daily: Bill to medicare part B, Disp: 1 Kit, Rfl: 0    triamcinolone (NASACORT AQ) 55 mcg nasal inhaler, 1 [place] by Both Nostrils route two (2) times daily as needed. , Disp: , Rfl:     albuterol (PROVENTIL HFA, VENTOLIN HFA, PROAIR HFA) 90 mcg/actuation inhaler, Take 2 Puffs by inhalation every four (4) hours as needed for Wheezing., Disp: 1 Inhaler, Rfl: 11    co-enzyme Q-10 (CO Q-10) 100 mg capsule, Take 100 mg by mouth daily. , Disp: , Rfl:     multivitamin (ONE A DAY) tablet, Take 1 Tab by mouth daily. , Disp: , Rfl:     Cholecalciferol, Vitamin D3, (VITAMIN D3) 1,000 unit cap, Take  by mouth., Disp: , Rfl:     ascorbic acid (VITAMIN C WITH JURGEN HIPS) 1,000 mg tablet, Take  by mouth. 3 tablets daily, Disp: , Rfl:     ALLERGIES:   Allergies   Allergen Reactions    Augmentin [Amoxicillin-Pot Clavulanate] Nausea Only     Pt states he is not allergic  \"upsets my stomach\"      Chantix [Varenicline] Other (comments)     [place]    Symbicort [Budesonide-Formoterol] Other (comments)     Thrush in mouth    Wellbutrin [Bupropion Hcl] Hives       SOCIAL HISTORY:   Social History     Socioeconomic History    Marital status:      Spouse name: Not on file    Number of children: Not on file    Years of education: Not on file    Highest education level: Not on file   Occupational History    Not on file   Tobacco Use    Smoking status: Current Every Day Smoker     Packs/day: 0.50     Years: 40.00     Pack years: 20.00     Types: Cigarettes    Smokeless tobacco: Never Used    Tobacco comment: 6-7 cigarettes per day   Substance and Sexual Activity    Alcohol use:  Yes    Drug use: No    Sexual activity: Not on file   Other Topics Concern    Not on file   Social History Narrative    Not on file     Social Determinants of Health     Financial Resource Strain:    Zannie Cowden Difficulty of Paying Living Expenses:    Food Insecurity:     Worried About Running Out of Food in the Last Year:     [address] in the Last Year:    Transportation Needs:     Lack of Transportation (Medical):  Lack of Transportation (Non-Medical):    Physical Activity:     Days of Exercise per Week:     Minutes of Exercise per Session:    Stress:     Feeling of Stress :    Social Connections:     Frequency of Communication with Friends and Family:     Frequency of Social Gatherings with Friends and Family:     Attends Anabaptism Services:     Active Member of Clubs or Organizations:     Attends Club or Organization Meetings:     Marital Status:    Intimate Partner Violence:     Fear of Current or Ex-Partner:     Emotionally Abused:     Physically Abused:     Sexually Abused:      FAMILY HISTORY:   Family History   Problem Relation Age of Onset    Diabetes Other     Heart Disease Mother     Hypertension Mother     High Cholesterol Mother      REVIEW OF SYSTEMS:   A full 12-point review of systems was completed and was negative unless noted in the history of present illness. PHYSICAL EXAMINATION:   ECOG Performance status 0  VITAL SIGNS:   Visit Vitals  /81 (BP 1 Location: Left upper arm, BP Patient Position: Sitting)   Pulse 78   Temp 98 °F (36.7 °C)   Wt 87.5 kg (193 lb)   SpO2 93%   BMI 27.69 kg/m²     General: well developed/nourished adult Male in no acute distress; appears stated age  [de-identified]: normocephalic, atraumatic; EOMI  Respiratory: normal inspiratory effort, no audible wheezes  Extremities: no cyanosis, clubbing, or edema  Musculoskeletal: mobility intact x4; normal ROM in all joints  Neuro: AOx3; sensation intact x 4; CNII-XII grossly intact  Psych: appropriate affect, insight, and judgement    PATHOLOGY:    I personally reviewed the pathology reports as documented in the HPI.     LABORATORY:   Lab Results   Component Value Date/Time    Sodium 138 10/14/2021 01:44 PM Potassium 4.8 10/14/2021 01:44 PM    Chloride 109 (H) 10/14/2021 01:44 PM    CO2 27 10/14/2021 01:44 PM    Anion gap 2 (L) 10/14/2021 01:44 PM    Glucose 100 10/14/2021 01:44 PM    BUN 31 (H) 10/14/2021 01:44 PM    Creatinine 1.43 10/14/2021 01:44 PM    GFR est AA >60 10/14/2021 01:44 PM    GFR est non-AA 51 (L) 10/14/2021 01:44 PM    Calcium 9.3 10/14/2021 01:44 PM    Magnesium 2.0 11/24/2019 01:15 AM    Albumin 3.7 07/23/2020 10:08 AM    Protein, total 7.4 07/23/2020 10:08 AM    Globulin 3.7 (H) 07/23/2020 10:08 AM    A-G Ratio 1.0 (L) 07/23/2020 10:08 AM    ALT (SGPT) 24 07/23/2020 10:08 AM     Lab Results   Component Value Date/Time    WBC 8.2 10/14/2021 01:44 PM    HGB 16.7 10/14/2021 01:44 PM    HCT 50.5 (H) 10/14/2021 01:44 PM    PLATELET 623 43/21/2993 01:44 PM     RADIOLOGY:    I personally reviewed the images and agree with the findings as documented in the HPI. IMPRESSION:  Steven Rasmussen is a 78 y.o. male with stage 1A2 T1bN0 SCC of the RLL presenting for discussion of radiation therapy. I had a long discussion with Steven Rasmussen and his wife, who accompanies him to this visit, regarding treatment options including resection and stereotactic body radiation for treatment of his early stage NSCLC. Based on multi-disciplinary discussion his lung function is inadequate for lobectomy. CALGB 919638 is ongoing evaluating outcomes of lobectomy versus more limited resection for small peripheral lesions, however this study has not yet resulted. I reviewed recent data demonstrating comparable outcomes using SBRT for patients who are unable to tolerate or refuse lobectomy as well as the logistics and possible side effects of treatment Jovana Alarcon 2021). Given the extremely early stage of his cancer he is not a candidate for study enrollment on SWOG 1914 (radiation +/- immunotherapy) and would be treated with standard of care radiation alone.  Steven Rasmussen and his family had the opportunity to ask questions which appeared to be answered to their satisfaction and have elected to proceed with treatment. PLAN:    1) Consented patient for treatment with external beam radiation after discussing risk, benefits, and side effects from treatment. 2) Reviewed available research treatment and cancer care protocols for which patient may be eligible. Unfortunately there are no matching clinical trials available at this time. 3) CT simulation 11/2/21, treatment to start shortly thereafter. 4) Patient will hold high dose vitamin C during XRT and for one week afterwards, may continue activity as tolerated, may continue testosterone gel, vitamin D, and daily multivitamin.     Mack Shaver MD   October 28, 2021

## 2021-10-28 ENCOUNTER — HOSPITAL ENCOUNTER (OUTPATIENT)
Dept: RADIATION ONCOLOGY | Age: 76
Discharge: HOME | End: 2021-10-28
Payer: MEDICARE

## 2021-10-28 VITALS
WEIGHT: 193 LBS | TEMPERATURE: 98 F | OXYGEN SATURATION: 93 % | BODY MASS INDEX: 27.69 KG/M2 | SYSTOLIC BLOOD PRESSURE: 135 MMHG | HEART RATE: 78 BPM | DIASTOLIC BLOOD PRESSURE: 81 MMHG

## 2021-10-28 PROCEDURE — 99211 OFF/OP EST MAY X REQ PHY/QHP: CPT

## 2021-10-28 PROCEDURE — 77470 SPECIAL RADIATION TREATMENT: CPT

## 2021-10-28 RX ORDER — ASPIRIN 81 MG/1
40.5 TABLET ORAL DAILY
COMMUNITY

## 2021-10-28 NOTE — NURSE NAVIGATOR
Consult RLL lung cancer. Spouse present for consult. Pet scan 9-23-21. Pathology 10-18-21. Pt denies pain. Radiation teaching completed. Pt just started testosterone cream per Urology for low testosterone level. CONSENTS SIGNED FOR SBRT RIGHT LUNG.   CT [facility] SCHEDULED Tuesday 11-2-21 AT 9 AM.  APT GIVEN TO PT.     Jody Kruse RN

## 2021-11-02 ENCOUNTER — HOSPITAL ENCOUNTER (OUTPATIENT)
Dept: RADIATION ONCOLOGY | Age: 76
Discharge: HOME | End: 2021-11-02
Payer: MEDICARE

## 2021-11-02 PROCEDURE — 77334 RADIATION TREATMENT AID(S): CPT

## 2021-11-09 ENCOUNTER — HOSPITAL ENCOUNTER (OUTPATIENT)
Dept: RADIATION ONCOLOGY | Age: 76
Discharge: HOME | End: 2021-11-09
Payer: MEDICARE

## 2021-11-09 PROCEDURE — 77300 RADIATION THERAPY DOSE PLAN: CPT

## 2021-11-09 PROCEDURE — 77293 RESPIRATOR MOTION MGMT SIMUL: CPT

## 2021-11-09 PROCEDURE — 77301 RADIOTHERAPY DOSE PLAN IMRT: CPT

## 2021-11-09 PROCEDURE — 77399 UNLISTED PX MED RADJ PHYSICS: CPT

## 2021-11-10 ENCOUNTER — HOSPITAL ENCOUNTER (OUTPATIENT)
Dept: RADIATION ONCOLOGY | Age: 76
Discharge: HOME | End: 2021-11-10
Payer: MEDICARE

## 2021-11-10 PROCEDURE — 77338 DESIGN MLC DEVICE FOR IMRT: CPT

## 2021-11-12 DIAGNOSIS — C34.31 MALIGNANT NEOPLASM OF LOWER LOBE OF RIGHT LUNG (HCC): Primary | ICD-10-CM

## 2021-11-15 ENCOUNTER — HOSPITAL ENCOUNTER (OUTPATIENT)
Dept: RADIATION ONCOLOGY | Age: 76
Discharge: HOME | End: 2021-11-15
Payer: MEDICARE

## 2021-11-15 PROCEDURE — 77373 STRTCTC BDY RAD THER TX DLVR: CPT

## 2021-11-16 ENCOUNTER — HOSPITAL ENCOUNTER (OUTPATIENT)
Dept: RADIATION ONCOLOGY | Age: 76
Discharge: HOME | End: 2021-11-16
Payer: MEDICARE

## 2021-11-16 PROCEDURE — 77373 STRTCTC BDY RAD THER TX DLVR: CPT

## 2021-11-17 ENCOUNTER — HOSPITAL ENCOUNTER (OUTPATIENT)
Dept: RADIATION ONCOLOGY | Age: 76
Discharge: HOME | End: 2021-11-17
Payer: MEDICARE

## 2021-11-17 PROCEDURE — 77373 STRTCTC BDY RAD THER TX DLVR: CPT

## 2021-11-18 ENCOUNTER — HOSPITAL ENCOUNTER (OUTPATIENT)
Dept: RADIATION ONCOLOGY | Age: 76
Discharge: HOME | End: 2021-11-18
Payer: MEDICARE

## 2021-11-18 PROCEDURE — 77373 STRTCTC BDY RAD THER TX DLVR: CPT

## 2021-11-18 NOTE — PROGRESS NOTES
Patient: Chelle Fulton MRN: [id number]  SSN: [id number]    YOB: 1945  Age: 78 y.o. Sex: male      DIAGNOSIS:   RLL SCC, T1bN0 stage 1A2    TREATMENT SITE:  RLL    DOSE and FRACTIONATION:  5000 of 5000cGy; 5 of 5 fractions    INTERVAL HISTORY:  Chelle Fulton is a 78 y.o. male being treated for  RLL SCC, T1bN0 stage 1A2. Week 1: No complaints. OBJECTIVE:  NAD  Patient asymptomatic, no vital signs obtained for this visit     Lab Results   Component Value Date/Time    Sodium 138 10/14/2021 01:44 PM    Potassium 4.8 10/14/2021 01:44 PM    Chloride 109 (H) 10/14/2021 01:44 PM    CO2 27 10/14/2021 01:44 PM    Anion gap 2 (L) 10/14/2021 01:44 PM    Glucose 100 10/14/2021 01:44 PM    BUN 31 (H) 10/14/2021 01:44 PM    Creatinine 1.43 10/14/2021 01:44 PM    GFR est AA >60 10/14/2021 01:44 PM    GFR est non-AA 51 (L) 10/14/2021 01:44 PM    Calcium 9.3 10/14/2021 01:44 PM    Magnesium 2.0 11/24/2019 01:15 AM    Albumin 3.7 07/23/2020 10:08 AM    Protein, total 7.4 07/23/2020 10:08 AM    Globulin 3.7 (H) 07/23/2020 10:08 AM    A-G Ratio 1.0 (L) 07/23/2020 10:08 AM    ALT (SGPT) 24 07/23/2020 10:08 AM     Lab Results   Component Value Date/Time    WBC 8.2 10/14/2021 01:44 PM    HGB 16.7 10/14/2021 01:44 PM    HCT 50.5 (H) 10/14/2021 01:44 PM    PLATELET 960 32/38/6275 01:44 PM       ASSESSMENT and PLAN:  Chelle Fulton is tolerating radiation as anticipated for the current dose and fraction He completes radiation therapy today and will follow up with repeat CT chest in 2 months or sooner as needed.     Robbin Mc MD   November 19, 2021

## 2021-11-19 ENCOUNTER — HOSPITAL ENCOUNTER (OUTPATIENT)
Dept: RADIATION ONCOLOGY | Age: 76
Discharge: HOME | End: 2021-11-19
Payer: MEDICARE

## 2021-11-19 DIAGNOSIS — C34.90 MALIGNANT NEOPLASM OF LUNG, UNSPECIFIED LATERALITY, UNSPECIFIED PART OF LUNG (HCC): Primary | ICD-10-CM

## 2021-11-19 PROCEDURE — 77373 STRTCTC BDY RAD THER TX DLVR: CPT

## 2021-11-19 PROCEDURE — 77336 RADIATION PHYSICS CONSULT: CPT

## 2021-12-01 PROBLEM — C34.91 SQUAMOUS CELL LUNG CANCER, RIGHT (HCC): Status: ACTIVE | Noted: 2021-12-01

## 2022-01-20 ENCOUNTER — HOSPITAL ENCOUNTER (OUTPATIENT)
Dept: CT IMAGING | Age: 77
Discharge: HOME | End: 2022-01-20
Attending: RADIOLOGY
Payer: MEDICARE

## 2022-01-20 DIAGNOSIS — C34.90 MALIGNANT NEOPLASM OF LUNG, UNSPECIFIED LATERALITY, UNSPECIFIED PART OF LUNG (HCC): ICD-10-CM

## 2022-01-20 PROCEDURE — 71250 CT THORAX DX C-: CPT

## 2022-01-21 ENCOUNTER — HOSPITAL ENCOUNTER (OUTPATIENT)
Dept: RADIATION ONCOLOGY | Age: 77
Discharge: HOME | End: 2022-01-21
Payer: MEDICARE

## 2022-01-21 VITALS
BODY MASS INDEX: 29.8 KG/M2 | WEIGHT: 201.8 LBS | HEART RATE: 77 BPM | TEMPERATURE: 97.6 F | OXYGEN SATURATION: 93 % | RESPIRATION RATE: 16 BRPM | SYSTOLIC BLOOD PRESSURE: 159 MMHG | DIASTOLIC BLOOD PRESSURE: 79 MMHG

## 2022-01-21 DIAGNOSIS — C34.31 MALIGNANT NEOPLASM OF LOWER LOBE OF RIGHT LUNG (HCC): Primary | ICD-10-CM

## 2022-01-21 PROCEDURE — 99211 OFF/OP EST MAY X REQ PHY/QHP: CPT

## 2022-01-21 NOTE — PROGRESS NOTES
Patient: Lebron Gandara MRN: [id number]  SSN: [id number]    YOB: 1945  Age: 78 y.o. Sex: male      Other Providers:  [facility]     DIAGNOSIS: RLL SCC, T1bN0 stage 1A2    PREVIOUS TREATMENT:SBRT X 5 fractions to the RLL nodule. INTERVAL HISTORY:  Lebron Gandara is a 78 y.o. male who is here  Today for follow up. He has continued to recover from his course of therapy. He notes no new complaints or concerns. He continues to stay active and his fatigue from treatment has improved. He continues going to the gym daily. No cough or shortness of breath. No difficulty swallowing. UPDATED PAST MEDICAL HISTORY:  Since our prior encounter, Lebron Gandara has not been hospitalized. There have been no significant changes to the medical history. MEDICATIONS:     Current Outpatient Medications:     metoprolol tartrate (LOPRESSOR) 50 mg tablet, Take 1 Tablet by mouth two (2) times a day., Disp: 180 Tablet, Rfl: 2    rosuvastatin (CRESTOR) 40 mg tablet, Take 1 Tablet by mouth nightly., Disp: 90 Tablet, Rfl: 3    tiotropium-olodateroL (Stiolto Respimat) 2.5-2.5 mcg/actuation inhaler, Take 2 Puffs by inhalation daily. , Disp: 3 Each, Rfl: 3    COQ10, UBIQUINOL, PO, Take 200 mg by mouth daily. , Disp: , Rfl:     iron,carbonyl/ascorbic acid (VITRON-C PO), Take 1,000 mg by mouth daily. , Disp: , Rfl:     docosahexaenoic acid/epa (FISH OIL PO), Take 1 Capsule by mouth daily. , Disp: , Rfl:     aspirin delayed-release 81 mg tablet, Take 40.5 mg by mouth daily. , Disp: , Rfl:     GARLIC EXTRACT PO, Take 2,000 mg by mouth daily. , Disp: , Rfl:     testosterone (ANDROGEL) 20.25 mg/1.25 gram (1.62 %) gel, Apply 20 mg to affected area daily.  Max Daily Amount: 20 mg., Disp: 1 Each, Rfl: 5    glucose blood VI test strips (ASCENSIA AUTODISC VI, ONE TOUCH ULTRA TEST VI) strip, E11.9 Non insulin dep testing bs 1 times daily: Bill to [facility] part B, Disp: 100 Strip, Rfl: 3    levothyroxine (SYNTHROID) 88 mcg tablet, Take 1 Tablet by mouth Daily (before breakfast). , Disp: 90 Tablet, Rfl: 3    lancets misc, E11.9 Non insulin dep testing bs 1times daily; bill to [facility] part B, Disp: 100 Each, Rfl: 3    TURMERIC PO, Take 720 mg by mouth daily. , Disp: , Rfl:     Blood-Glucose Meter monitoring kit, E11.9 Non insulin dep testing bs 1-2 times daily: Bill to medicare part B, Disp: 1 Kit, Rfl: 0    triamcinolone (NASACORT AQ) 55 mcg nasal inhaler, 1 [place] by Both Nostrils route two (2) times daily as needed. , Disp: , Rfl:     albuterol (PROVENTIL HFA, VENTOLIN HFA, PROAIR HFA) 90 mcg/actuation inhaler, Take 2 Puffs by inhalation every four (4) hours as needed for Wheezing., Disp: 1 Inhaler, Rfl: 11    multivitamin (ONE A DAY) tablet, Take 1 Tab by mouth daily. , Disp: , Rfl:     Cholecalciferol, Vitamin D3, (VITAMIN D3) 1,000 unit cap, Take 1,000 Units by mouth daily. , Disp: , Rfl:     ALLERGIES:   Allergies   Allergen Reactions    Augmentin [Amoxicillin-Pot Clavulanate] Nausea Only     Pt states he is not allergic  \"upsets my stomach\"      Chantix [Varenicline] Other (comments)     [place]    Symbicort [Budesonide-Formoterol] Other (comments)     Thrush in mouth    Wellbutrin [Bupropion Hcl] Hives       PHYSICAL EXAMINATION:   ECOG Performance status 0  VITAL SIGNS:   Visit Vitals  BP (!) 159/79 (BP 1 Location: Left upper arm, BP Patient Position: Sitting)   Pulse 77   Temp 97.6 °F (36.4 °C)   Resp 16   Wt 201 lb 12.8 oz (91.5 kg)   SpO2 93%   BMI 29.80 kg/m²        GENERAL: The patient is well-developed, ambulatory, alert and in no acute distress. HEENT: Head is normocephalic, atraumatic. Pupils are equal, round and reactive to light and accommodation. RESP: no audible wheezes, no distress noted.   Abdomen: soft and non tender   Neuro: alert and oriented; cranial nerves intact  MSK: normal gait          LABORATORY:   Lab Results   Component Value Date/Time    Sodium 139 12/01/2021 11:06 AM Potassium 4.7 12/01/2021 11:06 AM    Chloride 103 12/01/2021 11:06 AM    CO2 21 12/01/2021 11:06 AM    Anion gap 2 (L) 10/14/2021 01:44 PM    Glucose 138 (H) 12/01/2021 11:06 AM    BUN 19 12/01/2021 11:06 AM    Creatinine 1.23 12/01/2021 11:06 AM    GFR est AA 66 12/01/2021 11:06 AM    GFR est non-AA 57 (L) 12/01/2021 11:06 AM    Calcium 9.3 12/01/2021 11:06 AM    Magnesium 2.0 11/24/2019 01:15 AM    Albumin 4.3 12/01/2021 11:06 AM    Protein, total 6.9 12/01/2021 11:06 AM    Globulin 3.7 (H) 07/23/2020 10:08 AM    A-G Ratio 1.7 12/01/2021 11:06 AM    ALT (SGPT) 13 12/01/2021 11:06 AM     Lab Results   Component Value Date/Time    WBC 8.2 10/14/2021 01:44 PM    HGB 16.7 10/14/2021 01:44 PM    HCT 50.5 (H) 10/14/2021 01:44 PM    PLATELET 768 44/46/3107 01:44 PM       RADIOLOGY:  CT CHEST WO CONT    Result Date: 1/20/2022  CT OF THE CHEST WITHOUT CONTRAST HISTORY: Restaging lung carcinoma after radiation therapy. COMPARISON: 75/10/3474 TECHNIQUE: A helical acquisition was performed through the chest utilizing 2.0FF slice thickness without intravenous contrast. Coronal and sagittal reformats were obtained. Dose reduction techniques used: Automated exposure control, adjustment of the mAs and/or kVp according to patient's size, and iterative reconstruction techniques. FINDINGS: *  PLEURA/PERICARDIUM: Within normal limits. *  LUNGS: COPD with peripheral interstitial fibrosis. Right lower lobe nodule measures 6.0 x 5.0 cm. Using similar measurement techniques on the prior exam it measured 16 x 5 mm. *  FAY/MEDIASTINUM: Within normal limits. *  TRACHEOBRONCHIAL TREE: Within normal limits. *  AORTA/PULMONARY VESSELS: Within normal limits. *  CORONARY ARTERIES: Extensive coronary artery calcification is seen. *  CHEST WALL/AXILLA: Within normal limits. *  VISUALIZED UPPER ABDOMEN: Within normal limits. *  SPINE / BONES: Within normal limits. *  ADDITIONAL COMMENTS: None. Decreased size of right lower lobe nodule.  COPD with interstitial fibrosis. Coronary artery disease. Date of Dictation: 1/20/2022 1:30 PM       IMPRESSION:  Eugene Pulido is a 78 y.o. male who has recovered well from therapy. PLAN:    We will clarify Reading with radiologist, but upon review of images by Dr. Elissa Tay personally the nodule has decreased in size and she is happy with the response. We will plan for repeat scan in 3 months or sooner if needed. Portions of this note were copied from prior encounters and reviewed for accuracy, currency, and represent documentation and tasks completed during this encounter. I verify and attest these portions to be unchanged from prior visits.                Kala Green, [address] [place] [address] Hematology and Oncology/Radiation Oncology   [address] [place] [address]  Office : [phone]  Fax : [phone]

## 2022-01-21 NOTE — PROGRESS NOTES
2 Month Follow Up    02/14/2022 - Urology Follow Up (Dr. Marce Barba)    RT End: 11/19/2021 x 5 fractions SBRT    CT Chest (01/20/2022):  Decreased size of right lower lobe nodule. COPD with interstitial fibrosis. Coronary artery disease.           Hoa Washburn, CMA

## 2022-01-27 ENCOUNTER — APPOINTMENT (OUTPATIENT)
Dept: RADIATION ONCOLOGY | Age: 77
End: 2022-01-27

## 2022-01-28 ENCOUNTER — APPOINTMENT (OUTPATIENT)
Dept: RADIATION ONCOLOGY | Age: 77
End: 2022-01-28

## 2022-03-08 ENCOUNTER — HOSPITAL ENCOUNTER (OUTPATIENT)
Dept: LAB | Age: 77
Discharge: HOME | End: 2022-03-08
Payer: MEDICARE

## 2022-03-08 DIAGNOSIS — J43.9 PULMONARY EMPHYSEMA WITH FIBROSIS OF LUNG (HCC): ICD-10-CM

## 2022-03-08 DIAGNOSIS — J84.10 PULMONARY EMPHYSEMA WITH FIBROSIS OF LUNG (HCC): ICD-10-CM

## 2022-03-08 DIAGNOSIS — R60.9 EDEMA, UNSPECIFIED TYPE: ICD-10-CM

## 2022-03-08 DIAGNOSIS — R60.0 LOCALIZED EDEMA: ICD-10-CM

## 2022-03-08 LAB
ALBUMIN SERPL-MCNC: 3.1 G/DL (ref 3.2–4.6)
ALBUMIN/GLOB SERPL: 0.7 {RATIO} (ref 1.2–3.5)
ALP SERPL-CCNC: 122 U/L (ref 50–136)
ALT SERPL-CCNC: 28 U/L (ref 12–65)
ANION GAP SERPL CALC-SCNC: 2 MMOL/L (ref 7–16)
AST SERPL-CCNC: 20 U/L (ref 15–37)
BASOPHILS # BLD: 0 K/UL (ref 0–0.2)
BASOPHILS NFR BLD: 0 % (ref 0–2)
BILIRUB SERPL-MCNC: 0.5 MG/DL (ref 0.2–1.1)
BNP SERPL-MCNC: 551 PG/ML (ref 5–125)
BUN SERPL-MCNC: 36 MG/DL (ref 8–23)
CALCIUM SERPL-MCNC: 9.2 MG/DL (ref 8.3–10.4)
CHLORIDE SERPL-SCNC: 107 MMOL/L (ref 98–107)
CO2 SERPL-SCNC: 28 MMOL/L (ref 21–32)
CREAT SERPL-MCNC: 1.4 MG/DL (ref 0.8–1.5)
D DIMER PPP FEU-MCNC: 1.25 UG/ML(FEU)
DIFFERENTIAL METHOD BLD: NORMAL
EOSINOPHIL # BLD: 0 K/UL (ref 0–0.8)
EOSINOPHIL NFR BLD: 1 % (ref 0.5–7.8)
ERYTHROCYTE [DISTWIDTH] IN BLOOD BY AUTOMATED COUNT: 13.9 % (ref 11.9–14.6)
GLOBULIN SER CALC-MCNC: 4.6 G/DL (ref 2.3–3.5)
GLUCOSE SERPL-MCNC: 130 MG/DL (ref 65–100)
HCT VFR BLD AUTO: 49 % (ref 41.1–50.3)
HGB BLD-MCNC: 16.2 G/DL (ref 13.6–17.2)
IMM GRANULOCYTES # BLD AUTO: 0 K/UL (ref 0–0.5)
IMM GRANULOCYTES NFR BLD AUTO: 0 % (ref 0–5)
LYMPHOCYTES # BLD: 1.2 K/UL (ref 0.5–4.6)
LYMPHOCYTES NFR BLD: 17 % (ref 13–44)
MCH RBC QN AUTO: 31.2 PG (ref 26.1–32.9)
MCHC RBC AUTO-ENTMCNC: 33.1 G/DL (ref 31.4–35)
MCV RBC AUTO: 94.2 FL (ref 79.6–97.8)
MONOCYTES # BLD: 0.8 K/UL (ref 0.1–1.3)
MONOCYTES NFR BLD: 11 % (ref 4–12)
NEUTS SEG # BLD: 5.1 K/UL (ref 1.7–8.2)
NEUTS SEG NFR BLD: 71 % (ref 43–78)
NRBC # BLD: 0 K/UL (ref 0–0.2)
PLATELET # BLD AUTO: 180 K/UL (ref 150–450)
PMV BLD AUTO: 10.6 FL (ref 9.4–12.3)
POTASSIUM SERPL-SCNC: 4.3 MMOL/L (ref 3.5–5.1)
PROT SERPL-MCNC: 7.7 G/DL (ref 6.3–8.2)
RBC # BLD AUTO: 5.2 M/UL (ref 4.23–5.6)
SODIUM SERPL-SCNC: 137 MMOL/L (ref 138–145)
WBC # BLD AUTO: 7.1 K/UL (ref 4.3–11.1)

## 2022-03-08 PROCEDURE — 80053 COMPREHEN METABOLIC PANEL: CPT

## 2022-03-08 PROCEDURE — 36415 COLL VENOUS BLD VENIPUNCTURE: CPT

## 2022-03-08 PROCEDURE — 83880 ASSAY OF NATRIURETIC PEPTIDE: CPT

## 2022-03-08 PROCEDURE — 85379 FIBRIN DEGRADATION QUANT: CPT

## 2022-03-08 PROCEDURE — 85025 COMPLETE CBC W/AUTO DIFF WBC: CPT

## 2022-03-08 NOTE — PROGRESS NOTES
The clot test is up a little --so need to get US of both legs --(I ordered as stat)-the heart test is mildly up but not bad-no anemia; kidney and liver ok also

## 2022-03-09 ENCOUNTER — HOSPITAL ENCOUNTER (OUTPATIENT)
Dept: ULTRASOUND IMAGING | Age: 77
Discharge: HOME | End: 2022-03-09
Attending: INTERNAL MEDICINE
Payer: MEDICARE

## 2022-03-09 DIAGNOSIS — R60.9 EDEMA, UNSPECIFIED TYPE: ICD-10-CM

## 2022-03-09 PROCEDURE — 93970 EXTREMITY STUDY: CPT

## 2022-03-09 NOTE — PROGRESS NOTES
So before I call him he has already asked was anything needing to be done about the BNP results? /TD

## 2022-03-18 PROBLEM — I48.91 ATRIAL FIBRILLATION WITH RAPID VENTRICULAR RESPONSE (HCC): Status: ACTIVE | Noted: 2019-11-22

## 2022-03-18 PROBLEM — J84.10 PULMONARY FIBROSIS (HCC): Status: ACTIVE | Noted: 2019-11-22

## 2022-03-18 PROBLEM — H61.21 IMPACTED CERUMEN OF RIGHT EAR: Status: ACTIVE | Noted: 2017-11-27

## 2022-03-18 PROBLEM — E11.9 DIABETES MELLITUS TYPE 2, DIET-CONTROLLED (HCC): Status: ACTIVE | Noted: 2020-07-21

## 2022-03-18 PROBLEM — F41.8 SITUATIONAL ANXIETY: Status: ACTIVE | Noted: 2019-12-17

## 2022-03-19 PROBLEM — E55.9 VITAMIN D DEFICIENCY: Status: ACTIVE | Noted: 2020-06-04

## 2022-03-19 PROBLEM — N40.1 BENIGN PROSTATIC HYPERPLASIA WITH LOWER URINARY TRACT SYMPTOMS: Status: ACTIVE | Noted: 2017-11-27

## 2022-03-19 PROBLEM — T48.5X5A RHINITIS MEDICAMENTOSA: Status: ACTIVE | Noted: 2021-02-23

## 2022-03-19 PROBLEM — R26.89 BALANCE PROBLEM: Status: ACTIVE | Noted: 2019-05-13

## 2022-03-19 PROBLEM — K43.2 INCISIONAL HERNIA, WITHOUT OBSTRUCTION OR GANGRENE: Status: ACTIVE | Noted: 2021-02-23

## 2022-03-19 PROBLEM — Z12.5 PROSTATE CANCER SCREENING: Status: ACTIVE | Noted: 2017-11-27

## 2022-03-19 PROBLEM — R91.1 LUNG NODULE: Status: ACTIVE | Noted: 2021-10-07

## 2022-03-19 PROBLEM — Z12.83 SKIN EXAM FOR MALIGNANT NEOPLASM: Status: ACTIVE | Noted: 2018-06-04

## 2022-03-19 PROBLEM — C34.91 SQUAMOUS CELL LUNG CANCER, RIGHT (HCC): Status: ACTIVE | Noted: 2021-12-01

## 2022-03-19 PROBLEM — J31.0 RHINITIS MEDICAMENTOSA: Status: ACTIVE | Noted: 2021-02-23

## 2022-03-20 PROBLEM — R79.89 LOW TESTOSTERONE: Status: ACTIVE | Noted: 2020-07-21

## 2022-03-20 PROBLEM — I71.40 ABDOMINAL AORTIC ANEURYSM (AAA) WITHOUT RUPTURE (HCC): Status: ACTIVE | Noted: 2017-11-27

## 2022-03-20 PROBLEM — G47.9 SLEEP DISTURBANCE: Status: ACTIVE | Noted: 2017-11-27

## 2022-03-20 PROBLEM — Z85.528 HISTORY OF KIDNEY CANCER: Status: ACTIVE | Noted: 2020-06-04

## 2022-03-20 PROBLEM — J32.9 CHRONIC SINUSITIS: Status: ACTIVE | Noted: 2019-05-13

## 2022-03-31 ENCOUNTER — HOSPITAL ENCOUNTER (OUTPATIENT)
Dept: LAB | Age: 77
Discharge: HOME | End: 2022-03-31
Payer: MEDICARE

## 2022-03-31 DIAGNOSIS — R60.9 EDEMA, UNSPECIFIED TYPE: ICD-10-CM

## 2022-03-31 LAB
ANION GAP SERPL CALC-SCNC: 5 MMOL/L (ref 7–16)
BUN SERPL-MCNC: 29 MG/DL (ref 8–23)
CALCIUM SERPL-MCNC: 8.9 MG/DL (ref 8.3–10.4)
CHLORIDE SERPL-SCNC: 105 MMOL/L (ref 98–107)
CO2 SERPL-SCNC: 27 MMOL/L (ref 21–32)
CREAT SERPL-MCNC: 1.4 MG/DL (ref 0.8–1.5)
GLUCOSE SERPL-MCNC: 147 MG/DL (ref 65–100)
POTASSIUM SERPL-SCNC: 4.1 MMOL/L (ref 3.5–5.1)
SODIUM SERPL-SCNC: 137 MMOL/L (ref 138–145)

## 2022-03-31 PROCEDURE — 36415 COLL VENOUS BLD VENIPUNCTURE: CPT

## 2022-03-31 PROCEDURE — 80048 BASIC METABOLIC PNL TOTAL CA: CPT

## 2022-04-07 ENCOUNTER — HOSPITAL ENCOUNTER (OUTPATIENT)
Dept: ULTRASOUND IMAGING | Age: 77
Discharge: HOME | End: 2022-04-07
Attending: INTERNAL MEDICINE

## 2022-04-07 DIAGNOSIS — R60.9 EDEMA, UNSPECIFIED TYPE: ICD-10-CM

## 2022-04-07 NOTE — PROGRESS NOTES
No changes of liver cirrhosis. Incidental gall bladder polyps and simple cyst on kidney.  Both harmless

## 2022-04-21 ENCOUNTER — HOSPITAL ENCOUNTER (OUTPATIENT)
Dept: CT IMAGING | Age: 77
Discharge: HOME | End: 2022-04-21
Attending: NURSE PRACTITIONER

## 2022-04-21 DIAGNOSIS — C34.31 MALIGNANT NEOPLASM OF LOWER LOBE OF RIGHT LUNG (HCC): ICD-10-CM

## 2022-04-22 ENCOUNTER — HOSPITAL ENCOUNTER (OUTPATIENT)
Dept: RADIATION ONCOLOGY | Age: 77
Discharge: HOME | End: 2022-04-22
Payer: MEDICARE

## 2022-04-22 VITALS
RESPIRATION RATE: 18 BRPM | WEIGHT: 192.5 LBS | HEART RATE: 86 BPM | DIASTOLIC BLOOD PRESSURE: 70 MMHG | BODY MASS INDEX: 28.43 KG/M2 | TEMPERATURE: 98.2 F | OXYGEN SATURATION: 94 % | SYSTOLIC BLOOD PRESSURE: 123 MMHG

## 2022-04-22 PROCEDURE — 99211 OFF/OP EST MAY X REQ PHY/QHP: CPT

## 2022-04-22 NOTE — PROGRESS NOTES
3 Month Follow Up:  -Post CT    04/17/2023 - Urology Follow Up (Dr. Joseline Banks)  RT End: 11/19/2021   -SBRT x 5 fractions    CT Chest (04/21/2022): No findings concerning for disease progression. Persistent bilateral lower  lobe inflammatory appearing changes are seen with inflammatory changes in the right lower lobe appearing worsened. A prior small right lower lobe nodule is  not clearly appreciated although this could be obscured by worsening inflammatory changes. A small nodular density is seen at this level on today's study although this appears at least partially related to a benign vascular  structure. Additional stable findings are seen as described above.        Kadeem Sample, CMA

## 2022-04-25 NOTE — PROGRESS NOTES
Patient: Kirill Spear MRN: [id number]  SSN: [id number]    YOB: 1945  Age: 78 y.o. Sex: male      Other Providers:  [facility]     DIAGNOSIS: RLL SCC, T1bN0 stage 1A2    PREVIOUS TREATMENT:SBRT X 5 fractions to the RLL nodule. INTERVAL HISTORY:  Kirill Spear is a 78 y.o. male who is here  Today for follow up. He has continued to recover from his course of therapy. He notes no new complaints or concerns. He continues to stay active and his fatigue from treatment has improved. He continues going to the gym daily. No cough or shortness of breath. No difficulty swallowing.     4/25/22 Here today for follow up. Doing okay since last seen. He notes ongoing fatigue and decreased stamina due to recent COVID infection. He Continues to stay active and is working to get back to previous state. He notes no new issues or concerns otherwise. No difficulty with swallowing. UPDATED PAST MEDICAL HISTORY:  Since our prior encounter, Kirill Spear has not been hospitalized. There have been no significant changes to the medical history. MEDICATIONS:     Current Outpatient Medications:     doxycycline (VIBRAMYCIN) 100 mg capsule, Take 100 mg by mouth two (2) times a day., Disp: , Rfl:     benzonatate (TESSALON) 200 mg capsule, Take 200 mg by mouth three (3) times daily as needed. , Disp: , Rfl:     furosemide (LASIX) 20 mg tablet, Take 1 Tablet by mouth daily. , Disp: 90 Tablet, Rfl: 3    ascorbic acid, vitamin C, (Vitamin C) 500 mg tablet, Take 1,000 mg by mouth., Disp: , Rfl:     metoprolol tartrate (LOPRESSOR) 50 mg tablet, Take 1 Tablet by mouth two (2) times a day., Disp: 180 Tablet, Rfl: 2    rosuvastatin (CRESTOR) 40 mg tablet, Take 1 Tablet by mouth nightly., Disp: 90 Tablet, Rfl: 3    tiotropium-olodateroL (Stiolto Respimat) 2.5-2.5 mcg/actuation inhaler, Take 2 Puffs by inhalation daily. , Disp: 3 Each, Rfl: 3    COQ10, UBIQUINOL, PO, Take 200 mg by mouth daily. , Disp: , Rfl:     docosahexaenoic acid/epa (FISH OIL PO), Take 1 Capsule by mouth daily. , Disp: , Rfl:     aspirin delayed-release 81 mg tablet, Take 40.5 mg by mouth daily. , Disp: , Rfl:     glucose blood VI test strips (ASCENSIA AUTODISC VI, ONE TOUCH ULTRA TEST VI) strip, E11.9 Non insulin dep testing bs 1 times daily: Bill to [facility] part B, Disp: 100 Strip, Rfl: 3    levothyroxine (SYNTHROID) 88 mcg tablet, Take 1 Tablet by mouth Daily (before breakfast). , Disp: 90 Tablet, Rfl: 3    lancets misc, E11.9 Non insulin dep testing bs 1times daily; bill to [facility] part B, Disp: 100 Each, Rfl: 3    TURMERIC PO, Take 720 mg by mouth daily. , Disp: , Rfl:     Blood-Glucose Meter monitoring kit, E11.9 Non insulin dep testing bs 1-2 times daily: Bill to medicare part B, Disp: 1 Kit, Rfl: 0    triamcinolone (NASACORT AQ) 55 mcg nasal inhaler, 1 [place] by Both Nostrils route two (2) times daily as needed. , Disp: , Rfl:     multivitamin (ONE A DAY) tablet, Take 1 Tab by mouth daily. , Disp: , Rfl:     Cholecalciferol, Vitamin D3, (VITAMIN D3) 1,000 unit cap, Take 2,000 Units by mouth daily. , Disp: , Rfl:     albuterol (PROVENTIL HFA, VENTOLIN HFA, PROAIR HFA) 90 mcg/actuation inhaler, Take 2 Puffs by inhalation every four (4) hours as needed for Wheezing.  (Patient not taking: Reported on 4/20/2022), Disp: 1 Inhaler, Rfl: 11    ALLERGIES:   Allergies   Allergen Reactions    Augmentin [Amoxicillin-Pot Clavulanate] Nausea Only     Pt states he is not allergic  \"upsets my stomach\"      Chantix [Varenicline] Other (comments)     Wild Dreams    Timur Flavor Hives    Symbicort [Budesonide-Formoterol] Other (comments)     Thrush in mouth    Wellbutrin [Bupropion Hcl] Hives       PHYSICAL EXAMINATION:   ECOG Performance status 0  VITAL SIGNS:   Visit Vitals  /70 (BP 1 Location: Left upper arm, BP Patient Position: Sitting)   Pulse 86   Temp 98.2 °F (36.8 °C)   Resp 18   Wt 192 lb 8 oz (87.3 kg)   SpO2 94%   BMI 28.43 kg/m² GENERAL: The patient is well-developed, ambulatory, alert and in no acute distress. HEENT: Head is normocephalic, atraumatic. Pupils are equal, round and reactive to light and accommodation. RESP: no audible wheezes, no distress noted. Abdomen: soft and non tender   Neuro: alert and oriented; cranial nerves intact  MSK: normal gait          LABORATORY:   Lab Results   Component Value Date/Time    Sodium 137 (L) 03/31/2022 12:19 PM    Potassium 4.1 03/31/2022 12:19 PM    Chloride 105 03/31/2022 12:19 PM    CO2 27 03/31/2022 12:19 PM    Anion gap 5 (L) 03/31/2022 12:19 PM    Glucose 147 (H) 03/31/2022 12:19 PM    BUN 29 (H) 03/31/2022 12:19 PM    Creatinine 1.40 03/31/2022 12:19 PM    GFR est AA >60 03/31/2022 12:19 PM    GFR est non-AA 53 (L) 03/31/2022 12:19 PM    Calcium 8.9 03/31/2022 12:19 PM    Magnesium 2.0 11/24/2019 01:15 AM    Albumin 3.1 (L) 03/08/2022 11:17 AM    Protein, total 7.7 03/08/2022 11:17 AM    Globulin 4.6 (H) 03/08/2022 11:17 AM    A-G Ratio 0.7 (L) 03/08/2022 11:17 AM    ALT (SGPT) 28 03/08/2022 11:17 AM     Lab Results   Component Value Date/Time    WBC 7.1 03/08/2022 11:17 AM    HGB 16.2 03/08/2022 11:17 AM    HCT 49.0 03/08/2022 11:17 AM    PLATELET 049 82/48/9524 11:17 AM       RADIOLOGY:  CT CHEST WO CONT    Result Date: 1/20/2022  CT OF THE CHEST WITHOUT CONTRAST HISTORY: Restaging lung carcinoma after radiation therapy. COMPARISON: 08/74/9897 TECHNIQUE: A helical acquisition was performed through the chest utilizing 6.6EW slice thickness without intravenous contrast. Coronal and sagittal reformats were obtained. Dose reduction techniques used: Automated exposure control, adjustment of the mAs and/or kVp according to patient's size, and iterative reconstruction techniques. FINDINGS: *  PLEURA/PERICARDIUM: Within normal limits. *  LUNGS: COPD with peripheral interstitial fibrosis. Right lower lobe nodule measures 6.0 x 5.0 cm.  Using similar measurement techniques on the prior exam it measured 16 x 5 mm. *  FAY/MEDIASTINUM: Within normal limits. *  TRACHEOBRONCHIAL TREE: Within normal limits. *  AORTA/PULMONARY VESSELS: Within normal limits. *  CORONARY ARTERIES: Extensive coronary artery calcification is seen. *  CHEST WALL/AXILLA: Within normal limits. *  VISUALIZED UPPER ABDOMEN: Within normal limits. *  SPINE / BONES: Within normal limits. *  ADDITIONAL COMMENTS: None. Decreased size of right lower lobe nodule. COPD with interstitial fibrosis. Coronary artery disease. Date of Dictation: 1/20/2022 1:30 PM       IMPRESSION:  Young Smith is a 78 y.o. male who has recovered well from therapy. PLAN:    CT with no concerns for disease progression, but ongoing inflammatory changes noted. ?changes due to recent COVID infection as well. Plan for repeat CT in 3 months. Portions of this note were copied from prior encounters and reviewed for accuracy, currency, and represent documentation and tasks completed during this encounter. I verify and attest these portions to be unchanged from prior visits.                Kala Quiroz, [address] [place] [address] Hematology and Oncology/Radiation Oncology   [address] [place] [address]  Office : [phone]  Fax : [phone]

## 2022-06-01 ENCOUNTER — OFFICE VISIT (OUTPATIENT)
Dept: INTERNAL MEDICINE CLINIC | Facility: CLINIC | Age: 77
End: 2022-06-01
Payer: MEDICARE

## 2022-06-01 VITALS
DIASTOLIC BLOOD PRESSURE: 78 MMHG | SYSTOLIC BLOOD PRESSURE: 124 MMHG | HEIGHT: 69 IN | WEIGHT: 186.5 LBS | BODY MASS INDEX: 27.62 KG/M2

## 2022-06-01 DIAGNOSIS — J44.9 CHRONIC OBSTRUCTIVE PULMONARY DISEASE, UNSPECIFIED COPD TYPE (HCC): ICD-10-CM

## 2022-06-01 DIAGNOSIS — C34.91 SQUAMOUS CELL LUNG CANCER, RIGHT (HCC): ICD-10-CM

## 2022-06-01 DIAGNOSIS — E03.9 HYPOTHYROIDISM, UNSPECIFIED TYPE: Primary | ICD-10-CM

## 2022-06-01 PROCEDURE — 3023F SPIROM DOC REV: CPT | Performed by: INTERNAL MEDICINE

## 2022-06-01 PROCEDURE — G8427 DOCREV CUR MEDS BY ELIG CLIN: HCPCS | Performed by: INTERNAL MEDICINE

## 2022-06-01 PROCEDURE — 4004F PT TOBACCO SCREEN RCVD TLK: CPT | Performed by: INTERNAL MEDICINE

## 2022-06-01 PROCEDURE — 1123F ACP DISCUSS/DSCN MKR DOCD: CPT | Performed by: INTERNAL MEDICINE

## 2022-06-01 PROCEDURE — 3017F COLORECTAL CA SCREEN DOC REV: CPT | Performed by: INTERNAL MEDICINE

## 2022-06-01 PROCEDURE — 99213 OFFICE O/P EST LOW 20 MIN: CPT | Performed by: INTERNAL MEDICINE

## 2022-06-01 PROCEDURE — G8417 CALC BMI ABV UP PARAM F/U: HCPCS | Performed by: INTERNAL MEDICINE

## 2022-06-01 RX ORDER — CHLORAL HYDRATE 500 MG
1 CAPSULE ORAL DAILY
COMMUNITY

## 2022-06-01 RX ORDER — LEVOTHYROXINE SODIUM 88 UG/1
TABLET ORAL
Qty: 90 TABLET | Refills: 3 | Status: SHIPPED | OUTPATIENT
Start: 2022-06-01

## 2022-06-01 ASSESSMENT — PATIENT HEALTH QUESTIONNAIRE - PHQ9
SUM OF ALL RESPONSES TO PHQ QUESTIONS 1-9: 0
SUM OF ALL RESPONSES TO PHQ9 QUESTIONS 1 & 2: 0
1. LITTLE INTEREST OR PLEASURE IN DOING THINGS: 0
SUM OF ALL RESPONSES TO PHQ QUESTIONS 1-9: 0
2. FEELING DOWN, DEPRESSED OR HOPELESS: 0

## 2022-06-01 ASSESSMENT — ENCOUNTER SYMPTOMS
SHORTNESS OF BREATH: 0
COUGH: 0

## 2022-06-01 NOTE — PROGRESS NOTES
SUBJECTIVE:   Derrell Allred is a 78 y.o. male seen for a visit regarding   Chief Complaint   Patient presents with    Cholesterol Problem     6 month f/u    Hypertension        Hypertension  This is a chronic problem. The current episode started more than 1 year ago. The problem is unchanged. The problem is controlled. Pertinent negatives include no chest pain, palpitations or shortness of breath (walks mile routinely). Past treatments include beta blockers and diuretics. Hyperlipidemia  This is a chronic problem. The current episode started more than 1 year ago. The problem is controlled. Recent lipid tests were reviewed and are normal (LDL 85 12/2021-on 40mg crestor). Pertinent negatives include no chest pain or shortness of breath (walks mile routinely). Past Medical History, Past Surgical History, Family history, Social History, and Medications were all reviewed with the patient today and updated as necessary.        Current Outpatient Medications   Medication Sig Dispense Refill    levothyroxine (SYNTHROID) 88 MCG tablet TAKE 1 TABLET BY MOUTH DAILY BEFORE BREAKFAST 90 tablet 3    QUNOL COQ10/UBIQUINOL/RANDY PO Take 200 mg by mouth daily      Omega-3 Fatty Acids (FISH OIL) 1000 MG CAPS Take 1 capsule by mouth daily      Multiple Vitamins-Minerals (MULTIVITAL-M PO) Take 1 tablet by mouth daily      Lancets MISC E11.9 Non insulin dep testing bs 1times daily; bill to [facility] part B      TURMERIC PO Take 720 mg by mouth daily      albuterol sulfate  (90 Base) MCG/ACT inhaler Inhale 2 puffs into the lungs every 4 hours as needed      ascorbic acid (VITAMIN C) 500 MG tablet Take 1,000 mg by mouth      aspirin 81 MG EC tablet Take 40.5 mg by mouth daily      vitamin D 25 MCG (1000 UT) CAPS Take 2,000 Units by mouth daily      furosemide (LASIX) 20 MG tablet Take 20 mg by mouth daily      metoprolol tartrate (LOPRESSOR) 50 MG tablet Take 50 mg by mouth 2 times daily      rosuvastatin (CRESTOR) 40 MG tablet Take 40 mg by mouth      tiotropium-olodaterol (STIOLTO RESPIMAT) 2.5-2.5 MCG/ACT AERS Inhale 2 puffs into the lungs daily      triamcinolone (NASACORT) 55 MCG/ACT nasal inhaler 1 spray by Nasal route 2 times daily as needed       No current facility-administered medications for this visit. Allergies   Allergen Reactions    Amoxicillin-Pot Clavulanate Nausea Only     Pt states he is not allergic  \"upsets my stomach\"    Bupropion Hives    Thomas Flavor Hives    Varenicline Other (See Comments)     [place] Dreams     Patient Active Problem List   Diagnosis    Coronary artery disease involving native coronary artery of native heart    Situational anxiety    Allergic rhinitis    Essential hypertension    Impacted cerumen of right ear    Diabetes mellitus type 2, diet-controlled ([address]    Pulmonary fibrosis ([address]    Atrial fibrillation with rapid ventricular response ([address]    Skin exam for malignant neoplasm    [facility] obstructive pulmonary disease) [facility]    Balance problem    Anxiety    Incisional hernia, without obstruction or gangrene    Mediastinal adenopathy    Squamous cell lung cancer, right ([facility])    UIP (usual interstitial pneumonitis) ([facility])    Benign prostatic hyperplasia with lower urinary tract symptoms    Lung nodule    Vitamin D deficiency    Rhinitis medicamentosa    Hypothyroidism    Diabetes mellitus (HCC)    Prostate cancer screening    Chronic sinusitis    Low testosterone    History of kidney cancer    Depression    Sleep disturbance    Abdominal aortic aneurysm (AAA) without rupture ([facility])     Social History     Tobacco Use    Smoking status: Current Every Day Smoker     Packs/day: 0.50    Smokeless tobacco: Never Used    Tobacco comment: Quit smokin-7 cigarettes per day   Substance Use Topics    Alcohol use: Yes          Review of Systems   Constitutional: Negative for unexpected weight change.    Respiratory: Negative for cough and shortness of breath (walks mile routinely). On 10 cig/d   Cardiovascular: Positive for leg swelling (slight -uses support stockings). Negative for chest pain and palpitations. Endocrine: Negative for cold intolerance and heat intolerance. On 88 levothyroxine         OBJECTIVE:  /78   Ht 5' 9\" (1.753 m)   Wt 186 lb 8 oz (84.6 kg)   BMI 27.54 kg/m²      Physical Exam  Constitutional:       General: He is not in acute distress. Appearance: Normal appearance. Cardiovascular:      Rate and Rhythm: Normal rate and regular rhythm. Pulmonary:      Breath sounds: Rales (few in bases) present. No wheezing. Medical problems and test results were reviewed with the patient today. reviewed pulmonary note    ASSESSMENT and PLAN     1. Hypothyroidism, unspecified type  -     TSH; Future  2. Chronic obstructive pulmonary disease, unspecified COPD type ([facility]  3. Squamous cell lung cancer, right (HCC)     Followed by RT and pulmonary-lung ca and COPD-stable; check thyroid -lab ok in March-lipid nl also  Current treatment plan is effective. no changes in therapy  lab results and schedule for future lab studies reviewed with patient  reviewed medications and side effects in detail     Return in about 6 months (around 12/1/2022) for For Medicare wellness.

## 2022-06-09 ENCOUNTER — TELEPHONE (OUTPATIENT)
Dept: UROLOGY | Age: 77
End: 2022-06-09

## 2022-06-09 DIAGNOSIS — R79.89 LOW TESTOSTERONE IN MALE: Primary | ICD-10-CM

## 2022-06-09 RX ORDER — TESTOSTERONE 16.2 MG/G
1 GEL TRANSDERMAL DAILY
Qty: 1 EACH | Refills: 5 | Status: SHIPPED | OUTPATIENT
Start: 2022-06-09 | End: 2022-11-02

## 2022-06-22 DIAGNOSIS — C34.91 MALIGNANT NEOPLASM OF RIGHT LUNG, UNSPECIFIED PART OF LUNG (HCC): Primary | ICD-10-CM

## 2022-07-06 RX ORDER — METOPROLOL TARTRATE 50 MG/1
50 TABLET, FILM COATED ORAL 2 TIMES DAILY
Qty: 180 TABLET | Refills: 3 | Status: SHIPPED | OUTPATIENT
Start: 2022-07-06

## 2022-07-06 NOTE — TELEPHONE ENCOUNTER
Requested Prescriptions     Signed Prescriptions Disp Refills    metoprolol tartrate (LOPRESSOR) 50 MG tablet 180 tablet 3     Sig: Take 1 tablet by mouth 2 times daily     Authorizing Provider: Moriah Peter     Ordering User: Kizzy Alva

## 2022-07-25 ENCOUNTER — HOSPITAL ENCOUNTER (OUTPATIENT)
Dept: CT IMAGING | Age: 77
Discharge: HOME | End: 2022-07-28
Payer: MEDICARE

## 2022-07-25 DIAGNOSIS — C34.91 MALIGNANT NEOPLASM OF RIGHT LUNG, UNSPECIFIED PART OF LUNG (HCC): ICD-10-CM

## 2022-07-25 PROCEDURE — 71250 CT THORAX DX C-: CPT

## 2022-07-28 NOTE — PROGRESS NOTES
Patient: Ailyn Hendricks  MRN: [id number]   SSN: [id number]          YOB: 1945   Age: 78 y.o. Sex: male        Other Providers:  [facility]      DIAGNOSIS: RLL SCC, T1bN0 stage 1A2     PREVIOUS TREATMENT:  1) SBRT to RLL nodule, 5000cGy, completed 11/19/21     INTERVAL HISTORY:  Ailyn Hendricks is a  78 y.o. male who is here  Today for follow up. He has continued to recover from his course  of therapy. He notes no new complaints or concerns. He continues to stay active and his fatigue from treatment has improved. He continues going to the gym daily. No cough or shortness of breath. No difficulty swallowing.      4/25/22 Here today for follow up. Doing okay since last seen. He notes ongoing fatigue and decreased stamina due to recent COVID infection. He Continues to stay active and is working to get back to previous state. He notes no new issues or concerns otherwise. No difficulty with swallowing. Repeat CT demonstrates no findings concerning for disease progression with persistent lower lobe inflammatory changes worsened on the right.    7/25/22: Repeat CT chest demonstrates grossly unchanged RLL pulmonary nodule with post radiation change and parenchymal scarring. There are chronically stable enlarged AP window lymph nodes and prominent precarinal prevascular space lymph nodes. Mr. Silverio Narvaez had covid and has had congestion and a slightly worsened cough since that time without significant changes in his breathing. UPDATED PAST MEDICAL HISTORY:  Since our prior encounter, Ailyn Hendricks has not been hospitalized. There have been no significant changes  to the medical history. MEDICATIONS:   Current Outpatient Medications   Medication Sig Dispense Refill    metoprolol tartrate (LOPRESSOR) 50 MG tablet Take 1 tablet by mouth 2 times daily 180 tablet 3    Testosterone (ANDROGEL) 20.25 MG/ACT (1.62%) GEL gel Place 1 actuation onto the skin daily for 30 doses.  1 each 5    levothyroxine (SYNTHROID) 88 MCG tablet TAKE 1 TABLET BY MOUTH DAILY BEFORE BREAKFAST 90 tablet 3    QUNOL COQ10/UBIQUINOL/RANDY PO Take 200 mg by mouth daily      Omega-3 Fatty Acids (FISH OIL) 1000 MG CAPS Take 1 capsule by mouth daily      Multiple Vitamins-Minerals (MULTIVITAL-M PO) Take 1 tablet by mouth daily      Lancets MISC E11.9 Non insulin dep testing bs 1times daily; bill to [facility] part B      TURMERIC PO Take 720 mg by mouth daily      albuterol sulfate  (90 Base) MCG/ACT inhaler Inhale 2 puffs into the lungs every 4 hours as needed      ascorbic acid (VITAMIN C) 500 MG tablet Take 1,000 mg by mouth      aspirin 81 MG EC tablet Take 40.5 mg by mouth daily      vitamin D 25 MCG (1000 UT) CAPS Take 2,000 Units by mouth daily      furosemide (LASIX) 20 MG tablet Take 20 mg by mouth daily      rosuvastatin (CRESTOR) 40 MG tablet Take 40 mg by mouth      tiotropium-olodaterol (STIOLTO RESPIMAT) 2.5-2.5 MCG/ACT AERS Inhale 2 puffs into the lungs daily      triamcinolone (NASACORT) 55 MCG/ACT nasal inhaler 1 spray by Nasal route 2 times daily as needed       No current facility-administered medications for this encounter. ALLERGIES:   Allergies   Allergen Reactions    Amoxicillin-Pot Clavulanate Nausea Only     Pt states he is not allergic  \"upsets my stomach\"    Bupropion Hives    Thomas Flavor Hives    Varenicline Other (See Comments)     Wild Dreams      PHYSICAL EXAMINATION:    ECOG Performance status 0   VITAL SIGNS:   /75   Pulse 91   Temp 98 °F (36.7 °C) (Oral)   Resp 18   Wt 189 lb 11.2 oz (86 kg)   SpO2 90%   BMI 28.01 kg/m²     GENERAL: The patient is well-developed, ambulatory, alert and in no acute distress. HEENT: Head is normocephalic, atraumatic. Pupils are equal, round and reactive to light and accommodation. RESP: no audible wheezes, no distress noted.    Abdomen: soft and non tender    Neuro: alert and oriented; cranial nerves intact   MSK: normal gait LABORATORY:   Lab Results   Component Value Date    WBC 7.1 03/08/2022    HGB 16.2 03/08/2022    HCT 49.0 03/08/2022    MCV 94.2 03/08/2022     03/08/2022     Lab Results   Component Value Date     (L) 03/31/2022    K 4.1 03/31/2022     03/31/2022    CO2 27 03/31/2022    BUN 29 (H) 03/31/2022    CREATININE 1.40 03/31/2022    GLUCOSE 147 (H) 03/31/2022    CALCIUM 8.9 03/31/2022    PROT 7.7 03/08/2022    LABALBU 3.1 (L) 03/08/2022    BILITOT 0.5 03/08/2022    ALKPHOS 122 03/08/2022    AST 20 03/08/2022    ALT 28 03/08/2022    GFRAA >60 03/31/2022    AGRATIO 0.7 (L) 03/08/2022    GLOB 4.6 (H) 03/08/2022      RADIOLOGY:   CT chest 7/25/22 personally reviewed as per HPI     IMPRESSION:  Otoniel Qiu is a  78 y.o. male who has recovered well from therapy with stable post treatment changes on imaging. I reviewed in detail with Mr. Jo Florian the findings from his most recent CT chest and compared the location, size, and surrounding pulmonary changes to his initial PET/CT, scan from 1/22, and 4/22. Based on my review the changes in the lung are secondary to radiation and that the size of the lesion itself is stable. Unfortunately the reads on radiology have included measurements made in entirely different areas of the nodule, some of which do not correlate to the initial site of malignancy on his PET/CT. Given the challenges in obtaining reproducible measurements I have recommended a PET/CT as his next staging scan to confirm resolution of FDG avidity with surrounding changes due to treatment. He is comfortable with this plan and will contact us with any questions or concerns prior to his next scan.      PLAN:    PET/CT in 3 months and follow up in radiation oncology

## 2022-07-29 ENCOUNTER — HOSPITAL ENCOUNTER (OUTPATIENT)
Dept: RADIATION ONCOLOGY | Age: 77
Setting detail: RECURRING SERIES
Discharge: HOME | End: 2022-08-01
Payer: MEDICARE

## 2022-07-29 ENCOUNTER — APPOINTMENT (OUTPATIENT)
Dept: RADIATION ONCOLOGY | Age: 77
End: 2022-07-29

## 2022-07-29 VITALS
SYSTOLIC BLOOD PRESSURE: 135 MMHG | OXYGEN SATURATION: 90 % | BODY MASS INDEX: 28.01 KG/M2 | DIASTOLIC BLOOD PRESSURE: 75 MMHG | RESPIRATION RATE: 18 BRPM | TEMPERATURE: 98 F | WEIGHT: 189.7 LBS | HEART RATE: 91 BPM

## 2022-07-29 DIAGNOSIS — C34.91 MALIGNANT NEOPLASM OF RIGHT LUNG, UNSPECIFIED PART OF LUNG (HCC): Primary | ICD-10-CM

## 2022-07-29 DIAGNOSIS — C34.31 MALIGNANT NEOPLASM OF LOWER LOBE, RIGHT BRONCHUS OR LUNG (HCC): ICD-10-CM

## 2022-07-29 PROCEDURE — 99211 OFF/OP EST MAY X REQ PHY/QHP: CPT

## 2022-07-29 NOTE — PROGRESS NOTES
Follow Up:  -Post CT    08/18/2022 - Pulmonary Follow Up (Dr. Irene Best)      SBRT End: 11/19/2021 x 5 fractions     CT Chest (07/25/2022): Grossly unchanged right lower lobe pulmonary nodule measuring 17 x 23 mm in greatest axial dimension which appears unchanged from recent chest CT April 21, 2022 although increased in size from more remote comparisons. There is post radiation change about this nodule and parenchymal scarring. It is difficult to completely exclude lymphangitic spread of malignancy although given the preservation of the lung base and superior segment of the right lower lobe this  is felt less likely. Recommend continued attention on follow-up. Chronically stable enlarged AP window lymph node and prominent precarinal and pre vascular space lymph nodes.       Faith Yancey, CMA

## 2022-08-18 ENCOUNTER — NURSE ONLY (OUTPATIENT)
Dept: PULMONOLOGY | Age: 77
End: 2022-08-18
Payer: MEDICARE

## 2022-08-18 ENCOUNTER — OFFICE VISIT (OUTPATIENT)
Dept: PULMONOLOGY | Age: 77
End: 2022-08-18
Payer: MEDICARE

## 2022-08-18 VITALS
HEIGHT: 69 IN | BODY MASS INDEX: 28.14 KG/M2 | DIASTOLIC BLOOD PRESSURE: 70 MMHG | WEIGHT: 190 LBS | TEMPERATURE: 97 F | SYSTOLIC BLOOD PRESSURE: 127 MMHG | OXYGEN SATURATION: 93 % | HEART RATE: 72 BPM

## 2022-08-18 DIAGNOSIS — J30.0 VASOMOTOR RHINITIS: Primary | ICD-10-CM

## 2022-08-18 DIAGNOSIS — J84.10 PULMONARY FIBROSIS (HCC): Primary | ICD-10-CM

## 2022-08-18 DIAGNOSIS — J84.112 UIP (USUAL INTERSTITIAL PNEUMONITIS) (HCC): ICD-10-CM

## 2022-08-18 DIAGNOSIS — J44.9 CHRONIC OBSTRUCTIVE PULMONARY DISEASE, UNSPECIFIED COPD TYPE (HCC): ICD-10-CM

## 2022-08-18 PROBLEM — N18.30 CHRONIC RENAL DISEASE, STAGE III (HCC): Status: ACTIVE | Noted: 2022-08-18

## 2022-08-18 LAB
FEF25-27, POC: 1.68 L/S
FET, POC: NORMAL
FEV 1 , POC: 2.68 L
FEV1/FVC, POC: NORMAL
FVC, POC: NORMAL
LUNG AGE, POC: NORMAL
PEF, POC: 6.05 L/S

## 2022-08-18 PROCEDURE — 99214 OFFICE O/P EST MOD 30 MIN: CPT | Performed by: INTERNAL MEDICINE

## 2022-08-18 PROCEDURE — 94726 PLETHYSMOGRAPHY LUNG VOLUMES: CPT | Performed by: INTERNAL MEDICINE

## 2022-08-18 PROCEDURE — 4004F PT TOBACCO SCREEN RCVD TLK: CPT | Performed by: INTERNAL MEDICINE

## 2022-08-18 PROCEDURE — 94729 DIFFUSING CAPACITY: CPT | Performed by: INTERNAL MEDICINE

## 2022-08-18 PROCEDURE — 1123F ACP DISCUSS/DSCN MKR DOCD: CPT | Performed by: INTERNAL MEDICINE

## 2022-08-18 PROCEDURE — G8417 CALC BMI ABV UP PARAM F/U: HCPCS | Performed by: INTERNAL MEDICINE

## 2022-08-18 PROCEDURE — 3017F COLORECTAL CA SCREEN DOC REV: CPT | Performed by: INTERNAL MEDICINE

## 2022-08-18 PROCEDURE — 3023F SPIROM DOC REV: CPT | Performed by: INTERNAL MEDICINE

## 2022-08-18 PROCEDURE — 94010 BREATHING CAPACITY TEST: CPT | Performed by: INTERNAL MEDICINE

## 2022-08-18 PROCEDURE — G8427 DOCREV CUR MEDS BY ELIG CLIN: HCPCS | Performed by: INTERNAL MEDICINE

## 2022-08-18 RX ORDER — TRIAMCINOLONE ACETONIDE 55 UG/1
1 SPRAY, METERED NASAL 2 TIMES DAILY PRN
Status: CANCELLED | OUTPATIENT
Start: 2022-08-18

## 2022-08-18 RX ORDER — IPRATROPIUM BROMIDE 21 UG/1
2 SPRAY, METERED NASAL 3 TIMES DAILY
Qty: 1 EACH | Refills: 11 | Status: SHIPPED | OUTPATIENT
Start: 2022-08-18

## 2022-08-18 RX ORDER — ALBUTEROL SULFATE 90 UG/1
2 AEROSOL, METERED RESPIRATORY (INHALATION) EVERY 4 HOURS PRN
Qty: 1 EACH | Refills: 11 | Status: SHIPPED | OUTPATIENT
Start: 2022-08-18

## 2022-08-18 NOTE — PROGRESS NOTES
Dr. Ebonie Carvalho MD  [address] [facility] [address] [facility], [address] [place] [address]  [phone]    Patient Name:  Omar Martinez  YOB: 1945  Office Visit: 8/21/2022    ASSESSMENT AND PLAN:  (Medical Decision Making)  Omar Martinez is a 78 y.o. 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 .    1. Vasomotor rhinitis  Strangely, this is the patient's primary concern today and we address it with a trial of atropine nasal spray. -     albuterol sulfate HFA (PROVENTIL;VENTOLIN;PROAIR) 108 (90 Base) MCG/ACT inhaler; Inhale 2 puffs into the lungs every 4 hours as needed for Shortness of Breath or Wheezing, Disp-1 each, R-11Normal  -     ipratropium (ATROVENT) 0.03 % nasal spray; 2 sprays by Each Nostril route 3 times daily, Disp-1 each, R-11Normal    2. Chronic obstructive pulmonary disease, unspecified COPD type ([facility]  Overview:  Pt following with Pulmonology. Discussed smoking cessation plan with pt   today. Samples of and refills for Stiolto been provided. Additionally patient was advised to investigate the insurance drug formulary to see if there are alternative LAMA/LABA inhalers at lower cost    Orders:  -     albuterol sulfate HFA (PROVENTIL;VENTOLIN;PROAIR) 108 (90 Base) MCG/ACT inhaler; Inhale 2 puffs into the lungs every 4 hours as needed for Shortness of Breath or Wheezing, Disp-1 each, R-11Normal  -     tiotropium-olodaterol (STIOLTO) 2.5-2.5 MCG/ACT AERS; Inhale 2 puffs into the lungs daily, Disp-3 each, R-3Normal    3. UIP (usual interstitial pneumonitis) ([facility]  Asked patient to consider pirfenidone a medication that can slow the progression of lung decline from IPF. Educational materials were provided and he agrees to consider.     Dami Martin MD  Electronically signed    Clinical time for encounter was 32 minutes. _____________________________________________________________________________________________________________________    Reason for Visit:  COPD and Follow-up      History of Present Illness:     Ailyn Hendricks Is a 78 y.o. male with h/o pulmonary fibrosis, COPD with emphysema, RLL SCC T1bN0 Stage 1A2 (dx 10/2021) and active smoking who follows  up s/p radiation therapy by Dr. Ayden Medina. He reports she has reduced his cardiovascular activity but continues to lift weights regularly. He admits to dyspnea on hills or with exertion but doesn't think this has progressed much since a year ago    COVID-19 was rough in February. He is lifting weights regularly, but reports he thinks he has slacked off when it comes to aerobic exercise. He still has congestion. If it's hot and humid he has trouble breathing. He has noticed it has been more difficult this summer. Zyrtec seems to help with nasal congestion. He notes a lot of rhinorrhea around times of  meals. For COPD he has been using stiolto and it is expensive. We discuss the scarring lung disease which is present on his CT of the chest and agree to keep close tabs on his oxygen levels. His PFTs show a disproportionately reduced DLCO as is characteristic of chronic pulmonary fibrosis with emphysema. He is undergoing serial CT scans after treatment of his lung cancer and we personally review all the images today. Tobacco Use: High Risk    Smoking Tobacco Use: Every Day    Smokeless Tobacco Use: Never         Physical exam:    Vitals:    08/18/22 1255   BP: 127/70   Pulse: 72   Temp: 97 °F (36.1 °C)   TempSrc: Skin   SpO2: 93%   Weight: 190 lb (86.2 kg)   Height: 5' 9\" (1.753 m)       Body mass index is 28.06 kg/m². General Appearance: The patient is pleasant and in no respiratory distress. HEENT: PERRLA. Conjunctivae unremarkable. Nasal mucosa is without epistaxis, exudate, or polyps. Gums and dentition are unremarkable.   There is no oropharyngeal narrowing. Neck/Lymphatic:  Symmetrical with no elevation of jugular venous pulsation. Trachea midline. No thyroid enlargement. No cervical adenopathy. Lungs:  Normal respiratory effort with symmetrical lung expansion. Breath sounds diminished with a few basilar rales. Heart:  RRR  Abdomen:  Soft and non-tender. No hepatosplenomegaly. Bowel sounds are normal.    Extremity:  No edema, clubbing or cyanosis  Neuro: The patient is alert and oriented to person, place, and time. Memory appears intact and mood is normal.  No gross sensorimotor deficits are present. DIAGNOSTIC TESTS:                                                                                                             Spirometry:   Date 08/18/2022        FVC 3.86 (90%)        FEV1 2.68 (83%)        FEV1/FVC 0.70        FEF 25-75%         Bronchodilator Response         TLC 6.17 (91%)        RV 2.25 (91%)        DLCO 10.2 (34%)        No flowsheet data found. Echo:  04/13/22    TRANSTHORACIC ECHOCARDIOGRAM (TTE) COMPLETE (CONTRAST/BUBBLE/3D PRN) 04/13/2022  6:31 PM (Final)    Narrative  This is a summary report. The complete report is available in the patient's medical record. If you cannot access the medical record, please contact the sending organization for a detailed fax or copy. Left Ventricle: Left ventricle size is normal. Normal wall thickness. Normal left ventricular systolic function with a visually estimated EF of 60 - 65%.     Signed by: Detra Soulier, MD on 4/13/2022  6:31 PM      Labs:   Lab Results   Component Value Date/Time     03/31/2022 12:19 PM    K 4.1 03/31/2022 12:19 PM     03/31/2022 12:19 PM    CO2 27 03/31/2022 12:19 PM    BUN 29 03/31/2022 12:19 PM    CREATININE 1.40 03/31/2022 12:19 PM    GLUCOSE 147 03/31/2022 12:19 PM    CALCIUM 8.9 03/31/2022 12:19 PM      Lab Results   Component Value Date    WBC 7.1 03/08/2022    HGB 16.2 03/08/2022    HCT 49.0 03/08/2022    MCV 94.2 03/08/2022     03/08/2022     Lab Results   Component Value Date    DDIMER 1.25 (H) 03/08/2022     Lab Results   Component Value Date/Time    ALKPHOS 122 03/08/2022 11:17 AM    ALT 28 03/08/2022 11:17 AM    AST 20 03/08/2022 11:17 AM    PROT 7.7 03/08/2022 11:17 AM    BILITOT 0.5 03/08/2022 11:17 AM    LABALBU 3.1 03/08/2022 11:17 AM     Lab Results   Component Value Date     (H) 03/08/2022     No results for input(s): PHAPOC, ISE4UZQB, HQ1JRPI, KWC4UOJ, BE in the last 72 hours. Imaging:  CXR: No results found for this or any previous visit from the past 365 days. CT WITHOUT CONTRAST:   CT CHEST WO CONTRAST 07/25/2022    Narrative  EXAM: Chest CT without contrast.  INDICATION: Patient with known right lower lobe squamous cell carcinoma status  post radiation. Follow-up exam.  COMPARISON: Chest CT dated April 21, 2022, PET/CT September 23, 2021, chest CT  dated February 2021. Multiple axial images were obtained through the chest.  Radiation dose reduction  techniques were used for this study: All CT scans performed at this facility  use one or all of the following: Automated exposure control, adjustment of the  mA and/or kVp according to patient's size, iterative reconstruction. FINDINGS:  LUNGS/PLEURA:  Central airways are clear. There is bilateral lower lobe cylindrical  bronchiectasis. Moderate centrilobular and paraseptal emphysematous changes of  the lungs. Redemonstrated right lower lobe solid pulmonary nodule measuring  grossly 17 x 23 mm which is not appear significant changed from recent  comparison April 21, 2022. Adjacent post radiation change does limit evaluation  for exact size measurements as well as irregularity of this nodule. There is  diffuse interlobular septal thickening of the right lower lobe in the region of  this pulmonary nodule. There is additionally advanced paraseptal emphysematous  changes limiting evaluation of this region.     MEDIASTINUM:  Severe kidney cancer followed by right nephrectomy    Chronic airway obstruction, not elsewhere classified 3/19/2013    Diabetes ([facility]     does not check sugars, last A1C 6.3    Diabetes mellitus ([facility] 3/19/2013    Hashimoto's disease     Pulmonary fibrosis (HCC)     Followed by Dr. Danielle Ring at [facility]    Sinus problem     Thyroid disease     Hypothyroid, managed with medication    Tobacco use disorder 3/19/2013    Unspecified essential hypertension 3/19/2013    Unspecified hypothyroidism 3/19/2013      Tobacco Use: High Risk    Smoking Tobacco Use: Every Day    Smokeless Tobacco Use: Never     Allergies   Allergen Reactions    Amoxicillin-Pot Clavulanate Nausea Only     Pt states he is not allergic  \"upsets my stomach\"    Bupropion Hives    Thomas Flavor Hives    Varenicline Other (See Comments)     [place]     Current Outpatient Medications   Medication Instructions    albuterol sulfate HFA (PROVENTIL;VENTOLIN;PROAIR) 108 (90 Base) MCG/ACT inhaler 2 puffs, Inhalation, EVERY 4 HOURS PRN    ascorbic acid (VITAMIN C) 1,000 mg, Oral    aspirin 40.5 mg, Oral, DAILY    furosemide (LASIX) 20 mg, Oral, DAILY    ipratropium (ATROVENT) 0.03 % nasal spray 2 sprays, Each Nostril, 3 TIMES DAILY    Lancets MISC E11.9 Non insulin dep testing bs 1times daily; bill to [facility] part B    levothyroxine (SYNTHROID) 88 MCG tablet TAKE 1 TABLET BY MOUTH DAILY BEFORE BREAKFAST    metoprolol tartrate (LOPRESSOR) 50 mg, Oral, 2 TIMES DAILY    Multiple Vitamins-Minerals (MULTIVITAL-M PO) 1 tablet, Oral, DAILY    Omega-3 Fatty Acids (FISH OIL) 1000 MG CAPS 1 capsule, Oral, DAILY    QUNOL COQ10/UBIQUINOL/RANDY  mg, Oral, DAILY    rosuvastatin (CRESTOR) 40 mg, Oral    Testosterone (ANDROGEL) 20.25 mg, TransDERmal, DAILY    tiotropium-olodaterol (STIOLTO) 2.5-2.5 MCG/ACT AERS 2 puffs, Inhalation, DAILY    triamcinolone (NASACORT) 55 MCG/ACT nasal inhaler 1 spray, Nasal, 2 TIMES DAILY PRN    TURMERIC  mg, Oral, DAILY    vitamin

## 2022-09-19 NOTE — PROGRESS NOTES
CM met with patient / spouse to discuss d/c plan. Patient alert/orient to name, place and . Patient / spouse states that they lives in a two level home with two steps to enter into the home. States that he's been independent with his ADL's and has no DME's in the home. Spouse requesting a toilet riser will be ordered from [facility]  Patient / spouse states that patient will be returning back home with no needs identified at this time. CM will continue to monitor for any needs that may occur. Care Management Interventions  PCP Verified by CM: Yes(Sharon White MD)  Mode of Transport at Discharge:  Other (see comment)(Family)  Transition of Care Consult (CM Consult): Discharge Planning  Discharge Durable Medical Equipment: No  Physical Therapy Consult: Yes  Occupational Therapy Consult: No  Speech Therapy Consult: No  Current Support Network: Own Home, Lives with Spouse  Confirm Follow Up Transport: Family  Plan discussed with Pt/Family/Caregiver: Yes  Freedom of Choice Offered: Yes   Resource Information Provided?: No  Discharge Location  Discharge Placement: Home - - -

## 2022-10-25 NOTE — PROGRESS NOTES
[address] [address], [address] [place] [address]      Patient:  Nasim Stahl  1945     SUBJECTIVE:  Nasim Stahl is a  78 y.o. male seen for a follow up visit regarding the following:     Chief Complaint   Patient presents with    Hypertension     CC: follow up AAA, atrial fibrillation     HPI:   78 y.o. male with a history of pAF, AAA, renal cell CA,  pulmonary fibrosis, DM, former smoker (quit 11-9-19), hypothyroid,who is here for follow up. Patient was last seen in office on 3/16/22 , since then reports that he has been doing well from heart perspective. No chest pain, palpitations, or dyspnea. Continues to work out 5 days per week. Some leg swelling controlled with low dose lasix, compression stockings. No other complaints. Cardiovascular Testing:  - Echo 4/13/22: LVEF >60 %, RV normal, Valves: trace MR, trace TR.   - CTA Abd/pelvis 9/3/21: Aorta 33 mm. Coronary artery calcification, calcification of aorta branch vessels. - Echo 11/23/19 normal LVEF >55%, mild LA enlargement, no valvular disease  - SARIKA/DCCV 11/22/19: LVEF >55%, mod/severe atheroma, atrial septal aneurysm.   - ZIO 1/10/20 - 1/24/20: brief SVT no sustained arrhythmias  - AAA scan 8/26/20: 35 mm aorta     Past medical history, past surgical history, family history, social history, and medications were all reviewed with the patient today and updated as necessary.          Patient Active Problem List    Diagnosis Date Noted    Chronic renal disease, stage III [facility]) [047754] 08/18/2022     Priority: Medium    Squamous cell lung cancer, right ([facility] 12/01/2021    Lung nodule 10/07/2021    Incisional hernia, without obstruction or gangrene 02/23/2021    Rhinitis medicamentosa 02/23/2021    Diabetes mellitus type 2, diet-controlled ([facility] 07/21/2020    Low testosterone 07/21/2020    Vitamin D deficiency 06/04/2020    History of kidney cancer 06/04/2020    Situational anxiety 12/17/2019 Pulmonary fibrosis ([address] 2019    Atrial fibrillation with rapid ventricular response ([address] 2019    Balance problem 2019    Chronic sinusitis 2019    Skin exam for malignant neoplasm 2018    Impacted cerumen of right ear 2017     Right ear canal irrigated. Cerumen impaction removed by physician. External ear canal with small debris; TM intact. Pt tolerated well. Benign prostatic hyperplasia with lower urinary tract symptoms 2017     Check PSA        Prostate cancer screening 2017    Sleep disturbance 2017     Ambien prn        Abdominal aortic aneurysm (AAA) without rupture 2017     Check US         COPD (chronic obstructive pulmonary disease) ([facility] 2015     Pt following with Pulmonology. Discussed smoking cessation plan with pt   today. Anxiety 2015    Depression 2015    Mediastinal adenopathy 2013     EBUS-2013-reactive        UIP (usual interstitial pneumonitis) ([facility] 2013    Coronary artery disease involving native coronary artery of native heart 2013     Refer to Cardiology to evaluate. Last stress testing was over 6 years   ago. Multiple risk factors: male gender, DM, HTN, Smoking, HDL <39. Allergic rhinitis 2013     New problem since moving to [place]. Essential hypertension 2013     At goal.         Hypothyroidism 2013     TSH 3.43; increase Levoxyl to 88 mcg po daily. Recheck TSH        Diabetes mellitus ([address] 2013     HA1c 6.1; continue with annual eye exams and daily self foot exams.          Family History   Problem Relation Age of Onset    High Cholesterol Mother     Hypertension Mother     Diabetes Other     Heart Disease Mother      Social History     Tobacco Use    Smoking status: Every Day     Packs/day: 0.50     Types: Cigarettes    Smokeless tobacco: Never    Tobacco comments:     Quit smokin-7 cigarettes per day   Substance Use Topics    Alcohol use: Yes     Review of Systems   Constitutional: Negative. Negative for chills and fever. Cardiovascular: Negative. Negative for chest pain, dyspnea on exertion, irregular heartbeat, leg swelling and palpitations. Respiratory:  Negative for cough, shortness of breath and wheezing. Gastrointestinal: Negative. Negative for abdominal pain, nausea and vomiting. Neurological: Negative. Negative for dizziness and light-headedness. PHYSICAL EXAM:    /70   Pulse 78   Ht 5' 11\" (1.803 m)   Wt 191 lb (86.6 kg)   BMI 26.64 kg/m²     Physical Exam  Vitals reviewed. Constitutional:       General: He is not in acute distress. Appearance: He is not toxic-appearing. HENT:      Head: Normocephalic and atraumatic. Cardiovascular:      Rate and Rhythm: Normal rate and regular rhythm. Heart sounds: Normal heart sounds. No murmur heard. No friction rub. No gallop. Pulmonary:      Effort: Pulmonary effort is normal. No respiratory distress. Breath sounds: Normal breath sounds. No stridor. No wheezing or rales. Abdominal:      General: Abdomen is flat. There is no distension. Palpations: Abdomen is soft. Tenderness: There is no abdominal tenderness. Musculoskeletal:         General: No swelling or deformity. Right lower leg: No edema. Left lower leg: No edema. Skin:     General: Skin is warm and dry. Neurological:      Mental Status: He is alert and oriented to person, place, and time. Psychiatric:         Mood and Affect: Mood normal.         Thought Content: Thought content normal.         Judgment: Judgment normal.       Medical problems, medical history, and test results were reviewed with the patient today. No results found for this or any previous visit (from the past 168 hour(s)).       Current Outpatient Medications:     Garlic 10 MG CAPS, Take 10 mg by mouth, Disp: , Rfl:     rosuvastatin (CRESTOR) 40 MG tablet, Take 1 tablet by mouth daily, Disp: 90 tablet, Rfl: 3    albuterol sulfate HFA (PROVENTIL;VENTOLIN;PROAIR) 108 (90 Base) MCG/ACT inhaler, Inhale 2 puffs into the lungs every 4 hours as needed for Shortness of Breath or Wheezing, Disp: 1 each, Rfl: 11    tiotropium-olodaterol (STIOLTO) 2.5-2.5 MCG/ACT AERS, Inhale 2 puffs into the lungs daily, Disp: 3 each, Rfl: 3    ipratropium (ATROVENT) 0.03 % nasal spray, 2 sprays by Each Nostril route 3 times daily, Disp: 1 each, Rfl: 11    metoprolol tartrate (LOPRESSOR) 50 MG tablet, Take 1 tablet by mouth 2 times daily, Disp: 180 tablet, Rfl: 3    Testosterone (ANDROGEL) 20.25 MG/ACT (1.62%) GEL gel, Place 1 actuation onto the skin daily for 30 doses. , Disp: 1 each, Rfl: 5    levothyroxine (SYNTHROID) 88 MCG tablet, TAKE 1 TABLET BY MOUTH DAILY BEFORE BREAKFAST, Disp: 90 tablet, Rfl: 3    QUNOL COQ10/UBIQUINOL/RANDY PO, Take 200 mg by mouth daily, Disp: , Rfl:     Omega-3 Fatty Acids (FISH OIL) 1000 MG CAPS, Take 1 capsule by mouth daily, Disp: , Rfl:     Multiple Vitamins-Minerals (MULTIVITAL-M PO), Take 1 tablet by mouth daily, Disp: , Rfl:     Lancets MISC, E11.9 Non insulin dep testing bs 1times daily; bill to [facility] part B, Disp: , Rfl:     TURMERIC PO, Take 720 mg by mouth daily, Disp: , Rfl:     ascorbic acid (VITAMIN C) 500 MG tablet, Take 1,000 mg by mouth, Disp: , Rfl:     aspirin 81 MG EC tablet, Take 40.5 mg by mouth daily, Disp: , Rfl:     vitamin D 25 MCG (1000 UT) CAPS, Take 2,000 Units by mouth daily, Disp: , Rfl:     furosemide (LASIX) 20 MG tablet, Take 20 mg by mouth daily, Disp: , Rfl:     triamcinolone (NASACORT) 55 MCG/ACT nasal inhaler, 1 spray by Nasal route 2 times daily as needed, Disp: , Rfl:      ASSESSMENT/PLAN:    Cardiovascular Testing:  - Echo 4/13/22: LVEF >60 %, RV normal, Valves: trace MR, trace TR.   - CTA Abd/pelvis 9/3/21: Aorta 33 mm. Coronary artery calcification, calcification of aorta branch vessels.   - Echo 11/23/19 normal LVEF >55%, mild LA enlargement, no valvular disease  - SARIKA/DCCV 11/22/19: LVEF >55%, mod/severe atheroma, atrial septal aneurysm.   - ZIO 1/10/20 - 1/24/20: brief SVT no sustained arrhythmias  - AAA scan 8/26/20: 35 mm aorta     1. Lone atrial fibrillation (HCC)  - thought to be post - operative atrial fibrillation event following renal operation  - seen by EP : recs to assess for 90 days (Has now been on Eliquis > 90 days)  if no palpitations or A fib on monitor stop anticoagulation. Currently only on ASA  - ASA 81 mg PO daily after cessation of Eliquis   - Keep metoprolol given abdominal AAA and SVT on monitor  - was very symptomatic, discussed stroke risk for a fib and that if he becomes symptomatic needs to seek evaluation as this would warrant restarting anticoagulation. 2. Abdominal aortic aneurysm (AAA) without rupture (HCC)  - 33 mm on CT scan, US stable 35 mm previously   - BP controlled , on beta blocker  - duplex aorta ordered today      3. Coronary artery disease involving native coronary artery of native heart without angina pectoris  4. Coronary artery calcification   - non obstructive on outside cath in [facility] per history many years ago  - Recommend continuation of ASA 81, Metoprolol, rosuvastatin 40 mg  - exercising, no recent chest pain, dyspnea on exertion     5. Peripheral arterial disease ([address]  6. Mixed hyperlipidemia  - moderate atheroma on SARIKA  - Continue rosuvastatin 40     7. Diabetes mellitus type 2, diet-controlled ([address]  - per primary, exercises in an effort to control sugars. 8. Pulmonary fibrosis ([address]  9. History of 2019 novel coronavirus disease (COVID-19).   - follows with pulmonology     Problem List Items Addressed This Visit          Circulatory    Coronary artery disease involving native coronary artery of native heart    Relevant Medications    rosuvastatin (CRESTOR) 40 MG tablet    Abdominal aortic aneurysm (AAA) without rupture    Relevant Medications    rosuvastatin (CRESTOR) 40 MG tablet    Other Relevant Orders    Vascular AAA screening       Endocrine    Diabetes mellitus ([facility]     Other Visit Diagnoses       Lone atrial fibrillation ([facility]    -  Primary    Relevant Medications    rosuvastatin (CRESTOR) 40 MG tablet    Coronary artery calcification        Relevant Medications    rosuvastatin (CRESTOR) 40 MG tablet    Peripheral vascular disease, unspecified (HCC)        Mixed hyperlipidemia        Relevant Medications    rosuvastatin (CRESTOR) 40 MG tablet    Pulmonary fibrosis, unspecified ([facility]        History of 2019 novel coronavirus disease (COVID-19)                Instructed patient go to ER or call 911/EMS should symptoms recur or worsen. Patient has been instructed and agrees to call our office with any issues or other concerns related to their cardiac condition(s) and/or complaint(s). Return in about 6 months (around 4/26/2023).     Ronald Jones,   10/26/2022 2:53 PM

## 2022-10-26 ENCOUNTER — OFFICE VISIT (OUTPATIENT)
Dept: CARDIOLOGY CLINIC | Age: 77
End: 2022-10-26
Payer: MEDICARE

## 2022-10-26 VITALS
HEART RATE: 78 BPM | DIASTOLIC BLOOD PRESSURE: 70 MMHG | SYSTOLIC BLOOD PRESSURE: 138 MMHG | BODY MASS INDEX: 26.74 KG/M2 | WEIGHT: 191 LBS | HEIGHT: 71 IN

## 2022-10-26 DIAGNOSIS — E11.9 TYPE 2 DIABETES MELLITUS WITHOUT COMPLICATION, WITHOUT LONG-TERM CURRENT USE OF INSULIN (HCC): ICD-10-CM

## 2022-10-26 DIAGNOSIS — Z86.16 HISTORY OF 2019 NOVEL CORONAVIRUS DISEASE (COVID-19): ICD-10-CM

## 2022-10-26 DIAGNOSIS — E78.2 MIXED HYPERLIPIDEMIA: ICD-10-CM

## 2022-10-26 DIAGNOSIS — I48.91 LONE ATRIAL FIBRILLATION (HCC): Primary | ICD-10-CM

## 2022-10-26 DIAGNOSIS — I25.10 CORONARY ARTERY CALCIFICATION: ICD-10-CM

## 2022-10-26 DIAGNOSIS — I73.9 PERIPHERAL VASCULAR DISEASE, UNSPECIFIED (HCC): ICD-10-CM

## 2022-10-26 DIAGNOSIS — I25.10 CORONARY ARTERY DISEASE INVOLVING NATIVE CORONARY ARTERY OF NATIVE HEART WITHOUT ANGINA PECTORIS: ICD-10-CM

## 2022-10-26 DIAGNOSIS — I25.84 CORONARY ARTERY CALCIFICATION: ICD-10-CM

## 2022-10-26 DIAGNOSIS — I71.40 ABDOMINAL AORTIC ANEURYSM (AAA) WITHOUT RUPTURE, UNSPECIFIED PART: ICD-10-CM

## 2022-10-26 DIAGNOSIS — J84.10 PULMONARY FIBROSIS, UNSPECIFIED (HCC): ICD-10-CM

## 2022-10-26 PROCEDURE — 3017F COLORECTAL CA SCREEN DOC REV: CPT | Performed by: INTERNAL MEDICINE

## 2022-10-26 PROCEDURE — 3078F DIAST BP <80 MM HG: CPT | Performed by: INTERNAL MEDICINE

## 2022-10-26 PROCEDURE — 1123F ACP DISCUSS/DSCN MKR DOCD: CPT | Performed by: INTERNAL MEDICINE

## 2022-10-26 PROCEDURE — 3046F HEMOGLOBIN A1C LEVEL >9.0%: CPT | Performed by: INTERNAL MEDICINE

## 2022-10-26 PROCEDURE — 2022F DILAT RTA XM EVC RTNOPTHY: CPT | Performed by: INTERNAL MEDICINE

## 2022-10-26 PROCEDURE — 3074F SYST BP LT 130 MM HG: CPT | Performed by: INTERNAL MEDICINE

## 2022-10-26 PROCEDURE — G8417 CALC BMI ABV UP PARAM F/U: HCPCS | Performed by: INTERNAL MEDICINE

## 2022-10-26 PROCEDURE — 99214 OFFICE O/P EST MOD 30 MIN: CPT | Performed by: INTERNAL MEDICINE

## 2022-10-26 PROCEDURE — G8484 FLU IMMUNIZE NO ADMIN: HCPCS | Performed by: INTERNAL MEDICINE

## 2022-10-26 PROCEDURE — G8427 DOCREV CUR MEDS BY ELIG CLIN: HCPCS | Performed by: INTERNAL MEDICINE

## 2022-10-26 PROCEDURE — 4004F PT TOBACCO SCREEN RCVD TLK: CPT | Performed by: INTERNAL MEDICINE

## 2022-10-26 RX ORDER — ROSUVASTATIN CALCIUM 40 MG/1
40 TABLET, COATED ORAL DAILY
Qty: 90 TABLET | Refills: 3 | Status: SHIPPED | OUTPATIENT
Start: 2022-10-26

## 2022-10-26 ASSESSMENT — ENCOUNTER SYMPTOMS
ABDOMINAL PAIN: 0
VOMITING: 0
WHEEZING: 0
SHORTNESS OF BREATH: 0
COUGH: 0
NAUSEA: 0
GASTROINTESTINAL NEGATIVE: 1

## 2022-10-31 ENCOUNTER — HOSPITAL ENCOUNTER (OUTPATIENT)
Dept: PET IMAGING | Age: 77
Discharge: HOME | End: 2022-11-03
Payer: MEDICARE

## 2022-10-31 DIAGNOSIS — C34.31 MALIGNANT NEOPLASM OF LOWER LOBE, RIGHT BRONCHUS OR LUNG (HCC): ICD-10-CM

## 2022-10-31 DIAGNOSIS — C34.91 MALIGNANT NEOPLASM OF RIGHT LUNG, UNSPECIFIED PART OF LUNG (HCC): ICD-10-CM

## 2022-10-31 LAB
GLUCOSE BLD STRIP.AUTO-MCNC: 180 MG/DL (ref 65–100)
SERVICE CMNT-IMP: ABNORMAL

## 2022-10-31 PROCEDURE — 6360000004 HC RX CONTRAST MEDICATION: Performed by: RADIOLOGY

## 2022-10-31 PROCEDURE — 78815 PET IMAGE W/CT SKULL-THIGH: CPT

## 2022-10-31 PROCEDURE — 3430000000 HC RX DIAGNOSTIC RADIOPHARMACEUTICAL: Performed by: RADIOLOGY

## 2022-10-31 PROCEDURE — 82962 GLUCOSE BLOOD TEST: CPT

## 2022-10-31 PROCEDURE — 2580000003 HC RX 258: Performed by: RADIOLOGY

## 2022-10-31 PROCEDURE — A9552 F18 FDG: HCPCS | Performed by: RADIOLOGY

## 2022-10-31 RX ORDER — SODIUM CHLORIDE 0.9 % (FLUSH) 0.9 %
10 SYRINGE (ML) INJECTION ONCE AS NEEDED
Status: COMPLETED | OUTPATIENT
Start: 2022-10-31 | End: 2022-10-31

## 2022-10-31 RX ORDER — FLUDEOXYGLUCOSE F 18 200 MCI/ML
12.66 INJECTION, SOLUTION INTRAVENOUS
Status: COMPLETED | OUTPATIENT
Start: 2022-10-31 | End: 2022-10-31

## 2022-10-31 RX ADMIN — SODIUM CHLORIDE, PRESERVATIVE FREE 10 ML: 5 INJECTION INTRAVENOUS at 10:01

## 2022-10-31 RX ADMIN — DIATRIZOATE MEGLUMINE AND DIATRIZOATE SODIUM 10 ML: 660; 100 LIQUID ORAL; RECTAL at 10:01

## 2022-10-31 RX ADMIN — FLUDEOXYGLUCOSE F 18 12.66 MILLICURIE: 200 INJECTION, SOLUTION INTRAVENOUS at 10:01

## 2022-11-02 ENCOUNTER — HOSPITAL ENCOUNTER (OUTPATIENT)
Dept: RADIATION ONCOLOGY | Age: 77
Setting detail: RECURRING SERIES
Discharge: HOME | End: 2022-11-05
Payer: MEDICARE

## 2022-11-02 VITALS
BODY MASS INDEX: 27.29 KG/M2 | TEMPERATURE: 98.1 F | WEIGHT: 195.7 LBS | SYSTOLIC BLOOD PRESSURE: 128 MMHG | OXYGEN SATURATION: 90 % | HEART RATE: 82 BPM | DIASTOLIC BLOOD PRESSURE: 69 MMHG | RESPIRATION RATE: 16 BRPM

## 2022-11-02 DIAGNOSIS — C34.91 MALIGNANT NEOPLASM OF RIGHT LUNG, UNSPECIFIED PART OF LUNG (HCC): Primary | ICD-10-CM

## 2022-11-02 PROCEDURE — 99211 OFF/OP EST MAY X REQ PHY/QHP: CPT

## 2022-11-02 NOTE — PROGRESS NOTES
Patient: Angélica Key  MRN: [id number]   SSN: [id number]          YOB: 1945   Age: 78 y.o. Sex: male        Other Providers:  [facility]      DIAGNOSIS: RLL SCC, T1bN0 stage 1A2     PREVIOUS TREATMENT:  1) SBRT to RLL nodule, 5000cGy, completed 11/19/21     INTERVAL HISTORY:  Angélica Key is a  78 y.o. male who is here  Today for follow up. He has continued to recover from his course  of therapy. He notes no new complaints or concerns. He continues to stay active and his fatigue from treatment has improved. He continues going to the gym daily. No cough or shortness of breath. No difficulty swallowing.      4/25/22 Here today for follow up. Doing okay since last seen. He notes ongoing fatigue and decreased stamina due to recent COVID infection. He Continues to stay active and is working to get back to previous state. He notes no new issues or concerns otherwise. No difficulty with swallowing. Repeat CT demonstrates no findings concerning for disease progression with persistent lower lobe inflammatory changes worsened on the right.    7/25/22: Repeat CT chest demonstrates grossly unchanged RLL pulmonary nodule with post radiation change and parenchymal scarring. There are chronically stable enlarged AP window lymph nodes and prominent precarinal prevascular space lymph nodes. Mr. Donaldo Cid had covid and has had congestion and a slightly worsened cough since that time without significant changes in his breathing. 11/2/22: Repeat PET/CT 10/31/22 demonstrates post treatment changes within both lower lobes and no FDG uptake within the stable 2cm RLL pulmonary nodule and no evidence of residual viable disease. There is a single focus of mild FDG uptake adjacent to the L second rib. On personal review this was present on his PET/CT 9/21 and has not changed, there is no visible correlate in the lung or the bone.  I discussed the finding with Dr. Markus Mahan in radiology who agrees it is consistent with a benign finding. Mr. Devora Stewart is doing well but continues to have residual congestion after having COVID in 02/22. He denies shortness of breath and continues to exercise daily. MEDICATIONS:   Current Outpatient Medications   Medication Sig Dispense Refill    Garlic 10 MG CAPS Take 10 mg by mouth      rosuvastatin (CRESTOR) 40 MG tablet Take 1 tablet by mouth daily 90 tablet 3    albuterol sulfate HFA (PROVENTIL;VENTOLIN;PROAIR) 108 (90 Base) MCG/ACT inhaler Inhale 2 puffs into the lungs every 4 hours as needed for Shortness of Breath or Wheezing 1 each 11    tiotropium-olodaterol (STIOLTO) 2.5-2.5 MCG/ACT AERS Inhale 2 puffs into the lungs daily 3 each 3    ipratropium (ATROVENT) 0.03 % nasal spray 2 sprays by Each Nostril route 3 times daily 1 each 11    metoprolol tartrate (LOPRESSOR) 50 MG tablet Take 1 tablet by mouth 2 times daily 180 tablet 3    Testosterone (ANDROGEL) 20.25 MG/ACT (1.62%) GEL gel Place 1 actuation onto the skin daily for 30 doses. 1 each 5    levothyroxine (SYNTHROID) 88 MCG tablet TAKE 1 TABLET BY MOUTH DAILY BEFORE BREAKFAST 90 tablet 3    QUNOL COQ10/UBIQUINOL/RANDY PO Take 200 mg by mouth daily      Omega-3 Fatty Acids (FISH OIL) 1000 MG CAPS Take 1 capsule by mouth daily      Multiple Vitamins-Minerals (MULTIVITAL-M PO) Take 1 tablet by mouth daily      Lancets MISC E11.9 Non insulin dep testing bs 1times daily; bill to MCR part B      TURMERIC PO Take 720 mg by mouth daily      ascorbic acid (VITAMIN C) 500 MG tablet Take 1,000 mg by mouth      aspirin 81 MG EC tablet Take 40.5 mg by mouth daily      vitamin D 25 MCG (1000 UT) CAPS Take 2,000 Units by mouth daily      furosemide (LASIX) 20 MG tablet Take 20 mg by mouth daily      triamcinolone (NASACORT) 55 MCG/ACT nasal inhaler 1 spray by Nasal route 2 times daily as needed       No current facility-administered medications for this encounter.      Facility-Administered Medications Ordered in Other Encounters Medication Dose Route Frequency Provider Last Rate Last Admin    diatrizoate meglumine-sodium (GASTROGRAFIN) 66-10 % solution 10 mL  10 mL Oral ONCE PRN Tati Mcdonald MD   10 mL at 10/31/22 1001      ALLERGIES:   Allergies   Allergen Reactions    Amoxicillin-Pot Clavulanate Nausea Only     Pt states he is not allergic  \"upsets my stomach\"    Bupropion Hives    [place] Flavor Hives    Varenicline Other (See Comments)     [facility]     PHYSICAL EXAMINATION:   ECOG Performance status 0  VITAL SIGNS:   /69   Pulse 82   Temp 98.1 °F (36.7 °C) (Oral)   Resp 16   Wt 195 lb 11.2 oz (88.8 kg)   SpO2 90%   BMI 27.29 kg/m²     GENERAL: The patient is well-developed, ambulatory, alert and in no acute distress. HEENT: Head is normocephalic, atraumatic. Pupils are equal, round and reactive to light and accommodation. RESP: no audible wheezes, no distress noted. Abdomen: soft and non tender   Neuro: alert and oriented; cranial nerves intact  MSK: normal gait          LABORATORY:   Lab Results   Component Value Date    WBC 7.1 03/08/2022    HGB 16.2 03/08/2022    HCT 49.0 03/08/2022    MCV 94.2 03/08/2022     03/08/2022     Lab Results   Component Value Date     (L) 03/31/2022    K 4.1 03/31/2022     03/31/2022    CO2 27 03/31/2022    BUN 29 (H) 03/31/2022    CREATININE 1.40 03/31/2022    GLUCOSE 147 (H) 03/31/2022    CALCIUM 8.9 03/31/2022    PROT 7.7 03/08/2022    LABALBU 3.1 (L) 03/08/2022    BILITOT 0.5 03/08/2022    ALKPHOS 122 03/08/2022    AST 20 03/08/2022    ALT 28 03/08/2022    GFRAA >60 03/31/2022    AGRATIO 0.7 (L) 03/08/2022    GLOB 4.6 (H) 03/08/2022     RADIOLOGY:  PET/CT 10/31/22 personally reviewed as per HPI. IMPRESSION:  Pura García is a  78 y.o. male who has recovered well from therapy with resolution of FDG avid disease in the RLL and uptake of most likely benign etiology in the GRICEL stable from 9/21.      PLAN:    CT chest and follow up in 6 months with radiation oncology or sooner as needed.     Matthew Quintanilla MD  11/2/2022

## 2022-11-29 ENCOUNTER — OFFICE VISIT (OUTPATIENT)
Dept: INTERNAL MEDICINE CLINIC | Facility: CLINIC | Age: 77
End: 2022-11-29
Payer: MEDICARE

## 2022-11-29 VITALS
BODY MASS INDEX: 26.6 KG/M2 | WEIGHT: 190 LBS | SYSTOLIC BLOOD PRESSURE: 137 MMHG | HEIGHT: 71 IN | DIASTOLIC BLOOD PRESSURE: 84 MMHG

## 2022-11-29 DIAGNOSIS — J01.10 ACUTE FRONTAL SINUSITIS, RECURRENCE NOT SPECIFIED: Primary | ICD-10-CM

## 2022-11-29 PROCEDURE — 99213 OFFICE O/P EST LOW 20 MIN: CPT | Performed by: INTERNAL MEDICINE

## 2022-11-29 PROCEDURE — 3017F COLORECTAL CA SCREEN DOC REV: CPT | Performed by: INTERNAL MEDICINE

## 2022-11-29 PROCEDURE — 3074F SYST BP LT 130 MM HG: CPT | Performed by: INTERNAL MEDICINE

## 2022-11-29 PROCEDURE — 3078F DIAST BP <80 MM HG: CPT | Performed by: INTERNAL MEDICINE

## 2022-11-29 PROCEDURE — G8417 CALC BMI ABV UP PARAM F/U: HCPCS | Performed by: INTERNAL MEDICINE

## 2022-11-29 PROCEDURE — 1123F ACP DISCUSS/DSCN MKR DOCD: CPT | Performed by: INTERNAL MEDICINE

## 2022-11-29 PROCEDURE — G8484 FLU IMMUNIZE NO ADMIN: HCPCS | Performed by: INTERNAL MEDICINE

## 2022-11-29 PROCEDURE — 4004F PT TOBACCO SCREEN RCVD TLK: CPT | Performed by: INTERNAL MEDICINE

## 2022-11-29 PROCEDURE — G8427 DOCREV CUR MEDS BY ELIG CLIN: HCPCS | Performed by: INTERNAL MEDICINE

## 2022-11-29 RX ORDER — DOXYCYCLINE HYCLATE 100 MG
100 TABLET ORAL 2 TIMES DAILY
Qty: 28 TABLET | Refills: 0 | Status: SHIPPED | OUTPATIENT
Start: 2022-11-29 | End: 2022-12-13

## 2022-11-29 ASSESSMENT — PATIENT HEALTH QUESTIONNAIRE - PHQ9
2. FEELING DOWN, DEPRESSED OR HOPELESS: 0
1. LITTLE INTEREST OR PLEASURE IN DOING THINGS: 0
SUM OF ALL RESPONSES TO PHQ QUESTIONS 1-9: 0
SUM OF ALL RESPONSES TO PHQ9 QUESTIONS 1 & 2: 0

## 2022-11-29 ASSESSMENT — ENCOUNTER SYMPTOMS
SORE THROAT: 0
SINUS PAIN: 1

## 2022-11-29 NOTE — PROGRESS NOTES
SUBJECTIVE:   Dow Felty is a 78 y.o. male seen for a visit regarding   Chief Complaint   Patient presents with    Sinusitis     Has facial pain, yellow/green mucous        Sinusitis  This is a new problem. The current episode started in the past 7 days (has chronic congestion since covid in feb-week ago headache started, more mucus out and post nasal; malaise-yellow and green mucus new). Associated symptoms include congestion. Pertinent negatives include no sore throat. Past Medical History, Past Surgical History, Family history, Social History, and Medications were all reviewed with the patient today and updated as necessary. Current Outpatient Medications   Medication Sig Dispense Refill    doxycycline hyclate (VIBRA-TABS) 100 MG tablet Take 1 tablet by mouth 2 times daily for 14 days 28 tablet 0    Garlic 10 MG CAPS Take 10 mg by mouth daily      rosuvastatin (CRESTOR) 40 MG tablet Take 1 tablet by mouth daily 90 tablet 3    albuterol sulfate HFA (PROVENTIL;VENTOLIN;PROAIR) 108 (90 Base) MCG/ACT inhaler Inhale 2 puffs into the lungs every 4 hours as needed for Shortness of Breath or Wheezing 1 each 11    tiotropium-olodaterol (STIOLTO) 2.5-2.5 MCG/ACT AERS Inhale 2 puffs into the lungs daily 3 each 3    metoprolol tartrate (LOPRESSOR) 50 MG tablet Take 1 tablet by mouth 2 times daily 180 tablet 3    Testosterone (ANDROGEL) 20.25 MG/ACT (1.62%) GEL gel Place 1 actuation onto the skin daily for 30 doses.  1 each 5    levothyroxine (SYNTHROID) 88 MCG tablet TAKE 1 TABLET BY MOUTH DAILY BEFORE BREAKFAST 90 tablet 3    QUNOL COQ10/UBIQUINOL/RANDY PO Take 200 mg by mouth daily      Omega-3 Fatty Acids (FISH OIL) 1000 MG CAPS Take 1 capsule by mouth daily      Multiple Vitamins-Minerals (MULTIVITAL-M PO) Take 1 tablet by mouth daily      Lancets MISC E11.9 Non insulin dep testing bs 1times daily; bill to MCR part B      TURMERIC PO Take 720 mg by mouth daily      ascorbic acid (VITAMIN C) 500 MG tablet Take 1,000 mg by mouth daily      aspirin 81 MG EC tablet Take 40.5 mg by mouth daily      vitamin D 25 MCG (1000 UT) CAPS Take 2,000 Units by mouth daily      triamcinolone (NASACORT) 55 MCG/ACT nasal inhaler 1 spray by Nasal route 2 times daily as needed       No current facility-administered medications for this visit. Allergies   Allergen Reactions    Amoxicillin-Pot Clavulanate Nausea Only     Pt states he is not allergic  \"upsets my stomach\"    Bupropion Hives    [place] Flavor Hives    Varenicline Other (See Comments)     [facility]     Patient Active Problem List   Diagnosis    Coronary artery disease involving native coronary artery of native heart    Situational anxiety    Allergic rhinitis    Essential hypertension    Impacted cerumen of right ear    Diabetes mellitus type 2, diet-controlled ([facility])    Pulmonary fibrosis ([facility])    Atrial fibrillation with rapid ventricular response ([facility])    Skin exam for malignant neoplasm    COPD (chronic obstructive pulmonary disease) ([facility])    Balance problem    Anxiety    Incisional hernia, without obstruction or gangrene    Mediastinal adenopathy    Squamous cell lung cancer, right ([facility])    UIP (usual interstitial pneumonitis) ([facility])    Benign prostatic hyperplasia with lower urinary tract symptoms    Lung nodule    Vitamin D deficiency    Rhinitis medicamentosa    Hypothyroidism    Diabetes mellitus ([facility] [address]    Prostate cancer screening    Chronic sinusitis    Low testosterone    History of kidney cancer    Depression    Sleep disturbance    Abdominal aortic aneurysm (AAA) without rupture    Chronic renal disease, stage III ([facility]) [[id number]]     Social History     Tobacco Use    Smoking status: Every Day     Packs/day: 0.50     Types: Cigarettes    Smokeless tobacco: Never    Tobacco comments:     Quit smokin-7 cigarettes per day   Substance Use Topics    Alcohol use: Yes          Review of Systems   Constitutional:  Negative for fever.    HENT:  Positive for congestion, postnasal drip and sinus pain. Negative for sore throat. Respiratory:          Has nodule followed in lung -had PET end of Oct       OBJECTIVE:  /84   Ht 5' 11\" (1.803 m)   Wt 190 lb (86.2 kg)   BMI 26.50 kg/m²      Physical Exam  Constitutional:       General: He is not in acute distress. Appearance: Normal appearance. HENT:      Nose:      Right Turbinates: Not swollen. Right Sinus: No maxillary sinus tenderness or frontal sinus tenderness. Left Sinus: Frontal sinus tenderness present. No maxillary sinus tenderness. Lymphadenopathy:      Cervical: No cervical adenopathy. Medical problems and test results were reviewed with the patient today. ASSESSMENT and PLAN     1. Acute frontal sinusitis, recurrence not specified  -     doxycycline hyclate (VIBRA-TABS) 100 MG tablet; Take 1 tablet by mouth 2 times daily for 14 days, Disp-28 tablet, R-0Normal   Treat sinus infection -use mucinex also  reviewed medications and side effects in detail     No follow-ups on file.

## 2022-12-07 ENCOUNTER — OFFICE VISIT (OUTPATIENT)
Dept: INTERNAL MEDICINE CLINIC | Facility: CLINIC | Age: 77
End: 2022-12-07
Payer: MEDICARE

## 2022-12-07 VITALS
DIASTOLIC BLOOD PRESSURE: 78 MMHG | WEIGHT: 189.5 LBS | SYSTOLIC BLOOD PRESSURE: 138 MMHG | BODY MASS INDEX: 26.53 KG/M2 | HEIGHT: 71 IN

## 2022-12-07 DIAGNOSIS — E03.9 HYPOTHYROIDISM, UNSPECIFIED TYPE: ICD-10-CM

## 2022-12-07 DIAGNOSIS — Z23 NEED FOR PROPHYLACTIC VACCINATION AGAINST STREPTOCOCCUS PNEUMONIAE (PNEUMOCOCCUS): ICD-10-CM

## 2022-12-07 DIAGNOSIS — Z00.00 MEDICARE ANNUAL WELLNESS VISIT, SUBSEQUENT: Primary | ICD-10-CM

## 2022-12-07 DIAGNOSIS — E11.9 TYPE 2 DIABETES MELLITUS WITHOUT COMPLICATION, WITHOUT LONG-TERM CURRENT USE OF INSULIN (HCC): ICD-10-CM

## 2022-12-07 DIAGNOSIS — Z12.5 SCREENING FOR PROSTATE CANCER: ICD-10-CM

## 2022-12-07 DIAGNOSIS — I10 ESSENTIAL HYPERTENSION: ICD-10-CM

## 2022-12-07 DIAGNOSIS — C34.91 SQUAMOUS CELL LUNG CANCER, RIGHT (HCC): ICD-10-CM

## 2022-12-07 LAB
BASOPHILS # BLD: 0.1 K/UL (ref 0–0.2)
BASOPHILS NFR BLD: 1 % (ref 0–2)
DIFFERENTIAL METHOD BLD: ABNORMAL
EOSINOPHIL # BLD: 0.1 K/UL (ref 0–0.8)
EOSINOPHIL NFR BLD: 1 % (ref 0.5–7.8)
ERYTHROCYTE [DISTWIDTH] IN BLOOD BY AUTOMATED COUNT: 13.8 % (ref 11.9–14.6)
HCT VFR BLD AUTO: 54.3 % (ref 41.1–50.3)
HGB BLD-MCNC: 18.4 G/DL (ref 13.6–17.2)
IMM GRANULOCYTES # BLD AUTO: 0 K/UL (ref 0–0.5)
IMM GRANULOCYTES NFR BLD AUTO: 0 % (ref 0–5)
LYMPHOCYTES # BLD: 1.4 K/UL (ref 0.5–4.6)
LYMPHOCYTES NFR BLD: 16 % (ref 13–44)
MCH RBC QN AUTO: 32.6 PG (ref 26.1–32.9)
MCHC RBC AUTO-ENTMCNC: 33.9 G/DL (ref 31.4–35)
MCV RBC AUTO: 96.1 FL (ref 82–102)
MONOCYTES # BLD: 0.7 K/UL (ref 0.1–1.3)
MONOCYTES NFR BLD: 8 % (ref 4–12)
NEUTS SEG # BLD: 6.7 K/UL (ref 1.7–8.2)
NEUTS SEG NFR BLD: 74 % (ref 43–78)
NRBC # BLD: 0 K/UL (ref 0–0.2)
PLATELET # BLD AUTO: 143 K/UL (ref 150–450)
PMV BLD AUTO: 11.3 FL (ref 9.4–12.3)
RBC # BLD AUTO: 5.65 M/UL (ref 4.23–5.6)
WBC # BLD AUTO: 8.9 K/UL (ref 4.3–11.1)

## 2022-12-07 PROCEDURE — G8484 FLU IMMUNIZE NO ADMIN: HCPCS | Performed by: INTERNAL MEDICINE

## 2022-12-07 PROCEDURE — 3074F SYST BP LT 130 MM HG: CPT | Performed by: INTERNAL MEDICINE

## 2022-12-07 PROCEDURE — 3078F DIAST BP <80 MM HG: CPT | Performed by: INTERNAL MEDICINE

## 2022-12-07 PROCEDURE — 3017F COLORECTAL CA SCREEN DOC REV: CPT | Performed by: INTERNAL MEDICINE

## 2022-12-07 PROCEDURE — G0439 PPPS, SUBSEQ VISIT: HCPCS | Performed by: INTERNAL MEDICINE

## 2022-12-07 PROCEDURE — G0009 ADMIN PNEUMOCOCCAL VACCINE: HCPCS | Performed by: INTERNAL MEDICINE

## 2022-12-07 PROCEDURE — 90677 PCV20 VACCINE IM: CPT | Performed by: INTERNAL MEDICINE

## 2022-12-07 PROCEDURE — 1123F ACP DISCUSS/DSCN MKR DOCD: CPT | Performed by: INTERNAL MEDICINE

## 2022-12-07 PROCEDURE — 3046F HEMOGLOBIN A1C LEVEL >9.0%: CPT | Performed by: INTERNAL MEDICINE

## 2022-12-07 RX ORDER — MULTIVIT WITH MINERALS/LUTEIN
1000 TABLET ORAL DAILY
COMMUNITY

## 2022-12-07 ASSESSMENT — PATIENT HEALTH QUESTIONNAIRE - PHQ9
2. FEELING DOWN, DEPRESSED OR HOPELESS: 0
SUM OF ALL RESPONSES TO PHQ QUESTIONS 1-9: 0
3. TROUBLE FALLING OR STAYING ASLEEP: 0
SUM OF ALL RESPONSES TO PHQ QUESTIONS 1-9: 0

## 2022-12-07 ASSESSMENT — LIFESTYLE VARIABLES
HOW OFTEN DO YOU HAVE A DRINK CONTAINING ALCOHOL: MONTHLY OR LESS
HOW MANY STANDARD DRINKS CONTAINING ALCOHOL DO YOU HAVE ON A TYPICAL DAY: 1 OR 2

## 2022-12-07 NOTE — PROGRESS NOTES
Medicare Annual Wellness Visit    Leena Simental is here for Medicare AWV (Pt here for his AWE and he is fasting today )    Assessment & Plan   Medicare annual wellness visit, subsequent  Hypothyroidism, unspecified type  Squamous cell lung cancer, right ([facility] [address]  Type 2 diabetes mellitus without complication, without long-term current use of insulin ([facility] [address]  -     CBC with Auto Differential; Future  -     Comprehensive Metabolic Panel; Future  -     Lipid Panel; Future  -     Hemoglobin A1C; Future  Essential hypertension  Need for prophylactic vaccination against Streptococcus pneumoniae (pneumococcus)  -     Pneumococcal, PCV20, PREVNAR 20, (age 25 yrs+), IM, PF  Screening for prostate cancer  -     PSA Screening; Future    Recommendations for Preventive Services Due: see orders and patient instructions/AVS.  Recommended screening schedule for the next 5-10 years is provided to the patient in written form: see Patient Instructions/AVS.   Last colonoscopy 2/2017-he plans to contact-got letter-can check psa -sees Dr Selam Matthew -check A1C , has been 6.5 -6.6 --on diet  Return in 6 months (on 6/7/2023) for Medicare Annual Wellness Visit in 1 year, For medication assessment. Subjective   The following acute and/or chronic problems were also addressed today:  Thyroid -on levothyroxine; treated for lung ca yearago--on statin for lipid    Patient's complete Health Risk Assessment and screening values have been reviewed and are found in Flowsheets. The following problems were reviewed today and where indicated follow up appointments were made and/or referrals ordered. Positive Risk Factor Screenings with Interventions:                    Vision Screen:  Do you have difficulty driving, watching TV, or doing any of your daily activities because of your eyesight?: No  Have you had an eye exam within the past year?: (!) No  No results found.     Interventions:   Patient declines any further evaluation or treatment    Safety:  Do you have either shower bars, grab bars, non-slip mats or non-slip surfaces in your shower or bathtub?: (!) No  Interventions:  Patient declined any further interventions or treatment     Advanced Directives:  Do you have a Living Will?: (!) No    Intervention:  has NO advanced directive - information provided        Tobacco Use:  Tobacco Use: High Risk    Smoking Tobacco Use: Every Day    Smokeless Tobacco Use: Never    Passive Exposure: Not on file     E-cigarette/Vaping       Questions Responses    E-cigarette/Vaping Use     Start Date     Passive Exposure     Quit Date     Counseling Given     Comments         Interventions:  Has 4-5 cig per day  See AVS for addional education material  See A/P for any pertinent orders                        Objective   Vitals:    12/07/22 1051   BP: 138/78   Weight: 189 lb 8 oz (86 kg)   Height: 5' 11\" (1.803 m)      Body mass index is 26.43 kg/m². Physical Exam         Allergies   Allergen Reactions    Amoxicillin-Pot Clavulanate Nausea Only     Pt states he is not allergic  \"upsets my stomach\"    Bupropion Hives    [place] Flavor Hives    Varenicline Other (See Comments)     [place]     Prior to Visit Medications    Medication Sig Taking?  Authorizing Provider   Ascorbic Acid (VITAMIN C) 1000 MG tablet Take 1,000 mg by mouth daily Yes Historical Provider, MD   doxycycline hyclate (VIBRA-TABS) 100 MG tablet Take 1 tablet by mouth 2 times daily for 14 days Yes Carly Cutler MD   Garlic 10 MG CAPS Take 10 mg by mouth daily Yes Historical Provider, MD   rosuvastatin (CRESTOR) 40 MG tablet Take 1 tablet by mouth daily  Patient taking differently: Take 40 mg by mouth nightly Yes Adarsh Cline DO   albuterol sulfate HFA (PROVENTIL;VENTOLIN;PROAIR) 108 (90 Base) MCG/ACT inhaler Inhale 2 puffs into the lungs every 4 hours as needed for Shortness of Breath or Wheezing Yes Casandra Chung MD   tiotropium-olodaterol (STIOLTO) 2.5-2.5 MCG/ACT AERS Inhale 2 puffs into the lungs daily Yes Kp Núñez MD   metoprolol tartrate (LOPRESSOR) 50 MG tablet Take 1 tablet by mouth 2 times daily Yes Rebecca Gardiner DO   levothyroxine (SYNTHROID) 88 MCG tablet TAKE 1 TABLET BY MOUTH DAILY BEFORE BREAKFAST Yes MD Lisa Mathistati [place] COQ10/UBIQUINOL/RANDY PO Take 200 mg by mouth daily Yes Historical Provider, MD   Omega-3 Fatty Acids (FISH OIL) 1000 MG CAPS Take 1 capsule by mouth daily Yes Historical Provider, MD   Multiple Vitamins-Minerals (MULTIVITAL-M PO) Take 1 tablet by mouth daily Yes Historical Provider, MD   Lancets MISC E11.9 Non insulin dep testing bs 1times daily; bill to [facility] part B Yes Ar Automatic Reconciliation   TURMERIC PO Take 720 mg by mouth daily Yes Ar Automatic Reconciliation   aspirin 81 MG EC tablet Take 40.5 mg by mouth daily Yes Ar Automatic Reconciliation   vitamin D 25 MCG (1000 UT) CAPS Take 2,000 Units by mouth daily Yes Ar Automatic Reconciliation   triamcinolone (NASACORT) 55 MCG/ACT nasal inhaler 1 spray by Nasal route 2 times daily as needed Yes Ar Automatic Reconciliation   Testosterone (ANDROGEL) 20.25 MG/ACT (1.62%) GEL gel Place 1 actuation onto the skin daily for 30 doses.   LETICIA Lane - NP       CareTeam (Including outside providers/suppliers regularly involved in providing care):   Patient Care Team:  Christine Gallegos MD as PCP - Kalia Barnes MD as PCP - [facility] Empaneled Provider  Dr Delfina Mccallum, Dr Rodriguez, Dr Aracelis Delgado; Dr Narvaez Catherine and updated this visit:  Tobacco  Allergies  Meds  Problems  Med Hx  Surg Hx  Soc Hx  Fam Hx

## 2022-12-07 NOTE — PATIENT INSTRUCTIONS
Learning About Vision Tests  What are vision tests? The four most common vision tests are visual acuity tests, refraction, visual field tests, and color vision tests. Visual acuity (sharpness) tests  These tests are used: To see if you need glasses or contact lenses. To monitor an eye problem. To check an eye injury. Visual acuity tests are done as part of routine exams. You may also have this test when you get your 's license or apply for some types of jobs. Visual field tests  These tests are used: To check for vision loss in any area of your range of vision. To screen for certain eye diseases. To look for nerve damage after a stroke, head injury, or other problem that could reduce blood flow to the brain. Refraction and color tests  A refraction test is done to find the right prescription for glasses and contact lenses. A color vision test is done to check for color blindness. Color vision is often tested as part of a routine exam. You may also have this test when you apply for a job where recognizing different colors is important, such as , electronics, or the [employer]. How are vision tests done? Visual acuity test   You cover one eye at a time. You read aloud from a wall chart across the room. You read aloud from a small card that you hold in your hand. Refraction   You look into a special device. The device puts lenses of different strengths in front of each eye to see how strong your glasses or contact lenses need to be. Visual field tests   Your doctor may have you look through special machines. Or your doctor may simply have you stare straight ahead while they move a finger into and out of your field of vision. Color vision test   You look at pieces of printed test patterns in various colors. You say what number or symbol you see. Your doctor may have you trace the number or symbol using a pointer. How do these tests feel?   There is very little chance of having a problem from this test. If dilating drops are used for a vision test, they may make the eyes sting and cause a medicine taste in the mouth. Follow-up care is a key part of your treatment and safety. Be sure to make and go to all appointments, and call your doctor if you are having problems. It's also a good idea to know your test results and keep a list of the medicines you take. Where can you learn more? Go to [email or web address] and sign in to your [employer] account. Enter G551 in the [employer] box to learn more about \"Learning About Vision Tests. \"     If you do not have an account, please click on the \"Sign Up Now\" link. Current as of: January 24, 2022               Content Version: 13.4  © 2006-2022 Healthwise, [employer]. Care instructions adapted under license by [facility] ([facility]). If you have questions about a medical condition or this instruction, always ask your healthcare professional. [place] [address] any warranty or liability for your use of this information. Advance Directives: Care Instructions  Overview  An advance directive is a legal way to state your wishes at the end of your life. It tells your family and your doctor what to do if you can't say what you want. There are two main types of advance directives. You can change them any time your wishes change. Living will. This form tells your family and your doctor your wishes about life support and other treatment. The form is also called a declaration. Medical power of . This form lets you name a person to make treatment decisions for you when you can't speak for yourself. This person is called a health care agent (health care proxy, health care surrogate). The form is also called a durable power of  for health care.   If you do not have an advance directive, decisions about your medical care may be made by a family member, or by a doctor or a  who doesn't know you. It may help to think of an advance directive as a gift to the people who care for you. If you have one, they won't have to make tough decisions by themselves. Follow-up care is a key part of your treatment and safety. Be sure to make and go to all appointments, and call your doctor if you are having problems. It's also a good idea to know your test results and keep a list of the medicines you take. What should you include in an advance directive? Many states have a unique advance directive form. (It may ask you to address specific issues.) Or you might use a universal form that's approved by many states. If your form doesn't tell you what to address, it may be hard to know what to include in your advance directive. Use the questions below to help you get started. Who do you want to make decisions about your medical care if you are not able to? What life-support measures do you want if you have a serious illness that gets worse over time or can't be cured? What are you most afraid of that might happen? (Maybe you're afraid of having pain, losing your independence, or being kept alive by machines.)  Where would you prefer to die? (Your home? A hospital? A nursing home?)  Do you want to donate your organs when you die? Do you want certain Yazidism practices performed before you die? When should you call for help? Be sure to contact your doctor if you have any questions. Where can you learn more? Go to [email or web address] and sign in to your [employer] account. Enter R264 in the [facility] box to learn more about \"Advance Directives: Care Instructions. \"     If you do not have an account, please click on the \"Sign Up Now\" link. Current as of: June 16, 2022               Content Version: 13.4  © 4875-2480 Healthwise, [employer]. Care instructions adapted under license by [facility] ([facility]).  If you have questions about a medical condition or this instruction, always ask your healthcare professional. [place] [address] any warranty or liability for your use of this information. Personalized Preventive Plan for [employer] - 12/7/2022  Medicare offers a range of preventive health benefits. Some of the tests and screenings are paid in full while other may be subject to a deductible, co-insurance, and/or copay. Some of these benefits include a comprehensive review of your medical history including lifestyle, illnesses that may run in your family, and various assessments and screenings as appropriate. After reviewing your medical record and screening and assessments performed today your provider may have ordered immunizations, labs, imaging, and/or referrals for you. A list of these orders (if applicable) as well as your Preventive Care list are included within your After Visit Summary for your review. Other Preventive Recommendations:    A preventive eye exam performed by an eye specialist is recommended every 1-2 years to screen for glaucoma; cataracts, macular degeneration, and other eye disorders. A preventive dental visit is recommended every 6 months. Try to get at least 150 minutes of exercise per week or 10,000 steps per day on a pedometer . Order or download the FREE \"Exercise & Physical Activity: Your Everyday Guide\" from [employer]. Call 9-226.504.1126 or search [employer] online. You need 8813-1898 mg of calcium and 2506-6797 IU of vitamin D per day. It is possible to meet your calcium requirement with diet alone, but a vitamin D supplement is usually necessary to meet this goal.  When exposed to the sun, use a sunscreen that protects against both UVA and UVB radiation with an SPF of 30 or greater. Reapply every 2 to 3 hours or after sweating, drying off with a towel, or swimming. Always wear a seat belt when traveling in a car.  Always wear a helmet when riding a bicycle or motorcycle.

## 2022-12-08 LAB
ALBUMIN SERPL-MCNC: 4 G/DL (ref 3.2–4.6)
ALBUMIN/GLOB SERPL: 1.2 (ref 0.4–1.6)
ALP SERPL-CCNC: 102 U/L (ref 50–136)
ALT SERPL-CCNC: 35 U/L (ref 12–65)
ANION GAP SERPL CALC-SCNC: 8 MMOL/L (ref 2–11)
AST SERPL-CCNC: 19 U/L (ref 15–37)
BILIRUB SERPL-MCNC: 1 MG/DL (ref 0.2–1.1)
BUN SERPL-MCNC: 27 MG/DL (ref 8–23)
CALCIUM SERPL-MCNC: 9.3 MG/DL (ref 8.3–10.4)
CHLORIDE SERPL-SCNC: 106 MMOL/L (ref 101–110)
CHOLEST SERPL-MCNC: 134 MG/DL
CO2 SERPL-SCNC: 23 MMOL/L (ref 21–32)
CREAT SERPL-MCNC: 1.4 MG/DL (ref 0.8–1.5)
EST. AVERAGE GLUCOSE BLD GHB EST-MCNC: 154 MG/DL
GLOBULIN SER CALC-MCNC: 3.3 G/DL (ref 2.8–4.5)
GLUCOSE SERPL-MCNC: 126 MG/DL (ref 65–100)
HBA1C MFR BLD: 7 % (ref 4.8–5.6)
HDLC SERPL-MCNC: 53 MG/DL (ref 40–60)
HDLC SERPL: 2.5
LDLC SERPL CALC-MCNC: 64.8 MG/DL
POTASSIUM SERPL-SCNC: 4.7 MMOL/L (ref 3.5–5.1)
PROT SERPL-MCNC: 7.3 G/DL (ref 6.3–8.2)
PSA SERPL-MCNC: 1.4 NG/ML
SODIUM SERPL-SCNC: 137 MMOL/L (ref 133–143)
TRIGL SERPL-MCNC: 81 MG/DL (ref 35–150)
VLDLC SERPL CALC-MCNC: 16.2 MG/DL (ref 6–23)

## 2022-12-12 ENCOUNTER — TELEPHONE (OUTPATIENT)
Dept: INTERNAL MEDICINE CLINIC | Facility: CLINIC | Age: 77
End: 2022-12-12

## 2022-12-12 DIAGNOSIS — D58.2 ELEVATED HEMOGLOBIN (HCC): Primary | ICD-10-CM

## 2022-12-12 DIAGNOSIS — R79.89 LOW TESTOSTERONE IN MALE: ICD-10-CM

## 2022-12-12 DIAGNOSIS — E11.9 TYPE 2 DIABETES MELLITUS WITHOUT COMPLICATION, WITHOUT LONG-TERM CURRENT USE OF INSULIN (HCC): ICD-10-CM

## 2022-12-12 RX ORDER — METFORMIN HYDROCHLORIDE 500 MG/1
500 TABLET, EXTENDED RELEASE ORAL
Qty: 90 TABLET | Refills: 1 | Status: CANCELLED | OUTPATIENT
Start: 2022-12-12

## 2022-12-12 NOTE — TELEPHONE ENCOUNTER
----- Message from Karma Brown MD sent at 12/8/2022  2:20 PM EST -----  Hb over 18-this can be the testosterone -the platelets a fraction below normal-should repeat the cbc with testosterone level in a month--the A1C higher at 7.0% now--consider start of metformin XR 500mg qd with food for this and would check in 6 mo-lipid good

## 2022-12-12 NOTE — TELEPHONE ENCOUNTER
Pt notified of lab results per Dr. Niki Sanchez. Pt agreed to recheck on cbc and testosterone in 1 month. Agreed per Dr. Niki Sanchez to start Metformin  mg and to recheck in 6 months. Orders placed and rx pended. While on the phone, pt also mentioned he had a bad experience at lab last week. States the entire blood draw was very painful. Pt states that today, a week later he still has bad bruising up to his bicep. Advised pt that if bruising doesn't go away to let us know. He agreed.

## 2022-12-14 DIAGNOSIS — E11.9 TYPE 2 DIABETES MELLITUS WITHOUT COMPLICATION, WITHOUT LONG-TERM CURRENT USE OF INSULIN (HCC): Primary | ICD-10-CM

## 2022-12-14 RX ORDER — METFORMIN HYDROCHLORIDE 500 MG/1
500 TABLET, EXTENDED RELEASE ORAL
Qty: 90 TABLET | Refills: 3 | Status: SHIPPED | OUTPATIENT
Start: 2022-12-14

## 2022-12-21 ENCOUNTER — TELEPHONE (OUTPATIENT)
Dept: UROLOGY | Age: 77
End: 2022-12-21

## 2022-12-21 DIAGNOSIS — R79.89 LOW TESTOSTERONE IN MALE: ICD-10-CM

## 2022-12-21 RX ORDER — TESTOSTERONE 16.2 MG/G
1 GEL TRANSDERMAL DAILY
Qty: 1 EACH | Refills: 5 | Status: SHIPPED | OUTPATIENT
Start: 2022-12-21 | End: 2023-01-20

## 2022-12-21 NOTE — TELEPHONE ENCOUNTER
Pt called and requests rf of testosterone androgel 1.62% to be sent to [facility] in [facility] 103 /d/h

## 2023-02-02 ENCOUNTER — PATIENT MESSAGE (OUTPATIENT)
Dept: PULMONOLOGY | Age: 78
End: 2023-02-02

## 2023-02-02 DIAGNOSIS — J44.9 CHRONIC OBSTRUCTIVE PULMONARY DISEASE, UNSPECIFIED COPD TYPE (HCC): ICD-10-CM

## 2023-02-02 NOTE — TELEPHONE ENCOUNTER
From: Kimberley Fothergill  To: Dr. Byrnes [place]: 2/2/2023 1:46 PM EST  Subject: [place] stella Cueto - please call in a refill for the above. Its at [facility], [phone]. I am almost out.   Thank you.  Sahara Joe

## 2023-02-14 ENCOUNTER — OFFICE VISIT (OUTPATIENT)
Dept: PULMONOLOGY | Age: 78
End: 2023-02-14
Payer: MEDICARE

## 2023-02-14 VITALS
RESPIRATION RATE: 16 BRPM | TEMPERATURE: 97.2 F | HEIGHT: 70 IN | SYSTOLIC BLOOD PRESSURE: 130 MMHG | WEIGHT: 195 LBS | HEART RATE: 90 BPM | DIASTOLIC BLOOD PRESSURE: 78 MMHG | OXYGEN SATURATION: 92 % | BODY MASS INDEX: 27.92 KG/M2

## 2023-02-14 DIAGNOSIS — J44.9 CHRONIC OBSTRUCTIVE PULMONARY DISEASE, UNSPECIFIED COPD TYPE (HCC): ICD-10-CM

## 2023-02-14 DIAGNOSIS — C34.91 SQUAMOUS CELL LUNG CANCER, RIGHT (HCC): ICD-10-CM

## 2023-02-14 DIAGNOSIS — J84.112 IPF (IDIOPATHIC PULMONARY FIBROSIS) (HCC): Primary | ICD-10-CM

## 2023-02-14 DIAGNOSIS — J31.0 CHRONIC RHINITIS: ICD-10-CM

## 2023-02-14 PROCEDURE — 1123F ACP DISCUSS/DSCN MKR DOCD: CPT | Performed by: INTERNAL MEDICINE

## 2023-02-14 PROCEDURE — 3023F SPIROM DOC REV: CPT | Performed by: INTERNAL MEDICINE

## 2023-02-14 PROCEDURE — 3075F SYST BP GE 130 - 139MM HG: CPT | Performed by: INTERNAL MEDICINE

## 2023-02-14 PROCEDURE — G8417 CALC BMI ABV UP PARAM F/U: HCPCS | Performed by: INTERNAL MEDICINE

## 2023-02-14 PROCEDURE — 3017F COLORECTAL CA SCREEN DOC REV: CPT | Performed by: INTERNAL MEDICINE

## 2023-02-14 PROCEDURE — G8427 DOCREV CUR MEDS BY ELIG CLIN: HCPCS | Performed by: INTERNAL MEDICINE

## 2023-02-14 PROCEDURE — 99214 OFFICE O/P EST MOD 30 MIN: CPT | Performed by: INTERNAL MEDICINE

## 2023-02-14 PROCEDURE — 4004F PT TOBACCO SCREEN RCVD TLK: CPT | Performed by: INTERNAL MEDICINE

## 2023-02-14 PROCEDURE — G8484 FLU IMMUNIZE NO ADMIN: HCPCS | Performed by: INTERNAL MEDICINE

## 2023-02-14 PROCEDURE — 3078F DIAST BP <80 MM HG: CPT | Performed by: INTERNAL MEDICINE

## 2023-02-14 NOTE — PROGRESS NOTES
Dr. Yaa Yin MD  [facility] , [address], [address] [place] [address]  [phone]    Patient Name:  Marine Brunner  YOB: 1945  Office Visit: 2/14/2023    ASSESSMENT AND PLAN:  (Medical Decision Making)  Marine Brunner is a 78 y.o. 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 .    1. IPF (idiopathic pulmonary fibrosis) ([facility]  Patient is currently not interested in any anti-fibrotic therapy. We discussed oxygen therapy and he isn't all that interested despite the benefits we discussed. Will check overnight oximetry. 2. Chronic obstructive pulmonary disease, unspecified COPD type ([facility]  Continue Stiolto and as needed albuterol.   - tiotropium-olodaterol (STIOLTO) 2.5-2.5 MCG/ACT AERS; Inhale 2 puffs into the lungs daily  Dispense: 3 each; Refill: 3  - Pulse oximetry, overnight    3. Chronic rhinitis   Patient thinks is constantly runny nose is a likely contributor to his dyspnea. It is also a nuisance. I think that his significant combined COPD and lung fibrosis explains his shortness of breath. However there may be something to offer to help with all of his sinus congestion for symptomatic purposes. - External Referral To ENT    4. Squamous cell lung cancer, right ([facility]   Followed by Dr. Travis Livingston every 6 months with imaging after radiation therapy was completed. Next CT is in May. Orders Placed This Encounter   Medications    tiotropium-olodaterol (STIOLTO) 2.5-2.5 MCG/ACT AERS     Sig: Inhale 2 puffs into the lungs daily     Dispense:  3 each     Refill:  3     No orders of the defined types were placed in this encounter. Follow-up and Dispositions    Return in about 3 months (around 5/14/2023) for Ambulatory Sat at next Visit. Monisha Gomez MD    Total time for encounter on day of encounter was 34 minutes.   This time includes chart prep, review of tests/procedures, review of other provider's notes, documentation and counseling patient regarding disease process and medications. _____________________________________________________________________________________________________________________    Reason for Visit:  COPD      History of Present Illness:     Toya Byrd Is a 78 y.o. male with h/o pulmonary fibrosis, COPD with emphysema, RLL SCC T1bN0 Stage 1A2 (dx 10/2021) active smoking who follows  up s/p radiation therapy by Dr. Faizan Coates. He reports she has reduced his cardiovascular activity but continues to lift weights regularly. He admits to dyspnea on hills or with exertion but doesn't think this has progressed much since a year ago    COVID-19 was rough in February 2022. He had some significant shortness of breath as well as lower extremity edema which was treated with diuretics and compression stockings. The swelling in his legs has improved. He has slowly increased his exercise at the gym. He says he has not been doing aerobic activity as much as he should. Zyrtec a bit with his nasal congestion, but he finds it has gotten even worse. nasal sprays and Singulair did not help. He is concerned about chronic sinusitis. For COPD he has been using stiolto and it is expensive. In the past when he was on triple inhaler therapy he had lots of troubles with oral candidiasis. We discuss the scarring lung disease which is present on his CT of the chest and agree to keep close tabs on his oxygen levels. His PFTs show a disproportionately reduced DLCO as is characteristic of chronic pulmonary fibrosis with emphysema. Today his oxygen level is low after exertion. He does not want to initiate oxygen however.     He continues to smoke         Tobacco Use: High Risk    Smoking Tobacco Use: Every Day    Smokeless Tobacco Use: Never    Passive Exposure: Not on file         Physical exam:    Vitals:    02/14/23 1627   BP: 130/78   Pulse: 90   Resp: 16   Temp: 97.2 °F (36.2 °C)   SpO2: 92%   Weight: 195 lb (88.5 kg)   Height: 5' 10\" (1.778 m)       Body mass index is 27.98 kg/m². General Appearance: The patient is pleasant and in no respiratory distress. HEENT: PERRLA. Conjunctivae unremarkable. Nasal mucosa is without epistaxis, exudate, or polyps. Gums and dentition are unremarkable. There is no oropharyngeal narrowing. Neck/Lymphatic:  Symmetrical with no elevation of jugular venous pulsation. Trachea midline. No thyroid enlargement. No cervical adenopathy. Lungs:  Normal respiratory effort with symmetrical lung expansion. Breath sounds diminished with a few basilar rales. Heart:  RRR  Abdomen:  Soft and non-tender. No hepatosplenomegaly. Bowel sounds are normal.    Extremity:  No edema, clubbing or cyanosis  Neuro: The patient is alert and oriented to person, place, and time. Memory appears intact and mood is normal.  No gross sensorimotor deficits are present. DIAGNOSTIC TESTS:                                                                                                             Spirometry:   Date 08/18/2022        FVC 3.86 (90%)        FEV1 2.68 (83%)        FEV1/FVC 0.70        FEF 25-75%         Bronchodilator Response         TLC 6.17 (91%)        RV 2.25 (91%)        DLCO 10.2 (34%)        No flowsheet data found. Echo:  04/13/22    TRANSTHORACIC ECHOCARDIOGRAM (TTE) COMPLETE (CONTRAST/BUBBLE/3D PRN) 04/13/2022  6:31 PM (Final)    Narrative  This is a summary report. The complete report is available in the patient's medical record. If you cannot access the medical record, please contact the sending organization for a detailed fax or copy. Left Ventricle: Left ventricle size is normal. Normal wall thickness. Normal left ventricular systolic function with a visually estimated EF of 60 - 65%.     Signed by: Maya Gama MD on 4/13/2022  6:31 PM      Labs:   Lab Results   Component Value Date/Time     12/07/2022 12:23 PM    K 4.7 12/07/2022 12:23 PM     12/07/2022 12:23 PM    CO2 23 12/07/2022 12:23 PM    BUN 27 12/07/2022 12:23 PM    CREATININE 1.40 12/07/2022 12:23 PM    GLUCOSE 126 12/07/2022 12:23 PM    CALCIUM 9.3 12/07/2022 12:23 PM      Lab Results   Component Value Date    WBC 8.9 12/07/2022    HGB 18.4 (H) 12/07/2022    HCT 54.3 (H) 12/07/2022    MCV 96.1 12/07/2022     (L) 12/07/2022     Lab Results   Component Value Date    DDIMER 1.25 (H) 03/08/2022     Lab Results   Component Value Date/Time    ALKPHOS 102 12/07/2022 12:23 PM    ALKPHOS 122 03/08/2022 11:17 AM    ALT 35 12/07/2022 12:23 PM    AST 19 12/07/2022 12:23 PM    PROT 7.3 12/07/2022 12:23 PM    BILITOT 1.0 12/07/2022 12:23 PM    LABALBU 4.0 12/07/2022 12:23 PM     Lab Results   Component Value Date     (H) 03/08/2022     No results for input(s): PHAPOC, IYA1PSUN, IV6EUGW, YQT3LTN, BE in the last 72 hours. Imaging:  CXR: No results found for this or any previous visit from the past 365 days. CT WITHOUT CONTRAST:   CT CHEST WO CONTRAST 07/25/2022    Narrative  EXAM: Chest CT without contrast.  INDICATION: Patient with known right lower lobe squamous cell carcinoma status  post radiation. Follow-up exam.  COMPARISON: Chest CT dated April 21, 2022, PET/CT September 23, 2021, chest CT  dated February 2021. Multiple axial images were obtained through the chest.  Radiation dose reduction  techniques were used for this study: All CT scans performed at this facility  use one or all of the following: Automated exposure control, adjustment of the  mA and/or kVp according to patient's size, iterative reconstruction. FINDINGS:  LUNGS/PLEURA:  Central airways are clear. There is bilateral lower lobe cylindrical  bronchiectasis. Moderate centrilobular and paraseptal emphysematous changes of  the lungs.  Redemonstrated right lower lobe solid pulmonary nodule measuring  grossly 17 x 23 mm which is not appear significant changed from recent  comparison April 21, 2022. Adjacent post radiation change does limit evaluation  for exact size measurements as well as irregularity of this nodule. There is  diffuse interlobular septal thickening of the right lower lobe in the region of  this pulmonary nodule. There is additionally advanced paraseptal emphysematous  changes limiting evaluation of this region. MEDIASTINUM:  Severe atherosclerosis of the thoracic aorta and coronary arteries. Heart is  normal in size. Esophagus is unremarkable. Unchanged borderline enlarged 10 mm  precarinal lymph node. There is an enlarged AP window lymph node measuring 13 mm  which appears unchanged dating back to February 2021 as well as nearly adjacent  prominent AP window/prevascular space lymph node which appears unchanged. BONES AND SOFT TISSUES:  Subcutaneous soft tissues are within normal limits. No acute or aggressive  osseous abnormality. UPPER ABDOMEN:  Right nephrectomy bed partially imaged. No adrenal nodule. Impression  1. Grossly unchanged right lower lobe pulmonary nodule measuring 17 x 23 mm in  greatest axial dimension which appears unchanged from recent chest CT April 21, 2022 although increased in size from more remote comparisons. There is post  radiation change about this nodule and parenchymal scarring. It is difficult to  completely exclude lymphangitic spread of malignancy although given the  preservation of the lung base and superior segment of the right lower lobe this  is felt less likely. Recommend continued attention on follow-up. 2. Chronically stable enlarged AP window lymph node and prominent precarinal and  prevascular space lymph nodes.     PET SCAN:   Nuclear Medicine:   PET CT SKULL BASE TO MID THIGH 10/31/2022    Narrative  EXAMINATION: PET CT SKULL BASE TO MID THIGH 10/31/2022 11:31 AM    ACCESSION NUMBER: [id number]    COMPARISON: Most recent chest CT dated 7/25/2022, PET/CT dated 9/23/2021    INDICATION: Malignant neoplasm of unspecified part of right bronchus or lung;  Malignant neoplasm of lower lobe, right bronchus or lung    TECHNIQUE:   Radiopharmaceutical: 12.66 mCi F18-FDG IV. Positron emission  tomography (PET) with concurrently acquired computed tomography (CT) for  attenuation correction and anatomical localization imaging; skull base to  mid-thigh. Blood glucose at the time of tracer administration is 180 mg/dl, which is  adequate for imaging. Approximately 60 minutes after administration of the  radiopharmaceutical imaging was initiated. SUV max calculated from patient body  wt. Noncaloric dilute oral contrast is given. For this CT scanner at least one of the following techniques is utilized to  decrease patient radiation exposure: Automatic exposure control, modulation of  MA and KVP based on patient weight, and iterative reconstruction. FINDINGS:  Head/Neck:  No hypermetabolic cervical lymphadenopathy. No abnormal radiotracer uptake  within the brain, however the normal intense uptake limits evaluation. Chest:  Moderate upper lobe predominant emphysematous changes. Redemonstrated bibasilar  predominant reticulation within architectural distortion, compatible with  posttreatment changes. The posttreatment changes in the lung bases demonstrate  mild diffuse FDG uptake. 2 cm right lower lobe pulmonary nodule demonstrates no  focal FDG uptake. No hypermetabolic hilar or mediastinal lymphadenopathy. Abdomen/Pelvis:  Postsurgical changes of a right nephrectomy. No abnormal FDG uptake within the  liver. Mild aneurysmal dilation of the infrarenal abdominal aorta, measuring up  to 3.5 cm. Physiologic uptake within the gastrointestinal and genitourinary  tracts. Calcified atherosclerotic disease. Bones:  Mild focal metabolic activity within the left second rib anteriorly. No discrete  lesion or fracture is evident. Impression  1.   Posttreatment changes within both lower lobes, with no focal FDG uptake  within the stable 2 cm right lower lobe pulmonary nodule. No evidence of  residual viable disease within this nodule. 2.  Single focus of mild FDG uptake within the left second rib anteriorly  without discrete lesion or traumatic findings. Uncertain the clinical  significance of this uptake. Attention to the left second rib on follow-up  studies. 3.  No evidence of metastatic disease within the abdomen and pelvis. 4.  Infrarenal abdominal aortic aneurysm measuring up to 3.5 cm. PFTs:   No flowsheet data found. No results found for this or any previous visit. No results found for this or any previous visit. FeNO: No results found for this or any previous visit. FeNO and Likelihood of Eosinophilic Asthma   Unlikely Intermediate Likely   <25 ppb 25-50 ppb >50ppb   Exercise Oximetry:  Echo:   TRANSTHORACIC ECHOCARDIOGRAM (TTE) COMPLETE (CONTRAST/BUBBLE/3D PRN) 04/13/2022    Narrative  This is a summary report. The complete report is available in the patient's medical record. If you cannot access the medical record, please contact the sending organization for a detailed fax or copy. Left Ventricle: Left ventricle size is normal. Normal wall thickness. Normal left ventricular systolic function with a visually estimated EF of 60 - 65%.     Past Medical History:   Diagnosis Date    AAA (abdominal aortic aneurysm)      35 mm on CT scan, US stable 35 mm    Abnormal chest x-ray 3/19/2013    Adenopathy 3/19/2013    Lymph glands    Allergic rhinitis, cause unspecified 3/19/2013    Atrial fibrillation (HCC)     after kidney surgery and wore monitor but no more a fib events noted    CAD (coronary artery disease)     Followed by Dr. Darwin Spann at [address])     right kidney cancer followed by right nephrectomy    Chronic airway obstruction, not elsewhere classified 3/19/2013    Diabetes ([address]     does not check sugars, last A1C 6.3    Diabetes mellitus ([address] 3/19/2013    Hashimoto's disease     Pulmonary fibrosis (HCC)     Followed by Dr. Kaylie Batista at [facility]    Sinus problem     Thyroid disease     Hypothyroid, managed with medication    Tobacco use disorder 3/19/2013    Unspecified essential hypertension 3/19/2013    Unspecified hypothyroidism 3/19/2013      Tobacco Use: High Risk    Smoking Tobacco Use: Every Day    Smokeless Tobacco Use: Never    Passive Exposure: Not on file     Allergies   Allergen Reactions    Amoxicillin-Pot Clavulanate Nausea Only     Pt states he is not allergic  \"upsets my stomach\"    Bupropion Hives    [place] Flavor Hives    Varenicline Other (See Comments)     [place]     Current Outpatient Medications   Medication Instructions    albuterol sulfate HFA (PROVENTIL;VENTOLIN;PROAIR) 108 (90 Base) MCG/ACT inhaler 2 puffs, Inhalation, EVERY 4 HOURS PRN    aspirin 40.5 mg, Oral, DAILY    Garlic 10 mg, Oral, DAILY    Lancets MISC E11.9 Non insulin dep testing bs 1times daily; bill to [facility] part B    levothyroxine (SYNTHROID) 88 MCG tablet TAKE 1 TABLET BY MOUTH DAILY BEFORE BREAKFAST    metFORMIN (GLUCOPHAGE-XR) 500 mg, Oral, DAILY WITH BREAKFAST    metoprolol tartrate (LOPRESSOR) 50 mg, Oral, 2 TIMES DAILY    Multiple Vitamins-Minerals (MULTIVITAL-M PO) 1 tablet, Oral, DAILY    Omega-3 Fatty Acids (FISH OIL) 1000 MG CAPS 1 capsule, Oral, DAILY    QUNOL COQ10/UBIQUINOL/RANDY  mg, Oral, DAILY    rosuvastatin (CRESTOR) 40 mg, Oral, DAILY    Testosterone (ANDROGEL) 20.25 mg, TransDERmal, DAILY    tiotropium-olodaterol (STIOLTO) 2.5-2.5 MCG/ACT AERS 2 puffs, Inhalation, DAILY    triamcinolone (NASACORT) 55 MCG/ACT nasal inhaler 1 spray, Nasal, 2 TIMES DAILY PRN    TURMERIC  mg, Oral, DAILY    vitamin C 1,000 mg, Oral, DAILY    vitamin D 2,000 Units, Oral, DAILY     Past Medical History:   Diagnosis Date    AAA (abdominal aortic aneurysm)      35 mm on CT scan, US stable 35 mm    Abnormal chest x-ray 3/19/2013    Adenopathy 3/19/2013    Lymph glands    Allergic rhinitis, cause unspecified 3/19/2013    Atrial fibrillation (HCC)     after kidney surgery and wore monitor but no more a fib events noted    CAD (coronary artery disease)     Followed by Dr. Amee Narayanan at [address])     right kidney cancer followed by right nephrectomy    Chronic airway obstruction, not elsewhere classified 3/19/2013    Diabetes ([facility]     does not check sugars, last A1C 6.3    Diabetes mellitus ([facility] 3/19/2013    Hashimoto's disease     Pulmonary fibrosis ([facility]     Followed by Dr. Liana Bal at [facility]    Sinus problem     Thyroid disease     Hypothyroid, managed with medication    Tobacco use disorder 3/19/2013    Unspecified essential hypertension 3/19/2013    Unspecified hypothyroidism 3/19/2013     Family History   Problem Relation Age of Onset    High Cholesterol Mother     Hypertension Mother     Diabetes Other     Heart Disease Mother

## 2023-02-16 DIAGNOSIS — R79.89 LOW TESTOSTERONE IN MALE: ICD-10-CM

## 2023-02-21 RX ORDER — TESTOSTERONE 20.25 MG/1.25G
GEL TOPICAL
Qty: 75 G | OUTPATIENT
Start: 2023-02-21

## 2023-03-01 ENCOUNTER — PATIENT MESSAGE (OUTPATIENT)
Dept: INTERNAL MEDICINE CLINIC | Facility: CLINIC | Age: 78
End: 2023-03-01

## 2023-03-01 DIAGNOSIS — E11.9 TYPE 2 DIABETES MELLITUS WITHOUT COMPLICATION, WITHOUT LONG-TERM CURRENT USE OF INSULIN (HCC): Primary | ICD-10-CM

## 2023-03-07 DIAGNOSIS — R79.89 LOW TESTOSTERONE IN MALE: ICD-10-CM

## 2023-03-07 RX ORDER — TESTOSTERONE 16.2 MG/G
1 GEL TRANSDERMAL DAILY
Qty: 1 EACH | Refills: 5 | Status: SHIPPED | OUTPATIENT
Start: 2023-03-07 | End: 2023-04-06

## 2023-04-11 DIAGNOSIS — D58.2 ELEVATED HEMOGLOBIN (HCC): ICD-10-CM

## 2023-04-11 DIAGNOSIS — R79.89 LOW TESTOSTERONE IN MALE: ICD-10-CM

## 2023-04-11 LAB
BASOPHILS # BLD: 0 K/UL (ref 0–0.2)
BASOPHILS NFR BLD: 1 % (ref 0–2)
DIFFERENTIAL METHOD BLD: ABNORMAL
EOSINOPHIL # BLD: 0.1 K/UL (ref 0–0.8)
EOSINOPHIL NFR BLD: 1 % (ref 0.5–7.8)
ERYTHROCYTE [DISTWIDTH] IN BLOOD BY AUTOMATED COUNT: 13 % (ref 11.9–14.6)
HCT VFR BLD AUTO: 52.1 % (ref 41.1–50.3)
HGB BLD-MCNC: 18 G/DL (ref 13.6–17.2)
IMM GRANULOCYTES # BLD AUTO: 0 K/UL (ref 0–0.5)
IMM GRANULOCYTES NFR BLD AUTO: 0 % (ref 0–5)
LYMPHOCYTES # BLD: 1.9 K/UL (ref 0.5–4.6)
LYMPHOCYTES NFR BLD: 24 % (ref 13–44)
MCH RBC QN AUTO: 33.3 PG (ref 26.1–32.9)
MCHC RBC AUTO-ENTMCNC: 34.5 G/DL (ref 31.4–35)
MCV RBC AUTO: 96.5 FL (ref 82–102)
MONOCYTES # BLD: 0.8 K/UL (ref 0.1–1.3)
MONOCYTES NFR BLD: 10 % (ref 4–12)
NEUTS SEG # BLD: 5.2 K/UL (ref 1.7–8.2)
NEUTS SEG NFR BLD: 64 % (ref 43–78)
NRBC # BLD: 0 K/UL (ref 0–0.2)
PLATELET # BLD AUTO: 151 K/UL (ref 150–450)
PMV BLD AUTO: 10.8 FL (ref 9.4–12.3)
RBC # BLD AUTO: 5.4 M/UL (ref 4.23–5.6)
WBC # BLD AUTO: 8 K/UL (ref 4.3–11.1)

## 2023-04-12 LAB — TESTOST SERPL-MCNC: 394 NG/DL (ref 264–916)

## 2023-04-24 ENCOUNTER — TELEPHONE (OUTPATIENT)
Dept: UROLOGY | Age: 78
End: 2023-04-24

## 2023-04-24 ENCOUNTER — OFFICE VISIT (OUTPATIENT)
Dept: UROLOGY | Age: 78
End: 2023-04-24
Payer: MEDICARE

## 2023-04-24 DIAGNOSIS — E29.1 HYPOGONADISM IN MALE: ICD-10-CM

## 2023-04-24 DIAGNOSIS — N43.3 HYDROCELE, UNSPECIFIED HYDROCELE TYPE: ICD-10-CM

## 2023-04-24 DIAGNOSIS — C64.1 MALIGNANT NEOPLASM OF RIGHT KIDNEY, EXCEPT RENAL PELVIS (HCC): Primary | ICD-10-CM

## 2023-04-24 LAB
BILIRUBIN, URINE, POC: NEGATIVE
BLOOD URINE, POC: NEGATIVE
GLUCOSE URINE, POC: NEGATIVE
KETONES, URINE, POC: NEGATIVE
LEUKOCYTE ESTERASE, URINE, POC: NEGATIVE
NITRITE, URINE, POC: NEGATIVE
PH, URINE, POC: 6 (ref 4.6–8)
PROTEIN,URINE, POC: NEGATIVE
SPECIFIC GRAVITY, URINE, POC: 1.01 (ref 1–1.03)
URINALYSIS CLARITY, POC: NORMAL
URINALYSIS COLOR, POC: NORMAL
UROBILINOGEN, POC: NORMAL

## 2023-04-24 PROCEDURE — G8417 CALC BMI ABV UP PARAM F/U: HCPCS | Performed by: UROLOGY

## 2023-04-24 PROCEDURE — 4004F PT TOBACCO SCREEN RCVD TLK: CPT | Performed by: UROLOGY

## 2023-04-24 PROCEDURE — 99214 OFFICE O/P EST MOD 30 MIN: CPT | Performed by: UROLOGY

## 2023-04-24 PROCEDURE — G8427 DOCREV CUR MEDS BY ELIG CLIN: HCPCS | Performed by: UROLOGY

## 2023-04-24 PROCEDURE — 81003 URINALYSIS AUTO W/O SCOPE: CPT | Performed by: UROLOGY

## 2023-04-24 PROCEDURE — 3017F COLORECTAL CA SCREEN DOC REV: CPT | Performed by: UROLOGY

## 2023-04-24 PROCEDURE — 1123F ACP DISCUSS/DSCN MKR DOCD: CPT | Performed by: UROLOGY

## 2023-04-24 ASSESSMENT — ENCOUNTER SYMPTOMS
EYES NEGATIVE: 1
RESPIRATORY NEGATIVE: 1

## 2023-04-24 NOTE — PROGRESS NOTES
Testosterone (ANDROGEL) 20.25 MG/ACT (1.62%) GEL gel Place 1 actuation onto the skin daily for 30 doses. 1 each 5     No current facility-administered medications for this visit. Allergies   Allergen Reactions    Amoxicillin-Pot Clavulanate Nausea Only     Pt states he is not allergic  \"upsets my stomach\"    Bupropion Hives    [facility] Flavor Hives    Varenicline Other (See Comments)     [place]     Social History     Socioeconomic History    Marital status:      Spouse name: Not on file    Number of children: Not on file    Years of education: Not on file    Highest education level: Not on file   Occupational History    Not on file   Tobacco Use    Smoking status: Every Day     Packs/day: 0.50     Types: Cigarettes    Smokeless tobacco: Never    Tobacco comments:     Quit smokin-10      cigarettes per day   Vaping Use    Vaping Use: Never used   Substance and Sexual Activity    Alcohol use: Yes     Comment: special occasions-rarely    Drug use: No    Sexual activity: Not on file   Other Topics Concern    Not on file   Social History Narrative    Not on file     Social Determinants of Health     Financial Resource Strain: Not on file   Food Insecurity: Not on file   Transportation Needs: Not on file   Physical Activity: Sufficiently Active    Days of Exercise per Week: 6 days    Minutes of Exercise per Session: 90 min   Stress: Not on file   Social Connections: Not on file   Intimate Partner Violence: Not on file   Housing Stability: Not on file     Family History   Problem Relation Age of Onset    High Cholesterol Mother     Hypertension Mother     Diabetes Other     Heart Disease Mother        Review of Systems  Constitutional: Negative  Skin: Negative skin ROS  Eyes: Eyes negative  ENT: HENT negative  Respiratory: Respiratory negative  Cardiovascular: Positive for hypertension. GI: Neg GI ROS  Genitourinary: Positive for erectile dysfunction.   Musculoskeletal: Musculoskeletal

## 2023-04-24 NOTE — TELEPHONE ENCOUNTER
----- Message from Charlie Jones MD sent at 4/24/2023 12:21 PM EDT -----  Please schedule right hydrocelectomy

## 2023-04-25 NOTE — TELEPHONE ENCOUNTER
The patient did not want to schedule at this time. Has other appointments and will call back in the future.

## 2023-04-26 ENCOUNTER — OFFICE VISIT (OUTPATIENT)
Dept: CARDIOLOGY CLINIC | Age: 78
End: 2023-04-26
Payer: MEDICARE

## 2023-04-26 VITALS
HEART RATE: 83 BPM | DIASTOLIC BLOOD PRESSURE: 70 MMHG | HEIGHT: 70 IN | BODY MASS INDEX: 27.2 KG/M2 | SYSTOLIC BLOOD PRESSURE: 136 MMHG | WEIGHT: 190 LBS

## 2023-04-26 DIAGNOSIS — I48.91 LONE ATRIAL FIBRILLATION (HCC): Primary | ICD-10-CM

## 2023-04-26 DIAGNOSIS — I25.10 CORONARY ARTERY DISEASE INVOLVING NATIVE CORONARY ARTERY OF NATIVE HEART WITHOUT ANGINA PECTORIS: ICD-10-CM

## 2023-04-26 DIAGNOSIS — I71.40 ABDOMINAL AORTIC ANEURYSM (AAA) WITHOUT RUPTURE, UNSPECIFIED PART (HCC): ICD-10-CM

## 2023-04-26 PROCEDURE — G8417 CALC BMI ABV UP PARAM F/U: HCPCS | Performed by: INTERNAL MEDICINE

## 2023-04-26 PROCEDURE — 99214 OFFICE O/P EST MOD 30 MIN: CPT | Performed by: INTERNAL MEDICINE

## 2023-04-26 PROCEDURE — 4004F PT TOBACCO SCREEN RCVD TLK: CPT | Performed by: INTERNAL MEDICINE

## 2023-04-26 PROCEDURE — 3078F DIAST BP <80 MM HG: CPT | Performed by: INTERNAL MEDICINE

## 2023-04-26 PROCEDURE — 3075F SYST BP GE 130 - 139MM HG: CPT | Performed by: INTERNAL MEDICINE

## 2023-04-26 PROCEDURE — G8428 CUR MEDS NOT DOCUMENT: HCPCS | Performed by: INTERNAL MEDICINE

## 2023-04-26 PROCEDURE — 3017F COLORECTAL CA SCREEN DOC REV: CPT | Performed by: INTERNAL MEDICINE

## 2023-04-26 PROCEDURE — 1123F ACP DISCUSS/DSCN MKR DOCD: CPT | Performed by: INTERNAL MEDICINE

## 2023-04-26 PROCEDURE — 93000 ELECTROCARDIOGRAM COMPLETE: CPT | Performed by: INTERNAL MEDICINE

## 2023-04-26 RX ORDER — METOPROLOL TARTRATE 50 MG/1
50 TABLET, FILM COATED ORAL 2 TIMES DAILY
Qty: 180 TABLET | Refills: 3 | Status: SHIPPED | OUTPATIENT
Start: 2023-04-26

## 2023-04-26 RX ORDER — ROSUVASTATIN CALCIUM 40 MG/1
40 TABLET, COATED ORAL DAILY
Qty: 90 TABLET | Refills: 3 | Status: SHIPPED | OUTPATIENT
Start: 2023-04-26

## 2023-04-26 ASSESSMENT — ENCOUNTER SYMPTOMS
SHORTNESS OF BREATH: 0
WHEEZING: 0
COUGH: 0

## 2023-04-26 NOTE — PROGRESS NOTES
Other     Heart Disease Mother        Social History     Tobacco Use    Smoking status: Every Day     Packs/day: 0.50     Types: Cigarettes    Smokeless tobacco: Never    Tobacco comments:     Quit smokin-10      cigarettes per day   Substance Use Topics    Alcohol use: Yes     Comment: special occasions-rarely       Review of Systems   Constitutional: Negative. HENT:  Positive for congestion (sinus congestion). Cardiovascular:  Negative for chest pain, dyspnea on exertion, irregular heartbeat, leg swelling and palpitations. Respiratory:  Negative for cough, shortness of breath and wheezing. Neurological:  Negative for dizziness and light-headedness. PHYSICAL EXAM:  /70   Pulse 83   Ht 5' 10\" (1.778 m)   Wt 190 lb (86.2 kg)   BMI 27.26 kg/m²     Physical Exam  Vitals reviewed. Constitutional:       Appearance: He is not ill-appearing, toxic-appearing or diaphoretic. Cardiovascular:      Rate and Rhythm: Normal rate and regular rhythm. Heart sounds: Normal heart sounds. No murmur heard. No friction rub. No gallop. Pulmonary:      Effort: Pulmonary effort is normal. No respiratory distress. Breath sounds: Normal breath sounds. No stridor. No wheezing or rales. Abdominal:      General: There is no distension. Musculoskeletal:      Right lower leg: No edema. Left lower leg: No edema. Skin:     General: Skin is warm and dry. Neurological:      General: No focal deficit present. Mental Status: He is alert and oriented to person, place, and time. Mental status is at baseline. Psychiatric:         Behavior: Behavior normal.         Judgment: Judgment normal.       Medical problems, medical history, and test results were reviewed with the patient today.      Recent Results (from the past 168 hour(s))   AMB POC URINALYSIS DIP STICK AUTO W/O MICRO    Collection Time: 23 11:40 AM   Result Value Ref Range    Color (UA POC)      Clarity (UA POC)      Glucose,

## 2023-05-07 ENCOUNTER — ANESTHESIA EVENT (OUTPATIENT)
Dept: SURGERY | Age: 78
End: 2023-05-07
Payer: MEDICARE

## 2023-05-08 ENCOUNTER — HOSPITAL ENCOUNTER (OUTPATIENT)
Age: 78
Setting detail: OUTPATIENT SURGERY
Discharge: HOME | End: 2023-05-08
Attending: OTOLARYNGOLOGY | Admitting: OTOLARYNGOLOGY
Payer: MEDICARE

## 2023-05-08 ENCOUNTER — ANESTHESIA (OUTPATIENT)
Dept: SURGERY | Age: 78
End: 2023-05-08
Payer: MEDICARE

## 2023-05-08 VITALS
RESPIRATION RATE: 16 BRPM | TEMPERATURE: 98 F | HEART RATE: 76 BPM | SYSTOLIC BLOOD PRESSURE: 135 MMHG | OXYGEN SATURATION: 90 % | BODY MASS INDEX: 26.48 KG/M2 | DIASTOLIC BLOOD PRESSURE: 66 MMHG | HEIGHT: 70 IN | WEIGHT: 185 LBS

## 2023-05-08 LAB
GLUCOSE BLD STRIP.AUTO-MCNC: 130 MG/DL (ref 65–100)
SERVICE CMNT-IMP: ABNORMAL

## 2023-05-08 PROCEDURE — 3600000012 HC SURGERY LEVEL 2 ADDTL 15MIN: Performed by: OTOLARYNGOLOGY

## 2023-05-08 PROCEDURE — 6360000002 HC RX W HCPCS: Performed by: NURSE ANESTHETIST, CERTIFIED REGISTERED

## 2023-05-08 PROCEDURE — 3600000002 HC SURGERY LEVEL 2 BASE: Performed by: OTOLARYNGOLOGY

## 2023-05-08 PROCEDURE — 7100000010 HC PHASE II RECOVERY - FIRST 15 MIN: Performed by: OTOLARYNGOLOGY

## 2023-05-08 PROCEDURE — 7100000000 HC PACU RECOVERY - FIRST 15 MIN: Performed by: OTOLARYNGOLOGY

## 2023-05-08 PROCEDURE — 82962 GLUCOSE BLOOD TEST: CPT

## 2023-05-08 PROCEDURE — 2500000003 HC RX 250 WO HCPCS: Performed by: NURSE ANESTHETIST, CERTIFIED REGISTERED

## 2023-05-08 PROCEDURE — 7100000001 HC PACU RECOVERY - ADDTL 15 MIN: Performed by: OTOLARYNGOLOGY

## 2023-05-08 PROCEDURE — 2709999900 HC NON-CHARGEABLE SUPPLY: Performed by: OTOLARYNGOLOGY

## 2023-05-08 PROCEDURE — 6370000000 HC RX 637 (ALT 250 FOR IP): Performed by: ANESTHESIOLOGY

## 2023-05-08 PROCEDURE — 3700000000 HC ANESTHESIA ATTENDED CARE: Performed by: OTOLARYNGOLOGY

## 2023-05-08 PROCEDURE — 3700000001 HC ADD 15 MINUTES (ANESTHESIA): Performed by: OTOLARYNGOLOGY

## 2023-05-08 PROCEDURE — 2580000003 HC RX 258: Performed by: ANESTHESIOLOGY

## 2023-05-08 RX ORDER — SODIUM CHLORIDE 0.9 % (FLUSH) 0.9 %
5-40 SYRINGE (ML) INJECTION EVERY 12 HOURS SCHEDULED
Status: DISCONTINUED | OUTPATIENT
Start: 2023-05-08 | End: 2023-05-08 | Stop reason: HOSPADM

## 2023-05-08 RX ORDER — ROCURONIUM BROMIDE 10 MG/ML
INJECTION, SOLUTION INTRAVENOUS PRN
Status: DISCONTINUED | OUTPATIENT
Start: 2023-05-08 | End: 2023-05-08 | Stop reason: SDUPTHER

## 2023-05-08 RX ORDER — SODIUM CHLORIDE 0.9 % (FLUSH) 0.9 %
5-40 SYRINGE (ML) INJECTION PRN
Status: DISCONTINUED | OUTPATIENT
Start: 2023-05-08 | End: 2023-05-08 | Stop reason: HOSPADM

## 2023-05-08 RX ORDER — DEXAMETHASONE SODIUM PHOSPHATE 10 MG/ML
INJECTION INTRAMUSCULAR; INTRAVENOUS PRN
Status: DISCONTINUED | OUTPATIENT
Start: 2023-05-08 | End: 2023-05-08 | Stop reason: SDUPTHER

## 2023-05-08 RX ORDER — ONDANSETRON 2 MG/ML
4 INJECTION INTRAMUSCULAR; INTRAVENOUS
Status: DISCONTINUED | OUTPATIENT
Start: 2023-05-08 | End: 2023-05-08 | Stop reason: HOSPADM

## 2023-05-08 RX ORDER — ACETAMINOPHEN 500 MG
1000 TABLET ORAL ONCE
Status: COMPLETED | OUTPATIENT
Start: 2023-05-08 | End: 2023-05-08

## 2023-05-08 RX ORDER — EPHEDRINE SULFATE/0.9% NACL/PF 50 MG/5 ML
SYRINGE (ML) INTRAVENOUS PRN
Status: DISCONTINUED | OUTPATIENT
Start: 2023-05-08 | End: 2023-05-08 | Stop reason: SDUPTHER

## 2023-05-08 RX ORDER — SUCCINYLCHOLINE/SOD CL,ISO/PF 200MG/10ML
SYRINGE (ML) INTRAVENOUS PRN
Status: DISCONTINUED | OUTPATIENT
Start: 2023-05-08 | End: 2023-05-08 | Stop reason: SDUPTHER

## 2023-05-08 RX ORDER — HYDROMORPHONE HYDROCHLORIDE 2 MG/ML
0.5 INJECTION, SOLUTION INTRAMUSCULAR; INTRAVENOUS; SUBCUTANEOUS EVERY 10 MIN PRN
Status: DISCONTINUED | OUTPATIENT
Start: 2023-05-08 | End: 2023-05-08 | Stop reason: HOSPADM

## 2023-05-08 RX ORDER — ONDANSETRON 2 MG/ML
INJECTION INTRAMUSCULAR; INTRAVENOUS PRN
Status: DISCONTINUED | OUTPATIENT
Start: 2023-05-08 | End: 2023-05-08 | Stop reason: SDUPTHER

## 2023-05-08 RX ORDER — IPRATROPIUM BROMIDE AND ALBUTEROL SULFATE 2.5; .5 MG/3ML; MG/3ML
1 SOLUTION RESPIRATORY (INHALATION)
Status: DISCONTINUED | OUTPATIENT
Start: 2023-05-08 | End: 2023-05-08 | Stop reason: HOSPADM

## 2023-05-08 RX ORDER — LIDOCAINE HYDROCHLORIDE 10 MG/ML
1 INJECTION, SOLUTION INFILTRATION; PERINEURAL
Status: DISCONTINUED | OUTPATIENT
Start: 2023-05-08 | End: 2023-05-08 | Stop reason: HOSPADM

## 2023-05-08 RX ORDER — OXYCODONE HYDROCHLORIDE 5 MG/1
5 TABLET ORAL
Status: DISCONTINUED | OUTPATIENT
Start: 2023-05-08 | End: 2023-05-08 | Stop reason: HOSPADM

## 2023-05-08 RX ORDER — PROPOFOL 10 MG/ML
INJECTION, EMULSION INTRAVENOUS PRN
Status: DISCONTINUED | OUTPATIENT
Start: 2023-05-08 | End: 2023-05-08 | Stop reason: SDUPTHER

## 2023-05-08 RX ORDER — MIDAZOLAM HYDROCHLORIDE 2 MG/2ML
2 INJECTION, SOLUTION INTRAMUSCULAR; INTRAVENOUS
Status: DISCONTINUED | OUTPATIENT
Start: 2023-05-08 | End: 2023-05-08 | Stop reason: HOSPADM

## 2023-05-08 RX ORDER — LIDOCAINE HYDROCHLORIDE 20 MG/ML
INJECTION, SOLUTION EPIDURAL; INFILTRATION; INTRACAUDAL; PERINEURAL PRN
Status: DISCONTINUED | OUTPATIENT
Start: 2023-05-08 | End: 2023-05-08 | Stop reason: SDUPTHER

## 2023-05-08 RX ORDER — HALOPERIDOL 5 MG/ML
1 INJECTION INTRAMUSCULAR
Status: DISCONTINUED | OUTPATIENT
Start: 2023-05-08 | End: 2023-05-08 | Stop reason: HOSPADM

## 2023-05-08 RX ORDER — SODIUM CHLORIDE, SODIUM LACTATE, POTASSIUM CHLORIDE, CALCIUM CHLORIDE 600; 310; 30; 20 MG/100ML; MG/100ML; MG/100ML; MG/100ML
INJECTION, SOLUTION INTRAVENOUS CONTINUOUS
Status: DISCONTINUED | OUTPATIENT
Start: 2023-05-08 | End: 2023-05-08 | Stop reason: HOSPADM

## 2023-05-08 RX ORDER — FENTANYL CITRATE 50 UG/ML
50 INJECTION, SOLUTION INTRAMUSCULAR; INTRAVENOUS EVERY 5 MIN PRN
Status: DISCONTINUED | OUTPATIENT
Start: 2023-05-08 | End: 2023-05-08 | Stop reason: HOSPADM

## 2023-05-08 RX ORDER — FENTANYL CITRATE 50 UG/ML
INJECTION, SOLUTION INTRAMUSCULAR; INTRAVENOUS PRN
Status: DISCONTINUED | OUTPATIENT
Start: 2023-05-08 | End: 2023-05-08 | Stop reason: SDUPTHER

## 2023-05-08 RX ADMIN — ROCURONIUM BROMIDE 5 MG: 10 INJECTION, SOLUTION INTRAVENOUS at 14:10

## 2023-05-08 RX ADMIN — FENTANYL CITRATE 25 MCG: 50 INJECTION, SOLUTION INTRAMUSCULAR; INTRAVENOUS at 14:38

## 2023-05-08 RX ADMIN — PROPOFOL 50 MG: 10 INJECTION, EMULSION INTRAVENOUS at 14:37

## 2023-05-08 RX ADMIN — LIDOCAINE HYDROCHLORIDE 100 MG: 20 INJECTION, SOLUTION EPIDURAL; INFILTRATION; INTRACAUDAL; PERINEURAL at 14:10

## 2023-05-08 RX ADMIN — Medication 10 MG: at 14:55

## 2023-05-08 RX ADMIN — SODIUM CHLORIDE, SODIUM LACTATE, POTASSIUM CHLORIDE, AND CALCIUM CHLORIDE: 600; 310; 30; 20 INJECTION, SOLUTION INTRAVENOUS at 14:37

## 2023-05-08 RX ADMIN — PHENYLEPHRINE HYDROCHLORIDE 100 MCG: 0.1 INJECTION, SOLUTION INTRAVENOUS at 14:17

## 2023-05-08 RX ADMIN — PHENYLEPHRINE HYDROCHLORIDE 50 MCG: 0.1 INJECTION, SOLUTION INTRAVENOUS at 14:25

## 2023-05-08 RX ADMIN — FENTANYL CITRATE 25 MCG: 50 INJECTION, SOLUTION INTRAMUSCULAR; INTRAVENOUS at 14:44

## 2023-05-08 RX ADMIN — PHENYLEPHRINE HYDROCHLORIDE 50 MCG: 0.1 INJECTION, SOLUTION INTRAVENOUS at 14:18

## 2023-05-08 RX ADMIN — DEXAMETHASONE SODIUM PHOSPHATE 10 MG: 10 INJECTION INTRAMUSCULAR; INTRAVENOUS at 14:26

## 2023-05-08 RX ADMIN — Medication 120 MG: at 14:11

## 2023-05-08 RX ADMIN — FENTANYL CITRATE 50 MCG: 50 INJECTION, SOLUTION INTRAMUSCULAR; INTRAVENOUS at 14:10

## 2023-05-08 RX ADMIN — SODIUM CHLORIDE, SODIUM LACTATE, POTASSIUM CHLORIDE, AND CALCIUM CHLORIDE: 600; 310; 30; 20 INJECTION, SOLUTION INTRAVENOUS at 13:53

## 2023-05-08 RX ADMIN — PHENYLEPHRINE HYDROCHLORIDE 50 MCG: 0.1 INJECTION, SOLUTION INTRAVENOUS at 14:52

## 2023-05-08 RX ADMIN — PHENYLEPHRINE HYDROCHLORIDE 100 MCG: 0.1 INJECTION, SOLUTION INTRAVENOUS at 14:38

## 2023-05-08 RX ADMIN — PROPOFOL 200 MG: 10 INJECTION, EMULSION INTRAVENOUS at 14:10

## 2023-05-08 RX ADMIN — ACETAMINOPHEN 1000 MG: 500 TABLET, FILM COATED ORAL at 12:18

## 2023-05-08 RX ADMIN — Medication 10 MG: at 14:17

## 2023-05-08 RX ADMIN — PHENYLEPHRINE HYDROCHLORIDE 100 MCG: 0.1 INJECTION, SOLUTION INTRAVENOUS at 14:47

## 2023-05-08 RX ADMIN — Medication 10 MG: at 14:25

## 2023-05-08 RX ADMIN — PROPOFOL 60 MG: 10 INJECTION, EMULSION INTRAVENOUS at 14:47

## 2023-05-08 RX ADMIN — ONDANSETRON 4 MG: 2 INJECTION INTRAMUSCULAR; INTRAVENOUS at 14:25

## 2023-05-08 ASSESSMENT — PAIN SCALES - GENERAL: PAINLEVEL_OUTOF10: 0

## 2023-05-08 ASSESSMENT — LIFESTYLE VARIABLES: SMOKING_STATUS: 1

## 2023-05-08 ASSESSMENT — PAIN - FUNCTIONAL ASSESSMENT: PAIN_FUNCTIONAL_ASSESSMENT: 0-10

## 2023-05-08 NOTE — ANESTHESIA PROCEDURE NOTES
Airway  Date/Time: 5/8/2023 2:14 PM  Urgency: elective    Airway not difficult    General Information and Staff    Patient location during procedure: OR  Resident/CRNA: LETICIA Roa CRNA  Performed: resident/CRNA     Indications and Patient Condition  Indications for airway management: anesthesia  Spontaneous Ventilation: absent  Sedation level: deep  Preoxygenated: yes  Patient position: sniffing  MILS maintained throughout  Mask difficulty assessment: vent by bag mask    Final Airway Details  Final airway type: endotracheal airway      Successful airway: ETT  Cuffed: yes   Successful intubation technique: video laryngoscopy  Facilitating devices/methods: intubating stylet  Endotracheal tube insertion site: oral  Blade size: #4  ETT size (mm): 6.5  Cormack-Lehane Classification: grade III - view of epiglottis only  Placement verified by: chest auscultation   Measured from: lips  ETT to lips (cm): 23  Number of attempts at approach: 1  Number of other approaches attempted: 2

## 2023-05-08 NOTE — ANESTHESIA POSTPROCEDURE EVALUATION
Department of Anesthesiology  Postprocedure Note    Patient: Phyllis Douglas  MRN: [id number]  YOB: 1945  Date of evaluation: 5/8/2023      Procedure Summary     Date: 05/08/23 Room / Location: [facility] OP OR 04 / [facility]    Anesthesia Start: 1359 Anesthesia Stop: 1321    Procedure: DIRECT LARYNGOSCOPY (Mouth) Diagnosis:       Atypical face pain      Dysphonia      Other diseases of vocal cords      Neoplasm of uncertain behavior of larynx      Nodules of vocal cords      (Atypical face pain [G50.1])      (Dysphonia [R49.0])      (Other diseases of vocal cords [J38.3])      (Neoplasm of uncertain behavior of larynx [D38.0])      (Nodules of vocal cords [J38.2])    Surgeons: Bennett Macdonald DO Responsible Provider: Ya Casarez MD    Anesthesia Type: general ASA Status: 3          Anesthesia Type: No value filed.     Abel Phase I: Abel Score: 8    Abel Phase II: Abel Score: 10      Anesthesia Post Evaluation    Patient location during evaluation: PACU  Patient participation: complete - patient participated  Level of consciousness: awake and alert  Airway patency: patent  Nausea & Vomiting: no nausea and no vomiting  Complications: no  Cardiovascular status: hemodynamically stable  Respiratory status: acceptable, nonlabored ventilation and spontaneous ventilation  Hydration status: euvolemic  Comments: /66   Pulse 76   Temp 98 °F (36.7 °C) (Temporal)   Resp 16   Ht 5' 10\" (1.778 m)   Wt 185 lb (83.9 kg)   SpO2 90%   BMI 26.54 kg/m²     Multimodal analgesia pain management approach

## 2023-05-08 NOTE — ANESTHESIA PRE PROCEDURE
Department of Anesthesiology  Preprocedure Note       Name:  Gokul Gaines   Age:  78 y.o.  :  1945                                          MRN:  [id number]         Date:  2023      Surgeon: Ruben Garvey):  Lisa Kasper DO    Procedure: Procedure(s):  PANENDOSCOPY WITH BIOPSY OF LEFT VOCAL CORD LESION    Medications prior to admission:   Prior to Admission medications    Medication Sig Start Date End Date Taking? Authorizing Provider   rosuvastatin (CRESTOR) 40 MG tablet Take 1 tablet by mouth daily 23   Jaspreet Love DO   metoprolol tartrate (LOPRESSOR) 50 MG tablet Take 1 tablet by mouth 2 times daily 23   Jaspreet Love DO   Testosterone (ANDROGEL) 20.25 MG/ACT (1.62%) GEL gel Place 1 actuation onto the skin daily for 30 doses. 3/7/23 4/26/23  Willis Major MD   blood glucose test strips (ASCENSIA AUTODISC VI;ONE TOUCH ULTRA TEST VI) strip 1 each by In Vitro route daily Pt uses one touch ultra 2 meter.  3/2/23   Riley Baker MD   tiotropium-olodaterol (STIOLTO) 2.5-2.5 MCG/ACT AERS Inhale 2 puffs into the lungs daily 23   Minh Astudillo MD   metFORMIN (GLUCOPHAGE-XR) 500 MG extended release tablet Take 1 tablet by mouth daily (with breakfast) 22   W Pb Gifford MD   Ascorbic Acid (VITAMIN C) 1000 MG tablet Take 1 tablet by mouth daily    Historical Provider, MD   Garlic 10 MG CAPS Take 10 mg by mouth daily    Historical Provider, MD   albuterol sulfate HFA (PROVENTIL;VENTOLIN;PROAIR) 108 (90 Base) MCG/ACT inhaler Inhale 2 puffs into the lungs every 4 hours as needed for Shortness of Breath or Wheezing 22   Minh Astudillo MD   levothyroxine (SYNTHROID) 88 MCG tablet TAKE 1 TABLET BY MOUTH DAILY BEFORE BREAKFAST 22   MD Miguel Rooney Mutradha COQ10/UBIQUINOL/RANDY PO Take 200 mg by mouth daily    Historical Provider, MD   Omega-3 Fatty Acids (FISH OIL) 1000 MG CAPS Take 1 capsule by mouth daily    Historical Provider, MD   Multiple Vitamins-Minerals

## 2023-05-08 NOTE — DISCHARGE INSTRUCTIONS
your Primary Care Provider. *  Please update this list whenever your medications are discontinued, doses are      changed, or new medications (including over-the-counter products) are added. *  Please carry medication information at all times in case of emergency situations. These are general instructions for a healthy lifestyle:  No smoking/ No tobacco products/ Avoid exposure to second hand smoke  Surgeon General's Warning:  Quitting smoking now greatly reduces serious risk to your health. Obesity, smoking, and sedentary lifestyle greatly increases your risk for illness  A healthy diet, regular physical exercise & weight monitoring are important for maintaining a healthy lifestyle    You may be retaining fluid if you have a history of heart failure or if you experience any of the following symptoms:  Weight gain of 3 pounds or more overnight or 5 pounds in a week, increased swelling in our hands or feet or shortness of breath while lying flat in bed. Please call your doctor as soon as you notice any of these symptoms; do not wait until your next office visit.

## 2023-05-08 NOTE — PERIOP NOTE
Discharge instructions discussed with wife at bedside, no additional questions or concerns at this time. Hard copy of instructions given to wife, prescriptions escribed.

## 2023-05-09 NOTE — OP NOTE
scope today, but I did abort the procedure as I could not get good enough exposure to even get an instrument to the lesion itself. So the tooth guard was removed from the upper teeth. There is no sign of any trauma to the lips, teeth or gums. The patient was then awakened and he was taken to the postop recovery room in a stable condition.         Chiquis Tan DO      [id number]  D:  05/08/2023 15:07  T:  05/09/2023 1:59  JOB #:  [id number]

## 2023-05-10 ENCOUNTER — HOSPITAL ENCOUNTER (OUTPATIENT)
Dept: CT IMAGING | Age: 78
Discharge: HOME | End: 2023-05-13

## 2023-05-10 DIAGNOSIS — C34.91 MALIGNANT NEOPLASM OF RIGHT LUNG, UNSPECIFIED PART OF LUNG (HCC): ICD-10-CM

## 2023-05-16 ENCOUNTER — APPOINTMENT (OUTPATIENT)
Dept: RADIATION ONCOLOGY | Age: 78
End: 2023-05-16
Payer: MEDICARE

## 2023-05-18 ENCOUNTER — HOSPITAL ENCOUNTER (OUTPATIENT)
Dept: RADIATION ONCOLOGY | Age: 78
Setting detail: RECURRING SERIES
Discharge: HOME | End: 2023-05-21
Payer: MEDICARE

## 2023-05-18 ENCOUNTER — APPOINTMENT (OUTPATIENT)
Dept: RADIATION ONCOLOGY | Age: 78
End: 2023-05-18
Payer: MEDICARE

## 2023-05-18 VITALS
RESPIRATION RATE: 16 BRPM | HEART RATE: 72 BPM | SYSTOLIC BLOOD PRESSURE: 136 MMHG | TEMPERATURE: 98.1 F | OXYGEN SATURATION: 93 % | DIASTOLIC BLOOD PRESSURE: 70 MMHG

## 2023-05-18 DIAGNOSIS — C34.31 MALIGNANT NEOPLASM OF LOWER LOBE, RIGHT BRONCHUS OR LUNG (HCC): Primary | ICD-10-CM

## 2023-05-18 PROCEDURE — 99211 OFF/OP EST MAY X REQ PHY/QHP: CPT

## 2023-06-05 DIAGNOSIS — E11.9 TYPE 2 DIABETES MELLITUS WITHOUT COMPLICATION, WITHOUT LONG-TERM CURRENT USE OF INSULIN (HCC): ICD-10-CM

## 2023-06-06 LAB
ANION GAP SERPL CALC-SCNC: 6 MMOL/L (ref 2–11)
BUN SERPL-MCNC: 27 MG/DL (ref 8–23)
CALCIUM SERPL-MCNC: 9.4 MG/DL (ref 8.3–10.4)
CHLORIDE SERPL-SCNC: 107 MMOL/L (ref 101–110)
CO2 SERPL-SCNC: 26 MMOL/L (ref 21–32)
CREAT SERPL-MCNC: 1.3 MG/DL (ref 0.8–1.5)
EST. AVERAGE GLUCOSE BLD GHB EST-MCNC: 143 MG/DL
GLUCOSE SERPL-MCNC: 97 MG/DL (ref 65–100)
HBA1C MFR BLD: 6.6 % (ref 4.8–5.6)
POTASSIUM SERPL-SCNC: 5 MMOL/L (ref 3.5–5.1)
SODIUM SERPL-SCNC: 139 MMOL/L (ref 133–143)

## 2023-06-08 ENCOUNTER — OFFICE VISIT (OUTPATIENT)
Dept: INTERNAL MEDICINE CLINIC | Facility: CLINIC | Age: 78
End: 2023-06-08
Payer: MEDICARE

## 2023-06-08 VITALS
OXYGEN SATURATION: 93 % | HEART RATE: 83 BPM | BODY MASS INDEX: 26.4 KG/M2 | SYSTOLIC BLOOD PRESSURE: 136 MMHG | DIASTOLIC BLOOD PRESSURE: 70 MMHG | WEIGHT: 184 LBS

## 2023-06-08 DIAGNOSIS — E11.51 TYPE 2 DIABETES MELLITUS WITH DIABETIC PERIPHERAL ANGIOPATHY WITHOUT GANGRENE, WITHOUT LONG-TERM CURRENT USE OF INSULIN (HCC): Primary | ICD-10-CM

## 2023-06-08 DIAGNOSIS — N18.31 TYPE 2 DIABETES MELLITUS WITH STAGE 3A CHRONIC KIDNEY DISEASE, WITHOUT LONG-TERM CURRENT USE OF INSULIN (HCC): ICD-10-CM

## 2023-06-08 DIAGNOSIS — E11.22 TYPE 2 DIABETES MELLITUS WITH STAGE 3A CHRONIC KIDNEY DISEASE, WITHOUT LONG-TERM CURRENT USE OF INSULIN (HCC): ICD-10-CM

## 2023-06-08 DIAGNOSIS — E03.9 HYPOTHYROIDISM, UNSPECIFIED TYPE: ICD-10-CM

## 2023-06-08 DIAGNOSIS — J44.9 CHRONIC OBSTRUCTIVE PULMONARY DISEASE, UNSPECIFIED COPD TYPE (HCC): ICD-10-CM

## 2023-06-08 DIAGNOSIS — N18.31 STAGE 3A CHRONIC KIDNEY DISEASE (HCC): ICD-10-CM

## 2023-06-08 PROCEDURE — G8427 DOCREV CUR MEDS BY ELIG CLIN: HCPCS | Performed by: INTERNAL MEDICINE

## 2023-06-08 PROCEDURE — 4004F PT TOBACCO SCREEN RCVD TLK: CPT | Performed by: INTERNAL MEDICINE

## 2023-06-08 PROCEDURE — 99214 OFFICE O/P EST MOD 30 MIN: CPT | Performed by: INTERNAL MEDICINE

## 2023-06-08 PROCEDURE — 3023F SPIROM DOC REV: CPT | Performed by: INTERNAL MEDICINE

## 2023-06-08 PROCEDURE — 3017F COLORECTAL CA SCREEN DOC REV: CPT | Performed by: INTERNAL MEDICINE

## 2023-06-08 PROCEDURE — 3044F HG A1C LEVEL LT 7.0%: CPT | Performed by: INTERNAL MEDICINE

## 2023-06-08 PROCEDURE — 3078F DIAST BP <80 MM HG: CPT | Performed by: INTERNAL MEDICINE

## 2023-06-08 PROCEDURE — 1123F ACP DISCUSS/DSCN MKR DOCD: CPT | Performed by: INTERNAL MEDICINE

## 2023-06-08 PROCEDURE — 2022F DILAT RTA XM EVC RTNOPTHY: CPT | Performed by: INTERNAL MEDICINE

## 2023-06-08 PROCEDURE — 3075F SYST BP GE 130 - 139MM HG: CPT | Performed by: INTERNAL MEDICINE

## 2023-06-08 PROCEDURE — G8417 CALC BMI ABV UP PARAM F/U: HCPCS | Performed by: INTERNAL MEDICINE

## 2023-06-08 RX ORDER — LEVOTHYROXINE SODIUM 88 UG/1
TABLET ORAL
Qty: 90 TABLET | Refills: 3 | Status: SHIPPED | OUTPATIENT
Start: 2023-06-08

## 2023-06-08 RX ORDER — METFORMIN HYDROCHLORIDE 500 MG/1
500 TABLET, EXTENDED RELEASE ORAL
Qty: 90 TABLET | Refills: 3 | Status: SHIPPED | OUTPATIENT
Start: 2023-06-08

## 2023-06-08 SDOH — ECONOMIC STABILITY: INCOME INSECURITY: HOW HARD IS IT FOR YOU TO PAY FOR THE VERY BASICS LIKE FOOD, HOUSING, MEDICAL CARE, AND HEATING?: NOT HARD AT ALL

## 2023-06-08 SDOH — ECONOMIC STABILITY: HOUSING INSECURITY
IN THE LAST 12 MONTHS, WAS THERE A TIME WHEN YOU DID NOT HAVE A STEADY PLACE TO SLEEP OR SLEPT IN A SHELTER (INCLUDING NOW)?: NO

## 2023-06-08 SDOH — ECONOMIC STABILITY: FOOD INSECURITY: WITHIN THE PAST 12 MONTHS, YOU WORRIED THAT YOUR FOOD WOULD RUN OUT BEFORE YOU GOT MONEY TO BUY MORE.: NEVER TRUE

## 2023-06-08 SDOH — ECONOMIC STABILITY: FOOD INSECURITY: WITHIN THE PAST 12 MONTHS, THE FOOD YOU BOUGHT JUST DIDN'T LAST AND YOU DIDN'T HAVE MONEY TO GET MORE.: NEVER TRUE

## 2023-06-08 ASSESSMENT — PATIENT HEALTH QUESTIONNAIRE - PHQ9
10. IF YOU CHECKED OFF ANY PROBLEMS, HOW DIFFICULT HAVE THESE PROBLEMS MADE IT FOR YOU TO DO YOUR WORK, TAKE CARE OF THINGS AT HOME, OR GET ALONG WITH OTHER PEOPLE: 0
SUM OF ALL RESPONSES TO PHQ QUESTIONS 1-9: 0
6. FEELING BAD ABOUT YOURSELF - OR THAT YOU ARE A FAILURE OR HAVE LET YOURSELF OR YOUR FAMILY DOWN: 0
3. TROUBLE FALLING OR STAYING ASLEEP: 0
4. FEELING TIRED OR HAVING LITTLE ENERGY: 0
5. POOR APPETITE OR OVEREATING: 0
SUM OF ALL RESPONSES TO PHQ QUESTIONS 1-9: 0
7. TROUBLE CONCENTRATING ON THINGS, SUCH AS READING THE NEWSPAPER OR WATCHING TELEVISION: 0
SUM OF ALL RESPONSES TO PHQ QUESTIONS 1-9: 0
9. THOUGHTS THAT YOU WOULD BE BETTER OFF DEAD, OR OF HURTING YOURSELF: 0
2. FEELING DOWN, DEPRESSED OR HOPELESS: 0
SUM OF ALL RESPONSES TO PHQ QUESTIONS 1-9: 0
8. MOVING OR SPEAKING SO SLOWLY THAT OTHER PEOPLE COULD HAVE NOTICED. OR THE OPPOSITE, BEING SO FIGETY OR RESTLESS THAT YOU HAVE BEEN MOVING AROUND A LOT MORE THAN USUAL: 0

## 2023-06-08 ASSESSMENT — ENCOUNTER SYMPTOMS
SHORTNESS OF BREATH: 0
VOICE CHANGE: 1
COUGH: 0
RHINORRHEA: 1

## 2023-06-08 ASSESSMENT — ANXIETY QUESTIONNAIRES
IF YOU CHECKED OFF ANY PROBLEMS ON THIS QUESTIONNAIRE, HOW DIFFICULT HAVE THESE PROBLEMS MADE IT FOR YOU TO DO YOUR WORK, TAKE CARE OF THINGS AT HOME, OR GET ALONG WITH OTHER PEOPLE: NOT DIFFICULT AT ALL
3. WORRYING TOO MUCH ABOUT DIFFERENT THINGS: 0
6. BECOMING EASILY ANNOYED OR IRRITABLE: 0
2. NOT BEING ABLE TO STOP OR CONTROL WORRYING: 0
1. FEELING NERVOUS, ANXIOUS, OR ON EDGE: 0
5. BEING SO RESTLESS THAT IT IS HARD TO SIT STILL: 0
7. FEELING AFRAID AS IF SOMETHING AWFUL MIGHT HAPPEN: 0
GAD7 TOTAL SCORE: 0
4. TROUBLE RELAXING: 0

## 2023-06-08 NOTE — PROGRESS NOTES
mell[facility]    Prostate cancer screening    Chronic sinusitis    Low testosterone    History of kidney cancer    Depression    Sleep disturbance    Abdominal aortic aneurysm (AAA) without rupture (HCC)    Chronic renal disease, stage III ([facility] [293332]    Type 2 diabetes mellitus with diabetic peripheral angiopathy without gangrene, without long-term current use of insulin (HCC)    Type 2 diabetes mellitus with chronic kidney disease     Social History     Tobacco Use    Smoking status: Every Day     Packs/day: 0.50     Types: Cigarettes    Smokeless tobacco: Never    Tobacco comments:     Quit smokin-10      cigarettes per day   Substance Use Topics    Alcohol use: Yes     Comment: special occasions-rarely          Review of Systems   HENT:  Positive for congestion, rhinorrhea and voice change (for months). Sinus drain and congested since covid -saw Dr Shahida Pickett and noted vocal cord lesion -about-6 wk ago-hospital laryngoscopy was attempted but not completed   Respiratory:  Negative for cough and shortness of breath. Had cancer nodule right lung 1.5 yr ago-had RT -gets CT Dr Samantha Pollock:  Negative for chest pain and palpitations. Walks 20 min 7d /wk   Endocrine:        Testosterone 2023 393       OBJECTIVE:  /70   Pulse 83   Wt 184 lb (83.5 kg)   SpO2 93%   BMI 26.40 kg/m²      Physical Exam  Constitutional:       General: He is not in acute distress. Appearance: Normal appearance. Cardiovascular:      Rate and Rhythm: Normal rate and regular rhythm. Heart sounds: No murmur heard. Comments: Slight decrease pedal pulse  Pulmonary:      Breath sounds: Rales (very few bases) present. No wheezing or rhonchi. Medical problems and test results were reviewed with the patient today. ASSESSMENT and PLAN     1.  Type 2 diabetes mellitus with diabetic peripheral angiopathy without gangrene, without long-term current use of insulin (HCC)  -

## 2023-08-14 NOTE — PROGRESS NOTES
Dr. Nitesh Hernandez MD  [address] [facility], [address] [place] [address]  [phone]    Patient Name:  Ana M Santana  YOB: 1945  Office Visit: 8/15/2023    ASSESSMENT AND PLAN:  (Medical Decision Making)    1. Combined pulmonary fibrosis and emphysema (CPFE) ([address])  Continue Stiolto and as needed albuterol. We discussed performing a trial off of this Stiolto to see if he feels his breathing is benefiting from it. We will monitor oxygenation closely-today he did not drop with ambulation although he did desaturate on walk test in February. Recommend complete pulmonary function tests prior to follow-up in 6 months. 2. History of tobacco abuse  Smoking cessation recommended. He still smokes 1/2ppd.    3. History of lung cancer  He is due for surveillance imaging in November. After that we can continue annual CT imaging    Orders Placed This Encounter   Medications    tiotropium-olodaterol (STIOLTO RESPIMAT) 2.5-2.5 MCG/ACT AERS     Sig: Inhale 2 puffs into the lungs daily     Dispense:  3 each     Refill:  3     No orders of the defined types were placed in this encounter. Cecil Oropeza MD    Total time for encounter on day of encounter was 35 minutes. This time includes chart prep, review of tests/procedures, review of other provider's notes, documentation and counseling patient regarding disease process and medications. ________________________________________  Reason for Visit:  Follow-up (IPF)        History of Present Illness:     Ana M Santana Is a 78 y.o. male with h/o pulmonary fibrosis, COPD with emphysema, RLL SCC T1bN0 Stage 1A2 (dx 10/2021) active smoking who follows up s/p radiation therapy by Dr. Ronda Roberts. He reports he has reduced his cardiovascular activity but continues to lift weights regularly.   He admits to dyspnea on hills or with exertion but doesn't think this has progressed much since a year ago    At his last visit on 2/14/23, we discussed

## 2023-08-15 ENCOUNTER — OFFICE VISIT (OUTPATIENT)
Dept: PULMONOLOGY | Age: 78
End: 2023-08-15
Payer: MEDICARE

## 2023-08-15 VITALS
OXYGEN SATURATION: 94 % | HEIGHT: 70 IN | WEIGHT: 188 LBS | TEMPERATURE: 97.2 F | RESPIRATION RATE: 16 BRPM | SYSTOLIC BLOOD PRESSURE: 116 MMHG | HEART RATE: 87 BPM | BODY MASS INDEX: 26.92 KG/M2 | DIASTOLIC BLOOD PRESSURE: 64 MMHG

## 2023-08-15 DIAGNOSIS — J84.10 COMBINED PULMONARY FIBROSIS AND EMPHYSEMA (CPFE) (HCC): Primary | ICD-10-CM

## 2023-08-15 DIAGNOSIS — J43.9 COMBINED PULMONARY FIBROSIS AND EMPHYSEMA (CPFE) (HCC): Primary | ICD-10-CM

## 2023-08-15 DIAGNOSIS — Z87.891 HISTORY OF TOBACCO ABUSE: ICD-10-CM

## 2023-08-15 DIAGNOSIS — Z85.118 HISTORY OF LUNG CANCER: ICD-10-CM

## 2023-08-15 PROCEDURE — 3017F COLORECTAL CA SCREEN DOC REV: CPT | Performed by: INTERNAL MEDICINE

## 2023-08-15 PROCEDURE — 1123F ACP DISCUSS/DSCN MKR DOCD: CPT | Performed by: INTERNAL MEDICINE

## 2023-08-15 PROCEDURE — 3078F DIAST BP <80 MM HG: CPT | Performed by: INTERNAL MEDICINE

## 2023-08-15 PROCEDURE — G8417 CALC BMI ABV UP PARAM F/U: HCPCS | Performed by: INTERNAL MEDICINE

## 2023-08-15 PROCEDURE — 4004F PT TOBACCO SCREEN RCVD TLK: CPT | Performed by: INTERNAL MEDICINE

## 2023-08-15 PROCEDURE — 3074F SYST BP LT 130 MM HG: CPT | Performed by: INTERNAL MEDICINE

## 2023-08-15 PROCEDURE — G8427 DOCREV CUR MEDS BY ELIG CLIN: HCPCS | Performed by: INTERNAL MEDICINE

## 2023-08-15 PROCEDURE — 3023F SPIROM DOC REV: CPT | Performed by: INTERNAL MEDICINE

## 2023-08-15 PROCEDURE — 99214 OFFICE O/P EST MOD 30 MIN: CPT | Performed by: INTERNAL MEDICINE

## 2023-09-25 DIAGNOSIS — I25.10 CORONARY ARTERY DISEASE INVOLVING NATIVE CORONARY ARTERY OF NATIVE HEART WITHOUT ANGINA PECTORIS: ICD-10-CM

## 2023-09-25 RX ORDER — ROSUVASTATIN CALCIUM 40 MG/1
40 TABLET, COATED ORAL DAILY
Qty: 90 TABLET | Refills: 3 | Status: SHIPPED | OUTPATIENT
Start: 2023-09-25

## 2023-09-25 NOTE — TELEPHONE ENCOUNTER
MEDICATION REFILL REQUEST      Name of Medication:  Rosuvastatin   Dose:  40 mg  Frequency:  daily   Quantity:    Days' supply:  80      Pharmacy Name/Location:  [facility]/ Needs called in today

## 2023-09-25 NOTE — TELEPHONE ENCOUNTER
Requested Prescriptions     Pending Prescriptions Disp Refills    rosuvastatin (CRESTOR) 40 MG tablet 90 tablet 3     Sig: Take 1 tablet by mouth daily

## 2023-10-11 ENCOUNTER — TELEPHONE (OUTPATIENT)
Dept: UROLOGY | Age: 78
End: 2023-10-11

## 2023-10-11 NOTE — TELEPHONE ENCOUNTER
Patient wants call about his up coming surgery. States that he have questions. Would like someone to call him back.   [phone]

## 2023-10-12 NOTE — TELEPHONE ENCOUNTER
Called and explained to the patient that we do not have a surgery request for him. Patient stated that all he wants is a short conversation with Dr. Eduardo Najjar about the surgery process.

## 2023-10-20 ENCOUNTER — TELEPHONE (OUTPATIENT)
Dept: UROLOGY | Age: 78
End: 2023-10-20

## 2023-10-24 DIAGNOSIS — I71.40 ABDOMINAL AORTIC ANEURYSM (AAA) WITHOUT RUPTURE, UNSPECIFIED PART (HCC): ICD-10-CM

## 2023-10-24 DIAGNOSIS — I25.10 CORONARY ARTERY DISEASE INVOLVING NATIVE CORONARY ARTERY OF NATIVE HEART WITHOUT ANGINA PECTORIS: ICD-10-CM

## 2023-10-24 RX ORDER — METOPROLOL TARTRATE 50 MG/1
50 TABLET, FILM COATED ORAL 2 TIMES DAILY
Qty: 180 TABLET | Refills: 3 | Status: SHIPPED | OUTPATIENT
Start: 2023-10-24

## 2023-10-27 ENCOUNTER — OFFICE VISIT (OUTPATIENT)
Age: 78
End: 2023-10-27
Payer: MEDICARE

## 2023-10-27 VITALS
BODY MASS INDEX: 26.34 KG/M2 | SYSTOLIC BLOOD PRESSURE: 126 MMHG | HEIGHT: 70 IN | DIASTOLIC BLOOD PRESSURE: 60 MMHG | WEIGHT: 184 LBS | HEART RATE: 80 BPM

## 2023-10-27 DIAGNOSIS — I25.10 CORONARY ARTERY DISEASE INVOLVING NATIVE CORONARY ARTERY OF NATIVE HEART WITHOUT ANGINA PECTORIS: ICD-10-CM

## 2023-10-27 DIAGNOSIS — I71.40 ABDOMINAL AORTIC ANEURYSM (AAA) WITHOUT RUPTURE, UNSPECIFIED PART (HCC): ICD-10-CM

## 2023-10-27 DIAGNOSIS — J84.10 PULMONARY FIBROSIS, UNSPECIFIED (HCC): ICD-10-CM

## 2023-10-27 DIAGNOSIS — E78.2 MIXED HYPERLIPIDEMIA: ICD-10-CM

## 2023-10-27 DIAGNOSIS — I25.84 CORONARY ARTERY CALCIFICATION: ICD-10-CM

## 2023-10-27 DIAGNOSIS — E11.9 TYPE 2 DIABETES MELLITUS WITHOUT COMPLICATION, WITHOUT LONG-TERM CURRENT USE OF INSULIN (HCC): ICD-10-CM

## 2023-10-27 DIAGNOSIS — I48.91 LONE ATRIAL FIBRILLATION (HCC): Primary | ICD-10-CM

## 2023-10-27 DIAGNOSIS — I25.10 CORONARY ARTERY CALCIFICATION: ICD-10-CM

## 2023-10-27 PROCEDURE — G8484 FLU IMMUNIZE NO ADMIN: HCPCS | Performed by: INTERNAL MEDICINE

## 2023-10-27 PROCEDURE — 3078F DIAST BP <80 MM HG: CPT | Performed by: INTERNAL MEDICINE

## 2023-10-27 PROCEDURE — 3044F HG A1C LEVEL LT 7.0%: CPT | Performed by: INTERNAL MEDICINE

## 2023-10-27 PROCEDURE — 1123F ACP DISCUSS/DSCN MKR DOCD: CPT | Performed by: INTERNAL MEDICINE

## 2023-10-27 PROCEDURE — 99214 OFFICE O/P EST MOD 30 MIN: CPT | Performed by: INTERNAL MEDICINE

## 2023-10-27 PROCEDURE — 4004F PT TOBACCO SCREEN RCVD TLK: CPT | Performed by: INTERNAL MEDICINE

## 2023-10-27 PROCEDURE — G8427 DOCREV CUR MEDS BY ELIG CLIN: HCPCS | Performed by: INTERNAL MEDICINE

## 2023-10-27 PROCEDURE — G8417 CALC BMI ABV UP PARAM F/U: HCPCS | Performed by: INTERNAL MEDICINE

## 2023-10-27 PROCEDURE — 3074F SYST BP LT 130 MM HG: CPT | Performed by: INTERNAL MEDICINE

## 2023-10-27 ASSESSMENT — ENCOUNTER SYMPTOMS
GASTROINTESTINAL NEGATIVE: 1
COUGH: 0
RESPIRATORY NEGATIVE: 1
SHORTNESS OF BREATH: 0

## 2023-10-27 NOTE — PROGRESS NOTES
call our office with any issues or other concerns related to their cardiac condition(s) and/or complaint(s). No follow-ups on file.     Staci Cid DO  10/27/2023 2:03 PM

## 2023-10-30 ENCOUNTER — TELEPHONE (OUTPATIENT)
Dept: UROLOGY | Age: 78
End: 2023-10-30

## 2023-10-30 PROBLEM — N43.3 HYDROCELE: Status: ACTIVE | Noted: 2023-10-30

## 2023-10-30 NOTE — TELEPHONE ENCOUNTER
----- Message from Chris Baron MD sent at 10/20/2023  2:31 PM EDT -----  Please schedule right hydrocelectomy.  Will need appointment for H+P

## 2023-11-09 DIAGNOSIS — R79.89 LOW TESTOSTERONE IN MALE: ICD-10-CM

## 2023-11-09 NOTE — TELEPHONE ENCOUNTER
Pt calling to get Refill on Testosterone Gell. Left VM that I sent the order to Dr. Nielson requesting the order and that I am just waiting on approval from him to proceed.

## 2023-11-10 RX ORDER — TESTOSTERONE 16.2 MG/G
1 GEL TRANSDERMAL DAILY
Qty: 1 EACH | Refills: 5 | Status: SHIPPED | OUTPATIENT
Start: 2023-11-10 | End: 2023-12-10

## 2023-11-20 ENCOUNTER — HOSPITAL ENCOUNTER (OUTPATIENT)
Dept: CT IMAGING | Age: 78
Discharge: HOME | End: 2023-11-23

## 2023-11-20 DIAGNOSIS — C34.31 MALIGNANT NEOPLASM OF LOWER LOBE, RIGHT BRONCHUS OR LUNG (HCC): ICD-10-CM

## 2023-11-27 ENCOUNTER — OFFICE VISIT (OUTPATIENT)
Dept: UROLOGY | Age: 78
End: 2023-11-27
Payer: MEDICARE

## 2023-11-27 DIAGNOSIS — N43.3 HYDROCELE, UNSPECIFIED HYDROCELE TYPE: Primary | ICD-10-CM

## 2023-11-27 PROCEDURE — G8417 CALC BMI ABV UP PARAM F/U: HCPCS | Performed by: UROLOGY

## 2023-11-27 PROCEDURE — G8427 DOCREV CUR MEDS BY ELIG CLIN: HCPCS | Performed by: UROLOGY

## 2023-11-27 PROCEDURE — G8484 FLU IMMUNIZE NO ADMIN: HCPCS | Performed by: UROLOGY

## 2023-11-27 PROCEDURE — 99213 OFFICE O/P EST LOW 20 MIN: CPT | Performed by: UROLOGY

## 2023-11-27 PROCEDURE — 1123F ACP DISCUSS/DSCN MKR DOCD: CPT | Performed by: UROLOGY

## 2023-11-27 PROCEDURE — 4004F PT TOBACCO SCREEN RCVD TLK: CPT | Performed by: UROLOGY

## 2023-11-27 ASSESSMENT — ENCOUNTER SYMPTOMS
BACK PAIN: 0
NAUSEA: 0

## 2023-12-01 NOTE — PERIOP NOTE
Patient verified name and . Order for consent NOT found in EHR at time of PAT visit. Unable to verify case posting against order; surgery verified by patient. Type 1B surgery, PAT phone assessment complete. Orders not received. Labs per surgeon: unknown; no orders received    Labs per anesthesia protocol: POC glucose on DOS. Patient answered medical/surgical history questions at their best of ability. All prior to admission medications documented in EPIC. Patient instructed to take the following medications the day of surgery according to anesthesia guidelines with a small sip of water: aspirin 81 mg, albuterol inhaler (use and bring), metoprolol, levothyroxine Hold all vitamins 7 days prior to surgery and NSAIDS 5 days prior to surgery. Prescription meds to hold:vitamins and supplements  Patient instructed on the following:    > Arrive at [facility], time of arrival to be called the day before by 1700  > NPO after midnight, unless otherwise indicated, including gum, mints, and ice chips  > Responsible adult must drive patient to the hospital, stay during surgery, and patient will need supervision 24 hours after anesthesia  > Use non moisturizing soap in shower the night before surgery and on the morning of surgery  > All piercings must be removed prior to arrival.    > Leave all valuables (money and jewelry) at home but bring insurance card and ID on DOS.   > You may be required to pay a deductible or co-pay on the day of your procedure. You can pre-pay by calling [phone] if your surgery is at the [facility] or [phone] if your surgery is at the [facility]. > Do not wear make-up, nail polish, lotions, cologne, perfumes, powders, or oil on skin. Artificial nails are not permitted.

## 2023-12-05 ENCOUNTER — HOSPITAL ENCOUNTER (OUTPATIENT)
Dept: RADIATION ONCOLOGY | Age: 78
Setting detail: RECURRING SERIES
Discharge: HOME | End: 2023-12-08
Payer: MEDICARE

## 2023-12-05 ENCOUNTER — ANESTHESIA EVENT (OUTPATIENT)
Dept: SURGERY | Age: 78
End: 2023-12-05
Payer: MEDICARE

## 2023-12-05 VITALS
OXYGEN SATURATION: 93 % | RESPIRATION RATE: 16 BRPM | TEMPERATURE: 97.9 F | HEART RATE: 80 BPM | SYSTOLIC BLOOD PRESSURE: 113 MMHG | DIASTOLIC BLOOD PRESSURE: 75 MMHG

## 2023-12-05 DIAGNOSIS — R91.1 LUNG NODULE: Primary | ICD-10-CM

## 2023-12-05 PROCEDURE — 99211 OFF/OP EST MAY X REQ PHY/QHP: CPT

## 2023-12-05 ASSESSMENT — PATIENT HEALTH QUESTIONNAIRE - PHQ9
SUM OF ALL RESPONSES TO PHQ QUESTIONS 1-9: 0
SUM OF ALL RESPONSES TO PHQ9 QUESTIONS 1 & 2: 0
2. FEELING DOWN, DEPRESSED OR HOPELESS: 0
SUM OF ALL RESPONSES TO PHQ QUESTIONS 1-9: 0
1. LITTLE INTEREST OR PLEASURE IN DOING THINGS: 0
SUM OF ALL RESPONSES TO PHQ QUESTIONS 1-9: 0
SUM OF ALL RESPONSES TO PHQ QUESTIONS 1-9: 0

## 2023-12-05 NOTE — PROGRESS NOTES
Patient: Donna Gallegos  MRN: [id number]   SSN: [id number]          YOB: 1945   Age: 78 y.o. Sex: male        Other Providers:  [facility]      DIAGNOSIS: RLL SCC, T1bN0 stage 1A2     PREVIOUS TREATMENT:  1) SBRT to RLL nodule, 5000cGy, completed 11/19/21     INTERVAL HISTORY:  Donna Gallegos is a  78 y.o. male who is here  Today for follow up. He has continued to recover from his course  of therapy. He notes no new complaints or concerns. He continues to stay active and his fatigue from treatment has improved. He continues going to the gym daily. No cough or shortness of breath. No difficulty swallowing.      4/25/22 Here today for follow up. Doing okay since last seen. He notes ongoing fatigue and decreased stamina due to recent COVID infection. He Continues to stay active and is working to get back to previous state. He notes no new issues or concerns otherwise. No difficulty with swallowing. Repeat CT demonstrates no findings concerning for disease progression with persistent lower lobe inflammatory changes worsened on the right.    7/25/22: Repeat CT chest demonstrates grossly unchanged RLL pulmonary nodule with post radiation change and parenchymal scarring. There are chronically stable enlarged AP window lymph nodes and prominent precarinal prevascular space lymph nodes. Mr. Lashell Timmons had covid and has had congestion and a slightly worsened cough since that time without significant changes in his breathing. 11/2/22: Repeat PET/CT 10/31/22 demonstrates post treatment changes within both lower lobes and no FDG uptake within the stable 2cm RLL pulmonary nodule and no evidence of residual viable disease. There is a single focus of mild FDG uptake adjacent to the L second rib. On personal review this was present on his PET/CT 9/21 and has not changed, there is no visible correlate in the lung or the bone.  I discussed the finding with Dr. Isidra Huggins in radiology who agrees

## 2023-12-05 NOTE — PROGRESS NOTES
Follow Up (SBRT Right Lower Lobe Lung): CT Scan Results  -Scan Complete: 11/20/2023     Patient presents today with no complaints.     Leanna Camargo MA

## 2023-12-06 ENCOUNTER — ANESTHESIA (OUTPATIENT)
Dept: SURGERY | Age: 78
End: 2023-12-06
Payer: MEDICARE

## 2023-12-06 ENCOUNTER — HOSPITAL ENCOUNTER (OUTPATIENT)
Age: 78
Setting detail: OUTPATIENT SURGERY
Discharge: HOME | End: 2023-12-06
Attending: UROLOGY | Admitting: UROLOGY
Payer: MEDICARE

## 2023-12-06 VITALS
RESPIRATION RATE: 18 BRPM | HEIGHT: 70 IN | DIASTOLIC BLOOD PRESSURE: 65 MMHG | OXYGEN SATURATION: 94 % | WEIGHT: 185.6 LBS | SYSTOLIC BLOOD PRESSURE: 144 MMHG | BODY MASS INDEX: 26.57 KG/M2 | TEMPERATURE: 98.2 F | HEART RATE: 79 BPM

## 2023-12-06 DIAGNOSIS — Z98.890 STATUS POST SURGERY: Primary | ICD-10-CM

## 2023-12-06 LAB
GLUCOSE BLD STRIP.AUTO-MCNC: 127 MG/DL (ref 65–100)
SERVICE CMNT-IMP: ABNORMAL

## 2023-12-06 PROCEDURE — 3600000012 HC SURGERY LEVEL 2 ADDTL 15MIN: Performed by: UROLOGY

## 2023-12-06 PROCEDURE — 2580000003 HC RX 258: Performed by: ANESTHESIOLOGY

## 2023-12-06 PROCEDURE — 7100000011 HC PHASE II RECOVERY - ADDTL 15 MIN: Performed by: UROLOGY

## 2023-12-06 PROCEDURE — 2500000003 HC RX 250 WO HCPCS: Performed by: NURSE ANESTHETIST, CERTIFIED REGISTERED

## 2023-12-06 PROCEDURE — 3700000001 HC ADD 15 MINUTES (ANESTHESIA): Performed by: UROLOGY

## 2023-12-06 PROCEDURE — 7100000010 HC PHASE II RECOVERY - FIRST 15 MIN: Performed by: UROLOGY

## 2023-12-06 PROCEDURE — 6360000002 HC RX W HCPCS: Performed by: UROLOGY

## 2023-12-06 PROCEDURE — 3600000002 HC SURGERY LEVEL 2 BASE: Performed by: UROLOGY

## 2023-12-06 PROCEDURE — 6360000002 HC RX W HCPCS: Performed by: NURSE ANESTHETIST, CERTIFIED REGISTERED

## 2023-12-06 PROCEDURE — 2580000003 HC RX 258: Performed by: NURSE ANESTHETIST, CERTIFIED REGISTERED

## 2023-12-06 PROCEDURE — 3700000000 HC ANESTHESIA ATTENDED CARE: Performed by: UROLOGY

## 2023-12-06 PROCEDURE — 2709999900 HC NON-CHARGEABLE SUPPLY: Performed by: UROLOGY

## 2023-12-06 PROCEDURE — 6370000000 HC RX 637 (ALT 250 FOR IP): Performed by: ANESTHESIOLOGY

## 2023-12-06 PROCEDURE — 7100000001 HC PACU RECOVERY - ADDTL 15 MIN: Performed by: UROLOGY

## 2023-12-06 PROCEDURE — 82962 GLUCOSE BLOOD TEST: CPT

## 2023-12-06 PROCEDURE — 7100000000 HC PACU RECOVERY - FIRST 15 MIN: Performed by: UROLOGY

## 2023-12-06 RX ORDER — PROCHLORPERAZINE EDISYLATE 5 MG/ML
5 INJECTION INTRAMUSCULAR; INTRAVENOUS
Status: DISCONTINUED | OUTPATIENT
Start: 2023-12-06 | End: 2023-12-06 | Stop reason: HOSPADM

## 2023-12-06 RX ORDER — HYDROMORPHONE HYDROCHLORIDE 2 MG/ML
0.5 INJECTION, SOLUTION INTRAMUSCULAR; INTRAVENOUS; SUBCUTANEOUS EVERY 5 MIN PRN
Status: DISCONTINUED | OUTPATIENT
Start: 2023-12-06 | End: 2023-12-06 | Stop reason: HOSPADM

## 2023-12-06 RX ORDER — EPHEDRINE SULFATE/0.9% NACL/PF 50 MG/5 ML
SYRINGE (ML) INTRAVENOUS PRN
Status: DISCONTINUED | OUTPATIENT
Start: 2023-12-06 | End: 2023-12-06 | Stop reason: SDUPTHER

## 2023-12-06 RX ORDER — SODIUM CHLORIDE 0.9 % (FLUSH) 0.9 %
5-40 SYRINGE (ML) INJECTION EVERY 12 HOURS SCHEDULED
Status: DISCONTINUED | OUTPATIENT
Start: 2023-12-06 | End: 2023-12-06 | Stop reason: HOSPADM

## 2023-12-06 RX ORDER — LIDOCAINE HYDROCHLORIDE 20 MG/ML
INJECTION, SOLUTION EPIDURAL; INFILTRATION; INTRACAUDAL; PERINEURAL PRN
Status: DISCONTINUED | OUTPATIENT
Start: 2023-12-06 | End: 2023-12-06 | Stop reason: SDUPTHER

## 2023-12-06 RX ORDER — SODIUM CHLORIDE 0.9 % (FLUSH) 0.9 %
5-40 SYRINGE (ML) INJECTION PRN
Status: DISCONTINUED | OUTPATIENT
Start: 2023-12-06 | End: 2023-12-06 | Stop reason: HOSPADM

## 2023-12-06 RX ORDER — ONDANSETRON 2 MG/ML
INJECTION INTRAMUSCULAR; INTRAVENOUS PRN
Status: DISCONTINUED | OUTPATIENT
Start: 2023-12-06 | End: 2023-12-06 | Stop reason: SDUPTHER

## 2023-12-06 RX ORDER — PROPOFOL 10 MG/ML
INJECTION, EMULSION INTRAVENOUS PRN
Status: DISCONTINUED | OUTPATIENT
Start: 2023-12-06 | End: 2023-12-06 | Stop reason: SDUPTHER

## 2023-12-06 RX ORDER — OXYCODONE HYDROCHLORIDE 5 MG/1
10 TABLET ORAL PRN
Status: COMPLETED | OUTPATIENT
Start: 2023-12-06 | End: 2023-12-06

## 2023-12-06 RX ORDER — ONDANSETRON 2 MG/ML
4 INJECTION INTRAMUSCULAR; INTRAVENOUS
Status: DISCONTINUED | OUTPATIENT
Start: 2023-12-06 | End: 2023-12-06 | Stop reason: HOSPADM

## 2023-12-06 RX ORDER — SODIUM CHLORIDE, SODIUM LACTATE, POTASSIUM CHLORIDE, CALCIUM CHLORIDE 600; 310; 30; 20 MG/100ML; MG/100ML; MG/100ML; MG/100ML
INJECTION, SOLUTION INTRAVENOUS CONTINUOUS
Status: DISCONTINUED | OUTPATIENT
Start: 2023-12-06 | End: 2023-12-06 | Stop reason: HOSPADM

## 2023-12-06 RX ORDER — HYDROCODONE BITARTRATE AND ACETAMINOPHEN 10; 325 MG/1; MG/1
1 TABLET ORAL EVERY 4 HOURS PRN
Qty: 15 TABLET | Refills: 0 | Status: SHIPPED | OUTPATIENT
Start: 2023-12-06 | End: 2024-01-05

## 2023-12-06 RX ORDER — SODIUM CHLORIDE 9 MG/ML
INJECTION, SOLUTION INTRAVENOUS PRN
Status: DISCONTINUED | OUTPATIENT
Start: 2023-12-06 | End: 2023-12-06 | Stop reason: HOSPADM

## 2023-12-06 RX ORDER — BUPIVACAINE HYDROCHLORIDE 2.5 MG/ML
INJECTION, SOLUTION EPIDURAL; INFILTRATION; INTRACAUDAL PRN
Status: DISCONTINUED | OUTPATIENT
Start: 2023-12-06 | End: 2023-12-06 | Stop reason: ALTCHOICE

## 2023-12-06 RX ORDER — ACETAMINOPHEN 500 MG
1000 TABLET ORAL ONCE
Status: COMPLETED | OUTPATIENT
Start: 2023-12-06 | End: 2023-12-06

## 2023-12-06 RX ORDER — OXYCODONE HYDROCHLORIDE 5 MG/1
5 TABLET ORAL PRN
Status: COMPLETED | OUTPATIENT
Start: 2023-12-06 | End: 2023-12-06

## 2023-12-06 RX ORDER — FENTANYL CITRATE 50 UG/ML
INJECTION, SOLUTION INTRAMUSCULAR; INTRAVENOUS PRN
Status: DISCONTINUED | OUTPATIENT
Start: 2023-12-06 | End: 2023-12-06 | Stop reason: SDUPTHER

## 2023-12-06 RX ORDER — DEXAMETHASONE SODIUM PHOSPHATE 4 MG/ML
INJECTION, SOLUTION INTRA-ARTICULAR; INTRALESIONAL; INTRAMUSCULAR; INTRAVENOUS; SOFT TISSUE PRN
Status: DISCONTINUED | OUTPATIENT
Start: 2023-12-06 | End: 2023-12-06 | Stop reason: SDUPTHER

## 2023-12-06 RX ORDER — LIDOCAINE HYDROCHLORIDE 10 MG/ML
1 INJECTION, SOLUTION INFILTRATION; PERINEURAL
Status: DISCONTINUED | OUTPATIENT
Start: 2023-12-06 | End: 2023-12-06 | Stop reason: HOSPADM

## 2023-12-06 RX ORDER — HYDROMORPHONE HYDROCHLORIDE 2 MG/ML
0.25 INJECTION, SOLUTION INTRAMUSCULAR; INTRAVENOUS; SUBCUTANEOUS EVERY 5 MIN PRN
Status: DISCONTINUED | OUTPATIENT
Start: 2023-12-06 | End: 2023-12-06 | Stop reason: HOSPADM

## 2023-12-06 RX ADMIN — Medication 10 MG: at 16:54

## 2023-12-06 RX ADMIN — SODIUM CHLORIDE, SODIUM LACTATE, POTASSIUM CHLORIDE, AND CALCIUM CHLORIDE: 600; 310; 30; 20 INJECTION, SOLUTION INTRAVENOUS at 13:03

## 2023-12-06 RX ADMIN — FENTANYL CITRATE 25 MCG: 50 INJECTION, SOLUTION INTRAMUSCULAR; INTRAVENOUS at 16:38

## 2023-12-06 RX ADMIN — OXYCODONE 10 MG: 5 TABLET ORAL at 17:30

## 2023-12-06 RX ADMIN — Medication 5 MG: at 16:34

## 2023-12-06 RX ADMIN — ONDANSETRON 4 MG: 2 INJECTION INTRAMUSCULAR; INTRAVENOUS at 16:50

## 2023-12-06 RX ADMIN — PHENYLEPHRINE HYDROCHLORIDE 50 MCG: 10 INJECTION INTRAVENOUS at 16:30

## 2023-12-06 RX ADMIN — Medication 10 MG: at 17:02

## 2023-12-06 RX ADMIN — FENTANYL CITRATE 25 MCG: 50 INJECTION, SOLUTION INTRAMUSCULAR; INTRAVENOUS at 16:31

## 2023-12-06 RX ADMIN — ACETAMINOPHEN 1000 MG: 500 TABLET, FILM COATED ORAL at 12:40

## 2023-12-06 RX ADMIN — PHENYLEPHRINE HYDROCHLORIDE 100 MCG: 10 INJECTION INTRAVENOUS at 16:33

## 2023-12-06 RX ADMIN — SODIUM CHLORIDE, SODIUM LACTATE, POTASSIUM CHLORIDE, AND CALCIUM CHLORIDE: 600; 310; 30; 20 INJECTION, SOLUTION INTRAVENOUS at 16:58

## 2023-12-06 RX ADMIN — LIDOCAINE HYDROCHLORIDE 80 MG: 20 INJECTION, SOLUTION EPIDURAL; INFILTRATION; INTRACAUDAL; PERINEURAL at 16:20

## 2023-12-06 RX ADMIN — Medication 2000 MG: at 16:25

## 2023-12-06 RX ADMIN — FENTANYL CITRATE 50 MCG: 50 INJECTION, SOLUTION INTRAMUSCULAR; INTRAVENOUS at 16:20

## 2023-12-06 RX ADMIN — Medication 5 MG: at 16:36

## 2023-12-06 RX ADMIN — DEXAMETHASONE SODIUM PHOSPHATE 4 MG: 4 INJECTION INTRA-ARTICULAR; INTRALESIONAL; INTRAMUSCULAR; INTRAVENOUS; SOFT TISSUE at 16:45

## 2023-12-06 RX ADMIN — PROPOFOL 200 MG: 10 INJECTION, EMULSION INTRAVENOUS at 16:20

## 2023-12-06 RX ADMIN — Medication 10 MG: at 16:30

## 2023-12-06 ASSESSMENT — PAIN SCALES - GENERAL: PAINLEVEL_OUTOF10: 5

## 2023-12-06 ASSESSMENT — PAIN DESCRIPTION - DESCRIPTORS
DESCRIPTORS: TENDER
DESCRIPTORS: SORE

## 2023-12-06 ASSESSMENT — PAIN DESCRIPTION - LOCATION: LOCATION: SCROTUM

## 2023-12-06 ASSESSMENT — PAIN - FUNCTIONAL ASSESSMENT
PAIN_FUNCTIONAL_ASSESSMENT: 0-10
PAIN_FUNCTIONAL_ASSESSMENT: 0-10

## 2023-12-06 ASSESSMENT — LIFESTYLE VARIABLES: SMOKING_STATUS: 1

## 2023-12-06 NOTE — DISCHARGE INSTRUCTIONS
If you have had surgery in the past 7-10 days by one of our providers and are having fever, bleeding, or drainage from an incision, have an opening in an incision, or having issues urinating properly, please call [phone]. Hydrocelectomy: What to Expect at [address] [place] [address] is surgery to remove a hydrocele. A hydrocele is a fluid-filled sac inside the scrotum. A hydrocele can happen on one or both sides of the scrotum. The doctor made a very small cut (incision) in your scrotum to drain the fluid from the hydrocele and to remove the fluid-filled sac. This surgery was done to remove the fluid and to stop the buildup of fluid in the scrotum. After your surgery, you may feel more tired than usual and have some mild groin pain for several days. Your groin and scrotum may be swollen or bruised. This usually gets better in 2 to 3 weeks. You will probably be able to go back to work or school 4 to 7 days after surgery. But you will need to avoid strenuous exercise or heavy lifting for 2 to 4 weeks. This care sheet gives you a general idea about how long it will take for you to recover. But each person recovers at a different pace. Follow the steps below to get better as quickly as possible. How can you care for yourself at home? Activity  Rest when you feel tired. Getting enough sleep will help you recover. Try to walk each day. Start by walking a little more than you did the day before. Bit by bit, increase the amount you walk. Walking boosts blood flow and helps prevent pneumonia and constipation. You may shower 24 hours after surgery, if your doctor says it is okay. Pat the cut (incision) dry. Do not take a bath for the first week, or until your doctor tells you it is okay. You may return to work or school when you are ready. This is usually in about 4 to 7 days.   Avoid strenuous activities, such as bicycle riding, jogging, weight lifting, or aerobic exercise, until your doctor says

## 2023-12-06 NOTE — ANESTHESIA PRE PROCEDURE
Department of Anesthesiology  Preprocedure Note       Name:  Lor Young   Age:  70 y.o.  :  1945                                          MRN:  [id number]         Date:  2023      Surgeon: Jabari De La Torre):  Rhonda Marroquin MD    Procedure: Procedure(s): HYDROCELECTOMY/RIGHT    Medications prior to admission:   Prior to Admission medications    Medication Sig Start Date End Date Taking? Authorizing Provider   Coenzyme Q10 10 MG CAPS Take 1 tablet by mouth daily    Lissette Blankenship MD   Testosterone (ANDROGEL) 20.25 MG/ACT (1.62%) GEL gel Place 1 actuation onto the skin daily for 30 doses. 11/10/23 12/10/23  Rhonda Marroquin MD   metoprolol tartrate (LOPRESSOR) 50 MG tablet Take 1 tablet by mouth 2 times daily 10/24/23   Adenike Valentin DO   rosuvastatin (CRESTOR) 40 MG tablet Take 1 tablet by mouth daily  Patient taking differently: Take 1 tablet by mouth nightly 23   Adenike Valentin DO   metFORMIN (GLUCOPHAGE-XR) 500 MG extended release tablet Take 1 tablet by mouth daily (with breakfast) 23   Amrita Knutson MD   levothyroxine (SYNTHROID) 88 MCG tablet TAKE 1 TABLET BY MOUTH DAILY BEFORE BREAKFAST 23   Amrita Knutson MD   blood glucose test strips (ASCENSIA AUTODISC VI;ONE TOUCH ULTRA TEST VI) strip 1 each by In Vitro route daily Pt uses one touch ultra 2 meter.  3/2/23   Amrita Knutson MD   Ascorbic Acid (VITAMIN C) 1000 MG tablet Take 1 tablet by mouth daily    Lissette Blankenship MD   Garlic 10 MG CAPS Take 10 mg by mouth daily    Lissette Blankenship MD   albuterol sulfate HFA (PROVENTIL;VENTOLIN;PROAIR) 108 (90 Base) MCG/ACT inhaler Inhale 2 puffs into the lungs every 4 hours as needed for Shortness of Breath or Wheezing 22   MD Maggy Buenrostro COQ10/UBIQUINOL/RANDY PO Take 200 mg by mouth daily    Lissette Blankenship MD   Omega-3 Fatty Acids (FISH OIL) 1000 MG CAPS Take 1 capsule by mouth daily    Lissette Blankenship MD   Multiple

## 2023-12-06 NOTE — BRIEF OP NOTE
Brief Postoperative Note      Patient: Han Coon  YOB: 1945  MRN: [id number]    Date of Procedure: 12/6/2023    Pre-Op Diagnosis Codes: * Hydrocele [N43.3] Right    Post-Op Diagnosis: Same       Procedure(s):   HYDROCELECTOMY/RIGHT    Surgeon(s):  Sheri Dillard MD      Anesthesia: General    Estimated Blood Loss (mL): Minimal    Complications: None    Specimens:   ID Type Source Tests Collected by Time Destination   A :  Tissue Scrotum SURGICAL PATHOLOGY Sheri Dillard MD 12/6/2023 1654        Electronically signed by Sheri Dillard MD on 12/6/2023 at 5:34 PM

## 2023-12-06 NOTE — H&P
HISTORY AND PHYSICAL             Date: 12/6/2023        Patient Name: Darcy Gardiner     YOB: 1945      Age:  70 y.o. History of Present Illness     The patient is having increasing bother from his right hydrocele. He says it interferes with him swinging a golf club. He is status post right nephrectomy in November 2019 for stage T1 renal cell carcinoma. In June 2023 his creatinine was 1.3.      Past Medical History     Past Medical History:   Diagnosis Date    AAA (abdominal aortic aneurysm) (HCC)     35 mm on CT scan, US stable 35 mm; 10/31/22 pet scan 3.5 cm    Abnormal chest x-ray 3/19/2013    Adenopathy 3/19/2013    Lymph glands    Allergic rhinitis, cause unspecified 3/19/2013    Atrial fibrillation (HCC)     after kidney surgery and wore monitor but no more a fib events noted    CAD (coronary artery disease)     Followed by Dr. Lanier Spatz at [facility]- per note nonobstructive CAD    Cancer [facility])     right kidney cancer followed by right nephrectomy; per pulmonary note squamous cell lung cancer followed by Dr. Taye Martin airway obstruction, not elsewhere classified 3/19/2013    COPD (chronic obstructive pulmonary disease) ([address])     Diabetes ([address])     \"pre-diabetes\" metformin, BS after dinner <120, denies hypoglycemia    Diabetes mellitus ([address]) 3/19/2013    Hashimoto's disease     History of lung cancer     treated with radiation    Pulmonary fibrosis ([address])     Follow[facility] Marsha Dave at [facility],    Sinus problem     Thyroid disease     Hypothyroid, managed with medication    Tobacco use disorder 3/19/2013    Unspecified essential hypertension 3/19/2013    Unspecified hypothyroidism 3/19/2013    medication        Past Surgical History     Past Surgical History:   Procedure Laterality Date    COLONOSCOPY      CT NEEDLE BIOPSY LUNG PERCUTANEOUS  10/18/2021    CT NEEDLE BIOPSY LUNG PERCUTANEOUS 10/18/2021 [facility] CT SCAN    HERNIA REPAIR      4/2021    IR

## 2023-12-07 ENCOUNTER — TELEPHONE (OUTPATIENT)
Dept: UROLOGY | Age: 78
End: 2023-12-07

## 2023-12-07 NOTE — TELEPHONE ENCOUNTER
Wife called concerned that Pt fainted 2 times since surgery yesterday and was wanting advise on how to proceed. Harolyn Boxer RN and she said that Pt had not had MAJOR surgery so if he is fainting and they are concerned then they need to head to the ER. Wife stated that Pt has not been taking it easy and has not been resting, that he just wanted to go back to normal right after surgery. Expressed to wife that Pt needs to rest, his body is letting him know that he is doing too much and needs to take it easy. I let wife know if anything else happens to give us a call or if she had anymore questions. Wife stated Pt has been resting on and off all afternoon and doing okay now. Wife appreciated the advise and thanked me for f/u with her.

## 2023-12-07 NOTE — OP NOTE
[facility]  OPERATIVE REPORT    Name:  Garnetta Halsted  MR#:  [id number]  :  1945  ACCOUNT #:  [de-identified]  DATE OF SERVICE:  2023    PREOPERATIVE DIAGNOSIS:  Right hydrocele. POSTOPERATIVE DIAGNOSIS:  Right hydrocele. PROCEDURE PERFORMED:  Right hydrocelectomy. SURGEON:  Yann Raya MD    ASSISTANT:  None. ANESTHESIA:  General.    COMPLICATIONS:  None. SPECIMENS REMOVED:  None. IMPLANTS:  None. ESTIMATED BLOOD LOSS:  Minimal.    DRAINS:  None. NARRATIVE:  The patient was taken to the OR and after adequate general anesthesia was achieved, he was placed in supine position and prepped and draped in the usual sterile fashion for a scrotal case. The patient was noted to have an approximately fist size hydrocele on the right side that was fairly tense. A midline incision was made with a 15-blade large enough to deliver the hydrocele. The subcutaneous layers were carefully dissected through until I was able to deliver the hydrocele. It was not clear if this was adventitial coverings and the cord was freed up as well up towards the external ring. I then entered the hydrocele and copious amount of straw-colored fluid was drained. The excess amounts of tunica vaginalis were then carefully dissected away. I spent quite sometime achieving meticulous hemostasis with Bovie electrocautery. Once this was achieved, the testicle was delivered back into the right hemiscrotum in its normal orientation. The scrotal wound was then closed in two layers, a deeper layer of running interlocking 2-0 chromic and an outer layer of running 2-0 chromic, fluffs and scrotal support were then applied. The patient was awakened, extubated, and taken to the OR without any further incidents or complaint.     Mahamed Voss MD      [id number]  D:  2023 18:50  T:  2023 0:26  JOB #:  [id number]

## 2023-12-15 ENCOUNTER — HOSPITAL ENCOUNTER (EMERGENCY)
Age: 78
Discharge: HOME | End: 2023-12-15
Attending: EMERGENCY MEDICINE
Payer: MEDICARE

## 2023-12-15 VITALS
WEIGHT: 187 LBS | BODY MASS INDEX: 26.77 KG/M2 | RESPIRATION RATE: 19 BRPM | HEART RATE: 88 BPM | DIASTOLIC BLOOD PRESSURE: 64 MMHG | TEMPERATURE: 98 F | SYSTOLIC BLOOD PRESSURE: 132 MMHG | HEIGHT: 70 IN | OXYGEN SATURATION: 100 %

## 2023-12-15 DIAGNOSIS — S31.30XA OPEN WOUND OF SCROTUM AND TESTES, INITIAL ENCOUNTER: Primary | ICD-10-CM

## 2023-12-15 LAB
ABO + RH BLD: NORMAL
ALBUMIN SERPL-MCNC: 3.8 G/DL (ref 3.2–4.6)
ALBUMIN/GLOB SERPL: 0.9 (ref 0.4–1.6)
ALP SERPL-CCNC: 105 U/L (ref 50–136)
ALT SERPL-CCNC: 31 U/L (ref 12–65)
ANION GAP SERPL CALC-SCNC: 6 MMOL/L (ref 2–11)
AST SERPL-CCNC: 20 U/L (ref 15–37)
BASOPHILS # BLD: 0 K/UL (ref 0–0.2)
BASOPHILS NFR BLD: 0 % (ref 0–2)
BILIRUB SERPL-MCNC: 0.9 MG/DL (ref 0.2–1.1)
BLOOD GROUP ANTIBODIES SERPL: NORMAL
BUN SERPL-MCNC: 28 MG/DL (ref 8–23)
CALCIUM SERPL-MCNC: 9.5 MG/DL (ref 8.3–10.4)
CHLORIDE SERPL-SCNC: 105 MMOL/L (ref 103–113)
CO2 SERPL-SCNC: 25 MMOL/L (ref 21–32)
CREAT SERPL-MCNC: 1.3 MG/DL (ref 0.8–1.5)
DIFFERENTIAL METHOD BLD: ABNORMAL
EOSINOPHIL # BLD: 0 K/UL (ref 0–0.8)
EOSINOPHIL NFR BLD: 0 % (ref 0.5–7.8)
ERYTHROCYTE [DISTWIDTH] IN BLOOD BY AUTOMATED COUNT: 13.5 % (ref 11.9–14.6)
GLOBULIN SER CALC-MCNC: 4.1 G/DL (ref 2.8–4.5)
GLUCOSE SERPL-MCNC: 116 MG/DL (ref 65–100)
HCT VFR BLD AUTO: 40.1 % (ref 41.1–50.3)
HGB BLD-MCNC: 13.8 G/DL (ref 13.6–17.2)
IMM GRANULOCYTES # BLD AUTO: 0.1 K/UL (ref 0–0.5)
IMM GRANULOCYTES NFR BLD AUTO: 0 % (ref 0–5)
LYMPHOCYTES # BLD: 1 K/UL (ref 0.5–4.6)
LYMPHOCYTES NFR BLD: 9 % (ref 13–44)
MCH RBC QN AUTO: 33 PG (ref 26.1–32.9)
MCHC RBC AUTO-ENTMCNC: 34.4 G/DL (ref 31.4–35)
MCV RBC AUTO: 95.9 FL (ref 82–102)
MONOCYTES # BLD: 0.8 K/UL (ref 0.1–1.3)
MONOCYTES NFR BLD: 7 % (ref 4–12)
NEUTS SEG # BLD: 10.1 K/UL (ref 1.7–8.2)
NEUTS SEG NFR BLD: 84 % (ref 43–78)
NRBC # BLD: 0 K/UL (ref 0–0.2)
PLATELET # BLD AUTO: 196 K/UL (ref 150–450)
PMV BLD AUTO: 9.8 FL (ref 9.4–12.3)
POTASSIUM SERPL-SCNC: 4 MMOL/L (ref 3.5–5.1)
PROT SERPL-MCNC: 7.9 G/DL (ref 6.3–8.2)
RBC # BLD AUTO: 4.18 M/UL (ref 4.23–5.6)
SODIUM SERPL-SCNC: 136 MMOL/L (ref 136–146)
SPECIMEN EXP DATE BLD: NORMAL
WBC # BLD AUTO: 12.1 K/UL (ref 4.3–11.1)

## 2023-12-15 PROCEDURE — 2500000003 HC RX 250 WO HCPCS: Performed by: EMERGENCY MEDICINE

## 2023-12-15 PROCEDURE — 85025 COMPLETE CBC W/AUTO DIFF WBC: CPT

## 2023-12-15 PROCEDURE — 99283 EMERGENCY DEPT VISIT LOW MDM: CPT

## 2023-12-15 PROCEDURE — 86850 RBC ANTIBODY SCREEN: CPT

## 2023-12-15 PROCEDURE — 86901 BLOOD TYPING SEROLOGIC RH(D): CPT

## 2023-12-15 PROCEDURE — 86900 BLOOD TYPING SEROLOGIC ABO: CPT

## 2023-12-15 PROCEDURE — 80053 COMPREHEN METABOLIC PANEL: CPT

## 2023-12-15 RX ORDER — LIDOCAINE HYDROCHLORIDE AND EPINEPHRINE 10; 10 MG/ML; UG/ML
20 INJECTION, SOLUTION INFILTRATION; PERINEURAL ONCE
Status: COMPLETED | OUTPATIENT
Start: 2023-12-15 | End: 2023-12-15

## 2023-12-15 RX ADMIN — LIDOCAINE HYDROCHLORIDE,EPINEPHRINE BITARTRATE 20 ML: 10; .01 INJECTION, SOLUTION INFILTRATION; PERINEURAL at 16:20

## 2023-12-15 ASSESSMENT — PAIN DESCRIPTION - ORIENTATION: ORIENTATION: MID

## 2023-12-15 ASSESSMENT — PAIN DESCRIPTION - LOCATION: LOCATION: OTHER (COMMENT)

## 2023-12-15 ASSESSMENT — PAIN SCALES - GENERAL: PAINLEVEL_OUTOF10: 0

## 2023-12-15 NOTE — ED NOTES
Assumed care of pt at this time. Pt reports he has been having worsening bleeding from his surgical site (x1 day). Surgical site is oozing blood at a slow rate, bleeding currently controlled, pt applying direct pressure to site. Pt A&O 4/4. GCS 15. Speech clear.       Tree Russell RN  12/15/23 [id number]

## 2023-12-15 NOTE — ED TRIAGE NOTES
Pt arrives to the ER with c/o surgical site bleeding. Pt had a testical surgery x December 6th. Pt states heavy bleeding today.

## 2023-12-15 NOTE — DISCHARGE INSTRUCTIONS
Continue to wear gauze padding. Rest.  May try cool compresses for discomfort. Call your urologist Monday for appointment to recheck. Recheck sooner or call them sooner for fever, worse bleeding or uncontrolled pain.

## 2023-12-19 ENCOUNTER — APPOINTMENT (OUTPATIENT)
Dept: RADIATION ONCOLOGY | Age: 78
End: 2023-12-19
Payer: MEDICARE

## 2024-01-03 ENCOUNTER — TELEPHONE (OUTPATIENT)
Dept: UROLOGY | Age: 79
End: 2024-01-03

## 2024-01-03 NOTE — TELEPHONE ENCOUNTER
Pt called stating that he had the Hydrocele procedure on Dec 6th. States he was bleeding on Dec 8th. ER visit Dec 9th. Saw Dr Nielson on Dec 11th and on Jan 2nd he is still seeping from the incision. Pt states he has questions directly for Dr Nielson regarding this because he feels that this is not healing and wants me to send him a message to let him know he would like a call from [place]. Let Pt know that I have sent Dr Nielson a message.

## 2024-01-03 NOTE — TELEPHONE ENCOUNTER
Called PT to let him know that per Dr Nielson's request he would like to see him in the office tomorrow Jan 4th at 11:45am to discuss Pt's concerns of seeping from the Incision.Pt was confused at first, but once he understood, thanked me for making the appt with him so soon.

## 2024-01-04 ENCOUNTER — OFFICE VISIT (OUTPATIENT)
Dept: UROLOGY | Age: 79
End: 2024-01-04

## 2024-01-04 DIAGNOSIS — N43.3 HYDROCELE, UNSPECIFIED HYDROCELE TYPE: ICD-10-CM

## 2024-01-04 DIAGNOSIS — S30.22XA SCROTAL HEMATOMA: Primary | ICD-10-CM

## 2024-01-04 PROCEDURE — 99024 POSTOP FOLLOW-UP VISIT: CPT | Performed by: UROLOGY

## 2024-01-04 ASSESSMENT — ENCOUNTER SYMPTOMS
BACK PAIN: 0
NAUSEA: 0

## 2024-01-04 NOTE — PROGRESS NOTES
1000 MG CAPS Take 1 capsule by mouth daily      Multiple Vitamins-Minerals (MULTIVITAL-M PO) Take 1 tablet by mouth daily      Lancets MISC E11.9 Non insulin dep testing bs 1times daily; bill to [facility] part B      TURMERIC PO Take 720 mg by mouth daily      aspirin 81 MG EC tablet Take 1 tablet by mouth daily      vitamin D 25 MCG (1000 UT) CAPS Take 2 capsules by mouth daily      triamcinolone (NASACORT) 55 MCG/ACT nasal inhaler 1 spray by Nasal route 2 times daily as needed      Testosterone (ANDROGEL) 20.25 MG/ACT (1.62%) GEL gel Place 1 actuation onto the skin daily for 30 doses. 1 each 5     No current facility-administered medications for this visit.     Allergies   Allergen Reactions    Amoxicillin-Pot Clavulanate Nausea Only     Pt states he is not allergic  \"upsets my stomach\"    Bupropion Hives    Thomas Flavor Hives    Varenicline Other (See Comments)     Wild Dreams     Social History     Socioeconomic History    Marital status:      Spouse name: Not on file    Number of children: Not on file    Years of education: Not on file    Highest education level: Not on file   Occupational History    Not on file   Tobacco Use    Smoking status: Every Day     Current packs/day: 0.50     Average packs/day: 0.5 packs/day for 50.0 years (25.0 ttl pk-yrs)     Types: Cigarettes    Smokeless tobacco: Never    Tobacco comments:     Quit smokin-10      cigarettes per day   Vaping Use    Vaping Use: Never used   Substance and Sexual Activity    Alcohol use: Yes     Comment: special occasions-rarely    Drug use: No    Sexual activity: Not on file   Other Topics Concern    Not on file   Social History Narrative    Not on file     Social Determinants of Health     Financial Resource Strain: Low Risk  (2023)    Overall Financial Resource Strain (CARDIA)     Difficulty of Paying Living Expenses: Not hard at all   Food Insecurity: Not on file (2023)   Transportation Needs: Unknown (2023)    PRAPARE -

## 2024-01-23 ENCOUNTER — HOSPITAL ENCOUNTER (OUTPATIENT)
Dept: PET IMAGING | Age: 79
Discharge: HOME | End: 2024-01-26
Payer: MEDICARE

## 2024-01-23 DIAGNOSIS — R91.1 LUNG NODULE: ICD-10-CM

## 2024-01-23 LAB
GLUCOSE BLD STRIP.AUTO-MCNC: 112 MG/DL (ref 65–100)
SERVICE CMNT-IMP: ABNORMAL

## 2024-01-23 PROCEDURE — 6360000004 HC RX CONTRAST MEDICATION: Performed by: RADIOLOGY

## 2024-01-23 PROCEDURE — 78815 PET IMAGE W/CT SKULL-THIGH: CPT

## 2024-01-23 PROCEDURE — 82962 GLUCOSE BLOOD TEST: CPT

## 2024-01-23 PROCEDURE — 3430000000 HC RX DIAGNOSTIC RADIOPHARMACEUTICAL: Performed by: RADIOLOGY

## 2024-01-23 PROCEDURE — A9609 HC RX DIAGNOSTIC RADIOPHARMACEUTICAL: HCPCS | Performed by: RADIOLOGY

## 2024-01-23 PROCEDURE — 2580000003 HC RX 258: Performed by: RADIOLOGY

## 2024-01-23 RX ORDER — FLUDEOXYGLUCOSE F 18 200 MCI/ML
11.93 INJECTION, SOLUTION INTRAVENOUS
Status: COMPLETED | OUTPATIENT
Start: 2024-01-23 | End: 2024-01-23

## 2024-01-23 RX ORDER — SODIUM CHLORIDE 0.9 % (FLUSH) 0.9 %
20 SYRINGE (ML) INJECTION AS NEEDED
Status: DISCONTINUED | OUTPATIENT
Start: 2024-01-23 | End: 2024-01-27 | Stop reason: HOSPADM

## 2024-01-23 RX ADMIN — DIATRIZOATE MEGLUMINE AND DIATRIZOATE SODIUM 10 ML: 660; 100 LIQUID ORAL; RECTAL at 15:07

## 2024-01-23 RX ADMIN — FLUDEOXYGLUCOSE F 18 11.93 MILLICURIE: 200 INJECTION, SOLUTION INTRAVENOUS at 15:07

## 2024-01-23 RX ADMIN — SODIUM CHLORIDE, PRESERVATIVE FREE 20 ML: 5 INJECTION INTRAVENOUS at 15:07

## 2024-01-31 ENCOUNTER — HOSPITAL ENCOUNTER (OUTPATIENT)
Dept: RADIATION ONCOLOGY | Age: 79
Setting detail: RECURRING SERIES
Discharge: HOME | End: 2024-02-03
Payer: MEDICARE

## 2024-01-31 VITALS
DIASTOLIC BLOOD PRESSURE: 75 MMHG | TEMPERATURE: 98 F | OXYGEN SATURATION: 94 % | SYSTOLIC BLOOD PRESSURE: 147 MMHG | RESPIRATION RATE: 18 BRPM | HEART RATE: 78 BPM

## 2024-01-31 DIAGNOSIS — C34.91 SQUAMOUS CELL LUNG CANCER, RIGHT (HCC): Primary | ICD-10-CM

## 2024-01-31 PROCEDURE — 99211 OFF/OP EST MAY X REQ PHY/QHP: CPT

## 2024-01-31 ASSESSMENT — PATIENT HEALTH QUESTIONNAIRE - PHQ9
SUM OF ALL RESPONSES TO PHQ QUESTIONS 1-9: 0
1. LITTLE INTEREST OR PLEASURE IN DOING THINGS: 0
SUM OF ALL RESPONSES TO PHQ QUESTIONS 1-9: 0
SUM OF ALL RESPONSES TO PHQ9 QUESTIONS 1 & 2: 0
2. FEELING DOWN, DEPRESSED OR HOPELESS: 0

## 2024-01-31 NOTE — PROGRESS NOTES
1 Month Follow Up (SBRT Right Lower Lobe Lung): PET Scan Results  -Scan Complete: 01/24/2024   -RT End: 11/19/2021     Patient presents today with no complaints.    Arlet Zhao MA    
Performance status 0  VITAL SIGNS:   BP (!) 147/75   Pulse 78   Temp 98 °F (36.7 °C) (Oral)   Resp 18   SpO2 94%     GENERAL: The patient is well-developed, ambulatory, alert and in no acute distress. HEENT: Head is normocephalic, atraumatic. Pupils are equal, round and reactive to light and accommodation.   RESP: no audible wheezes, no distress noted.  Abdomen: soft and non tender   Neuro: alert and oriented; cranial nerves intact  MSK: normal gait          LABORATORY:   Lab Results   Component Value Date    WBC 12.1 (H) 12/15/2023    HGB 13.8 12/15/2023    HCT 40.1 (L) 12/15/2023    MCV 95.9 12/15/2023     12/15/2023     Lab Results   Component Value Date     12/15/2023    K 4.0 12/15/2023     12/15/2023    CO2 25 12/15/2023    BUN 28 (H) 12/15/2023    CREATININE 1.30 12/15/2023    GLUCOSE 116 (H) 12/15/2023    CALCIUM 9.5 12/15/2023    PROT 7.9 12/15/2023    LABALBU 3.8 12/15/2023    BILITOT 0.9 12/15/2023    ALKPHOS 105 12/15/2023    AST 20 12/15/2023    ALT 31 12/15/2023    LABGLOM 57 (L) 12/15/2023    GFRAA >60 03/31/2022    AGRATIO 0.9 12/15/2023    GLOB 4.1 12/15/2023     RADIOLOGY:  PET/CT 1/23/24 personally reviewed as per HPI.     IMPRESSION:  Jose Dash is a  78 y.o. 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 I reviewed his current PET/CT and prior CT scans from 11/20/23 and 5/10/23. I believe the area of uptake is associated with a pleural based nodule not present on his prior scan from 5/10/23 and see no evidence of a rib metastasis. Based on the [place] solid pulmonary nodule calculator his risk of malignancy is 92%. He is very hesitant to undergo a repeat biopsy after prior pneumothorax requiring chest tube placement. I offered empiric SBRT versus short interval scan. I will present his case for imaging review to confirm no evidence of bony disease and move forward with treatment pending this discussion.    PLAN:    -Imaging review

## 2024-02-12 ENCOUNTER — HOSPITAL ENCOUNTER (OUTPATIENT)
Dept: RADIATION ONCOLOGY | Age: 79
Setting detail: RECURRING SERIES
Discharge: HOME | End: 2024-02-15
Payer: MEDICARE

## 2024-02-15 ENCOUNTER — OFFICE VISIT (OUTPATIENT)
Dept: UROLOGY | Age: 79
End: 2024-02-15

## 2024-02-15 DIAGNOSIS — C64.1 MALIGNANT NEOPLASM OF RIGHT KIDNEY, EXCEPT RENAL PELVIS (HCC): ICD-10-CM

## 2024-02-15 DIAGNOSIS — N43.3 HYDROCELE, UNSPECIFIED HYDROCELE TYPE: Primary | ICD-10-CM

## 2024-02-15 PROCEDURE — 99024 POSTOP FOLLOW-UP VISIT: CPT | Performed by: UROLOGY

## 2024-02-15 NOTE — PROGRESS NOTES
[facility]  [address] [address]  [phone]          Jose Dash  : 1945    Chief Complaint   Patient presents with    Follow-up          HPI     Jose Dash is a 78 y.o. male    The patient is doing well.  He says he is back in the gym working out.  There is no pain.  He is status post right hydrocelectomy on 2023.  Postoperative course was complicated by hematoma.      Past Medical History:   Diagnosis Date    AAA (abdominal aortic aneurysm) (HCC)     35 mm on CT scan, US stable 35 mm; 10/31/22 pet scan 3.5 cm    Abnormal chest x-ray 3/19/2013    Adenopathy 3/19/2013    Lymph glands    Allergic rhinitis, cause unspecified 3/19/2013    Atrial fibrillation (HCC)     after kidney surgery and wore monitor but no more a fib events noted    CAD (coronary artery disease)     Followed by Dr. Weinberg at [facility]- per note nonobstructive CAD    Cancer (HCC)     right kidney cancer followed by right nephrectomy; per pulmonary note squamous cell lung cancer followed by Dr. Tai    Chronic airway obstruction, not elsewhere classified 3/19/2013    COPD (chronic obstructive pulmonary disease) (HCC)     Diabetes (HCC)     \"pre-diabetes\" metformin, BS after dinner <120, denies hypoglycemia    Diabetes mellitus (HCC) 3/19/2013    Hashimoto's disease     History of lung cancer     treated with radiation    Pulmonary fibrosis (HCC)     Followed by Dr. April Ventura at [facility],    Sinus problem     Thyroid disease     Hypothyroid, managed with medication    Tobacco use disorder 3/19/2013    Unspecified essential hypertension 3/19/2013    Unspecified hypothyroidism 3/19/2013    medication     Past Surgical History:   Procedure Laterality Date    COLONOSCOPY      CT NEEDLE BIOPSY LUNG PERCUTANEOUS  10/18/2021    CT NEEDLE BIOPSY LUNG PERCUTANEOUS 10/18/2021 [facility] CT SCAN    HERNIA REPAIR      2021    HYDROCELE EXCISION

## 2024-02-20 ENCOUNTER — HOSPITAL ENCOUNTER (OUTPATIENT)
Dept: RADIATION ONCOLOGY | Age: 79
Setting detail: RECURRING SERIES
Discharge: HOME | End: 2024-02-23
Payer: MEDICARE

## 2024-02-20 LAB
RAD ONC ARIA COURSE FIRST TREATMENT DATE: NORMAL
RAD ONC ARIA COURSE ID: NORMAL
RAD ONC ARIA COURSE INTENT: NORMAL
RAD ONC ARIA COURSE LAST TREATMENT DATE: NORMAL
RAD ONC ARIA COURSE SESSION NUMBER: 1
RAD ONC ARIA COURSE START DATE: NORMAL
RAD ONC ARIA COURSE TREATMENT ELAPSED DAYS: 0
RAD ONC ARIA PLAN FRACTIONS TREATED TO DATE: 1
RAD ONC ARIA PLAN ID: NORMAL
RAD ONC ARIA PLAN PRESCRIBED DOSE PER FRACTION: 10 GY
RAD ONC ARIA PLAN PRIMARY REFERENCE POINT: NORMAL
RAD ONC ARIA PLAN TOTAL FRACTIONS PRESCRIBED: 5
RAD ONC ARIA PLAN TOTAL PRESCRIBED DOSE: 5000 CGY
RAD ONC ARIA REFERENCE POINT DOSAGE GIVEN TO DATE: 10 GY
RAD ONC ARIA REFERENCE POINT DOSAGE GIVEN TO DATE: 10.15 GY
RAD ONC ARIA REFERENCE POINT ID: NORMAL
RAD ONC ARIA REFERENCE POINT ID: NORMAL
RAD ONC ARIA REFERENCE POINT SESSION DOSAGE GIVEN: 10 GY
RAD ONC ARIA REFERENCE POINT SESSION DOSAGE GIVEN: 10.15 GY

## 2024-02-20 PROCEDURE — 77373 STRTCTC BDY RAD THER TX DLVR: CPT

## 2024-02-21 ENCOUNTER — HOSPITAL ENCOUNTER (OUTPATIENT)
Dept: RADIATION ONCOLOGY | Age: 79
Setting detail: RECURRING SERIES
Discharge: HOME | End: 2024-02-24
Payer: MEDICARE

## 2024-02-21 LAB
RAD ONC ARIA COURSE FIRST TREATMENT DATE: NORMAL
RAD ONC ARIA COURSE ID: NORMAL
RAD ONC ARIA COURSE INTENT: NORMAL
RAD ONC ARIA COURSE LAST TREATMENT DATE: NORMAL
RAD ONC ARIA COURSE SESSION NUMBER: 2
RAD ONC ARIA COURSE START DATE: NORMAL
RAD ONC ARIA COURSE TREATMENT ELAPSED DAYS: 1
RAD ONC ARIA PLAN FRACTIONS TREATED TO DATE: 2
RAD ONC ARIA PLAN ID: NORMAL
RAD ONC ARIA PLAN PRESCRIBED DOSE PER FRACTION: 10 GY
RAD ONC ARIA PLAN PRIMARY REFERENCE POINT: NORMAL
RAD ONC ARIA PLAN TOTAL FRACTIONS PRESCRIBED: 5
RAD ONC ARIA PLAN TOTAL PRESCRIBED DOSE: 5000 CGY
RAD ONC ARIA REFERENCE POINT DOSAGE GIVEN TO DATE: 20 GY
RAD ONC ARIA REFERENCE POINT DOSAGE GIVEN TO DATE: 20.3 GY
RAD ONC ARIA REFERENCE POINT ID: NORMAL
RAD ONC ARIA REFERENCE POINT ID: NORMAL
RAD ONC ARIA REFERENCE POINT SESSION DOSAGE GIVEN: 10 GY
RAD ONC ARIA REFERENCE POINT SESSION DOSAGE GIVEN: 10.15 GY

## 2024-02-21 PROCEDURE — 77373 STRTCTC BDY RAD THER TX DLVR: CPT

## 2024-02-22 ENCOUNTER — HOSPITAL ENCOUNTER (OUTPATIENT)
Dept: RADIATION ONCOLOGY | Age: 79
Setting detail: RECURRING SERIES
End: 2024-02-22
Payer: MEDICARE

## 2024-02-22 LAB
RAD ONC ARIA COURSE FIRST TREATMENT DATE: NORMAL
RAD ONC ARIA COURSE ID: NORMAL
RAD ONC ARIA COURSE INTENT: NORMAL
RAD ONC ARIA COURSE LAST TREATMENT DATE: NORMAL
RAD ONC ARIA COURSE SESSION NUMBER: 3
RAD ONC ARIA COURSE START DATE: NORMAL
RAD ONC ARIA COURSE TREATMENT ELAPSED DAYS: 2
RAD ONC ARIA PLAN FRACTIONS TREATED TO DATE: 3
RAD ONC ARIA PLAN ID: NORMAL
RAD ONC ARIA PLAN PRESCRIBED DOSE PER FRACTION: 10 GY
RAD ONC ARIA PLAN PRIMARY REFERENCE POINT: NORMAL
RAD ONC ARIA PLAN TOTAL FRACTIONS PRESCRIBED: 5
RAD ONC ARIA PLAN TOTAL PRESCRIBED DOSE: 5000 CGY
RAD ONC ARIA REFERENCE POINT DOSAGE GIVEN TO DATE: 30 GY
RAD ONC ARIA REFERENCE POINT DOSAGE GIVEN TO DATE: 30.46 GY
RAD ONC ARIA REFERENCE POINT ID: NORMAL
RAD ONC ARIA REFERENCE POINT ID: NORMAL
RAD ONC ARIA REFERENCE POINT SESSION DOSAGE GIVEN: 10 GY
RAD ONC ARIA REFERENCE POINT SESSION DOSAGE GIVEN: 10.15 GY

## 2024-02-22 PROCEDURE — 77373 STRTCTC BDY RAD THER TX DLVR: CPT

## 2024-02-23 ENCOUNTER — HOSPITAL ENCOUNTER (OUTPATIENT)
Dept: RADIATION ONCOLOGY | Age: 79
Setting detail: RECURRING SERIES
End: 2024-02-23
Payer: MEDICARE

## 2024-02-23 LAB
RAD ONC ARIA COURSE FIRST TREATMENT DATE: NORMAL
RAD ONC ARIA COURSE ID: NORMAL
RAD ONC ARIA COURSE INTENT: NORMAL
RAD ONC ARIA COURSE LAST TREATMENT DATE: NORMAL
RAD ONC ARIA COURSE SESSION NUMBER: 4
RAD ONC ARIA COURSE START DATE: NORMAL
RAD ONC ARIA COURSE TREATMENT ELAPSED DAYS: 3
RAD ONC ARIA PLAN FRACTIONS TREATED TO DATE: 4
RAD ONC ARIA PLAN ID: NORMAL
RAD ONC ARIA PLAN PRESCRIBED DOSE PER FRACTION: 10 GY
RAD ONC ARIA PLAN PRIMARY REFERENCE POINT: NORMAL
RAD ONC ARIA PLAN TOTAL FRACTIONS PRESCRIBED: 5
RAD ONC ARIA PLAN TOTAL PRESCRIBED DOSE: 5000 CGY
RAD ONC ARIA REFERENCE POINT DOSAGE GIVEN TO DATE: 40 GY
RAD ONC ARIA REFERENCE POINT DOSAGE GIVEN TO DATE: 40.61 GY
RAD ONC ARIA REFERENCE POINT ID: NORMAL
RAD ONC ARIA REFERENCE POINT ID: NORMAL
RAD ONC ARIA REFERENCE POINT SESSION DOSAGE GIVEN: 10 GY
RAD ONC ARIA REFERENCE POINT SESSION DOSAGE GIVEN: 10.15 GY

## 2024-02-23 PROCEDURE — 77373 STRTCTC BDY RAD THER TX DLVR: CPT

## 2024-02-26 ENCOUNTER — APPOINTMENT (OUTPATIENT)
Dept: RADIATION ONCOLOGY | Age: 79
End: 2024-02-26
Payer: MEDICARE

## 2024-02-26 LAB
RAD ONC ARIA COURSE FIRST TREATMENT DATE: NORMAL
RAD ONC ARIA COURSE ID: NORMAL
RAD ONC ARIA COURSE INTENT: NORMAL
RAD ONC ARIA COURSE LAST TREATMENT DATE: NORMAL
RAD ONC ARIA COURSE SESSION NUMBER: 5
RAD ONC ARIA COURSE START DATE: NORMAL
RAD ONC ARIA COURSE TREATMENT ELAPSED DAYS: 6
RAD ONC ARIA PLAN FRACTIONS TREATED TO DATE: 5
RAD ONC ARIA PLAN ID: NORMAL
RAD ONC ARIA PLAN PRESCRIBED DOSE PER FRACTION: 10 GY
RAD ONC ARIA PLAN PRIMARY REFERENCE POINT: NORMAL
RAD ONC ARIA PLAN TOTAL FRACTIONS PRESCRIBED: 5
RAD ONC ARIA PLAN TOTAL PRESCRIBED DOSE: 5000 CGY
RAD ONC ARIA REFERENCE POINT DOSAGE GIVEN TO DATE: 50 GY
RAD ONC ARIA REFERENCE POINT DOSAGE GIVEN TO DATE: 50.76 GY
RAD ONC ARIA REFERENCE POINT ID: NORMAL
RAD ONC ARIA REFERENCE POINT ID: NORMAL
RAD ONC ARIA REFERENCE POINT SESSION DOSAGE GIVEN: 10 GY
RAD ONC ARIA REFERENCE POINT SESSION DOSAGE GIVEN: 10.15 GY

## 2024-02-26 PROCEDURE — 77336 RADIATION PHYSICS CONSULT: CPT

## 2024-02-26 PROCEDURE — 77373 STRTCTC BDY RAD THER TX DLVR: CPT

## 2024-02-26 NOTE — ON TREATMENT VISIT
[facility] RADIATION ONCOLOGY ON TREATMENT VISIT    Patient: Jose Dash MRN: [id number]  SSN: [id number]    YOB: 1945  Age: 78 y.o.  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 male who is currently receiving definitive radiation therapy.    Current RT dose: 50/50 Gy in 5/5 fractions.     No concurrent systemic therapy    Subjective:  Week 1: significant fatigue and generalized weakness.    Objective:  There were no vitals filed for this visit.  Pain 0/10    General: Alert and conversant, in NAD  Skin: Intact.    Assessment:  Patient is tolerating radiation therapy well with fatigue and weakness which are more than anticipated after SBRT. He is recovering from a bleed after surgery and some deconditioning from this procedure. He will follow up in 3 months with repeat non contrast CT chest, I encouraged him to contact our office with any questions or concerns prior to this scan.    Plan:  -Follow up in 3 months with repeat CT chest without contrast or sooner as needed.  -The patient has a documented plan of care to address pain.  Pain is not present.      Aylin Tai MD  02/26/24

## 2024-03-01 DIAGNOSIS — R91.1 LUNG NODULE: Primary | ICD-10-CM

## 2024-03-07 ENCOUNTER — TELEPHONE (OUTPATIENT)
Dept: FAMILY MEDICINE CLINIC | Facility: CLINIC | Age: 79
End: 2024-03-07

## 2024-03-07 NOTE — TELEPHONE ENCOUNTER
----- Message from Justine Cobos MA sent at 3/6/2024  1:59 PM EST -----  Subject: Appointment Request    Reason for Call: New Patient/New to Provider Appointment needed: New   Patient Request Appointment    QUESTIONS    Reason for appointment request? No appointments available during search     Additional Information for Provider? PT was referred to Dr Crenshaw By Dr Tai to establish care since his MD left. Please call PT to schedule.   ---------------------------------------------------------------------------  --------------  CALL BACK INFO  [phone]; OK to leave message on voicemail  ---------------------------------------------------------------------------  --------------  SCRIPT ANSWERS

## 2024-03-07 NOTE — TELEPHONE ENCOUNTER
Cris Crenshaw, just wanted to triple check before I call this patient regarding him wanting to schedule a new patient appt with you.    Pt stated at least to the ECC that he was referred to you by Dr. Tai since his current PCP is retiring.    Not sure if you are familiar with the  Who has referred him but I wanted to check with you first.    Is this someone you are willing to see sooner? Or do you want him booked for your next available?     Thank you

## 2024-03-07 NOTE — TELEPHONE ENCOUNTER
Called, LVM for pt to cb and schedule new patient appt to establish care with PCP at NEXT AVAIL APPOINTMENT    Also adv pt he could also book on mychart as well

## 2024-03-22 NOTE — PROGRESS NOTES
Dr. April Ventura MD  [address]  [place] [address]  [phone]    Patient Name:  Jose Dash  YOB: 1945  Office Visit: 8/15/2023    ASSESSMENT AND PLAN:  (Medical Decision Making)    1. 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  We discussed performing a trial off of this Stiolto to see if he feels his breathing is benefiting from it.  We will monitor oxygenation closely-today he did not drop with ambulation although he did desaturate on walk test in February.  Recommend complete pulmonary function tests prior to follow-up in 6 months.    2. History of tobacco abuse  Smoking cessation recommended.  He still smokes 1/2ppd.    3. History of lung cancer  He is due for surveillance imaging in November.  After that we can continue annual CT imaging    Orders Placed This Encounter   Medications    tiotropium-olodaterol (STIOLTO RESPIMAT) 2.5-2.5 MCG/ACT AERS     Sig: Inhale 2 puffs into the lungs daily     Dispense:  3 each     Refill:  3     No orders of the defined types were placed in this encounter.      April Ventura MD    Total time for encounter on day of encounter was 35 minutes.  This time includes chart prep, review of tests/procedures, review of other provider's notes, documentation and counseling patient regarding disease process and medications.         ________________________________________  Reason for Visit:  Follow-up (IPF)        History of Present Illness:     Jose Dash Is a 77 y.o. male with h/o pulmonary fibrosis, COPD with emphysema, RLL SCC T1bN0 Stage 1A2 (dx 10/2021) active smoking who follows up s/p radiation therapy by Dr. Tai.   He reports he has reduced his cardiovascular activity but continues to lift weights regularly.  He admits to dyspnea on hills or with exertion but doesn't think this has progressed much since a year ago    At his last visit on 2/14/23, we discussed 
essential hypertension 3/19/2013    Unspecified hypothyroidism 3/19/2013    medication     Allergies   Allergen Reactions    Amoxicillin-Pot Clavulanate Nausea Only     Pt states he is not allergic  \"upsets my stomach\"    Bupropion Hives    Thomas Flavor Hives    Varenicline Other (See Comments)     [place]     Current Outpatient Medications   Medication Instructions    albuterol sulfate HFA (PROVENTIL;VENTOLIN;PROAIR) 108 (90 Base) MCG/ACT inhaler 2 puffs, Inhalation, EVERY 4 HOURS PRN    aspirin 81 mg, Oral, DAILY    blood glucose test strips (ASCENSIA AUTODISC VI;ONE TOUCH ULTRA TEST VI) strip 1 each, In Vitro, DAILY, Pt uses one touch ultra 2 meter.    Coenzyme Q10 10 MG CAPS 1 tablet, Oral, DAILY    Garlic 10 mg, Oral, DAILY    Lancets MISC E11.9 Non insulin dep testing bs 1times daily; bill to [facility] part B    levothyroxine (SYNTHROID) 88 MCG tablet TAKE 1 TABLET BY MOUTH DAILY BEFORE BREAKFAST    metFORMIN (GLUCOPHAGE-XR) 500 mg, Oral, DAILY WITH BREAKFAST    metoprolol tartrate (LOPRESSOR) 50 mg, Oral, 2 TIMES DAILY    Multiple Vitamins-Minerals (MULTIVITAL-M PO) 1 tablet, Oral, DAILY    Omega-3 Fatty Acids (FISH OIL) 1000 MG CAPS 1 capsule, Oral, DAILY    rosuvastatin (CRESTOR) 40 mg, Oral, DAILY    triamcinolone (NASACORT) 55 MCG/ACT nasal inhaler 1 spray, Nasal, 2 TIMES DAILY PRN    TURMERIC  mg, Oral, DAILY    vitamin C 1,000 mg, Oral, DAILY    vitamin D 2,000 Units, Oral, DAILY

## 2024-03-25 ENCOUNTER — OFFICE VISIT (OUTPATIENT)
Dept: PULMONOLOGY | Age: 79
End: 2024-03-25
Payer: MEDICARE

## 2024-03-25 VITALS
OXYGEN SATURATION: 94 % | SYSTOLIC BLOOD PRESSURE: 138 MMHG | RESPIRATION RATE: 16 BRPM | TEMPERATURE: 98.4 F | WEIGHT: 185 LBS | HEIGHT: 70 IN | HEART RATE: 78 BPM | BODY MASS INDEX: 26.48 KG/M2 | DIASTOLIC BLOOD PRESSURE: 72 MMHG

## 2024-03-25 DIAGNOSIS — J43.9 COMBINED PULMONARY FIBROSIS AND EMPHYSEMA (CPFE) (HCC): Primary | ICD-10-CM

## 2024-03-25 DIAGNOSIS — J84.112 IPF (IDIOPATHIC PULMONARY FIBROSIS) (HCC): ICD-10-CM

## 2024-03-25 DIAGNOSIS — Z87.891 PERSONAL HISTORY OF TOBACCO USE, PRESENTING HAZARDS TO HEALTH: ICD-10-CM

## 2024-03-25 DIAGNOSIS — C34.91 SQUAMOUS CELL LUNG CANCER, RIGHT (HCC): ICD-10-CM

## 2024-03-25 DIAGNOSIS — J84.10 COMBINED PULMONARY FIBROSIS AND EMPHYSEMA (CPFE) (HCC): Primary | ICD-10-CM

## 2024-03-25 PROCEDURE — G8427 DOCREV CUR MEDS BY ELIG CLIN: HCPCS | Performed by: INTERNAL MEDICINE

## 2024-03-25 PROCEDURE — G8417 CALC BMI ABV UP PARAM F/U: HCPCS | Performed by: INTERNAL MEDICINE

## 2024-03-25 PROCEDURE — 3023F SPIROM DOC REV: CPT | Performed by: INTERNAL MEDICINE

## 2024-03-25 PROCEDURE — G8484 FLU IMMUNIZE NO ADMIN: HCPCS | Performed by: INTERNAL MEDICINE

## 2024-03-25 PROCEDURE — 3078F DIAST BP <80 MM HG: CPT | Performed by: INTERNAL MEDICINE

## 2024-03-25 PROCEDURE — 3075F SYST BP GE 130 - 139MM HG: CPT | Performed by: INTERNAL MEDICINE

## 2024-03-25 PROCEDURE — 99214 OFFICE O/P EST MOD 30 MIN: CPT | Performed by: INTERNAL MEDICINE

## 2024-03-25 PROCEDURE — 4004F PT TOBACCO SCREEN RCVD TLK: CPT | Performed by: INTERNAL MEDICINE

## 2024-03-25 PROCEDURE — 1123F ACP DISCUSS/DSCN MKR DOCD: CPT | Performed by: INTERNAL MEDICINE

## 2024-03-25 NOTE — PATIENT INSTRUCTIONS
risks than conventional cigarettes is uncertain.    Cigars and pipes -- Smoke from cigars and pipes is generally not inhaled as far into the lungs as cigarette smoke. For this reason, the risk of lung cancer from cigar or pipe smoking is less than for cigarette smoking; however, the risk is still higher than in people who do not use tobacco at all.    Hookahs -- Hookahs or \"waterpipes\" involve heating tobacco to create smoke that passes through a water bowl and is then inhaled. Using a hookah is associated with an increased risk of lung cancer, other lung problems, and even carbon monoxide poisoning.      WHERE DO I START?    The following steps are recommended to start the process of quitting smoking:    ?  Even if you are not quite ready to quit smoking, talk with your health care provider. They can discuss options to help you get ready, such as starting a medication in advance to help reduce cravings. This can make it easier to quit once you are ready.    ?  If you are ready to quit smoking, speak with your health care provider about the method you plan to use to quit. Behavior changes should usually be combined with one or more medications, such as nicotine replacement therapy, varenicline (brand name: Chantix), or bupropion (brand names: Zyban, Wellbutrin).    ?  Pick a date to quit smoking. Tell friends and family about your plan.    ?  Seek support through free telephone quitlines (eg, in the [place], 1-800-QUIT-NOW), text messaging programs, or other tools that can be accessed for free online (www.smokefree.gov).    ?  Begin making changes in your behavior; avoid situations that may tempt you to smoke.    ?  Start varenicline (one to four weeks before your quit date) or bupropion (two weeks before your quit date) or start nicotine replacement (on the day you quit).    ?  Prepare for withdrawal symptoms. Consider using nicotine replacement therapy (such as nicotine patch, gum, or lozenge) to help manage

## 2024-04-03 DIAGNOSIS — E11.9 TYPE 2 DIABETES MELLITUS WITHOUT COMPLICATION, WITHOUT LONG-TERM CURRENT USE OF INSULIN (HCC): ICD-10-CM

## 2024-05-01 ENCOUNTER — OFFICE VISIT (OUTPATIENT)
Age: 79
End: 2024-05-01
Payer: MEDICARE

## 2024-05-01 VITALS
WEIGHT: 187 LBS | HEART RATE: 73 BPM | BODY MASS INDEX: 26.77 KG/M2 | DIASTOLIC BLOOD PRESSURE: 82 MMHG | SYSTOLIC BLOOD PRESSURE: 134 MMHG | HEIGHT: 70 IN

## 2024-05-01 DIAGNOSIS — J84.10 PULMONARY FIBROSIS, UNSPECIFIED (HCC): ICD-10-CM

## 2024-05-01 DIAGNOSIS — E11.9 TYPE 2 DIABETES MELLITUS WITHOUT COMPLICATION, WITHOUT LONG-TERM CURRENT USE OF INSULIN (HCC): ICD-10-CM

## 2024-05-01 DIAGNOSIS — I71.40 ABDOMINAL AORTIC ANEURYSM (AAA) WITHOUT RUPTURE, UNSPECIFIED PART (HCC): ICD-10-CM

## 2024-05-01 DIAGNOSIS — I48.91 LONE ATRIAL FIBRILLATION (HCC): Primary | ICD-10-CM

## 2024-05-01 DIAGNOSIS — I25.84 CORONARY ARTERY CALCIFICATION: ICD-10-CM

## 2024-05-01 DIAGNOSIS — I25.10 CORONARY ARTERY DISEASE INVOLVING NATIVE CORONARY ARTERY OF NATIVE HEART WITHOUT ANGINA PECTORIS: ICD-10-CM

## 2024-05-01 DIAGNOSIS — E78.2 MIXED HYPERLIPIDEMIA: ICD-10-CM

## 2024-05-01 DIAGNOSIS — I25.10 CORONARY ARTERY CALCIFICATION: ICD-10-CM

## 2024-05-01 PROCEDURE — 99214 OFFICE O/P EST MOD 30 MIN: CPT | Performed by: INTERNAL MEDICINE

## 2024-05-01 PROCEDURE — 4004F PT TOBACCO SCREEN RCVD TLK: CPT | Performed by: INTERNAL MEDICINE

## 2024-05-01 PROCEDURE — 93000 ELECTROCARDIOGRAM COMPLETE: CPT | Performed by: INTERNAL MEDICINE

## 2024-05-01 PROCEDURE — 3079F DIAST BP 80-89 MM HG: CPT | Performed by: INTERNAL MEDICINE

## 2024-05-01 PROCEDURE — G8427 DOCREV CUR MEDS BY ELIG CLIN: HCPCS | Performed by: INTERNAL MEDICINE

## 2024-05-01 PROCEDURE — 3075F SYST BP GE 130 - 139MM HG: CPT | Performed by: INTERNAL MEDICINE

## 2024-05-01 PROCEDURE — G8417 CALC BMI ABV UP PARAM F/U: HCPCS | Performed by: INTERNAL MEDICINE

## 2024-05-01 PROCEDURE — 1123F ACP DISCUSS/DSCN MKR DOCD: CPT | Performed by: INTERNAL MEDICINE

## 2024-05-01 RX ORDER — ROSUVASTATIN CALCIUM 40 MG/1
40 TABLET, COATED ORAL DAILY
Qty: 90 TABLET | Refills: 3 | Status: SHIPPED | OUTPATIENT
Start: 2024-05-01

## 2024-05-01 RX ORDER — METOPROLOL TARTRATE 50 MG/1
50 TABLET, FILM COATED ORAL 2 TIMES DAILY
Qty: 180 TABLET | Refills: 3 | Status: SHIPPED | OUTPATIENT
Start: 2024-05-01

## 2024-05-01 ASSESSMENT — ENCOUNTER SYMPTOMS
RESPIRATORY NEGATIVE: 1
SHORTNESS OF BREATH: 0
COUGH: 0

## 2024-05-01 NOTE — PROGRESS NOTES
[facility] CARDIOLOGY, [address], [address]     [place] [id number]      Patient:  Jose Dash  1945       SUBJECTIVE:  Jose Dash is a  78 y.o. male seen for a follow up visit regarding the following:     Chief Complaint   Patient presents with    6 Month Follow-Up    Atrial Fibrillation    Coronary Artery Disease     CC: follow up AAA, [place] atrial fibrillation     HPI:   77 y.o. male with a history of pAF, AAA, renal cell CA,  pulmonary fibrosis, DM, former smoker (quit 11-9-19), hypothyroid,who is here for follow up.     Patient was last seen in office on 10/27/23 , since then reports that he has completed recent admission. Reports that he continues to work out 5 days per week, walking daily. No chest pain or pressure, palpitations, irregular heart beats, leg swelling. Taking his medications. Follows with pulmonology. No other complaints at this time.      Cardiovascular Testing:  - CT 10/31/2022: Abdominal aortic aneurysm 35 mm.  - Echo 4/13/22: LVEF >60 %, RV normal, Valves: trace MR, trace TR.   - CTA Abd/pelvis 9/3/21: Aorta 33 mm.  Coronary artery calcification, calcification of aorta branch vessels.  - Echo 11/23/19 normal LVEF >55%, mild LA enlargement, no valvular disease  - SARIKA/DCCV 11/22/19: LVEF >55%, mod/severe atheroma, atrial septal aneurysm.   - ZIO 1/10/20 - 1/24/20: brief SVT no sustained arrhythmias  - AAA scan 8/26/20: 35 mm aorta     Past medical history, past surgical history, family history, social history, and medications were all reviewed with the patient today and updated as necessary.         Patient Active Problem List    Diagnosis Date Noted    Chronic renal disease, stage III ([facility]) [710190] 08/18/2022     Priority: Medium    Hydrocele 10/30/2023     Priority: Low    Type 2 diabetes mellitus with diabetic peripheral angiopathy without gangrene, without long-term current use of insulin ([facility]) 06/08/2023     Priority: Low    Type 2 diabetes mellitus

## 2024-05-07 ENCOUNTER — OFFICE VISIT (OUTPATIENT)
Dept: INTERNAL MEDICINE CLINIC | Facility: CLINIC | Age: 79
End: 2024-05-07
Payer: MEDICARE

## 2024-05-07 VITALS
DIASTOLIC BLOOD PRESSURE: 70 MMHG | WEIGHT: 185.5 LBS | SYSTOLIC BLOOD PRESSURE: 128 MMHG | BODY MASS INDEX: 26.62 KG/M2 | OXYGEN SATURATION: 95 % | HEART RATE: 80 BPM

## 2024-05-07 DIAGNOSIS — R53.83 OTHER FATIGUE: ICD-10-CM

## 2024-05-07 DIAGNOSIS — E03.9 HYPOTHYROIDISM, UNSPECIFIED TYPE: ICD-10-CM

## 2024-05-07 DIAGNOSIS — E11.51 TYPE 2 DIABETES MELLITUS WITH DIABETIC PERIPHERAL ANGIOPATHY WITHOUT GANGRENE, WITHOUT LONG-TERM CURRENT USE OF INSULIN (HCC): Primary | ICD-10-CM

## 2024-05-07 PROCEDURE — 1123F ACP DISCUSS/DSCN MKR DOCD: CPT | Performed by: STUDENT IN AN ORGANIZED HEALTH CARE EDUCATION/TRAINING PROGRAM

## 2024-05-07 PROCEDURE — 99214 OFFICE O/P EST MOD 30 MIN: CPT | Performed by: STUDENT IN AN ORGANIZED HEALTH CARE EDUCATION/TRAINING PROGRAM

## 2024-05-07 PROCEDURE — 3078F DIAST BP <80 MM HG: CPT | Performed by: STUDENT IN AN ORGANIZED HEALTH CARE EDUCATION/TRAINING PROGRAM

## 2024-05-07 PROCEDURE — 3074F SYST BP LT 130 MM HG: CPT | Performed by: STUDENT IN AN ORGANIZED HEALTH CARE EDUCATION/TRAINING PROGRAM

## 2024-05-07 RX ORDER — LEVOTHYROXINE SODIUM 88 UG/1
TABLET ORAL
Qty: 90 TABLET | Refills: 1 | Status: SHIPPED | OUTPATIENT
Start: 2024-05-07

## 2024-05-07 RX ORDER — METFORMIN HYDROCHLORIDE 500 MG/1
500 TABLET, EXTENDED RELEASE ORAL
Qty: 90 TABLET | Refills: 1 | Status: SHIPPED | OUTPATIENT
Start: 2024-05-07

## 2024-05-07 ASSESSMENT — PATIENT HEALTH QUESTIONNAIRE - PHQ9
SUM OF ALL RESPONSES TO PHQ QUESTIONS 1-9: 0
6. FEELING BAD ABOUT YOURSELF - OR THAT YOU ARE A FAILURE OR HAVE LET YOURSELF OR YOUR FAMILY DOWN: NOT AT ALL
SUM OF ALL RESPONSES TO PHQ QUESTIONS 1-9: 0
9. THOUGHTS THAT YOU WOULD BE BETTER OFF DEAD, OR OF HURTING YOURSELF: NOT AT ALL
2. FEELING DOWN, DEPRESSED OR HOPELESS: NOT AT ALL
1. LITTLE INTEREST OR PLEASURE IN DOING THINGS: NOT AT ALL
SUM OF ALL RESPONSES TO PHQ9 QUESTIONS 1 & 2: 0
4. FEELING TIRED OR HAVING LITTLE ENERGY: NOT AT ALL
3. TROUBLE FALLING OR STAYING ASLEEP: NOT AT ALL
10. IF YOU CHECKED OFF ANY PROBLEMS, HOW DIFFICULT HAVE THESE PROBLEMS MADE IT FOR YOU TO DO YOUR WORK, TAKE CARE OF THINGS AT HOME, OR GET ALONG WITH OTHER PEOPLE: NOT DIFFICULT AT ALL
SUM OF ALL RESPONSES TO PHQ QUESTIONS 1-9: 0
8. MOVING OR SPEAKING SO SLOWLY THAT OTHER PEOPLE COULD HAVE NOTICED. OR THE OPPOSITE, BEING SO FIGETY OR RESTLESS THAT YOU HAVE BEEN MOVING AROUND A LOT MORE THAN USUAL: NOT AT ALL
7. TROUBLE CONCENTRATING ON THINGS, SUCH AS READING THE NEWSPAPER OR WATCHING TELEVISION: NOT AT ALL
5. POOR APPETITE OR OVEREATING: NOT AT ALL
SUM OF ALL RESPONSES TO PHQ QUESTIONS 1-9: 0

## 2024-05-07 ASSESSMENT — ENCOUNTER SYMPTOMS
CHEST TIGHTNESS: 0
ABDOMINAL DISTENTION: 0
ABDOMINAL PAIN: 0
SORE THROAT: 0
SINUS PAIN: 0
SINUS PRESSURE: 0
COUGH: 0
BACK PAIN: 0
COLOR CHANGE: 0
CONSTIPATION: 0
SHORTNESS OF BREATH: 0
DIARRHEA: 0
NAUSEA: 0

## 2024-05-10 DIAGNOSIS — E03.9 HYPOTHYROIDISM, UNSPECIFIED TYPE: ICD-10-CM

## 2024-05-10 DIAGNOSIS — E11.51 TYPE 2 DIABETES MELLITUS WITH DIABETIC PERIPHERAL ANGIOPATHY WITHOUT GANGRENE, WITHOUT LONG-TERM CURRENT USE OF INSULIN (HCC): ICD-10-CM

## 2024-05-10 DIAGNOSIS — R53.83 OTHER FATIGUE: ICD-10-CM

## 2024-05-10 LAB
ALBUMIN SERPL-MCNC: 3.8 G/DL (ref 3.2–4.6)
ALBUMIN/GLOB SERPL: 1.1 (ref 1–1.9)
ALP SERPL-CCNC: 110 U/L (ref 40–129)
ALT SERPL-CCNC: 30 U/L (ref 12–65)
ANION GAP SERPL CALC-SCNC: 11 MMOL/L (ref 9–18)
AST SERPL-CCNC: 29 U/L (ref 15–37)
BASOPHILS # BLD: 0 K/UL (ref 0–0.2)
BASOPHILS NFR BLD: 1 % (ref 0–2)
BILIRUB SERPL-MCNC: 0.5 MG/DL (ref 0–1.2)
BUN SERPL-MCNC: 25 MG/DL (ref 8–23)
CALCIUM SERPL-MCNC: 9.7 MG/DL (ref 8.8–10.2)
CHLORIDE SERPL-SCNC: 104 MMOL/L (ref 98–107)
CO2 SERPL-SCNC: 25 MMOL/L (ref 20–28)
CREAT SERPL-MCNC: 1.22 MG/DL (ref 0.8–1.3)
DIFFERENTIAL METHOD BLD: NORMAL
EOSINOPHIL # BLD: 0.1 K/UL (ref 0–0.8)
EOSINOPHIL NFR BLD: 1 % (ref 0.5–7.8)
ERYTHROCYTE [DISTWIDTH] IN BLOOD BY AUTOMATED COUNT: 13.5 % (ref 11.9–14.6)
EST. AVERAGE GLUCOSE BLD GHB EST-MCNC: 172 MG/DL
GLOBULIN SER CALC-MCNC: 3.4 G/DL (ref 2.3–3.5)
GLUCOSE SERPL-MCNC: 123 MG/DL (ref 70–99)
HBA1C MFR BLD: 7.6 % (ref 0–5.6)
HCT VFR BLD AUTO: 47.9 % (ref 41.1–50.3)
HGB BLD-MCNC: 16.5 G/DL (ref 13.6–17.2)
IMM GRANULOCYTES # BLD AUTO: 0 K/UL (ref 0–0.5)
IMM GRANULOCYTES NFR BLD AUTO: 0 % (ref 0–5)
LYMPHOCYTES # BLD: 1.6 K/UL (ref 0.5–4.6)
LYMPHOCYTES NFR BLD: 20 % (ref 13–44)
MCH RBC QN AUTO: 32.1 PG (ref 26.1–32.9)
MCHC RBC AUTO-ENTMCNC: 34.4 G/DL (ref 31.4–35)
MCV RBC AUTO: 93.2 FL (ref 82–102)
MONOCYTES # BLD: 0.8 K/UL (ref 0.1–1.3)
MONOCYTES NFR BLD: 10 % (ref 4–12)
NEUTS SEG # BLD: 5.5 K/UL (ref 1.7–8.2)
NEUTS SEG NFR BLD: 68 % (ref 43–78)
NRBC # BLD: 0 K/UL (ref 0–0.2)
PLATELET # BLD AUTO: 151 K/UL (ref 150–450)
PMV BLD AUTO: 10.9 FL (ref 9.4–12.3)
POTASSIUM SERPL-SCNC: 4.9 MMOL/L (ref 3.5–5.1)
PROT SERPL-MCNC: 7.2 G/DL (ref 6.3–8.2)
RBC # BLD AUTO: 5.14 M/UL (ref 4.23–5.6)
SODIUM SERPL-SCNC: 139 MMOL/L (ref 136–145)
TSH, 3RD GENERATION: 4.41 UIU/ML (ref 0.27–4.2)
WBC # BLD AUTO: 8 K/UL (ref 4.3–11.1)

## 2024-05-13 DIAGNOSIS — E11.51 TYPE 2 DIABETES MELLITUS WITH DIABETIC PERIPHERAL ANGIOPATHY WITHOUT GANGRENE, WITHOUT LONG-TERM CURRENT USE OF INSULIN (HCC): Primary | ICD-10-CM

## 2024-05-13 DIAGNOSIS — E03.9 ACQUIRED HYPOTHYROIDISM: ICD-10-CM

## 2024-05-13 RX ORDER — METFORMIN HYDROCHLORIDE 500 MG/1
1000 TABLET, EXTENDED RELEASE ORAL
Qty: 180 TABLET | Refills: 1 | Status: SHIPPED | OUTPATIENT
Start: 2024-05-13

## 2024-05-13 RX ORDER — LEVOTHYROXINE SODIUM 0.1 MG/1
100 TABLET ORAL DAILY
Qty: 90 TABLET | Refills: 1 | Status: SHIPPED | OUTPATIENT
Start: 2024-05-13

## 2024-05-13 NOTE — PROGRESS NOTES
A1c 7.6 TSH elevated will increase levothyroxine and metformin dosage will call in new prescription for patient today

## 2024-06-04 ENCOUNTER — APPOINTMENT (OUTPATIENT)
Dept: RADIATION ONCOLOGY | Age: 79
End: 2024-06-04
Payer: MEDICARE

## 2024-06-06 ENCOUNTER — HOSPITAL ENCOUNTER (OUTPATIENT)
Dept: RADIATION ONCOLOGY | Age: 79
Setting detail: RECURRING SERIES
Discharge: HOME | End: 2024-06-09
Payer: MEDICARE

## 2024-06-06 VITALS
HEART RATE: 84 BPM | BODY MASS INDEX: 26.4 KG/M2 | DIASTOLIC BLOOD PRESSURE: 66 MMHG | TEMPERATURE: 97.9 F | OXYGEN SATURATION: 92 % | WEIGHT: 184 LBS | SYSTOLIC BLOOD PRESSURE: 118 MMHG

## 2024-06-06 DIAGNOSIS — R91.1 LUNG NODULE: ICD-10-CM

## 2024-06-06 DIAGNOSIS — C34.91 MALIGNANT NEOPLASM OF RIGHT LUNG, UNSPECIFIED PART OF LUNG (HCC): Primary | ICD-10-CM

## 2024-06-06 DIAGNOSIS — C34.31 MALIGNANT NEOPLASM OF LOWER LOBE OF RIGHT LUNG (HCC): Primary | ICD-10-CM

## 2024-06-06 PROCEDURE — 99211 OFF/OP EST MAY X REQ PHY/QHP: CPT

## 2024-06-06 NOTE — PROGRESS NOTES
Patient: Jose Dash  MRN: [id number]   SSN: [id number]          YOB: 1945   Age: 76 y.o.   Sex: male        Other Providers:  [facility]      DIAGNOSIS: RLL SCC, T1bN0 stage 1A2     PREVIOUS TREATMENT:  1) SBRT to RLL nodule, 5000cGy, completed 11/19/21  2) SBT to L lung, 5000cGy     INTERVAL HISTORY:  Jose Dash is a 78 y.o. male who is here  today for follow up. He has continued to recover from his course  of therapy. He notes no new complaints or concerns. He continues to stay active and his fatigue from treatment has improved. He continues going to the gym daily. No cough or shortness of breath. No difficulty swallowing.      INTERVAL HISTORY:  Repeat CT chest 6/3/24 shows no new or progressive disease. Mr. Dash reports new difficulty with deep breathing through the upper chest since his prior course of radiation, denies shortness of breath at rest or with exertion.     MEDICATIONS:   Current Outpatient Medications   Medication Sig Dispense Refill    metFORMIN (GLUCOPHAGE-XR) 500 MG extended release tablet Take 2 tablets by mouth daily (with breakfast) 180 tablet 1    levothyroxine (SYNTHROID) 100 MCG tablet Take 1 tablet by mouth daily 90 tablet 1    metoprolol tartrate (LOPRESSOR) 50 MG tablet Take 1 tablet by mouth 2 times daily 180 tablet 3    rosuvastatin (CRESTOR) 40 MG tablet Take 1 tablet by mouth daily 90 tablet 3    blood glucose test strips (ASCENSIA AUTODISC VI;ONE TOUCH ULTRA TEST VI) strip 1 each by In Vitro route daily Pt uses one touch ultra 2 meter. 100 each 3    Coenzyme Q10 10 MG CAPS Take 1 tablet by mouth daily      Ascorbic Acid (VITAMIN C) 1000 MG tablet Take 1 tablet by mouth daily      Garlic 10 MG CAPS Take 10 mg by mouth daily      albuterol sulfate HFA (PROVENTIL;VENTOLIN;PROAIR) 108 (90 Base) MCG/ACT inhaler Inhale 2 puffs into the lungs every 4 hours as needed for Shortness of Breath or Wheezing 1 each 11    Omega-3 Fatty Acids (FISH

## 2024-06-06 NOTE — PROGRESS NOTES
Follow up Lung cancer.  SBRT Right lung end 11-19-21.  SBRT Left lung end 2-26-24.  CT chest 6-3-24.  Pt denies complaints.  Pt not following Medical Oncology.  Pet scan ordered and scheduled  for 6 month f/u with Dr. Tai.    Miesha Smith, RN

## 2024-06-11 ENCOUNTER — APPOINTMENT (OUTPATIENT)
Dept: RADIATION ONCOLOGY | Age: 79
End: 2024-06-11
Payer: MEDICARE

## 2024-08-01 ENCOUNTER — TELEPHONE (OUTPATIENT)
Dept: INTERNAL MEDICINE CLINIC | Facility: CLINIC | Age: 79
End: 2024-08-01

## 2024-08-01 NOTE — TELEPHONE ENCOUNTER
----- Message from Elian Loza sent at 8/1/2024  3:56 PM EDT -----  Regarding: ECC Appointment Request  ECC Appointment Request    Patient needs appointment for ECC Appointment Type: New to Provider.    Patient Requested Dates(s):Sooner Appointment  Patient Requested Time:Anytime in the afternoon  Provider Name:Pat Cummins    Reason for Appointment Request: Established Patient - No appointments available during search  --------------------------------------------------------------------------------------------------------------------------    Relationship to Patient: Self     Call Back Information: OK to leave message on voicemail  Preferred Call Back Number: [phone]

## 2024-09-16 DIAGNOSIS — I25.10 CORONARY ARTERY DISEASE INVOLVING NATIVE CORONARY ARTERY OF NATIVE HEART WITHOUT ANGINA PECTORIS: ICD-10-CM

## 2024-09-16 RX ORDER — ROSUVASTATIN CALCIUM 40 MG/1
40 TABLET, COATED ORAL DAILY
Qty: 90 TABLET | Refills: 3 | Status: SHIPPED | OUTPATIENT
Start: 2024-09-16

## 2024-10-02 SDOH — HEALTH STABILITY: PHYSICAL HEALTH: ON AVERAGE, HOW MANY MINUTES DO YOU ENGAGE IN EXERCISE AT THIS LEVEL?: 80 MIN

## 2024-10-02 SDOH — HEALTH STABILITY: PHYSICAL HEALTH: ON AVERAGE, HOW MANY DAYS PER WEEK DO YOU ENGAGE IN MODERATE TO STRENUOUS EXERCISE (LIKE A BRISK WALK)?: 5 DAYS

## 2024-10-03 ENCOUNTER — OFFICE VISIT (OUTPATIENT)
Dept: FAMILY MEDICINE CLINIC | Facility: CLINIC | Age: 79
End: 2024-10-03

## 2024-10-03 VITALS
WEIGHT: 183.25 LBS | SYSTOLIC BLOOD PRESSURE: 124 MMHG | BODY MASS INDEX: 27.77 KG/M2 | DIASTOLIC BLOOD PRESSURE: 70 MMHG | HEIGHT: 68 IN | HEART RATE: 93 BPM | TEMPERATURE: 98.3 F | OXYGEN SATURATION: 95 %

## 2024-10-03 DIAGNOSIS — Z90.5 HISTORY OF NEPHRECTOMY, RIGHT: ICD-10-CM

## 2024-10-03 DIAGNOSIS — Z76.89 ENCOUNTER TO ESTABLISH CARE WITH NEW DOCTOR: ICD-10-CM

## 2024-10-03 DIAGNOSIS — E78.5 HYPERLIPIDEMIA ASSOCIATED WITH TYPE 2 DIABETES MELLITUS (HCC): ICD-10-CM

## 2024-10-03 DIAGNOSIS — E03.9 ACQUIRED HYPOTHYROIDISM: ICD-10-CM

## 2024-10-03 DIAGNOSIS — E11.69 HYPERLIPIDEMIA ASSOCIATED WITH TYPE 2 DIABETES MELLITUS (HCC): ICD-10-CM

## 2024-10-03 DIAGNOSIS — T48.5X5A RHINITIS MEDICAMENTOSA: ICD-10-CM

## 2024-10-03 DIAGNOSIS — E11.65 TYPE 2 DIABETES MELLITUS WITH HYPERGLYCEMIA, WITHOUT LONG-TERM CURRENT USE OF INSULIN (HCC): Primary | ICD-10-CM

## 2024-10-03 DIAGNOSIS — J31.0 RHINITIS MEDICAMENTOSA: ICD-10-CM

## 2024-10-03 DIAGNOSIS — N18.2 STAGE 2 CHRONIC KIDNEY DISEASE: ICD-10-CM

## 2024-10-03 DIAGNOSIS — I10 BENIGN HYPERTENSION: ICD-10-CM

## 2024-10-03 DIAGNOSIS — R79.89 ABNORMAL CBC: ICD-10-CM

## 2024-10-03 RX ORDER — LEVOTHYROXINE SODIUM 100 UG/1
100 TABLET ORAL DAILY
Qty: 90 TABLET | Refills: 1 | Status: SHIPPED | OUTPATIENT
Start: 2024-10-03

## 2024-10-03 RX ORDER — FLUTICASONE PROPIONATE 50 MCG
1 SPRAY, SUSPENSION (ML) NASAL DAILY
Qty: 32 G | Refills: 1 | Status: SHIPPED | OUTPATIENT
Start: 2024-10-03

## 2024-10-03 RX ORDER — METFORMIN HCL 500 MG
1000 TABLET, EXTENDED RELEASE 24 HR ORAL
Qty: 180 TABLET | Refills: 1 | Status: SHIPPED | OUTPATIENT
Start: 2024-10-03

## 2024-10-03 RX ORDER — VITAMIN B COMPLEX
1 CAPSULE ORAL DAILY
COMMUNITY

## 2024-10-03 ASSESSMENT — ENCOUNTER SYMPTOMS
VOMITING: 0
SINUS PRESSURE: 0
EYE DISCHARGE: 0
BLOOD IN STOOL: 0
NAUSEA: 0
CHEST TIGHTNESS: 0
SORE THROAT: 0
ABDOMINAL PAIN: 0
DIARRHEA: 0
WHEEZING: 0
SINUS PAIN: 0
SHORTNESS OF BREATH: 0

## 2024-10-03 ASSESSMENT — PATIENT HEALTH QUESTIONNAIRE - PHQ9
SUM OF ALL RESPONSES TO PHQ QUESTIONS 1-9: 0
1. LITTLE INTEREST OR PLEASURE IN DOING THINGS: NOT AT ALL
SUM OF ALL RESPONSES TO PHQ QUESTIONS 1-9: 0
SUM OF ALL RESPONSES TO PHQ9 QUESTIONS 1 & 2: 0
SUM OF ALL RESPONSES TO PHQ QUESTIONS 1-9: 0
2. FEELING DOWN, DEPRESSED OR HOPELESS: NOT AT ALL
SUM OF ALL RESPONSES TO PHQ QUESTIONS 1-9: 0

## 2024-10-03 NOTE — PROGRESS NOTES
Jose Enzo Hardy (1945) presents today for   Chief Complaint   Patient presents with    New Patient     Wants to establish, feeling fine    Medication Refill       Patient Active Problem List   Diagnosis    Coronary artery disease involving native coronary artery of native heart    Situational anxiety    Allergic rhinitis    Essential hypertension    Impacted cerumen of right ear    Diabetes mellitus type 2, diet-controlled (HCC)    Pulmonary fibrosis (HCC)    Atrial fibrillation with rapid ventricular response (HCC)    Skin exam for malignant neoplasm    COPD (chronic obstructive pulmonary disease) (HCC)    Balance problem    Anxiety    Incisional hernia, without obstruction or gangrene    Mediastinal adenopathy    Squamous cell lung cancer, right (HCC)    UIP (usual interstitial pneumonitis) ([facility])    Benign prostatic hyperplasia with lower urinary tract symptoms    Lung nodule    Vitamin D deficiency    Rhinitis medicamentosa    Hypothyroidism    Diabetes mellitus (HCC)    Prostate cancer screening    Chronic sinusitis    Low testosterone    History of kidney cancer    Depression    Sleep disturbance    Abdominal aortic aneurysm (AAA) without rupture (HCC)    Chronic renal disease, stage III ([facility]) [[id number]]    Type 2 diabetes mellitus with diabetic peripheral angiopathy without gangrene, without long-term current use of insulin (HCC)    Type 2 diabetes mellitus with chronic kidney disease    Hydrocele       Allergies   Allergen Reactions    Amoxicillin-Pot Clavulanate Nausea Only     Pt states he is not allergic  \"upsets my stomach\"    Bupropion Hives    Thomas Flavor Hives    Varenicline Other (See Comments)     [place]       Past Surgical History:   Procedure Laterality Date    COLONOSCOPY      CT NEEDLE BIOPSY LUNG PERCUTANEOUS  10/18/2021    CT NEEDLE BIOPSY LUNG PERCUTANEOUS 10/18/2021 [facility] RADI[facility]Y CT SCAN    HERNIA REPAIR      4/2021    HYDROCELE EXCISION Right 12/6/2023

## 2024-10-17 ENCOUNTER — LAB (OUTPATIENT)
Dept: FAMILY MEDICINE CLINIC | Facility: CLINIC | Age: 79
End: 2024-10-17

## 2024-10-17 DIAGNOSIS — I10 BENIGN HYPERTENSION: ICD-10-CM

## 2024-10-17 DIAGNOSIS — E03.9 ACQUIRED HYPOTHYROIDISM: ICD-10-CM

## 2024-10-17 DIAGNOSIS — E11.65 TYPE 2 DIABETES MELLITUS WITH HYPERGLYCEMIA, WITHOUT LONG-TERM CURRENT USE OF INSULIN (HCC): ICD-10-CM

## 2024-10-17 DIAGNOSIS — E11.69 HYPERLIPIDEMIA ASSOCIATED WITH TYPE 2 DIABETES MELLITUS (HCC): ICD-10-CM

## 2024-10-17 DIAGNOSIS — Z76.89 ENCOUNTER TO ESTABLISH CARE WITH NEW DOCTOR: ICD-10-CM

## 2024-10-17 DIAGNOSIS — N18.2 STAGE 2 CHRONIC KIDNEY DISEASE: ICD-10-CM

## 2024-10-17 DIAGNOSIS — R79.89 ABNORMAL CBC: ICD-10-CM

## 2024-10-17 DIAGNOSIS — E78.5 HYPERLIPIDEMIA ASSOCIATED WITH TYPE 2 DIABETES MELLITUS (HCC): ICD-10-CM

## 2024-10-17 LAB
ALBUMIN SERPL-MCNC: 3.7 G/DL (ref 3.2–4.6)
ALBUMIN/GLOB SERPL: 1.1 (ref 1–1.9)
ALP SERPL-CCNC: 99 U/L (ref 40–129)
ALT SERPL-CCNC: 34 U/L (ref 8–55)
ANION GAP SERPL CALC-SCNC: 10 MMOL/L (ref 9–18)
AST SERPL-CCNC: 33 U/L (ref 15–37)
BASOPHILS # BLD: 0 K/UL (ref 0–0.2)
BASOPHILS NFR BLD: 1 % (ref 0–2)
BILIRUB SERPL-MCNC: 0.7 MG/DL (ref 0–1.2)
BUN SERPL-MCNC: 25 MG/DL (ref 8–23)
CALCIUM SERPL-MCNC: 9.2 MG/DL (ref 8.8–10.2)
CHLORIDE SERPL-SCNC: 103 MMOL/L (ref 98–107)
CHOLEST SERPL-MCNC: 128 MG/DL (ref 0–200)
CO2 SERPL-SCNC: 27 MMOL/L (ref 20–28)
CREAT SERPL-MCNC: 1.26 MG/DL (ref 0.8–1.3)
DIFFERENTIAL METHOD BLD: ABNORMAL
EOSINOPHIL # BLD: 0.1 K/UL (ref 0–0.8)
EOSINOPHIL NFR BLD: 1 % (ref 0.5–7.8)
ERYTHROCYTE [DISTWIDTH] IN BLOOD BY AUTOMATED COUNT: 13.2 % (ref 11.9–14.6)
EST. AVERAGE GLUCOSE BLD GHB EST-MCNC: 164 MG/DL
GLOBULIN SER CALC-MCNC: 3.6 G/DL (ref 2.3–3.5)
GLUCOSE SERPL-MCNC: 158 MG/DL (ref 70–99)
HBA1C MFR BLD: 7.3 % (ref 0–5.6)
HCT VFR BLD AUTO: 51.1 % (ref 41.1–50.3)
HDLC SERPL-MCNC: 51 MG/DL (ref 40–60)
HDLC SERPL: 2.5 (ref 0–5)
HGB BLD-MCNC: 17.1 G/DL (ref 13.6–17.2)
IMM GRANULOCYTES # BLD AUTO: 0 K/UL (ref 0–0.5)
IMM GRANULOCYTES NFR BLD AUTO: 1 % (ref 0–5)
LDLC SERPL CALC-MCNC: 63 MG/DL (ref 0–100)
LYMPHOCYTES # BLD: 1.3 K/UL (ref 0.5–4.6)
LYMPHOCYTES NFR BLD: 14 % (ref 13–44)
MCH RBC QN AUTO: 32.4 PG (ref 26.1–32.9)
MCHC RBC AUTO-ENTMCNC: 33.5 G/DL (ref 31.4–35)
MCV RBC AUTO: 96.8 FL (ref 82–102)
MONOCYTES # BLD: 0.7 K/UL (ref 0.1–1.3)
MONOCYTES NFR BLD: 8 % (ref 4–12)
NEUTS SEG # BLD: 6.8 K/UL (ref 1.7–8.2)
NEUTS SEG NFR BLD: 75 % (ref 43–78)
NRBC # BLD: 0 K/UL (ref 0–0.2)
PLATELET # BLD AUTO: 159 K/UL (ref 150–450)
PMV BLD AUTO: 11.5 FL (ref 9.4–12.3)
POTASSIUM SERPL-SCNC: 4.7 MMOL/L (ref 3.5–5.1)
PROT SERPL-MCNC: 7.3 G/DL (ref 6.3–8.2)
RBC # BLD AUTO: 5.28 M/UL (ref 4.23–5.6)
SODIUM SERPL-SCNC: 139 MMOL/L (ref 136–145)
T4 FREE SERPL-MCNC: 1.6 NG/DL (ref 0.9–1.7)
TRIGL SERPL-MCNC: 71 MG/DL (ref 0–150)
TSH W FREE THYROID IF ABNORMAL: 5.21 UIU/ML (ref 0.27–4.2)
VLDLC SERPL CALC-MCNC: 14 MG/DL (ref 6–23)
WBC # BLD AUTO: 8.8 K/UL (ref 4.3–11.1)

## 2024-11-05 ENCOUNTER — OFFICE VISIT (OUTPATIENT)
Dept: FAMILY MEDICINE CLINIC | Facility: CLINIC | Age: 79
End: 2024-11-05

## 2024-11-05 VITALS
SYSTOLIC BLOOD PRESSURE: 139 MMHG | HEIGHT: 68 IN | DIASTOLIC BLOOD PRESSURE: 72 MMHG | WEIGHT: 185 LBS | BODY MASS INDEX: 28.04 KG/M2 | RESPIRATION RATE: 18 BRPM | HEART RATE: 70 BPM | OXYGEN SATURATION: 93 % | TEMPERATURE: 97.7 F

## 2024-11-05 DIAGNOSIS — N18.31 STAGE 3A CHRONIC KIDNEY DISEASE (HCC): ICD-10-CM

## 2024-11-05 DIAGNOSIS — E11.69 HYPERLIPIDEMIA ASSOCIATED WITH TYPE 2 DIABETES MELLITUS (HCC): ICD-10-CM

## 2024-11-05 DIAGNOSIS — E11.65 TYPE 2 DIABETES MELLITUS WITH HYPERGLYCEMIA, WITHOUT LONG-TERM CURRENT USE OF INSULIN (HCC): Primary | ICD-10-CM

## 2024-11-05 DIAGNOSIS — E03.9 ACQUIRED HYPOTHYROIDISM: ICD-10-CM

## 2024-11-05 DIAGNOSIS — J31.0 RHINITIS MEDICAMENTOSA: ICD-10-CM

## 2024-11-05 DIAGNOSIS — I10 BENIGN HYPERTENSION: ICD-10-CM

## 2024-11-05 DIAGNOSIS — M25.812 IMPINGEMENT OF LEFT SHOULDER: ICD-10-CM

## 2024-11-05 DIAGNOSIS — E78.5 HYPERLIPIDEMIA ASSOCIATED WITH TYPE 2 DIABETES MELLITUS (HCC): ICD-10-CM

## 2024-11-05 DIAGNOSIS — T48.5X5A RHINITIS MEDICAMENTOSA: ICD-10-CM

## 2024-11-05 RX ORDER — PREDNISONE 20 MG/1
40 TABLET ORAL DAILY
Qty: 10 TABLET | Refills: 0 | Status: SHIPPED | OUTPATIENT
Start: 2024-11-05 | End: 2024-11-10

## 2024-11-05 ASSESSMENT — ENCOUNTER SYMPTOMS
WHEEZING: 0
BLOOD IN STOOL: 0
SINUS PRESSURE: 0
DIARRHEA: 0
ABDOMINAL PAIN: 0
EYE DISCHARGE: 0
SINUS PAIN: 0
VOMITING: 0
SORE THROAT: 0
CHEST TIGHTNESS: 0
NAUSEA: 0
SHORTNESS OF BREATH: 0

## 2024-11-05 NOTE — PROGRESS NOTES
limits; TSH mildly elevated however free T4 high normal at 1.6.    =================    Mr. Dash now presents to clinic for follow-up on 11/5/2024.    Vital signs upon presentation stable and appropriate with blood pressure 139/72, pulse 70.  Patient afebrile with oxygen saturation 93% on room air.    Updated laboratory studies discussed in detail.    Pt reports very good tolerance and response to current flonase.     Pt reports several month duration of Left shoulder pain exacerbated by weight lifting and poor posture.  Patient has not trialed any specific interventions.  At this time we will trial topical Voltaren gel in addition to 5-day prednisone burst at 40 mg daily.  We also thoroughly discussed range of motion exercises    Plan of care at the conclusion of today's visit on 11/5/2024 is as follows;    _Continue current rosuvastatin 40 mg  _Continue current metformin extended release 1000 mg  _Continue current levothyroxine 100 mcg daily  _Continue current metoprolol tartrate 50 mg twice daily  _Continue fluticasone Nasal spray 1 puff each nostril daily.     Mr. Dash is appropriate to maintain routine labs and follow-up in clinic at 6-month interval for continued surveillance.      Medication instructions and potential adverse effects discussed in detail.  ER precautions advised.  Patient states agreement and understanding with regards to plan of care.    Return in about 6 months (around 5/5/2025) for Re-examination, Medication follow up, Lab Review.     Angel Contreras DO

## 2024-11-13 ENCOUNTER — OFFICE VISIT (OUTPATIENT)
Age: 79
End: 2024-11-13

## 2024-11-13 VITALS
SYSTOLIC BLOOD PRESSURE: 132 MMHG | HEIGHT: 70 IN | DIASTOLIC BLOOD PRESSURE: 72 MMHG | WEIGHT: 189 LBS | BODY MASS INDEX: 27.06 KG/M2 | HEART RATE: 80 BPM

## 2024-11-13 DIAGNOSIS — I25.10 CORONARY ARTERY DISEASE INVOLVING NATIVE CORONARY ARTERY OF NATIVE HEART WITHOUT ANGINA PECTORIS: ICD-10-CM

## 2024-11-13 DIAGNOSIS — F17.211 NICOTINE DEPENDENCE, CIGARETTES, IN REMISSION: ICD-10-CM

## 2024-11-13 DIAGNOSIS — E11.9 TYPE 2 DIABETES MELLITUS WITHOUT COMPLICATION, WITHOUT LONG-TERM CURRENT USE OF INSULIN (HCC): ICD-10-CM

## 2024-11-13 DIAGNOSIS — I25.10 CORONARY ARTERY CALCIFICATION: ICD-10-CM

## 2024-11-13 DIAGNOSIS — Z13.6 ENCOUNTER FOR SCREENING FOR CARDIOVASCULAR DISORDERS: ICD-10-CM

## 2024-11-13 DIAGNOSIS — I71.40 ABDOMINAL AORTIC ANEURYSM (AAA) WITHOUT RUPTURE, UNSPECIFIED PART (HCC): ICD-10-CM

## 2024-11-13 DIAGNOSIS — I48.91 LONE ATRIAL FIBRILLATION (HCC): Primary | ICD-10-CM

## 2024-11-13 DIAGNOSIS — E78.2 MIXED HYPERLIPIDEMIA: ICD-10-CM

## 2024-11-13 DIAGNOSIS — J84.10 PULMONARY FIBROSIS, UNSPECIFIED (HCC): ICD-10-CM

## 2024-11-13 RX ORDER — METOPROLOL TARTRATE 50 MG
50 TABLET ORAL 2 TIMES DAILY
Qty: 180 TABLET | Refills: 3 | Status: SHIPPED | OUTPATIENT
Start: 2024-11-13

## 2024-11-13 ASSESSMENT — ENCOUNTER SYMPTOMS
RESPIRATORY NEGATIVE: 1
GASTROINTESTINAL NEGATIVE: 1
COUGH: 0
SHORTNESS OF BREATH: 0

## 2024-11-13 NOTE — PROGRESS NOTES
[facility], [address], [address]     [place] [id number]      Patient:  Jose Dash  1945         SUBJECTIVE:  Jose Dash is a  79 y.o. male seen for a follow up visit regarding the following:     Chief Complaint   Patient presents with    Atrial Fibrillation    Coronary Artery Disease    Hypertension    Follow-up     CC: follow up AAA, [place] atrial fibrillation     HPI:   79 y.o. male with a history of pAF, AAA, renal cell CA,  pulmonary fibrosis, DM, former smoker (quit 11-9-19), lung nodules, hypothyroid,who is here for follow up.     Patient was last seen in office on 5/1/24 , since then reports that he has been doing well.  Has been following with with primary care as well as with radiation oncology for treatment of lung nodule.  Exercising regularly multiple times per week.  No angina complaints, no shortness of breath, cough, wheeze, dizziness or lightheadedness, racing heart or palpitations, leg swelling.  Wears compression socks in the gym when he exercises.  No other complaints at this time.    Cardiovascular Testing:  - CT 10/31/2022: Abdominal aortic aneurysm 35 mm.  - Echo 4/13/22: LVEF >60 %, RV normal, Valves: trace MR, trace TR.   - CTA Abd/pelvis 9/3/21: Aorta 33 mm.  Coronary artery calcification, calcification of aorta branch vessels.  - Echo 11/23/19 normal LVEF >55%, mild LA enlargement, no valvular disease  - SARIKA/DCCV 11/22/19: LVEF >55%, mod/severe atheroma, atrial septal aneurysm.   - ZIO 1/10/20 - 1/24/20: brief SVT no sustained arrhythmias  - AAA scan 8/26/20: 35 mm aorta     Past medical history, past surgical history, family history, social history, and medications were all reviewed with the patient today and updated as necessary.         Patient Active Problem List    Diagnosis Date Noted    Chronic renal disease, stage III (HCC) [517925] 08/18/2022     Priority: Medium    Hydrocele 10/30/2023     Priority: Low    Type 2 diabetes mellitus

## 2024-11-25 NOTE — PROGRESS NOTES
Pulmonary,    Sinus problem     Thyroid disease     Hypothyroid, managed with medication    Tobacco use disorder 3/19/2013    Unspecified essential hypertension 3/19/2013    Unspecified hypothyroidism 3/19/2013    medication     Allergies   Allergen Reactions    Amoxicillin-Pot Clavulanate Nausea Only     Pt states he is not allergic  \"upsets my stomach\"    Bupropion Hives    [place] Flavor Hives    Varenicline Other (See Comments)     Wild Dreams     Current Outpatient Medications   Medication Instructions    albuterol sulfate HFA (PROVENTIL;VENTOLIN;PROAIR) 108 (90 Base) MCG/ACT inhaler 2 puffs, Inhalation, EVERY 4 HOURS PRN    aspirin 81 mg, Oral, DAILY    b complex vitamins capsule 1 capsule, Oral, DAILY    blood glucose test strips (ASCENSIA AUTODISC VI;ONE TOUCH ULTRA TEST VI) strip 1 each, In Vitro, DAILY, Pt uses one touch ultra 2 meter.    Coenzyme Q10 10 MG CAPS 1 tablet, Oral, DAILY    fluticasone (FLONASE) 50 MCG/ACT nasal spray 1 spray, Each Nostril, DAILY    Garlic 10 mg, Oral, DAILY    Lancets MISC E11.9 Non insulin dep testing bs 1times daily; bill to MCR part B    levothyroxine (SYNTHROID) 100 mcg, Oral, DAILY    metFORMIN (GLUCOPHAGE-XR) 1,000 mg, Oral, DAILY WITH BREAKFAST    metoprolol tartrate (LOPRESSOR) 50 mg, Oral, 2 TIMES DAILY    Multiple Vitamins-Minerals (MULTIVITAL-M PO) 1 tablet, Oral, DAILY    Omega-3 Fatty Acids (FISH OIL) 1000 MG CAPS 1 capsule, Oral, DAILY    rosuvastatin (CRESTOR) 40 mg, Oral, DAILY    triamcinolone (NASACORT) 55 MCG/ACT nasal inhaler 1 spray, 2 TIMES DAILY PRN    TURMERIC  mg, Oral, DAILY    vitamin C 1,000 mg, Oral, DAILY    vitamin D 2,000 Units, Oral, DAILY

## 2024-11-26 ENCOUNTER — NURSE ONLY (OUTPATIENT)
Dept: PULMONOLOGY | Age: 79
End: 2024-11-26
Payer: MEDICARE

## 2024-11-26 ENCOUNTER — OFFICE VISIT (OUTPATIENT)
Dept: PULMONOLOGY | Age: 79
End: 2024-11-26
Payer: MEDICARE

## 2024-11-26 VITALS
WEIGHT: 185 LBS | BODY MASS INDEX: 27.4 KG/M2 | DIASTOLIC BLOOD PRESSURE: 78 MMHG | HEIGHT: 69 IN | RESPIRATION RATE: 17 BRPM | SYSTOLIC BLOOD PRESSURE: 144 MMHG | TEMPERATURE: 97.8 F | OXYGEN SATURATION: 93 % | HEART RATE: 79 BPM

## 2024-11-26 DIAGNOSIS — Z87.891 PERSONAL HISTORY OF TOBACCO USE, PRESENTING HAZARDS TO HEALTH: ICD-10-CM

## 2024-11-26 DIAGNOSIS — J84.112 IPF (IDIOPATHIC PULMONARY FIBROSIS) (HCC): Primary | ICD-10-CM

## 2024-11-26 DIAGNOSIS — C34.90 SQUAMOUS CELL CARCINOMA OF LUNG, UNSPECIFIED LATERALITY (HCC): ICD-10-CM

## 2024-11-26 DIAGNOSIS — J84.112 IPF (IDIOPATHIC PULMONARY FIBROSIS) (HCC): ICD-10-CM

## 2024-11-26 DIAGNOSIS — J84.10 COMBINED PULMONARY FIBROSIS AND EMPHYSEMA (CPFE) (HCC): Primary | ICD-10-CM

## 2024-11-26 DIAGNOSIS — J43.9 COMBINED PULMONARY FIBROSIS AND EMPHYSEMA (CPFE) (HCC): Primary | ICD-10-CM

## 2024-11-26 LAB
FEF25-27, POC: 1.14 L/S
FET, POC: NORMAL
FEV 1 , POC: 2.22 L
FEV1/FVC, POC: NORMAL
FVC, POC: NORMAL
LUNG AGE, POC: NORMAL
PEF, POC: 4.98 L/S

## 2024-11-26 PROCEDURE — G8417 CALC BMI ABV UP PARAM F/U: HCPCS | Performed by: INTERNAL MEDICINE

## 2024-11-26 PROCEDURE — 99214 OFFICE O/P EST MOD 30 MIN: CPT | Performed by: INTERNAL MEDICINE

## 2024-11-26 PROCEDURE — 94010 BREATHING CAPACITY TEST: CPT | Performed by: INTERNAL MEDICINE

## 2024-11-26 PROCEDURE — 4004F PT TOBACCO SCREEN RCVD TLK: CPT | Performed by: INTERNAL MEDICINE

## 2024-11-26 PROCEDURE — 3077F SYST BP >= 140 MM HG: CPT | Performed by: INTERNAL MEDICINE

## 2024-11-26 PROCEDURE — 1159F MED LIST DOCD IN RCRD: CPT | Performed by: INTERNAL MEDICINE

## 2024-11-26 PROCEDURE — 3078F DIAST BP <80 MM HG: CPT | Performed by: INTERNAL MEDICINE

## 2024-11-26 PROCEDURE — 1126F AMNT PAIN NOTED NONE PRSNT: CPT | Performed by: INTERNAL MEDICINE

## 2024-11-26 PROCEDURE — 3023F SPIROM DOC REV: CPT | Performed by: INTERNAL MEDICINE

## 2024-11-26 PROCEDURE — G8484 FLU IMMUNIZE NO ADMIN: HCPCS | Performed by: INTERNAL MEDICINE

## 2024-11-26 PROCEDURE — 94729 DIFFUSING CAPACITY: CPT | Performed by: INTERNAL MEDICINE

## 2024-11-26 PROCEDURE — 1123F ACP DISCUSS/DSCN MKR DOCD: CPT | Performed by: INTERNAL MEDICINE

## 2024-11-26 PROCEDURE — G8427 DOCREV CUR MEDS BY ELIG CLIN: HCPCS | Performed by: INTERNAL MEDICINE

## 2024-11-26 PROCEDURE — 94726 PLETHYSMOGRAPHY LUNG VOLUMES: CPT | Performed by: INTERNAL MEDICINE

## 2024-11-26 NOTE — PATIENT INSTRUCTIONS
your symptoms. Try to resist the urge to smoke \"just one\" cigarette to get through a rough day. Consider support groups for encouragement as well as tips on coping with withdrawal.    WHERE TO GET MORE INFORMATION    Your health care provider is the best source of information for questions and concerns related to your medical problem.    This article will be updated as needed on our web site ([email or web address]). Related topics for patients, as well as selected articles written for health care professionals, are also available. Some of the most relevant are listed below.  The following organizations also provide useful health information.    ?  Smokefree.gov  (https://smokefree.gov/)    ?  National Heart, Lung and Blood [place]  (www.nhlbi.nih.gov/)    ?  American Lung Association  (www.lungusa.org/)    ?  American Heart Association  (www.americanheart.org)    ?  [place] Department of Health and Human Services  (www.BeTobaccoFree.gov)    ?  [place] Department of Public Health  (www.makesmokingSouth County Hospitaltory.org/)    ?  Agency for Healthcare Research and Quality  (www.ahrq.gov/consumer/tobacco/)

## 2024-12-05 ENCOUNTER — OFFICE VISIT (OUTPATIENT)
Dept: FAMILY MEDICINE CLINIC | Facility: CLINIC | Age: 79
End: 2024-12-05

## 2024-12-05 VITALS
OXYGEN SATURATION: 98 % | BODY MASS INDEX: 28.3 KG/M2 | WEIGHT: 186.7 LBS | HEART RATE: 98 BPM | SYSTOLIC BLOOD PRESSURE: 118 MMHG | DIASTOLIC BLOOD PRESSURE: 68 MMHG | HEIGHT: 68 IN

## 2024-12-05 DIAGNOSIS — N18.31 STAGE 3A CHRONIC KIDNEY DISEASE (HCC): ICD-10-CM

## 2024-12-05 DIAGNOSIS — M25.812 IMPINGEMENT OF LEFT SHOULDER: ICD-10-CM

## 2024-12-05 DIAGNOSIS — G89.29 CHRONIC LEFT SHOULDER PAIN: Primary | ICD-10-CM

## 2024-12-05 DIAGNOSIS — M25.512 CHRONIC LEFT SHOULDER PAIN: Primary | ICD-10-CM

## 2024-12-05 DIAGNOSIS — E11.69 HYPERLIPIDEMIA ASSOCIATED WITH TYPE 2 DIABETES MELLITUS (HCC): ICD-10-CM

## 2024-12-05 DIAGNOSIS — R29.898 WEAKNESS OF LEFT SHOULDER: ICD-10-CM

## 2024-12-05 DIAGNOSIS — E78.5 HYPERLIPIDEMIA ASSOCIATED WITH TYPE 2 DIABETES MELLITUS (HCC): ICD-10-CM

## 2024-12-05 DIAGNOSIS — E11.65 TYPE 2 DIABETES MELLITUS WITH HYPERGLYCEMIA, WITHOUT LONG-TERM CURRENT USE OF INSULIN (HCC): ICD-10-CM

## 2024-12-05 DIAGNOSIS — E03.9 ACQUIRED HYPOTHYROIDISM: ICD-10-CM

## 2024-12-05 DIAGNOSIS — H66.001 NON-RECURRENT ACUTE SUPPURATIVE OTITIS MEDIA OF RIGHT EAR WITHOUT SPONTANEOUS RUPTURE OF TYMPANIC MEMBRANE: ICD-10-CM

## 2024-12-05 RX ORDER — DOXYCYCLINE HYCLATE 100 MG
100 TABLET ORAL 2 TIMES DAILY
Qty: 14 TABLET | Refills: 0 | Status: SHIPPED | OUTPATIENT
Start: 2024-12-05 | End: 2024-12-12

## 2024-12-05 RX ORDER — METHOCARBAMOL 750 MG/1
750 TABLET, FILM COATED ORAL 4 TIMES DAILY
Qty: 40 TABLET | Refills: 1 | Status: SHIPPED | OUTPATIENT
Start: 2024-12-05 | End: 2024-12-15

## 2024-12-05 SDOH — ECONOMIC STABILITY: FOOD INSECURITY: WITHIN THE PAST 12 MONTHS, YOU WORRIED THAT YOUR FOOD WOULD RUN OUT BEFORE YOU GOT MONEY TO BUY MORE.: NEVER TRUE

## 2024-12-05 SDOH — ECONOMIC STABILITY: FOOD INSECURITY: WITHIN THE PAST 12 MONTHS, THE FOOD YOU BOUGHT JUST DIDN'T LAST AND YOU DIDN'T HAVE MONEY TO GET MORE.: NEVER TRUE

## 2024-12-05 SDOH — ECONOMIC STABILITY: INCOME INSECURITY: HOW HARD IS IT FOR YOU TO PAY FOR THE VERY BASICS LIKE FOOD, HOUSING, MEDICAL CARE, AND HEATING?: NOT VERY HARD

## 2024-12-05 ASSESSMENT — ENCOUNTER SYMPTOMS
SINUS PRESSURE: 0
WHEEZING: 0
ABDOMINAL PAIN: 0
BLOOD IN STOOL: 0
VOMITING: 0
SINUS PAIN: 0
CHEST TIGHTNESS: 0
EYE DISCHARGE: 0
DIARRHEA: 0
NAUSEA: 0
SORE THROAT: 0
SHORTNESS OF BREATH: 0

## 2024-12-05 NOTE — PROGRESS NOTES
Jose Dash (1945) presents today for   Chief Complaint   Patient presents with    Follow-up     Pain throughout left shoulder and arm. He has to support his arm to touch his back. Reports he is every frustrated with this pain as it is affecting his daily life. HE is very active in the gym. Finished the prednisone tablets and the voltaren tablets. Not much improvement.        Patient Active Problem List   Diagnosis    Coronary artery disease involving native coronary artery of native heart    Situational anxiety    Allergic rhinitis    Essential hypertension    Impacted cerumen of right ear    Diabetes mellitus type 2, diet-controlled ([facility])    Pulmonary fibrosis ([facility])    Atrial fibrillation with rapid ventricular response ([facility])    Skin exam for malignant neoplasm    COPD (chronic obstructive pulmonary disease) ([facility])    Balance problem    Anxiety    Incisional hernia, without obstruction or gangrene    Mediastinal adenopathy    Squamous cell lung cancer, right ([facility])    UIP (usual interstitial pneumonitis) ([facility])    Benign prostatic hyperplasia with lower urinary tract symptoms    Lung nodule    Vitamin D deficiency    Rhinitis medicamentosa    Hypothyroidism    Diabetes mellitus ([facility])    Prostate cancer screening    Chronic sinusitis    Low testosterone    History of kidney cancer    Depression    Sleep disturbance    Abdominal aortic aneurysm (AAA) without rupture ([facility])    Chronic renal disease, stage III ([facility]) [385770]    Type 2 diabetes mellitus with diabetic peripheral angiopathy without gangrene, without long-term current use of insulin (HCC)    Type 2 diabetes mellitus with chronic kidney disease    Hydrocele       Allergies   Allergen Reactions    Amoxicillin-Pot Clavulanate Nausea Only     Augmentin causes nausea    Bupropion Hives    [place] Flavor Hives    Varenicline Other (See Comments)     Wild Dreams       Past Surgical History:   Procedure Laterality Date    COLONOSCOPY      CT NEEDLE

## 2024-12-06 ENCOUNTER — HOSPITAL ENCOUNTER (OUTPATIENT)
Dept: PET IMAGING | Age: 79
Discharge: HOME | End: 2024-12-09
Payer: MEDICARE

## 2024-12-06 DIAGNOSIS — R91.1 LUNG NODULE: ICD-10-CM

## 2024-12-06 DIAGNOSIS — C34.31 MALIGNANT NEOPLASM OF LOWER LOBE OF RIGHT LUNG (HCC): ICD-10-CM

## 2024-12-06 LAB
GLUCOSE BLD STRIP.AUTO-MCNC: 135 MG/DL (ref 65–100)
SERVICE CMNT-IMP: ABNORMAL

## 2024-12-06 PROCEDURE — 82962 GLUCOSE BLOOD TEST: CPT

## 2024-12-06 PROCEDURE — A9609 HC RX DIAGNOSTIC RADIOPHARMACEUTICAL: HCPCS | Performed by: RADIOLOGY

## 2024-12-06 PROCEDURE — 3430000000 HC RX DIAGNOSTIC RADIOPHARMACEUTICAL: Performed by: RADIOLOGY

## 2024-12-06 PROCEDURE — 2580000003 HC RX 258: Performed by: RADIOLOGY

## 2024-12-06 PROCEDURE — 78815 PET IMAGE W/CT SKULL-THIGH: CPT

## 2024-12-06 PROCEDURE — 6360000004 HC RX CONTRAST MEDICATION: Performed by: RADIOLOGY

## 2024-12-06 RX ORDER — FLUDEOXYGLUCOSE F 18 200 MCI/ML
12.35 INJECTION, SOLUTION INTRAVENOUS
Status: COMPLETED | OUTPATIENT
Start: 2024-12-06 | End: 2024-12-06

## 2024-12-06 RX ORDER — SODIUM CHLORIDE 0.9 % (FLUSH) 0.9 %
10 SYRINGE (ML) INJECTION ONCE AS NEEDED
Status: COMPLETED | OUTPATIENT
Start: 2024-12-06 | End: 2024-12-06

## 2024-12-06 RX ORDER — DIATRIZOATE MEGLUMINE AND DIATRIZOATE SODIUM 660; 100 MG/ML; MG/ML
10 SOLUTION ORAL; RECTAL
Status: DISCONTINUED | OUTPATIENT
Start: 2024-12-06 | End: 2024-12-10 | Stop reason: HOSPADM

## 2024-12-06 RX ADMIN — FLUDEOXYGLUCOSE F 18 12.35 MILLICURIE: 200 INJECTION, SOLUTION INTRAVENOUS at 10:26

## 2024-12-06 RX ADMIN — SODIUM CHLORIDE, PRESERVATIVE FREE 10 ML: 5 INJECTION INTRAVENOUS at 10:26

## 2024-12-06 RX ADMIN — DIATRIZOATE MEGLUMINE AND DIATRIZOATE SODIUM 10 ML: 660; 100 LIQUID ORAL; RECTAL at 10:26

## 2024-12-10 ENCOUNTER — APPOINTMENT (OUTPATIENT)
Dept: RADIATION ONCOLOGY | Age: 79
End: 2024-12-10
Payer: MEDICARE

## 2024-12-10 ENCOUNTER — HOSPITAL ENCOUNTER (OUTPATIENT)
Dept: RADIATION ONCOLOGY | Age: 79
Setting detail: RECURRING SERIES
Discharge: HOME | End: 2024-12-13
Payer: MEDICARE

## 2024-12-10 VITALS
DIASTOLIC BLOOD PRESSURE: 71 MMHG | HEART RATE: 80 BPM | BODY MASS INDEX: 27.43 KG/M2 | RESPIRATION RATE: 16 BRPM | TEMPERATURE: 98.2 F | OXYGEN SATURATION: 92 % | WEIGHT: 181 LBS | SYSTOLIC BLOOD PRESSURE: 126 MMHG | HEIGHT: 68 IN

## 2024-12-10 DIAGNOSIS — C34.31 MALIGNANT NEOPLASM OF LOWER LOBE OF RIGHT LUNG (HCC): Primary | ICD-10-CM

## 2024-12-10 DIAGNOSIS — R94.2 ABNORMAL PET SCAN, LUNG: ICD-10-CM

## 2024-12-10 DIAGNOSIS — C34.91 SQUAMOUS CELL LUNG CANCER, RIGHT (HCC): Primary | ICD-10-CM

## 2024-12-10 DIAGNOSIS — R94.2 ABNORMAL PET SCAN OF LUNG: ICD-10-CM

## 2024-12-10 PROCEDURE — 99211 OFF/OP EST MAY X REQ PHY/QHP: CPT

## 2024-12-10 NOTE — PROGRESS NOTES
Follow up Lung Cancer.  Pt denies pain.  SBRT right lung ending 11-19-21.  SBRT left lung ending 2-26-24.  Pet scan 12-6-24.  Pt to be presented at [facility].   Pet scan ordered and scheduled for 6 month f/u.    Miesha mSith RN      
(GLUCOPHAGE-XR) 500 MG extended release tablet Take 2 tablets by mouth daily (with breakfast) 180 tablet 1    levothyroxine (SYNTHROID) 100 MCG tablet Take 1 tablet by mouth daily 90 tablet 1    fluticasone (FLONASE) 50 MCG/ACT nasal spray 1 spray by Each Nostril route daily 32 g 1    rosuvastatin (CRESTOR) 40 MG tablet Take 1 tablet by mouth daily 90 tablet 3    blood glucose test strips (ASCENSIA AUTODISC VI;ONE TOUCH ULTRA TEST VI) strip 1 each by In Vitro route daily Pt uses one touch ultra 2 meter. 100 each 3    Coenzyme Q10 10 MG CAPS Take 1 tablet by mouth daily      Ascorbic Acid (VITAMIN C) 1000 MG tablet Take 1 tablet by mouth daily      Garlic 10 MG CAPS Take 10 mg by mouth daily      albuterol sulfate HFA (PROVENTIL;VENTOLIN;PROAIR) 108 (90 Base) MCG/ACT inhaler Inhale 2 puffs into the lungs every 4 hours as needed for Shortness of Breath or Wheezing 1 each 11    Omega-3 Fatty Acids (FISH OIL) 1000 MG CAPS Take 1 capsule by mouth daily      Multiple Vitamins-Minerals (MULTIVITAL-M PO) Take 1 tablet by mouth daily      Lancets MISC E11.9 Non insulin dep testing bs 1times daily; bill to MCR part B      TURMERIC PO Take 720 mg by mouth daily      aspirin 81 MG EC tablet Take 1 tablet by mouth daily      vitamin D 25 MCG (1000 UT) CAPS Take 2 capsules by mouth daily      triamcinolone (NASACORT) 55 MCG/ACT nasal inhaler 1 spray by Nasal route 2 times daily as needed (Patient not taking: Reported on 11/26/2024)       No current facility-administered medications for this visit.      ALLERGIES:   Allergies   Allergen Reactions    Amoxicillin-Pot Clavulanate Nausea Only     Augmentin causes nausea    Bupropion Hives    [place] Flavor Hives    Varenicline Other (See Comments)     Wild Dreams     PHYSICAL EXAMINATION:   ECOG Performance status 0  VITAL SIGNS:   /71   Pulse 80   Temp 98.2 °F (36.8 °C)   Resp 16   Ht 1.727 m (5' 8\")   Wt 82.1 kg (181 lb) Comment: pt weighed at home and didn't want to weigh

## 2024-12-11 DIAGNOSIS — R94.2 ABNORMAL PET SCAN OF LUNG: Primary | ICD-10-CM

## 2024-12-11 DIAGNOSIS — C34.91 SQUAMOUS CELL LUNG CANCER, RIGHT (HCC): ICD-10-CM

## 2024-12-26 NOTE — PROGRESS NOTES
HPI    Patient is a pleasant 78-year-old male who presents today to establish care with myself and to discuss some chronic issues.  States that he has had some degree of fatigue for the past several months and does not have as much energy as he is used to having.  Also needs refills on his Synthroid and metformin.  He specifically inquired about renal function and we can get that checked today otherwise doing well and exercises on a regular basis we will have a additional visit because he likes to have visits every 6 months and this is a good idea.  No systemic symptoms no nausea vomiting fever chills chest pain or shortness of breath noted today      Past Medical History, Past Surgical History, Family history, Social History, and Medications were all reviewed with the patient today and updated as necessary.       Current Outpatient Medications   Medication Sig Dispense Refill    metoprolol tartrate (LOPRESSOR) 50 MG tablet Take 1 tablet by mouth 2 times daily 180 tablet 3    rosuvastatin (CRESTOR) 40 MG tablet Take 1 tablet by mouth daily 90 tablet 3    blood glucose test strips (ASCENSIA AUTODISC VI;ONE TOUCH ULTRA TEST VI) strip 1 each by In Vitro route daily Pt uses one touch ultra 2 meter. 100 each 3    Coenzyme Q10 10 MG CAPS Take 1 tablet by mouth daily      Ascorbic Acid (VITAMIN C) 1000 MG tablet Take 1 tablet by mouth daily      Garlic 10 MG CAPS Take 10 mg by mouth daily      albuterol sulfate HFA (PROVENTIL;VENTOLIN;PROAIR) 108 (90 Base) MCG/ACT inhaler Inhale 2 puffs into the lungs every 4 hours as needed for Shortness of Breath or Wheezing 1 each 11    Omega-3 Fatty Acids (FISH OIL) 1000 MG CAPS Take 1 capsule by mouth daily      Multiple Vitamins-Minerals (MULTIVITAL-M PO) Take 1 tablet by mouth daily      Lancets MISC E11.9 Non insulin dep testing bs 1times daily; bill to [facility] part B      TURMERIC PO Take 720 mg by mouth daily      aspirin 81 MG EC tablet Take 1 tablet by mouth daily      vitamin D  Her, Delia MOHAMUD LPN   Licensed Practical Nurse     Telephone Encounter     Signed     Encounter Date: 12/24/2024     Signed         PROCEDURE: Colonoscopy (81748)     DIAGNOSIS: Colon Cancer Screening Z12.11     KIDNEY CONCERNS: NONE - Follow provider preferred prep     PROCEDURE LOCATION: [facility]     SLEEP APNEA: Yes-moderate per 2023 sleep study     BMI GREATER THAN 35: Yes     SEDATION: MAC Only (hx of sleep apnea, BMI 35.75 kg)     DIABETIC: Yes  (is A1C > 10.5 No, if yes, schedule in Hospital setting)  -A1C 7.5 done 12/17/24     Current use or HX of cocaine, meth or heroin use within the past 12 months (MAC): No If yes, cocaine, meth or heroin? N/A (If yes, patient will need to have negative drug screening within one week before Colonoscopy to proceed)     ANTIPLATELET / ANTICOAGULATION: None      MEDICATION HOLDS: - Dapaglifozin (Farxiga) - HOLD 72 hours.     SPECIAL INSTRUCTIONS: None        Nurse chart review complete. Please contact patient to schedule their Colonoscopy procedure.

## 2025-01-01 ENCOUNTER — PATIENT MESSAGE (OUTPATIENT)
Dept: FAMILY MEDICINE CLINIC | Facility: CLINIC | Age: 80
End: 2025-01-01

## 2025-01-01 DIAGNOSIS — R29.898 WEAKNESS OF LEFT SHOULDER: Primary | ICD-10-CM

## 2025-01-01 DIAGNOSIS — M19.012 ARTHRITIS OF LEFT SHOULDER REGION: ICD-10-CM

## 2025-01-01 DIAGNOSIS — M25.812 IMPINGEMENT OF LEFT SHOULDER: ICD-10-CM

## 2025-01-01 DIAGNOSIS — G89.29 CHRONIC LEFT SHOULDER PAIN: ICD-10-CM

## 2025-01-01 DIAGNOSIS — M25.512 CHRONIC LEFT SHOULDER PAIN: ICD-10-CM

## 2025-01-02 ENCOUNTER — TELEPHONE (OUTPATIENT)
Dept: ORTHOPEDIC SURGERY | Age: 80
End: 2025-01-02

## 2025-01-02 NOTE — TELEPHONE ENCOUNTER
Chart update 1/2/2025, received the following correspondence from patient;    \"Just saw your message (do not know how I missed it earlier).  Could mid arthritis cause the symptoms I am having?   To me (with no medical training) seems more muscular or nerve related.  I would like to get this resolved since I really can't exercise with this..    Should we talk?  You can call me when you have time or we can schedule a call.  I would like to take whatever you think is the next step in the immediate future.    Thanks,   phyllis\"    =========    My response;    Good morning,    Just because radiology states arthritis is mild to moderate does not mean the pain would be mild to moderate.  Remember I had previously placed a referral for evaluation by orthopedic specialist however you had declined that evaluation.  At this time it would remain my recommendation to be evaluated by either sports medicine or orthopedic surgery both of who commonly manage joint pain such as this.  I will place updated referral at this time.  If you would like a trial of an oral anti-inflammatory let me know, I believe this was declined previously.    Chart update 1/3/2025;    Patient currently pending evaluation by orthopedic specialist on 2/3/2025.  Patient is requesting trial of oral anti-inflammatory regarding shoulder pain.  Recent comprehensive metabolic panel reveals borderline renal function with creatinine 1.26, GFR 59.  Benefit outweighs risk when utilized sparingly.

## 2025-01-02 NOTE — TELEPHONE ENCOUNTER
Urgent PCP referral    Chronic left shoulder pain   Please review and advise  Xray in chart  Thank you

## 2025-01-06 ENCOUNTER — TELEPHONE (OUTPATIENT)
Dept: RADIATION ONCOLOGY | Age: 80
End: 2025-01-06

## 2025-01-06 NOTE — TELEPHONE ENCOUNTER
CT chest was cancelled.  Pt was notified and instructed to call with any changes.    Miesha Smith RN    ----- Message from Dr. Aylin Tai MD sent at 1/6/2025  7:12 AM EST -----  Yes I am ok with this - please let him know to call if anything changes before then  ----- Message -----  From: Miesha Smith RN  Sent: 1/3/2025  11:48 AM EST  To: Aylin Tai MD; Marisela Tai, cherri pt called Friday.  He states his congestion is gone and he is back to exercising on the treadmill.  He wants to cancel ct chest in Feb and f/u and just do pet scan in June and f/u.  He has new insurance and does not want to spend the money.  Are you ok with this?    Miesha Smith RN

## 2025-02-03 ENCOUNTER — OFFICE VISIT (OUTPATIENT)
Dept: ORTHOPEDIC SURGERY | Age: 80
End: 2025-02-03
Payer: MEDICARE

## 2025-02-03 DIAGNOSIS — M25.512 LEFT SHOULDER PAIN, UNSPECIFIED CHRONICITY: Primary | ICD-10-CM

## 2025-02-03 DIAGNOSIS — M67.912 TENDINOPATHY OF LEFT ROTATOR CUFF: ICD-10-CM

## 2025-02-03 PROCEDURE — G8428 CUR MEDS NOT DOCUMENT: HCPCS | Performed by: STUDENT IN AN ORGANIZED HEALTH CARE EDUCATION/TRAINING PROGRAM

## 2025-02-03 PROCEDURE — 99203 OFFICE O/P NEW LOW 30 MIN: CPT | Performed by: STUDENT IN AN ORGANIZED HEALTH CARE EDUCATION/TRAINING PROGRAM

## 2025-02-03 PROCEDURE — 4004F PT TOBACCO SCREEN RCVD TLK: CPT | Performed by: STUDENT IN AN ORGANIZED HEALTH CARE EDUCATION/TRAINING PROGRAM

## 2025-02-03 PROCEDURE — 1123F ACP DISCUSS/DSCN MKR DOCD: CPT | Performed by: STUDENT IN AN ORGANIZED HEALTH CARE EDUCATION/TRAINING PROGRAM

## 2025-02-03 PROCEDURE — G8417 CALC BMI ABV UP PARAM F/U: HCPCS | Performed by: STUDENT IN AN ORGANIZED HEALTH CARE EDUCATION/TRAINING PROGRAM

## 2025-02-03 PROCEDURE — 20610 DRAIN/INJ JOINT/BURSA W/O US: CPT | Performed by: STUDENT IN AN ORGANIZED HEALTH CARE EDUCATION/TRAINING PROGRAM

## 2025-02-03 RX ORDER — METHYLPREDNISOLONE ACETATE 40 MG/ML
40 INJECTION, SUSPENSION INTRA-ARTICULAR; INTRALESIONAL; INTRAMUSCULAR; SOFT TISSUE ONCE
Status: COMPLETED | OUTPATIENT
Start: 2025-02-03 | End: 2025-02-03

## 2025-02-03 RX ADMIN — METHYLPREDNISOLONE ACETATE 40 MG: 40 INJECTION, SUSPENSION INTRA-ARTICULAR; INTRALESIONAL; INTRAMUSCULAR; SOFT TISSUE at 13:49

## 2025-02-03 NOTE — PROGRESS NOTES
Name: Jose Dash  YOB: 1945  Gender: male  MRN: [id number]  Date of Encounter:  2/3/2025       CHIEF COMPLAINT:     Chief Complaint   Patient presents with    Shoulder Pain     Left        SUBJECTIVE/OBJECTIVE:      HPI:    Jose Dash  is a 79 y.o. pleasant male with a hx of DM2, CAD, CKD 3, HTN, A-fib, pulmonary fibrosis/COPD, AAA who presents today for a new evaluation of his left shoulder pain.    He reports a several month history of left shoulder pain.  At times shoulder pain is along his trapezius, into his pec, but primarily pain to the lateral shoulder and arm.  He has tried diclofenac both orally and topically, with improvement with Voltaren gel but gets a skin rash from frequent use.  Salonpas has given him no relief.  He has had no physical therapy or injection.  He does not recall particular injury, however he has been active in the gym for 40 years and still works out 5 times a week a week.     PAST HISTORY:   Past medical, surgical, family, social history and allergies reviewed by me.   Tobacco use:  reports that he has been smoking cigarettes. He started smoking about 60 years ago. He has a 30 pack-year smoking history. He has been exposed to tobacco smoke. He has never used smokeless tobacco.  Diabetes: DM2  Hemoglobin A1C   Date Value Ref Range Status   10/17/2024 7.3 (H) 0 - 5.6 % Final     Comment:     Reference Range  Normal       <5.7%  Prediabetes  5.7-6.4%  Diabetes     >6.4%           REVIEW OF SYSTEMS:   As noted in HPI.     PHYSICAL EXAMINATION:     Gen: Well-developed, no acute distress   HEENT: NC/AT, EOMI   Neck: Trachea midline, normal ROM   CV: Regular rhythm by palpation of distal pulse, normal capillary refill   Pulm: No respiratory distress, no stridor   Psychiatric: Well oriented, normal mood and affect.   Skin: No rashes, lesions or ulcers, normal temperature, turgor, and texture on uninvolved extremity.      ORTHO EXAM:    Left Shoulder:

## 2025-02-26 ENCOUNTER — OFFICE VISIT (OUTPATIENT)
Dept: FAMILY MEDICINE CLINIC | Facility: CLINIC | Age: 80
End: 2025-02-26
Payer: MEDICARE

## 2025-02-26 VITALS
TEMPERATURE: 97.7 F | SYSTOLIC BLOOD PRESSURE: 139 MMHG | BODY MASS INDEX: 28.49 KG/M2 | HEIGHT: 68 IN | HEART RATE: 74 BPM | DIASTOLIC BLOOD PRESSURE: 78 MMHG | WEIGHT: 188 LBS | OXYGEN SATURATION: 97 %

## 2025-02-26 DIAGNOSIS — K29.00 ACUTE GASTRITIS WITHOUT HEMORRHAGE, UNSPECIFIED GASTRITIS TYPE: ICD-10-CM

## 2025-02-26 DIAGNOSIS — N18.31 STAGE 3A CHRONIC KIDNEY DISEASE (HCC): ICD-10-CM

## 2025-02-26 DIAGNOSIS — E03.9 ACQUIRED HYPOTHYROIDISM: ICD-10-CM

## 2025-02-26 DIAGNOSIS — E11.69 HYPERLIPIDEMIA ASSOCIATED WITH TYPE 2 DIABETES MELLITUS (HCC): ICD-10-CM

## 2025-02-26 DIAGNOSIS — M19.012 ARTHRITIS OF LEFT SHOULDER REGION: ICD-10-CM

## 2025-02-26 DIAGNOSIS — E11.65 TYPE 2 DIABETES MELLITUS WITH HYPERGLYCEMIA, WITHOUT LONG-TERM CURRENT USE OF INSULIN (HCC): ICD-10-CM

## 2025-02-26 DIAGNOSIS — M25.512 CHRONIC LEFT SHOULDER PAIN: Primary | ICD-10-CM

## 2025-02-26 DIAGNOSIS — R07.89 CHEST DISCOMFORT: ICD-10-CM

## 2025-02-26 DIAGNOSIS — K21.9 GASTROESOPHAGEAL REFLUX DISEASE WITHOUT ESOPHAGITIS: ICD-10-CM

## 2025-02-26 DIAGNOSIS — E78.5 HYPERLIPIDEMIA ASSOCIATED WITH TYPE 2 DIABETES MELLITUS (HCC): ICD-10-CM

## 2025-02-26 DIAGNOSIS — G89.29 CHRONIC LEFT SHOULDER PAIN: Primary | ICD-10-CM

## 2025-02-26 PROCEDURE — 1123F ACP DISCUSS/DSCN MKR DOCD: CPT | Performed by: STUDENT IN AN ORGANIZED HEALTH CARE EDUCATION/TRAINING PROGRAM

## 2025-02-26 PROCEDURE — 3078F DIAST BP <80 MM HG: CPT | Performed by: STUDENT IN AN ORGANIZED HEALTH CARE EDUCATION/TRAINING PROGRAM

## 2025-02-26 PROCEDURE — 1159F MED LIST DOCD IN RCRD: CPT | Performed by: STUDENT IN AN ORGANIZED HEALTH CARE EDUCATION/TRAINING PROGRAM

## 2025-02-26 PROCEDURE — 1160F RVW MEDS BY RX/DR IN RCRD: CPT | Performed by: STUDENT IN AN ORGANIZED HEALTH CARE EDUCATION/TRAINING PROGRAM

## 2025-02-26 PROCEDURE — 3075F SYST BP GE 130 - 139MM HG: CPT | Performed by: STUDENT IN AN ORGANIZED HEALTH CARE EDUCATION/TRAINING PROGRAM

## 2025-02-26 PROCEDURE — 93000 ELECTROCARDIOGRAM COMPLETE: CPT | Performed by: STUDENT IN AN ORGANIZED HEALTH CARE EDUCATION/TRAINING PROGRAM

## 2025-02-26 PROCEDURE — 99214 OFFICE O/P EST MOD 30 MIN: CPT | Performed by: STUDENT IN AN ORGANIZED HEALTH CARE EDUCATION/TRAINING PROGRAM

## 2025-02-26 RX ORDER — FAMOTIDINE 40 MG/1
40 TABLET, FILM COATED ORAL EVERY EVENING
Qty: 30 TABLET | Refills: 3 | Status: SHIPPED | OUTPATIENT
Start: 2025-02-26

## 2025-02-26 RX ORDER — SUCRALFATE 1 G/1
1 TABLET ORAL 3 TIMES DAILY
Qty: 90 TABLET | Refills: 1 | Status: SHIPPED | OUTPATIENT
Start: 2025-02-26

## 2025-02-26 RX ORDER — OMEPRAZOLE 40 MG/1
40 CAPSULE, DELAYED RELEASE ORAL 2 TIMES DAILY
Qty: 60 CAPSULE | Refills: 1 | Status: SHIPPED | OUTPATIENT
Start: 2025-02-26

## 2025-02-26 SDOH — ECONOMIC STABILITY: FOOD INSECURITY: WITHIN THE PAST 12 MONTHS, THE FOOD YOU BOUGHT JUST DIDN'T LAST AND YOU DIDN'T HAVE MONEY TO GET MORE.: NEVER TRUE

## 2025-02-26 SDOH — ECONOMIC STABILITY: FOOD INSECURITY: WITHIN THE PAST 12 MONTHS, YOU WORRIED THAT YOUR FOOD WOULD RUN OUT BEFORE YOU GOT MONEY TO BUY MORE.: NEVER TRUE

## 2025-02-26 ASSESSMENT — PATIENT HEALTH QUESTIONNAIRE - PHQ9
8. MOVING OR SPEAKING SO SLOWLY THAT OTHER PEOPLE COULD HAVE NOTICED. OR THE OPPOSITE, BEING SO FIGETY OR RESTLESS THAT YOU HAVE BEEN MOVING AROUND A LOT MORE THAN USUAL: NOT AT ALL
9. THOUGHTS THAT YOU WOULD BE BETTER OFF DEAD, OR OF HURTING YOURSELF: NOT AT ALL
SUM OF ALL RESPONSES TO PHQ9 QUESTIONS 1 & 2: 0
5. POOR APPETITE OR OVEREATING: NOT AT ALL
4. FEELING TIRED OR HAVING LITTLE ENERGY: NEARLY EVERY DAY
10. IF YOU CHECKED OFF ANY PROBLEMS, HOW DIFFICULT HAVE THESE PROBLEMS MADE IT FOR YOU TO DO YOUR WORK, TAKE CARE OF THINGS AT HOME, OR GET ALONG WITH OTHER PEOPLE: NOT DIFFICULT AT ALL
1. LITTLE INTEREST OR PLEASURE IN DOING THINGS: NOT AT ALL
7. TROUBLE CONCENTRATING ON THINGS, SUCH AS READING THE NEWSPAPER OR WATCHING TELEVISION: NOT AT ALL
SUM OF ALL RESPONSES TO PHQ QUESTIONS 1-9: 6
6. FEELING BAD ABOUT YOURSELF - OR THAT YOU ARE A FAILURE OR HAVE LET YOURSELF OR YOUR FAMILY DOWN: NOT AT ALL
2. FEELING DOWN, DEPRESSED OR HOPELESS: NOT AT ALL
3. TROUBLE FALLING OR STAYING ASLEEP: NEARLY EVERY DAY
SUM OF ALL RESPONSES TO PHQ QUESTIONS 1-9: 6

## 2025-02-26 ASSESSMENT — ENCOUNTER SYMPTOMS
BLOOD IN STOOL: 0
SHORTNESS OF BREATH: 0
DIARRHEA: 0
EYE DISCHARGE: 0
VOMITING: 0
SINUS PRESSURE: 0
WHEEZING: 0
NAUSEA: 0
ABDOMINAL PAIN: 0
SORE THROAT: 0
CHEST TIGHTNESS: 0
SINUS PAIN: 0

## 2025-02-26 NOTE — PROGRESS NOTES
Jose Dash (1945) presents today for   Chief Complaint   Patient presents with    Other     Pt complaints of acid reflux, which is causing him to experience heartburn. Symptoms started to get worse on Saturday. Pt is taking nexum and zantac.        Patient Active Problem List   Diagnosis    Coronary artery disease involving native coronary artery of native heart    Situational anxiety    Allergic rhinitis    Essential hypertension    Impacted cerumen of right ear    Diabetes mellitus type 2, diet-controlled (HCC)    Pulmonary fibrosis (HCC)    Atrial fibrillation with rapid ventricular response (HCC)    Skin exam for malignant neoplasm    COPD (chronic obstructive pulmonary disease) ([facility])    Balance problem    Anxiety    Incisional hernia, without obstruction or gangrene    Mediastinal adenopathy    Squamous cell lung cancer, right (HCC)    UIP (usual interstitial pneumonitis) ([facility])    Benign prostatic hyperplasia with lower urinary tract symptoms    Lung nodule    Vitamin D deficiency    Rhinitis medicamentosa    Hypothyroidism    Diabetes mellitus (HCC)    Prostate cancer screening    Chronic sinusitis    Low testosterone    History of kidney cancer    Depression    Sleep disturbance    Abdominal aortic aneurysm (AAA) without rupture    Chronic renal disease, stage III ([facility]) [840618]    Type 2 diabetes mellitus with diabetic peripheral angiopathy without gangrene, without long-term current use of insulin ([facility])    Type 2 diabetes mellitus with chronic kidney disease    Hydrocele       Allergies   Allergen Reactions    Amoxicillin-Pot Clavulanate Nausea Only     Augmentin causes nausea    Bupropion Hives    [place] Flavoring Agent (Non-Screening) Hives    Varenicline Other (See Comments)     [place]       Past Surgical History:   Procedure Laterality Date    COLONOSCOPY      CT NEEDLE BIOPSY LUNG PERCUTANEOUS W IMAGING GUIDANCE  10/18/2021    CT NEEDLE BIOPSY LUNG PERCUTANEOUS 10/18/2021 [facility]

## 2025-03-04 ENCOUNTER — APPOINTMENT (OUTPATIENT)
Dept: PHYSICAL THERAPY | Age: 80
End: 2025-03-04
Payer: MEDICARE

## 2025-03-11 ENCOUNTER — HOSPITAL ENCOUNTER (OUTPATIENT)
Dept: PHYSICAL THERAPY | Age: 80
Setting detail: RECURRING SERIES
Discharge: HOME | End: 2025-03-14
Payer: MEDICARE

## 2025-03-11 DIAGNOSIS — M25.612 STIFFNESS OF LEFT SHOULDER, NOT ELSEWHERE CLASSIFIED: ICD-10-CM

## 2025-03-11 DIAGNOSIS — M25.512 ACUTE PAIN OF LEFT SHOULDER: Primary | ICD-10-CM

## 2025-03-11 PROCEDURE — 97110 THERAPEUTIC EXERCISES: CPT

## 2025-03-11 PROCEDURE — 97162 PT EVAL MOD COMPLEX 30 MIN: CPT

## 2025-03-11 NOTE — THERAPY EVALUATION
Jose Giraldo Hardy  : 1945  Primary: [facility] [employer] Medicare Advantage (Medicare Managed)  Secondary:  [facility] [place]  [place] [address]  Phone: [phone]  Fax: [phone] Plan Frequency: 1x/wk for 90 days    Plan of Care/Certification Expiration Date: 06/10/25        Plan of Care/Certification Expiration Date:  Plan of Care/Certification Expiration Date: 06/10/25    Frequency/Duration: Plan Frequency: 1x/wk for 90 days      Time In/Out:   Time In: 1500  Time Out: 1544      PT Visit Info:         Visit Count:  1                OUTPATIENT PHYSICAL THERAPY:             Initial Assessment 3/11/2025               Episode (L shoulder pain)         Treatment Diagnosis:     Acute pain of left shoulder  Stiffness of left shoulder, not elsewhere classified  Medical/Referring Diagnosis:    Left shoulder pain, unspecified chronicity  Tendinopathy of left rotator cuff      Referring Physician:  Laquita Chopra MD MD Orders:  PT Eval and Treat   Return MD Appt:    Date of Onset:  Onset Date: 24 (7-8 months)     Allergies:  Amoxicillin-pot clavulanate, Bupropion, [place] flavoring agent (non-screening), and Varenicline  Restrictions/Precautions:    None      Medications Last Reviewed: 3/11/2025     SUBJECTIVE   History of Injury/Illness (Reason for Referral):  Pt presents with L shoulder pain that began 7-8 months ago with no VIRI. The pain came on over a short period of time. Pain is on the top and lateral shoulder as well as the posterior side and in his pecs. He got a cortisone shot but that has worn off. He can't take anti inflammatories. He is using voltaren. HE says Dr. Chopra suspected RTC involvement. Pain with reaching across the body and lifting his arm overhead and lifting things. Pt works out 5x/wk with weight machines including overhead press, chest press, pec fly, lateral raises, deadlift and bicep/tricep work. He has been recently lessening the

## 2025-03-12 ASSESSMENT — PAIN SCALES - GENERAL: PAINLEVEL_OUTOF10: 3

## 2025-03-12 NOTE — PROGRESS NOTES
Jose Giraldo Hardy  : 1945  Primary: [facility] [employer] Medicare Advantage (Medicare Managed)  Secondary:  [facility] [place]  [place] [address]  Phone: [phone]  Fax: [phone] Plan Frequency: 1x/wk for 90 days    Plan of Care/Certification Expiration Date: 06/10/25        Plan of Care/Certification Expiration Date:  Plan of Care/Certification Expiration Date: 06/10/25    Frequency/Duration: Plan Frequency: 1x/wk for 90 days      Time In/Out:   Time In: 1500  Time Out: 1544      PT Visit Info:         Visit Count:  1    OUTPATIENT PHYSICAL THERAPY:   Treatment Note 3/11/2025       Episode  (L shoulder pain)               Treatment Diagnosis:    Acute pain of left shoulder  Stiffness of left shoulder, not elsewhere classified  Medical/Referring Diagnosis:    Left shoulder pain, unspecified chronicity  Tendinopathy of left rotator cuff      Referring Physician:  Lauqita Chopra MD MD Orders:  PT Eval and Treat   Return MD Appt:     Date of Onset:  Onset Date: 24 (7-8 months)     Allergies:   Amoxicillin-pot clavulanate, Bupropion, [place] flavoring agent (non-screening), and Varenicline  Restrictions/Precautions:   None      Interventions Planned (Treatment may consist of any combination of the following):     See Assessment Note    Subjective Comments:   See eval  Initial Pain Level:     3/10  Post Session Pain Level:      3/10  Medications Last Reviewed: 3/11/2025  Updated Objective Findings:  None Today  Treatment   THERAPEUTIC EXERCISE: (10 minutes):    Exercises per grid below to improve mobility and strength.  Required moderate visual, verbal, manual, and tactile cues to promote proper body alignment, promote proper body posture, promote proper body mechanics, and promote proper body breathing techniques.  Progressed resistance, range, repetitions, and complexity of movement as indicated.   Date:  25   Date:   Date:     Activity/Exercise

## 2025-03-18 ENCOUNTER — OFFICE VISIT (OUTPATIENT)
Dept: UROLOGY | Age: 80
End: 2025-03-18
Payer: MEDICARE

## 2025-03-18 DIAGNOSIS — N40.1 BENIGN PROSTATIC HYPERPLASIA WITH LOWER URINARY TRACT SYMPTOMS, SYMPTOM DETAILS UNSPECIFIED: Primary | ICD-10-CM

## 2025-03-18 DIAGNOSIS — Z85.528 HISTORY OF RENAL CELL CARCINOMA: ICD-10-CM

## 2025-03-18 DIAGNOSIS — Z90.5 SOLITARY KIDNEY, ACQUIRED: ICD-10-CM

## 2025-03-18 LAB
BILIRUBIN, URINE, POC: NEGATIVE
BLOOD URINE, POC: ABNORMAL
GLUCOSE URINE, POC: NEGATIVE
KETONES, URINE, POC: NEGATIVE
LEUKOCYTE ESTERASE, URINE, POC: ABNORMAL
NITRITE, URINE, POC: NEGATIVE
PH, URINE, POC: 6 (ref 4.6–8)
PROTEIN,URINE, POC: ABNORMAL
SPECIFIC GRAVITY, URINE, POC: 1.01 (ref 1–1.03)
URINALYSIS CLARITY, POC: CLEAR
URINALYSIS COLOR, POC: YELLOW
UROBILINOGEN, POC: NORMAL MG/DL

## 2025-03-18 PROCEDURE — 81002 URINALYSIS NONAUTO W/O SCOPE: CPT | Performed by: UROLOGY

## 2025-03-18 PROCEDURE — 1123F ACP DISCUSS/DSCN MKR DOCD: CPT | Performed by: UROLOGY

## 2025-03-18 PROCEDURE — 1159F MED LIST DOCD IN RCRD: CPT | Performed by: UROLOGY

## 2025-03-18 PROCEDURE — 99213 OFFICE O/P EST LOW 20 MIN: CPT | Performed by: UROLOGY

## 2025-03-18 NOTE — PROGRESS NOTES
medication     Past Surgical History:   Procedure Laterality Date    COLONOSCOPY      CT NEEDLE BIOPSY LUNG PERCUTANEOUS W IMAGING GUIDANCE  10/18/2021    CT NEEDLE BIOPSY LUNG PERCUTANEOUS 10/18/2021 [facility] RADIOLOGY CT SCAN    HERNIA REPAIR      4/2021    HYDROCELE EXCISION Right 12/6/2023    HYDROCELECTOMY/RIGHT performed by Bogdan Nielson MD at [facility] MAIN OR    IR GUIDED PERC PLEURAL DRAIN W CATH INSERT  10/18/2021    IR GUIDED PERC PLEURAL DRAIN W CATH INSERT  10/18/2021    IR PERC CATH PLEURAL DRAIN W/IMAG 10/18/2021 [facility] RADIOLOGY SPECIALS    KIDNEY REMOVAL Right     renal cancer.    UPPER GASTROINTESTINAL ENDOSCOPY N/A 5/8/2023    DIRECT LARYNGOSCOPY performed by Bogdan Schroeder DO at [facility] OPC     Current Outpatient Medications   Medication Sig Dispense Refill    omeprazole (PRILOSEC) 40 MG delayed release capsule Take 1 capsule by mouth in the morning and at bedtime 60 capsule 1    sucralfate (CARAFATE) 1 GM tablet Take 1 tablet by mouth in the morning, at noon, and at bedtime 90 tablet 1    famotidine (PEPCID) 40 MG tablet Take 1 tablet by mouth every evening 30 tablet 3    diclofenac (VOLTAREN) 50 MG EC tablet Take 1 tablet by mouth 3 times daily (with meals) 60 tablet 3    metoprolol tartrate (LOPRESSOR) 50 MG tablet Take 1 tablet by mouth 2 times daily 180 tablet 3    b complex vitamins capsule Take 1 capsule by mouth daily      metFORMIN (GLUCOPHAGE-XR) 500 MG extended release tablet Take 2 tablets by mouth daily (with breakfast) 180 tablet 1    levothyroxine (SYNTHROID) 100 MCG tablet Take 1 tablet by mouth daily 90 tablet 1    fluticasone (FLONASE) 50 MCG/ACT nasal spray 1 spray by Each Nostril route daily 32 g 1    rosuvastatin (CRESTOR) 40 MG tablet Take 1 tablet by mouth daily 90 tablet 3    blood glucose test strips (ASCENSIA AUTODISC VI;ONE TOUCH ULTRA TEST VI) strip 1 each by In Vitro route daily Pt uses one touch ultra 2 meter. 100 each 3    Coenzyme Q10 10 MG CAPS Take 1 tablet by mouth daily

## 2025-03-24 ENCOUNTER — HOSPITAL ENCOUNTER (OUTPATIENT)
Dept: PHYSICAL THERAPY | Age: 80
Setting detail: RECURRING SERIES
Discharge: HOME | End: 2025-03-27
Payer: MEDICARE

## 2025-03-24 PROCEDURE — 97110 THERAPEUTIC EXERCISES: CPT

## 2025-03-24 PROCEDURE — 97140 MANUAL THERAPY 1/> REGIONS: CPT

## 2025-03-24 NOTE — PROGRESS NOTES
Jose Giraldo Hardy  : 1945  Primary: [facility] [employer]arp Medicare Advantage (Medicare Managed)  Secondary:  [facility] [place]  [place] [place] [address]  Phone: [phone]  Fax: [phone] Plan Frequency: 1x/wk for 90 days    Plan of Care/Certification Expiration Date: 06/10/25        Plan of Care/Certification Expiration Date:  Plan of Care/Certification Expiration Date: 06/10/25    Frequency/Duration: Plan Frequency: 1x/wk for 90 days      Time In/Out:   Time In: 1630  Time Out: 1715      PT Visit Info:         Visit Count:  2    OUTPATIENT PHYSICAL THERAPY:   Treatment Note 3/24/2025       Episode  (L shoulder pain)               Treatment Diagnosis:    Acute pain of left shoulder  Stiffness of left shoulder, not elsewhere classified  Medical/Referring Diagnosis:    Left shoulder pain, unspecified chronicity  Tendinopathy of left rotator cuff      Referring Physician:  Laquita Chopra MD MD Orders:  PT Eval and Treat   Return MD Appt:     Date of Onset:  Onset Date: 24 (7-8 months)     Allergies:   Amoxicillin-pot clavulanate, Bupropion, [place] flavoring agent (non-screening), and Varenicline  Restrictions/Precautions:   None      Interventions Planned (Treatment may consist of any combination of the following):     See Assessment Note    Subjective Comments:   Pt reports his shoulder is really hurting him. He did upper body in the gym today and had pain even with tricep dips. He has been performing his HEP several times per day.  Initial Pain Level:     4/10  Post Session Pain Level:      3/10  Medications Last Reviewed: 3/24/2025  Updated Objective Findings:  None Today  Treatment   THERAPEUTIC EXERCISE: (31 minutes):    Exercises per grid below to improve mobility and strength.  Required moderate visual, verbal, manual, and tactile cues to promote proper body alignment, promote proper body posture, promote proper body mechanics, and promote proper body

## 2025-03-25 ASSESSMENT — PAIN SCALES - GENERAL: PAINLEVEL_OUTOF10: 4

## 2025-04-01 DIAGNOSIS — N40.1 BENIGN PROSTATIC HYPERPLASIA WITH LOWER URINARY TRACT SYMPTOMS, SYMPTOM DETAILS UNSPECIFIED: ICD-10-CM

## 2025-04-01 LAB
ANION GAP SERPL CALC-SCNC: 11 MMOL/L (ref 7–16)
BUN SERPL-MCNC: 34 MG/DL (ref 8–23)
CALCIUM SERPL-MCNC: 9.6 MG/DL (ref 8.8–10.2)
CHLORIDE SERPL-SCNC: 103 MMOL/L (ref 98–107)
CO2 SERPL-SCNC: 27 MMOL/L (ref 20–29)
CREAT SERPL-MCNC: 1.18 MG/DL (ref 0.8–1.3)
GLUCOSE SERPL-MCNC: 118 MG/DL (ref 70–99)
POTASSIUM SERPL-SCNC: 4.5 MMOL/L (ref 3.5–5.1)
SODIUM SERPL-SCNC: 140 MMOL/L (ref 136–145)

## 2025-04-04 ENCOUNTER — HOSPITAL ENCOUNTER (OUTPATIENT)
Dept: PHYSICAL THERAPY | Age: 80
Setting detail: RECURRING SERIES
Discharge: HOME | End: 2025-04-07
Payer: MEDICARE

## 2025-04-04 PROCEDURE — 97110 THERAPEUTIC EXERCISES: CPT

## 2025-04-04 PROCEDURE — 97140 MANUAL THERAPY 1/> REGIONS: CPT

## 2025-04-04 ASSESSMENT — PAIN SCALES - GENERAL: PAINLEVEL_OUTOF10: 3

## 2025-04-04 NOTE — PROGRESS NOTES
repetitions, and complexity of movement as indicated.   Date:  04/04/25     Activity/Exercise Parameters   RTB ER x20   RTB IR x20   GTB Row x20   RTB pull down x20   Pec stretch doorway 1 min foam roller   S/L ER 2-0# 2x10 pain   UBE L3 2F 2B   Shoulder flexion 2# x20 to shoulder height   ABCs at 90 deg 2# 1 round   R/S with manual resist with 45 deg abd 3x1min   Serratus punches 3# x30   Red ball circles 30 sec ea   I, Y, T 2# x15 ea   Prone rows and extensions B X30 ea     MANUAL THERAPY (10 minutes)To promote tissue length and joint mobility for return to previous level of function.   Posterior and distraction GH glides   STM to L pecs, upper trap, all shoulder and upper arm muscles     Treatment/Session Summary:    Treatment Assessment:   Pt with some pain with ER today in sidelying. He requires cueing to slow down exercises to focus on control.   Communication/Consultation:  None today  Equipment provided today:  HEP and Theraband  Recommendations/Intent for next treatment session: Next visit will focus on L shoulder strength.    >Total Treatment Billable Duration:  41 minutes   Time In: 1330  Time Out: 1415     Latonia Reyes PT         Charge Capture  Events  [facility] Portal  Appt Desk  Attendance Report     Future Appointments   Date Time Provider Department Center   4/9/2025  3:45 PM Latonia Reyes, PT [facility]   4/22/2025  3:00 PM Latonia Reyes, PT [facility]   4/28/2025 11:00 AM PVF LAB [facility] DEP   4/29/2025  2:15 PM Latonia Reyes, PT [facility]   5/5/2025  2:40 PM Angel Contreras DO [facility] DEP   5/6/2025  2:15 PM Latonia Reyes, PT [facility]   5/28/2025  3:00 PM Tyler Hernandez MD UCDG [facility] AMB   6/10/2025  2:00 PM GCC NM PET/CT INJ RM GCCRMPETG GCC   6/12/2025  2:00 PM Aylin Tai MD GCCROG GCC   6/23/2025  3:10 PM April Ventura MD PPS [facility] AMB   3/24/2026  2:30 PM Bogdan Nielson MD [id number] [facility] AMB

## 2025-04-09 ENCOUNTER — HOSPITAL ENCOUNTER (OUTPATIENT)
Dept: PHYSICAL THERAPY | Age: 80
Setting detail: RECURRING SERIES
Discharge: HOME | End: 2025-04-12
Payer: MEDICARE

## 2025-04-09 PROCEDURE — 97140 MANUAL THERAPY 1/> REGIONS: CPT

## 2025-04-09 PROCEDURE — 97110 THERAPEUTIC EXERCISES: CPT

## 2025-04-09 ASSESSMENT — PAIN SCALES - GENERAL: PAINLEVEL_OUTOF10: 1

## 2025-04-09 NOTE — PROGRESS NOTES
Jose Giraldo Hardy  : 1945  Primary: [facility] Aarp Medicare Advantage (Medicare Managed)  Secondary:  [facility] [place]  [place] [address]  Phone: [phone]  Fax: [phone] Plan Frequency: 1x/wk for 90 days    Plan of Care/Certification Expiration Date: 06/10/25        Plan of Care/Certification Expiration Date:  Plan of Care/Certification Expiration Date: 06/10/25    Frequency/Duration: Plan Frequency: 1x/wk for 90 days      Time In/Out:   Time In: 1542  Time Out: 1625      PT Visit Info:         Visit Count:  4    OUTPATIENT PHYSICAL THERAPY:   Treatment Note 2025       Episode  (L shoulder pain)               Treatment Diagnosis:    Acute pain of left shoulder  Stiffness of left shoulder, not elsewhere classified  Medical/Referring Diagnosis:    Left shoulder pain, unspecified chronicity  Tendinopathy of left rotator cuff      Referring Physician:  Laquita Chopra MD MD Orders:  PT Eval and Treat   Return MD Appt:     Date of Onset:  Onset Date: 24 (7-8 months)     Allergies:   Amoxicillin-pot clavulanate, Bupropion, [place] flavoring agent (non-screening), and Varenicline  Restrictions/Precautions:   None      Interventions Planned (Treatment may consist of any combination of the following):     See Assessment Note    Subjective Comments:   Pt reports his shoulder is doing better and he is trying to do the exercises religiously.  He did his HEP before coming to PT today.  Initial Pain Level:     1/10  Post Session Pain Level:      1/10  Medications Last Reviewed: 2025  Updated Objective Findings:  None Today  Treatment   THERAPEUTIC EXERCISE: (29 minutes):    Exercises per grid below to improve mobility and strength.  Required moderate visual, verbal, manual, and tactile cues to promote proper body alignment, promote proper body posture, promote proper body mechanics, and promote proper body breathing techniques.  Progressed resistance,

## 2025-04-18 ENCOUNTER — HOSPITAL ENCOUNTER (OUTPATIENT)
Dept: PHYSICAL THERAPY | Age: 80
Setting detail: RECURRING SERIES
Discharge: HOME | End: 2025-04-21
Payer: MEDICARE

## 2025-04-18 PROCEDURE — 97140 MANUAL THERAPY 1/> REGIONS: CPT

## 2025-04-18 PROCEDURE — 97110 THERAPEUTIC EXERCISES: CPT

## 2025-04-18 ASSESSMENT — PAIN SCALES - GENERAL: PAINLEVEL_OUTOF10: 1

## 2025-04-18 NOTE — PROGRESS NOTES
Jose Giraldo Hardy  : 1945  Primary: [facility] Aarp Medicare Advantage (Medicare Managed)  Secondary:  [facility] [place]  [place] [address]  Phone: [phone]  Fax: [phone] Plan Frequency: 1x/wk for 90 days    Plan of Care/Certification Expiration Date: 06/10/25        Plan of Care/Certification Expiration Date:  Plan of Care/Certification Expiration Date: 06/10/25    Frequency/Duration: Plan Frequency: 1x/wk for 90 days      Time In/Out:   Time In: 1330  Time Out: 1414      PT Visit Info:         Visit Count:  5    OUTPATIENT PHYSICAL THERAPY:   Treatment Note 2025       Episode  (L shoulder pain)               Treatment Diagnosis:    Acute pain of left shoulder  Stiffness of left shoulder, not elsewhere classified  Medical/Referring Diagnosis:    Left shoulder pain, unspecified chronicity  Tendinopathy of left rotator cuff      Referring Physician:  Laquita Chopra MD MD Orders:  PT Eval and Treat   Return MD Appt:     Date of Onset:  Onset Date: 24 (7-8 months)     Allergies:   Amoxicillin-pot clavulanate, Bupropion, [place] flavoring agent (non-screening), and Varenicline  Restrictions/Precautions:   None      Interventions Planned (Treatment may consist of any combination of the following):     See Assessment Note    Subjective Comments:   Pt reports he notices less pain and feels his shoulder is improving but not as fast as he would like.  Initial Pain Level:     1/10  Post Session Pain Level:      1/10  Medications Last Reviewed: 2025  Updated Objective Findings:  None Today  Treatment   THERAPEUTIC EXERCISE: (30 minutes):    Exercises per grid below to improve mobility and strength.  Required moderate visual, verbal, manual, and tactile cues to promote proper body alignment, promote proper body posture, promote proper body mechanics, and promote proper body breathing techniques.  Progressed resistance, range, repetitions, and

## 2025-04-19 ENCOUNTER — PATIENT MESSAGE (OUTPATIENT)
Dept: FAMILY MEDICINE CLINIC | Facility: CLINIC | Age: 80
End: 2025-04-19

## 2025-04-19 DIAGNOSIS — E03.9 ACQUIRED HYPOTHYROIDISM: ICD-10-CM

## 2025-04-21 RX ORDER — LEVOTHYROXINE SODIUM 100 UG/1
100 TABLET ORAL DAILY
Qty: 90 TABLET | Refills: 1 | Status: SHIPPED | OUTPATIENT
Start: 2025-04-21

## 2025-04-22 ENCOUNTER — HOSPITAL ENCOUNTER (OUTPATIENT)
Dept: PHYSICAL THERAPY | Age: 80
Setting detail: RECURRING SERIES
Discharge: HOME | End: 2025-04-25
Payer: MEDICARE

## 2025-04-22 DIAGNOSIS — M25.612 STIFFNESS OF LEFT SHOULDER, NOT ELSEWHERE CLASSIFIED: ICD-10-CM

## 2025-04-22 DIAGNOSIS — M25.512 ACUTE PAIN OF LEFT SHOULDER: Primary | ICD-10-CM

## 2025-04-22 PROCEDURE — 97110 THERAPEUTIC EXERCISES: CPT

## 2025-04-22 PROCEDURE — 97140 MANUAL THERAPY 1/> REGIONS: CPT

## 2025-04-22 ASSESSMENT — PAIN SCALES - GENERAL: PAINLEVEL_OUTOF10: 3

## 2025-04-28 DIAGNOSIS — N18.31 STAGE 3A CHRONIC KIDNEY DISEASE (HCC): ICD-10-CM

## 2025-04-28 DIAGNOSIS — E11.69 HYPERLIPIDEMIA ASSOCIATED WITH TYPE 2 DIABETES MELLITUS (HCC): ICD-10-CM

## 2025-04-28 DIAGNOSIS — E11.65 TYPE 2 DIABETES MELLITUS WITH HYPERGLYCEMIA, WITHOUT LONG-TERM CURRENT USE OF INSULIN (HCC): ICD-10-CM

## 2025-04-28 DIAGNOSIS — E78.5 HYPERLIPIDEMIA ASSOCIATED WITH TYPE 2 DIABETES MELLITUS (HCC): ICD-10-CM

## 2025-04-28 DIAGNOSIS — E03.9 ACQUIRED HYPOTHYROIDISM: ICD-10-CM

## 2025-04-28 LAB
ALBUMIN SERPL-MCNC: 3.8 G/DL (ref 3.2–4.6)
ALBUMIN/GLOB SERPL: 0.9 (ref 1–1.9)
ALP SERPL-CCNC: 98 U/L (ref 40–129)
ALT SERPL-CCNC: 37 U/L (ref 8–55)
ANION GAP SERPL CALC-SCNC: 11 MMOL/L (ref 7–16)
AST SERPL-CCNC: 36 U/L (ref 15–37)
BASOPHILS # BLD: 0.03 K/UL (ref 0–0.2)
BASOPHILS NFR BLD: 0.4 % (ref 0–2)
BILIRUB SERPL-MCNC: 0.6 MG/DL (ref 0–1.2)
BUN SERPL-MCNC: 30 MG/DL (ref 8–23)
CALCIUM SERPL-MCNC: 9.5 MG/DL (ref 8.8–10.2)
CHLORIDE SERPL-SCNC: 104 MMOL/L (ref 98–107)
CHOLEST SERPL-MCNC: 139 MG/DL (ref 0–200)
CO2 SERPL-SCNC: 25 MMOL/L (ref 20–29)
CREAT SERPL-MCNC: 1.27 MG/DL (ref 0.8–1.3)
CREAT UR-MCNC: 38.1 MG/DL (ref 39–259)
DIFFERENTIAL METHOD BLD: ABNORMAL
EOSINOPHIL # BLD: 0.03 K/UL (ref 0–0.8)
EOSINOPHIL NFR BLD: 0.4 % (ref 0.5–7.8)
ERYTHROCYTE [DISTWIDTH] IN BLOOD BY AUTOMATED COUNT: 13.3 % (ref 11.9–14.6)
EST. AVERAGE GLUCOSE BLD GHB EST-MCNC: 172 MG/DL
GLOBULIN SER CALC-MCNC: 4 G/DL (ref 2.3–3.5)
GLUCOSE SERPL-MCNC: 139 MG/DL (ref 70–99)
HBA1C MFR BLD: 7.6 % (ref 0–5.6)
HCT VFR BLD AUTO: 52.9 % (ref 41.1–50.3)
HDLC SERPL-MCNC: 61 MG/DL (ref 40–60)
HDLC SERPL: 2.3 (ref 0–5)
HGB BLD-MCNC: 17.5 G/DL (ref 13.6–17.2)
IMM GRANULOCYTES # BLD AUTO: 0.02 K/UL (ref 0–0.5)
IMM GRANULOCYTES NFR BLD AUTO: 0.2 % (ref 0–5)
LDLC SERPL CALC-MCNC: 68 MG/DL (ref 0–100)
LYMPHOCYTES # BLD: 1.32 K/UL (ref 0.5–4.6)
LYMPHOCYTES NFR BLD: 15.9 % (ref 13–44)
MCH RBC QN AUTO: 31.6 PG (ref 26.1–32.9)
MCHC RBC AUTO-ENTMCNC: 33.1 G/DL (ref 31.4–35)
MCV RBC AUTO: 95.7 FL (ref 82–102)
MICROALBUMIN UR-MCNC: 3.43 MG/DL (ref 0–20)
MICROALBUMIN/CREAT UR-RTO: 90 MG/G (ref 0–30)
MONOCYTES # BLD: 0.68 K/UL (ref 0.1–1.3)
MONOCYTES NFR BLD: 8.2 % (ref 4–12)
NEUTS SEG # BLD: 6.21 K/UL (ref 1.7–8.2)
NEUTS SEG NFR BLD: 74.9 % (ref 43–78)
NRBC # BLD: 0 K/UL (ref 0–0.2)
PLATELET # BLD AUTO: 137 K/UL (ref 150–450)
PMV BLD AUTO: 11.3 FL (ref 9.4–12.3)
POTASSIUM SERPL-SCNC: 4.7 MMOL/L (ref 3.5–5.1)
PROT SERPL-MCNC: 7.7 G/DL (ref 6.3–8.2)
RBC # BLD AUTO: 5.53 M/UL (ref 4.23–5.6)
SODIUM SERPL-SCNC: 140 MMOL/L (ref 136–145)
TRIGL SERPL-MCNC: 52 MG/DL (ref 0–150)
TSH W FREE THYROID IF ABNORMAL: 3.31 UIU/ML (ref 0.27–4.2)
VLDLC SERPL CALC-MCNC: 10 MG/DL (ref 6–23)
WBC # BLD AUTO: 8.3 K/UL (ref 4.3–11.1)

## 2025-04-29 ENCOUNTER — RESULTS FOLLOW-UP (OUTPATIENT)
Dept: FAMILY MEDICINE CLINIC | Facility: CLINIC | Age: 80
End: 2025-04-29

## 2025-04-29 ENCOUNTER — HOSPITAL ENCOUNTER (OUTPATIENT)
Dept: PHYSICAL THERAPY | Age: 80
Setting detail: RECURRING SERIES
Discharge: HOME | End: 2025-05-02
Payer: MEDICARE

## 2025-04-29 PROCEDURE — 97140 MANUAL THERAPY 1/> REGIONS: CPT

## 2025-04-29 PROCEDURE — 97110 THERAPEUTIC EXERCISES: CPT

## 2025-04-29 ASSESSMENT — PAIN SCALES - GENERAL: PAINLEVEL_OUTOF10: 2

## 2025-04-29 NOTE — PROGRESS NOTES
Jose Giraldo Hardy  : 1945  Primary: [facility] Aarp Medicare Advantage (Medicare Managed)  Secondary:  [facility] [place]  [place] [address]  Phone: [phone]  Fax: [phone] Plan Frequency: 1x/wk for 90 days    Plan of Care/Certification Expiration Date: 06/10/25        Plan of Care/Certification Expiration Date:  Plan of Care/Certification Expiration Date: 06/10/25    Frequency/Duration: Plan Frequency: 1x/wk for 90 days      Time In/Out:   Time In: 1415  Time Out: 1459      PT Visit Info:         Visit Count:  7    OUTPATIENT PHYSICAL THERAPY:   Treatment Note 2025       Episode  (L shoulder pain)               Treatment Diagnosis:    Acute pain of left shoulder  Stiffness of left shoulder, not elsewhere classified  Medical/Referring Diagnosis:    Left shoulder pain, unspecified chronicity  Tendinopathy of left rotator cuff      Referring Physician:  Laquita Chopra MD MD Orders:  PT Eval and Treat   Return MD Appt:     Date of Onset:  Onset Date: 24 (7-8 months)     Allergies:   Amoxicillin-pot clavulanate, Bupropion, [place] flavoring agent (non-screening), and Varenicline  Restrictions/Precautions:   None      Interventions Planned (Treatment may consist of any combination of the following):     See Assessment Note    Subjective Comments:   Pt reports less pain now that he is lessening the reps on his HEP. However he continues to leave PT feeling good and then wakes up the next day in pain. He sleeps on both sides and wonders if him rolling onto his L shoulder in the night is causing the pain.  Initial Pain Level:     2/10  Post Session Pain Level:      1/10  Medications Last Reviewed: 2025  Updated Objective Findings:  None Today  Treatment   THERAPEUTIC EXERCISE: (30 minutes):    Exercises per grid below to improve mobility and strength.  Required moderate visual, verbal, manual, and tactile cues to promote proper body alignment,

## 2025-05-05 ENCOUNTER — OFFICE VISIT (OUTPATIENT)
Dept: FAMILY MEDICINE CLINIC | Facility: CLINIC | Age: 80
End: 2025-05-05
Payer: MEDICARE

## 2025-05-05 VITALS
HEART RATE: 83 BPM | TEMPERATURE: 97.4 F | WEIGHT: 184.2 LBS | HEIGHT: 68 IN | OXYGEN SATURATION: 95 % | SYSTOLIC BLOOD PRESSURE: 124 MMHG | DIASTOLIC BLOOD PRESSURE: 74 MMHG | BODY MASS INDEX: 27.92 KG/M2

## 2025-05-05 DIAGNOSIS — J84.112 IPF (IDIOPATHIC PULMONARY FIBROSIS) (HCC): ICD-10-CM

## 2025-05-05 DIAGNOSIS — D69.6 THROMBOCYTOPENIA: Primary | ICD-10-CM

## 2025-05-05 DIAGNOSIS — I10 BENIGN HYPERTENSION: ICD-10-CM

## 2025-05-05 DIAGNOSIS — Z00.00 MEDICARE ANNUAL WELLNESS VISIT, SUBSEQUENT: ICD-10-CM

## 2025-05-05 DIAGNOSIS — E11.69 HYPERLIPIDEMIA ASSOCIATED WITH TYPE 2 DIABETES MELLITUS (HCC): ICD-10-CM

## 2025-05-05 DIAGNOSIS — E78.5 HYPERLIPIDEMIA ASSOCIATED WITH TYPE 2 DIABETES MELLITUS (HCC): ICD-10-CM

## 2025-05-05 DIAGNOSIS — N18.31 STAGE 3A CHRONIC KIDNEY DISEASE (HCC): ICD-10-CM

## 2025-05-05 DIAGNOSIS — E03.9 ACQUIRED HYPOTHYROIDISM: ICD-10-CM

## 2025-05-05 DIAGNOSIS — D58.2 ELEVATED HEMOGLOBIN: ICD-10-CM

## 2025-05-05 DIAGNOSIS — E11.65 TYPE 2 DIABETES MELLITUS WITH HYPERGLYCEMIA, WITHOUT LONG-TERM CURRENT USE OF INSULIN (HCC): ICD-10-CM

## 2025-05-05 PROCEDURE — G2211 COMPLEX E/M VISIT ADD ON: HCPCS | Performed by: STUDENT IN AN ORGANIZED HEALTH CARE EDUCATION/TRAINING PROGRAM

## 2025-05-05 PROCEDURE — 1123F ACP DISCUSS/DSCN MKR DOCD: CPT | Performed by: STUDENT IN AN ORGANIZED HEALTH CARE EDUCATION/TRAINING PROGRAM

## 2025-05-05 PROCEDURE — 1160F RVW MEDS BY RX/DR IN RCRD: CPT | Performed by: STUDENT IN AN ORGANIZED HEALTH CARE EDUCATION/TRAINING PROGRAM

## 2025-05-05 PROCEDURE — 1125F AMNT PAIN NOTED PAIN PRSNT: CPT | Performed by: STUDENT IN AN ORGANIZED HEALTH CARE EDUCATION/TRAINING PROGRAM

## 2025-05-05 PROCEDURE — 3078F DIAST BP <80 MM HG: CPT | Performed by: STUDENT IN AN ORGANIZED HEALTH CARE EDUCATION/TRAINING PROGRAM

## 2025-05-05 PROCEDURE — G0439 PPPS, SUBSEQ VISIT: HCPCS | Performed by: STUDENT IN AN ORGANIZED HEALTH CARE EDUCATION/TRAINING PROGRAM

## 2025-05-05 PROCEDURE — 3051F HG A1C>EQUAL 7.0%<8.0%: CPT | Performed by: STUDENT IN AN ORGANIZED HEALTH CARE EDUCATION/TRAINING PROGRAM

## 2025-05-05 PROCEDURE — 99214 OFFICE O/P EST MOD 30 MIN: CPT | Performed by: STUDENT IN AN ORGANIZED HEALTH CARE EDUCATION/TRAINING PROGRAM

## 2025-05-05 PROCEDURE — 3074F SYST BP LT 130 MM HG: CPT | Performed by: STUDENT IN AN ORGANIZED HEALTH CARE EDUCATION/TRAINING PROGRAM

## 2025-05-05 PROCEDURE — 1159F MED LIST DOCD IN RCRD: CPT | Performed by: STUDENT IN AN ORGANIZED HEALTH CARE EDUCATION/TRAINING PROGRAM

## 2025-05-05 ASSESSMENT — PATIENT HEALTH QUESTIONNAIRE - PHQ9
SUM OF ALL RESPONSES TO PHQ QUESTIONS 1-9: 0
2. FEELING DOWN, DEPRESSED OR HOPELESS: NOT AT ALL
SUM OF ALL RESPONSES TO PHQ QUESTIONS 1-9: 0
4. FEELING TIRED OR HAVING LITTLE ENERGY: NOT AT ALL
3. TROUBLE FALLING OR STAYING ASLEEP: NOT AT ALL
SUM OF ALL RESPONSES TO PHQ QUESTIONS 1-9: 0
6. FEELING BAD ABOUT YOURSELF - OR THAT YOU ARE A FAILURE OR HAVE LET YOURSELF OR YOUR FAMILY DOWN: NOT AT ALL
1. LITTLE INTEREST OR PLEASURE IN DOING THINGS: NOT AT ALL
9. THOUGHTS THAT YOU WOULD BE BETTER OFF DEAD, OR OF HURTING YOURSELF: NOT AT ALL
8. MOVING OR SPEAKING SO SLOWLY THAT OTHER PEOPLE COULD HAVE NOTICED. OR THE OPPOSITE, BEING SO FIGETY OR RESTLESS THAT YOU HAVE BEEN MOVING AROUND A LOT MORE THAN USUAL: NOT AT ALL
7. TROUBLE CONCENTRATING ON THINGS, SUCH AS READING THE NEWSPAPER OR WATCHING TELEVISION: NOT AT ALL
10. IF YOU CHECKED OFF ANY PROBLEMS, HOW DIFFICULT HAVE THESE PROBLEMS MADE IT FOR YOU TO DO YOUR WORK, TAKE CARE OF THINGS AT HOME, OR GET ALONG WITH OTHER PEOPLE: NOT DIFFICULT AT ALL
5. POOR APPETITE OR OVEREATING: NOT AT ALL
SUM OF ALL RESPONSES TO PHQ QUESTIONS 1-9: 0

## 2025-05-05 ASSESSMENT — ENCOUNTER SYMPTOMS
ABDOMINAL PAIN: 0
BLOOD IN STOOL: 0
DIARRHEA: 0
NAUSEA: 0
SINUS PAIN: 0
EYE DISCHARGE: 0
VOMITING: 0
SINUS PRESSURE: 0
WHEEZING: 0
CHEST TIGHTNESS: 0
SORE THROAT: 0
SHORTNESS OF BREATH: 0

## 2025-05-05 ASSESSMENT — LIFESTYLE VARIABLES
HOW MANY STANDARD DRINKS CONTAINING ALCOHOL DO YOU HAVE ON A TYPICAL DAY: 1 OR 2
HOW OFTEN DO YOU HAVE A DRINK CONTAINING ALCOHOL: MONTHLY OR LESS

## 2025-05-05 NOTE — PROGRESS NOTES
[place]     Prior to Visit Medications    Medication Sig Taking? Authorizing Provider   levothyroxine (SYNTHROID) 100 MCG tablet Take 1 tablet by mouth daily Yes Angel Contreras, DO   albuterol sulfate HFA (PROVENTIL;VENTOLIN;PROAIR) 108 (90 Base) MCG/ACT inhaler Inhale 2 puffs into the lungs every 4 hours as needed for Shortness of Breath or Wheezing Yes April Ventura MD   omeprazole (PRILOSEC) 40 MG delayed release capsule Take 1 capsule by mouth in the morning and at bedtime Yes Angel Contreras DO   sucralfate (CARAFATE) 1 GM tablet Take 1 tablet by mouth in the morning, at noon, and at bedtime Yes Angel Contreras DO   famotidine (PEPCID) 40 MG tablet Take 1 tablet by mouth every evening Yes Angel Contreras DO   diclofenac (VOLTAREN) 50 MG EC tablet Take 1 tablet by mouth 3 times daily (with meals) Yes Angel Contreras DO   metoprolol tartrate (LOPRESSOR) 50 MG tablet Take 1 tablet by mouth 2 times daily Yes Franco Weinberg DO   b complex vitamins capsule Take 1 capsule by mouth daily Yes Lissette Blankenship MD   metFORMIN (GLUCOPHAGE-XR) 500 MG extended release tablet Take 2 tablets by mouth daily (with breakfast) Yes Angel Contreras DO   fluticasone (FLONASE) 50 MCG/ACT nasal spray 1 spray by Each Nostril route daily Yes Angel Contreras DO   rosuvastatin (CRESTOR) 40 MG tablet Take 1 tablet by mouth daily Yes Franco Weinberg DO   blood glucose test strips (ASCENSIA AUTODISC VI;ONE TOUCH ULTRA TEST VI) strip 1 each by In Vitro route daily Pt uses one touch ultra 2 meter. Yes Kaylyn Hudson MD   Coenzyme Q10 10 MG CAPS Take 1 tablet by mouth daily Yes Lissette Blaknenship MD   Ascorbic Acid (VITAMIN C) 1000 MG tablet Take 1 tablet by mouth daily Yes Lissette Blankenship MD   Garlic 10 MG CAPS Take 10 mg by mouth daily Yes Lissette Blankenship MD   Omega-3 Fatty Acids (FISH OIL) 1000 MG CAPS Take 1 capsule by mouth daily Yes Lissette Blankenship MD   Multiple

## 2025-05-06 ENCOUNTER — HOSPITAL ENCOUNTER (OUTPATIENT)
Dept: PHYSICAL THERAPY | Age: 80
Setting detail: RECURRING SERIES
Discharge: HOME | End: 2025-05-09
Payer: MEDICARE

## 2025-05-06 PROCEDURE — 97110 THERAPEUTIC EXERCISES: CPT

## 2025-05-06 PROCEDURE — 97140 MANUAL THERAPY 1/> REGIONS: CPT

## 2025-05-06 ASSESSMENT — PAIN SCALES - GENERAL: PAINLEVEL_OUTOF10: 2

## 2025-05-06 NOTE — PROGRESS NOTES
Jose Giraldo Hardy  : 1945  Primary: [facility] Aarp Medicare Advantage (Medicare Managed)  Secondary:  [facility] [place]  [place] [address]  Phone: [phone]  Fax: [phone] Plan Frequency: 1x/wk for 90 days    Plan of Care/Certification Expiration Date: 06/10/25        Plan of Care/Certification Expiration Date:  Plan of Care/Certification Expiration Date: 06/10/25    Frequency/Duration: Plan Frequency: 1x/wk for 90 days      Time In/Out:   Time In: 1415  Time Out: 1459      PT Visit Info:         Visit Count:  8    OUTPATIENT PHYSICAL THERAPY:   Treatment Note 2025       Episode  (L shoulder pain)               Treatment Diagnosis:    Acute pain of left shoulder  Stiffness of left shoulder, not elsewhere classified  Medical/Referring Diagnosis:    Left shoulder pain, unspecified chronicity  Tendinopathy of left rotator cuff      Referring Physician:  Laquita Chopra MD MD Orders:  PT Eval and Treat   Return MD Appt:     Date of Onset:  Onset Date: 24 (7-8 months)     Allergies:   Amoxicillin-pot clavulanate, Bupropion, [place] flavoring agent (non-screening), and Varenicline  Restrictions/Precautions:   None      Interventions Planned (Treatment may consist of any combination of the following):     See Assessment Note    Subjective Comments:   Pt reports soreness in his subscap/lat area but overall his shoulder is doing a little bit better.   Initial Pain Level:     2/10  Post Session Pain Level:      1/10  Medications Last Reviewed: 2025  Updated Objective Findings:  None Today  Treatment   THERAPEUTIC EXERCISE: (30 minutes):    Exercises per grid below to improve mobility and strength.  Required moderate visual, verbal, manual, and tactile cues to promote proper body alignment, promote proper body posture, promote proper body mechanics, and promote proper body breathing techniques.  Progressed resistance, range, repetitions, and complexity

## 2025-05-08 ENCOUNTER — HOSPITAL ENCOUNTER (OUTPATIENT)
Dept: PHYSICAL THERAPY | Age: 80
Setting detail: RECURRING SERIES
Discharge: HOME | End: 2025-05-11
Payer: MEDICARE

## 2025-05-08 PROCEDURE — 97140 MANUAL THERAPY 1/> REGIONS: CPT

## 2025-05-08 PROCEDURE — 97110 THERAPEUTIC EXERCISES: CPT

## 2025-05-08 ASSESSMENT — PAIN SCALES - GENERAL: PAINLEVEL_OUTOF10: 1

## 2025-05-08 NOTE — PROGRESS NOTES
Jose Giraldo Hardy  : 1945  Primary: [facility] Aarp Medicare Advantage (Medicare Managed)  Secondary:  [facility] [place]  [place] [address]  Phone: [phone]  Fax: [phone] Plan Frequency: 1x/wk for 90 days    Plan of Care/Certification Expiration Date: 06/10/25        Plan of Care/Certification Expiration Date:  Plan of Care/Certification Expiration Date: 06/10/25    Frequency/Duration: Plan Frequency: 1x/wk for 90 days      Time In/Out:   Time In: 1500  Time Out: 1544      PT Visit Info:         Visit Count:  9    OUTPATIENT PHYSICAL THERAPY:   Treatment Note 2025       Episode  (L shoulder pain)               Treatment Diagnosis:    Acute pain of left shoulder  Stiffness of left shoulder, not elsewhere classified  Medical/Referring Diagnosis:    Left shoulder pain, unspecified chronicity  Tendinopathy of left rotator cuff      Referring Physician:  Laquita Chopra MD MD Orders:  PT Eval and Treat   Return MD Appt:     Date of Onset:  Onset Date: 24 (7-8 months)     Allergies:   Amoxicillin-pot clavulanate, Bupropion, [place] flavoring agent (non-screening), and Varenicline  Restrictions/Precautions:   None      Interventions Planned (Treatment may consist of any combination of the following):     See Assessment Note    Subjective Comments:   Pt reports his shoulder felt better after leaving last PT session than it had felt in a long time. He reports continued soreness off and on in the lat/subscap area, reporting no pain in the actual shoulder.    Initial Pain Level:     10  Post Session Pain Level:      10  Medications Last Reviewed: 2025  Updated Objective Findings:  None Today  Treatment   THERAPEUTIC EXERCISE: (30 minutes):    Exercises per grid below to improve mobility and strength.  Required moderate visual, verbal, manual, and tactile cues to promote proper body alignment, promote proper body posture, promote proper body

## 2025-05-09 ENCOUNTER — PATIENT MESSAGE (OUTPATIENT)
Dept: FAMILY MEDICINE CLINIC | Facility: CLINIC | Age: 80
End: 2025-05-09

## 2025-05-18 ENCOUNTER — PATIENT MESSAGE (OUTPATIENT)
Dept: FAMILY MEDICINE CLINIC | Facility: CLINIC | Age: 80
End: 2025-05-18

## 2025-05-18 DIAGNOSIS — E11.65 TYPE 2 DIABETES MELLITUS WITH HYPERGLYCEMIA, WITHOUT LONG-TERM CURRENT USE OF INSULIN (HCC): ICD-10-CM

## 2025-05-19 RX ORDER — METFORMIN HYDROCHLORIDE 500 MG/1
1000 TABLET, EXTENDED RELEASE ORAL
Qty: 180 TABLET | Refills: 1 | Status: SHIPPED | OUTPATIENT
Start: 2025-05-19

## 2025-05-19 RX ORDER — METFORMIN HYDROCHLORIDE 500 MG/1
1000 TABLET, EXTENDED RELEASE ORAL
Qty: 180 TABLET | Refills: 1 | Status: SHIPPED | OUTPATIENT
Start: 2025-05-19 | End: 2025-05-19 | Stop reason: SDUPTHER

## 2025-05-20 ENCOUNTER — HOSPITAL ENCOUNTER (OUTPATIENT)
Dept: PHYSICAL THERAPY | Age: 80
Setting detail: RECURRING SERIES
Discharge: HOME | End: 2025-05-23
Payer: MEDICARE

## 2025-05-20 DIAGNOSIS — M25.512 ACUTE PAIN OF LEFT SHOULDER: Primary | ICD-10-CM

## 2025-05-20 DIAGNOSIS — M25.612 STIFFNESS OF LEFT SHOULDER, NOT ELSEWHERE CLASSIFIED: ICD-10-CM

## 2025-05-20 PROCEDURE — 97140 MANUAL THERAPY 1/> REGIONS: CPT

## 2025-05-20 PROCEDURE — 97110 THERAPEUTIC EXERCISES: CPT

## 2025-05-20 ASSESSMENT — PAIN SCALES - GENERAL: PAINLEVEL_OUTOF10: 2

## 2025-05-20 NOTE — PROGRESS NOTES
Jose Giraldo Hardy  : 1945  Primary: [facility] Aarp Medicare Advantage (Medicare Managed)  Secondary:  [facility] [place]  [place] [address]  Phone: [phone]  Fax: [phone] Plan Frequency: 2x/wk for 90 days    Plan of Care/Certification Expiration Date: 25        Plan of Care/Certification Expiration Date:  Plan of Care/Certification Expiration Date: 25    Frequency/Duration: Plan Frequency: 2x/wk for 90 days      Time In/Out:   Time In: 1545  Time Out: 1629      PT Visit Info:         Visit Count:  10    OUTPATIENT PHYSICAL THERAPY:   Treatment Note 2025       Episode  (L shoulder pain)               Treatment Diagnosis:    Acute pain of left shoulder  Stiffness of left shoulder, not elsewhere classified  Medical/Referring Diagnosis:    Left shoulder pain, unspecified chronicity  Tendinopathy of left rotator cuff      Referring Physician:  Laquita Chopra MD MD Orders:  PT Eval and Treat   Return MD Appt:     Date of Onset:  Onset Date: 24 (7-8 months)     Allergies:   Amoxicillin-pot clavulanate, Bupropion, [place] flavoring agent (non-screening), and Varenicline  Restrictions/Precautions:   None      Interventions Planned (Treatment may consist of any combination of the following):     See Assessment Note    Subjective Comments:   See recert  Initial Pain Level:     2/10  Post Session Pain Level:      2/10  Medications Last Reviewed: 2025  Updated Objective Findings:  None Today  Treatment   THERAPEUTIC EXERCISE: (30 minutes):    Exercises per grid below to improve mobility and strength.  Required moderate visual, verbal, manual, and tactile cues to promote proper body alignment, promote proper body posture, promote proper body mechanics, and promote proper body breathing techniques.  Progressed resistance, range, repetitions, and complexity of movement as indicated.   Date:  25     Activity/Exercise Parameters   RTB ER

## 2025-05-20 NOTE — THERAPY RECERTIFICATION
function.      Regarding Jose Dash's therapy, I certify that the treatment plan above will be carried out by a therapist or under their direction.  Thank you for this referral,  Latonia Reyes PT     Referring Physician Signature: Laquita Chopra MD                    Charge Capture  Events  Appt Desk  Attendance Report

## 2025-05-22 ENCOUNTER — HOSPITAL ENCOUNTER (OUTPATIENT)
Dept: PHYSICAL THERAPY | Age: 80
Setting detail: RECURRING SERIES
Discharge: HOME | End: 2025-05-25
Payer: MEDICARE

## 2025-05-22 PROCEDURE — 97110 THERAPEUTIC EXERCISES: CPT

## 2025-05-22 PROCEDURE — 97140 MANUAL THERAPY 1/> REGIONS: CPT

## 2025-05-22 ASSESSMENT — PAIN SCALES - GENERAL: PAINLEVEL_OUTOF10: 2

## 2025-05-22 NOTE — PROGRESS NOTES
Jose Giraldo Hardy  : 1945  Primary: [facility] Aarp Medicare Advantage (Medicare Managed)  Secondary:  [facility] [place]  [place] [place] [address]  Phone: [phone]  Fax: [phone] Plan Frequency: 2x/wk for 90 days    Plan of Care/Certification Expiration Date: 25        Plan of Care/Certification Expiration Date:  Plan of Care/Certification Expiration Date: 25    Frequency/Duration: Plan Frequency: 2x/wk for 90 days      Time In/Out:   Time In: 1500  Time Out: 1544      PT Visit Info:         Visit Count:  11    OUTPATIENT PHYSICAL THERAPY:   Treatment Note 2025       Episode  (L shoulder pain)               Treatment Diagnosis:    Acute pain of left shoulder  Stiffness of left shoulder, not elsewhere classified  Medical/Referring Diagnosis:    Left shoulder pain, unspecified chronicity  Tendinopathy of left rotator cuff      Referring Physician:  Laquita Chopra MD MD Orders:  PT Eval and Treat   Return MD Appt:     Date of Onset:  Onset Date: 24 (7-8 months)     Allergies:   Amoxicillin-pot clavulanate, Bupropion, [place] flavoring agent (non-screening), and Varenicline  Restrictions/Precautions:   None      Interventions Planned (Treatment may consist of any combination of the following):     See Assessment Note    Subjective Comments:   Low reports he was really sore after he left PT last session. He is still sore in the same muscle under his shoulder.  Initial Pain Level:     2/10  Post Session Pain Level:      2/10  Medications Last Reviewed: 2025  Updated Objective Findings:  None Today  Treatment   THERAPEUTIC EXERCISE: (30 minutes):    Exercises per grid below to improve mobility and strength.  Required moderate visual, verbal, manual, and tactile cues to promote proper body alignment, promote proper body posture, promote proper body mechanics, and promote proper body breathing techniques.  Progressed resistance, range,

## 2025-05-28 ENCOUNTER — OFFICE VISIT (OUTPATIENT)
Age: 80
End: 2025-05-28
Payer: MEDICARE

## 2025-05-28 VITALS
HEART RATE: 96 BPM | SYSTOLIC BLOOD PRESSURE: 126 MMHG | BODY MASS INDEX: 27.74 KG/M2 | WEIGHT: 183 LBS | HEIGHT: 68 IN | DIASTOLIC BLOOD PRESSURE: 70 MMHG

## 2025-05-28 DIAGNOSIS — I48.91 LONE ATRIAL FIBRILLATION (HCC): Primary | ICD-10-CM

## 2025-05-28 DIAGNOSIS — I25.10 CORONARY ARTERY DISEASE INVOLVING NATIVE CORONARY ARTERY OF NATIVE HEART WITHOUT ANGINA PECTORIS: ICD-10-CM

## 2025-05-28 DIAGNOSIS — E78.2 MIXED HYPERLIPIDEMIA: ICD-10-CM

## 2025-05-28 DIAGNOSIS — I71.40 ABDOMINAL AORTIC ANEURYSM (AAA) WITHOUT RUPTURE, UNSPECIFIED PART: ICD-10-CM

## 2025-05-28 PROCEDURE — 1123F ACP DISCUSS/DSCN MKR DOCD: CPT | Performed by: INTERNAL MEDICINE

## 2025-05-28 PROCEDURE — 99214 OFFICE O/P EST MOD 30 MIN: CPT | Performed by: INTERNAL MEDICINE

## 2025-05-28 PROCEDURE — 1126F AMNT PAIN NOTED NONE PRSNT: CPT | Performed by: INTERNAL MEDICINE

## 2025-05-28 PROCEDURE — 3078F DIAST BP <80 MM HG: CPT | Performed by: INTERNAL MEDICINE

## 2025-05-28 PROCEDURE — 3074F SYST BP LT 130 MM HG: CPT | Performed by: INTERNAL MEDICINE

## 2025-05-28 RX ORDER — METOPROLOL TARTRATE 50 MG
50 TABLET ORAL 2 TIMES DAILY
Qty: 180 TABLET | Refills: 3 | Status: SHIPPED | OUTPATIENT
Start: 2025-05-28

## 2025-05-28 RX ORDER — ROSUVASTATIN CALCIUM 40 MG/1
40 TABLET, COATED ORAL DAILY
Qty: 90 TABLET | Refills: 3 | Status: SHIPPED | OUTPATIENT
Start: 2025-05-28

## 2025-05-28 NOTE — PROGRESS NOTES
CARDIOLOGY CLINIC FOLLOW-UP    Date: 5/28/2025  Patient's Primary Care Physician:  Angel Contreras DO  Referring Physician:  No ref. provider found     Subjective     Reason for Visit: Follow-up of AAA, lone atrial fibrillation    History of Present Illness   Mr. Dash is a 79 y.o. male with history of paroxysmal atrial fibrillation, AAA, renal cell carcinoma, pulmonary fibrosis, type 2 diabetes, who returns for routine follow-up.      Last seen in November 2020 for by Dr. Weinberg, the patient was asymptomatic at that time.   Interval hospitalizations/emergencies:     In the office today, he reports that he walks 20 minutes per day at the gym, on account of which he has lost 10-lbs. Denies chest pain, unchanged and non limiting dyspnea of long-standing.  Denies palpitations, near-syncope, or syncope.    Cardiovascular Testing:  - CT 10/31/2022: Abdominal aortic aneurysm 35 mm.  - Echo 4/13/22: LVEF >60 %, RV normal, Valves: trace MR, trace TR.   - CTA Abd/pelvis 9/3/21: Aorta 33 mm.  Coronary artery calcification, calcification of aorta branch vessels.  - Echo 11/23/19 normal LVEF >55%, mild LA enlargement, no valvular disease  - SARIKA/DCCV 11/22/19: LVEF >55%, mod/severe atheroma, atrial septal aneurysm.   - ZIO 1/10/20 - 1/24/20: brief SVT no sustained arrhythmias  - AAA scan 8/26/20: 35 mm aorta     ECG: Normal EKG (2/26/2025)    Review of Systems   Cardiovascular review of systems negative accept as above.    Past Medical History:   Diagnosis Date    AAA (abdominal aortic aneurysm)     35 mm on CT scan, US stable 35 mm; 10/31/22 pet scan 3.5 cm    Abnormal chest x-ray 3/19/2013    Adenopathy 3/19/2013    Lymph glands    Allergic rhinitis, cause unspecified 3/19/2013    Atrial fibrillation (HCC)     after kidney surgery and wore monitor but no more a fib events noted    CAD (coronary artery disease)     Followed by Dr. Weinberg at [facility]- per note nonobstructive CAD    Cancer (HCC)     right kidney

## 2025-05-30 NOTE — PROGRESS NOTES
Name: Jose Dash  YOB: 1945  Gender: male  MRN: [id number]  Date of Encounter:  6/2/2025       CHIEF COMPLAINT:     Chief Complaint   Patient presents with    Follow-up     L Shoulder        SUBJECTIVE/OBJECTIVE:      HPI:    Patient is a 79 y.o. pleasant male who presents today for a return evaluation of his left shoulder.    Working diagnosis:   1. Tendinopathy of left rotator cuff       LOV: 2/3/2025     Recall hx:   He presented initially 2/3/2025 for several months of left shoulder pain that he described as trapezius, pectoralis, but predominantly the lateral shoulder and arm.  He unfortunately gets skin rashes with Voltaren gel.    2/3/2025: Subacromial bursa injection performed.  PT referral.  X-ray shows moderate AC joint and mild glenohumeral joint arthritis.  Exam suggestive of rotator cuff partial tearing and tendinosis.    6/2/25: He has completed 11 sessions of therapy.  He has noted some continued soreness in the shoulder but more in the scapula and currently no pain in the upper arm. He wishes to repeat injection and continue his HEP at home, hoping that this resolves the issue. He expresses no interest in surgery.      PAST HISTORY:   Past medical, surgical, family, social history and allergies reviewed by me. Unchanged from prior visit.     REVIEW OF SYSTEMS:   As noted in HPI.     PHYSICAL EXAMINATION:     Gen: Well-developed, no acute distress   HEENT: NC/AT, EOMI   Neck: Trachea midline, normal ROM   CV: Regular rhythm by palpation of distal pulse, normal capillary refill   Pulm: No respiratory distress, no stridor   Psychiatric: Well oriented, normal mood and affect.   Skin: No rashes, lesions or ulcers, normal temperature, turgor, and texture on uninvolved extremity.      ORTHO EXAM:    Left Shoulder:      Inspection: no biceps deformity; no notable atrophy or erythema  ROM active  passive:   180. Abduction 170  170.  Ex rotation symmetric. Int rotation: With

## 2025-06-02 ENCOUNTER — OFFICE VISIT (OUTPATIENT)
Dept: ORTHOPEDIC SURGERY | Age: 80
End: 2025-06-02
Payer: MEDICARE

## 2025-06-02 DIAGNOSIS — M67.912 TENDINOPATHY OF LEFT ROTATOR CUFF: Primary | ICD-10-CM

## 2025-06-02 PROCEDURE — 1123F ACP DISCUSS/DSCN MKR DOCD: CPT | Performed by: STUDENT IN AN ORGANIZED HEALTH CARE EDUCATION/TRAINING PROGRAM

## 2025-06-02 PROCEDURE — 99213 OFFICE O/P EST LOW 20 MIN: CPT | Performed by: STUDENT IN AN ORGANIZED HEALTH CARE EDUCATION/TRAINING PROGRAM

## 2025-06-02 PROCEDURE — 20610 DRAIN/INJ JOINT/BURSA W/O US: CPT | Performed by: STUDENT IN AN ORGANIZED HEALTH CARE EDUCATION/TRAINING PROGRAM

## 2025-06-02 RX ORDER — METHYLPREDNISOLONE ACETATE 40 MG/ML
40 INJECTION, SUSPENSION INTRA-ARTICULAR; INTRALESIONAL; INTRAMUSCULAR; SOFT TISSUE ONCE
Status: COMPLETED | OUTPATIENT
Start: 2025-06-02 | End: 2025-06-02

## 2025-06-02 RX ADMIN — METHYLPREDNISOLONE ACETATE 40 MG: 40 INJECTION, SUSPENSION INTRA-ARTICULAR; INTRALESIONAL; INTRAMUSCULAR; SOFT TISSUE at 13:56

## 2025-06-03 ENCOUNTER — APPOINTMENT (OUTPATIENT)
Dept: PHYSICAL THERAPY | Age: 80
End: 2025-06-03
Payer: MEDICARE

## 2025-06-04 NOTE — PROGRESS NOTES
Patient: Jose Dash  MRN: [id number]   SSN: [id number]          YOB: 1945   Age: 76 y.o.   Sex: male        Other Providers:  [facility]      DIAGNOSIS: RLL SCC, T1bN0 stage 1A2     PREVIOUS TREATMENT:  1) SBRT to RLL nodule, 5000cGy, completed 11/19/21  2) SBT to L lung, 5000cGy     INTERVAL HISTORY:  Jose Dash is a 79 y.o. male who is here  today for follow up. He has continued to recover from his course  of therapy. He notes no new complaints or concerns. He continues to stay active and his fatigue from treatment has improved. He continues going to the gym daily. No cough or shortness of breath. No difficulty swallowing.      INTERVAL HISTORY:  Since his last visit in radiation oncology Mr. Dash has undergone PT and an injection for L shoulder pain. PET/CT 6/10/25 shows    MEDICATIONS:   Current Outpatient Medications   Medication Sig Dispense Refill    metoprolol tartrate (LOPRESSOR) 50 MG tablet Take 1 tablet by mouth 2 times daily 180 tablet 3    rosuvastatin (CRESTOR) 40 MG tablet Take 1 tablet by mouth daily 90 tablet 3    metFORMIN (GLUCOPHAGE-XR) 500 MG extended release tablet Take 2 tablets by mouth daily (with breakfast) 180 tablet 1    levothyroxine (SYNTHROID) 100 MCG tablet Take 1 tablet by mouth daily 90 tablet 1    albuterol sulfate HFA (PROVENTIL;VENTOLIN;PROAIR) 108 (90 Base) MCG/ACT inhaler Inhale 2 puffs into the lungs every 4 hours as needed for Shortness of Breath or Wheezing 1 each 11    omeprazole (PRILOSEC) 40 MG delayed release capsule Take 1 capsule by mouth in the morning and at bedtime (Patient not taking: Reported on 5/28/2025) 60 capsule 1    sucralfate (CARAFATE) 1 GM tablet Take 1 tablet by mouth in the morning, at noon, and at bedtime (Patient not taking: Reported on 5/28/2025) 90 tablet 1    famotidine (PEPCID) 40 MG tablet Take 1 tablet by mouth every evening 30 tablet 3    diclofenac (VOLTAREN) 50 MG EC tablet Take 1 tablet by

## 2025-06-06 ENCOUNTER — HOSPITAL ENCOUNTER (OUTPATIENT)
Dept: PHYSICAL THERAPY | Age: 80
Setting detail: RECURRING SERIES
Discharge: HOME | End: 2025-06-09
Payer: MEDICARE

## 2025-06-06 PROCEDURE — 97140 MANUAL THERAPY 1/> REGIONS: CPT

## 2025-06-06 PROCEDURE — 97110 THERAPEUTIC EXERCISES: CPT

## 2025-06-06 ASSESSMENT — PAIN SCALES - GENERAL: PAINLEVEL_OUTOF10: 0

## 2025-06-06 NOTE — PROGRESS NOTES
complexity of movement as indicated.   Date:  06/06/25     Activity/Exercise Parameters   RTB ER x20   GTB IR x20   GTB Row x20   RTB pull down GTB tricep pull down  x30   red bungee adduction X30 orange   Pec stretch doorway 2 min foam roller   S/L ER 2# x20   UBE L3 2F 2B   S/l lat stretch    ABCs at 90 deg 4# 2 rounds    R/S with manual resist with 45 deg abd  1 min    Serratus punches 5# x30   Red ball circles To fatigue   I, Y, T 2# x10 ea   Prone rows and extensions and HA, B X30 ea 1#   Horizontal abduction pull aparts Yellow x20   Wall slides     Orange ball shoulder taps X10 ea   Thoracic extension over foam roll 10x10 sec ea   Sidelying thor rotation X15 ea     MANUAL THERAPY (10 minutes)To promote tissue length and joint mobility for return to previous level of function.   Posterior and distraction GH glides   STM to L pecs, upper trap, all shoulder and upper arm muscles  Prone thoracic PA mobs and STM to posterior shoulder and thoracic paraspinals      Treatment/Session Summary:    Treatment Assessment:   Pt discharged.  Communication/Consultation:  None today  Equipment provided today:  HEP and Theraband  Recommendations/Intent for next treatment session: Next visit will focus on L shoulder strength.    >Total Treatment Billable Duration:  40 minutes   Time In: 1245  Time Out: 1329     Latonia Reyes PT         Charge Capture  Events  [facility] Portal  Appt Desk  Attendance Report     Future Appointments   Date Time Provider Department Center   6/10/2025  2:00 PM GCC NM PET/CT INJ RM GCCRMPETG [facility]   6/23/2025  3:10 PM April Ventura MD PPS [facility] AMB   7/24/2025 11:00 AM Aylin Tai MD GCCROG [facility]   10/29/2025 11:00 AM PVF LAB PVF [facility] DEP   11/6/2025  2:20 PM Angel Contreras,  PVF [facility] DEP   11/18/2025  2:15 PM Tyler Hernandez MD UCDG [facility] AMB   3/24/2026  2:30 PM Bogdan Nielson MD [id number] AMB

## 2025-06-06 NOTE — THERAPY DISCHARGE
instructions for gym workouts due to financial reasons and insurance not approving more visits.      PLAN   Effective Dates: 5/20/2025 TO Plan of Care/Certification Expiration Date: 08/18/25       GOALS: (Goals have been discussed and agreed upon with patient.)    SHORT-TERM FUNCTIONAL GOALS: Time Frame: 30 days  1. Pt will report <=1/10 pain with don/doffing clothing as well as minimal/no difficulty. Met   2. Pt will demonstrate improvement in active shoulder flexion MMT by one grade to increase UE function and participation in ADLs. Met   3. Pt will demonstrate improvement in active shoulder ER MMT by one degree to increase UE function and participation in ADLs. Met   4. Pt will be compliant in all HEP.  Met     DISCHARGE GOALS: Time Frame: 90 days    1. Pt will demonstrate improvement in active shoulder abduction MMT to 5/5 to increase UE function and participation in ADLs. Met   2. Pt will demonstrate improvement in active shoulder ER to 5/5 to increase UE function and participation in ADLs. Met   3. Pt will show a >= 10 point decrease on the DASH in order to show an increase in upper extremity function. Not met   4. Pt will report doing hair/bathing without difficulty and <=2/10 pain in order to be independent with ADL's. met      Regarding Jose Dash's therapy, I certify that the treatment plan above will be carried out by a therapist or under their direction.  Thank you for this referral,  Latonia Reyes, PT     Referring Physician Signature: Laquita Chopra MD No Signature is Required for this note.        Charge Capture  Events  Appt Desk  Attendance Report

## 2025-06-09 ENCOUNTER — APPOINTMENT (OUTPATIENT)
Dept: PHYSICAL THERAPY | Age: 80
End: 2025-06-09
Payer: MEDICARE

## 2025-06-12 ENCOUNTER — APPOINTMENT (OUTPATIENT)
Dept: RADIATION ONCOLOGY | Age: 80
End: 2025-06-12
Payer: MEDICARE

## 2025-06-17 ENCOUNTER — HOSPITAL ENCOUNTER (OUTPATIENT)
Dept: PET IMAGING | Age: 80
Discharge: HOME | End: 2025-06-20
Payer: MEDICARE

## 2025-06-17 DIAGNOSIS — C34.31 MALIGNANT NEOPLASM OF LOWER LOBE OF RIGHT LUNG (HCC): ICD-10-CM

## 2025-06-17 DIAGNOSIS — R94.2 ABNORMAL PET SCAN OF LUNG: ICD-10-CM

## 2025-06-17 LAB
GLUCOSE BLD STRIP.AUTO-MCNC: 141 MG/DL (ref 65–100)
SERVICE CMNT-IMP: ABNORMAL

## 2025-06-17 PROCEDURE — 82962 GLUCOSE BLOOD TEST: CPT

## 2025-06-17 PROCEDURE — 78815 PET IMAGE W/CT SKULL-THIGH: CPT

## 2025-06-17 PROCEDURE — 6360000004 HC RX CONTRAST MEDICATION: Performed by: RADIOLOGY

## 2025-06-17 PROCEDURE — 3430000000 HC RX DIAGNOSTIC RADIOPHARMACEUTICAL: Performed by: RADIOLOGY

## 2025-06-17 PROCEDURE — 2500000003 HC RX 250 WO HCPCS: Performed by: RADIOLOGY

## 2025-06-17 PROCEDURE — A9609 HC RX DIAGNOSTIC RADIOPHARMACEUTICAL: HCPCS | Performed by: RADIOLOGY

## 2025-06-17 RX ORDER — FLUDEOXYGLUCOSE F 18 200 MCI/ML
10.5 INJECTION, SOLUTION INTRAVENOUS
Status: COMPLETED | OUTPATIENT
Start: 2025-06-17 | End: 2025-06-17

## 2025-06-17 RX ORDER — SODIUM CHLORIDE 0.9 % (FLUSH) 0.9 %
20 SYRINGE (ML) INJECTION AS NEEDED
Status: DISCONTINUED | OUTPATIENT
Start: 2025-06-17 | End: 2025-06-21 | Stop reason: HOSPADM

## 2025-06-17 RX ORDER — DIATRIZOATE MEGLUMINE AND DIATRIZOATE SODIUM 660; 100 MG/ML; MG/ML
10 SOLUTION ORAL; RECTAL
Status: DISCONTINUED | OUTPATIENT
Start: 2025-06-17 | End: 2025-06-21 | Stop reason: HOSPADM

## 2025-06-17 RX ADMIN — DIATRIZOATE MEGLUMINE AND DIATRIZOATE SODIUM 10 ML: 660; 100 LIQUID ORAL; RECTAL at 11:36

## 2025-06-17 RX ADMIN — FLUDEOXYGLUCOSE F 18 10.5 MILLICURIE: 200 INJECTION, SOLUTION INTRAVENOUS at 11:36

## 2025-06-17 RX ADMIN — SODIUM CHLORIDE, PRESERVATIVE FREE 20 ML: 5 INJECTION INTRAVENOUS at 11:36

## 2025-06-23 ENCOUNTER — OFFICE VISIT (OUTPATIENT)
Dept: PULMONOLOGY | Age: 80
End: 2025-06-23
Payer: MEDICARE

## 2025-06-23 VITALS
SYSTOLIC BLOOD PRESSURE: 110 MMHG | BODY MASS INDEX: 25.77 KG/M2 | HEIGHT: 70 IN | RESPIRATION RATE: 20 BRPM | HEART RATE: 87 BPM | TEMPERATURE: 98 F | WEIGHT: 180 LBS | DIASTOLIC BLOOD PRESSURE: 60 MMHG | OXYGEN SATURATION: 91 %

## 2025-06-23 DIAGNOSIS — Z87.891 PERSONAL HISTORY OF TOBACCO USE, PRESENTING HAZARDS TO HEALTH: ICD-10-CM

## 2025-06-23 DIAGNOSIS — J44.9 CHRONIC OBSTRUCTIVE PULMONARY DISEASE, UNSPECIFIED COPD TYPE (HCC): ICD-10-CM

## 2025-06-23 DIAGNOSIS — R91.1 PULMONARY NODULE 1 CM OR GREATER IN DIAMETER: Primary | ICD-10-CM

## 2025-06-23 DIAGNOSIS — C34.91 SQUAMOUS CELL LUNG CANCER, RIGHT (HCC): ICD-10-CM

## 2025-06-23 PROCEDURE — 3074F SYST BP LT 130 MM HG: CPT | Performed by: INTERNAL MEDICINE

## 2025-06-23 PROCEDURE — 99214 OFFICE O/P EST MOD 30 MIN: CPT | Performed by: INTERNAL MEDICINE

## 2025-06-23 PROCEDURE — 1159F MED LIST DOCD IN RCRD: CPT | Performed by: INTERNAL MEDICINE

## 2025-06-23 PROCEDURE — 1123F ACP DISCUSS/DSCN MKR DOCD: CPT | Performed by: INTERNAL MEDICINE

## 2025-06-23 PROCEDURE — 3078F DIAST BP <80 MM HG: CPT | Performed by: INTERNAL MEDICINE

## 2025-06-23 RX ORDER — ALBUTEROL SULFATE 90 UG/1
2 INHALANT RESPIRATORY (INHALATION) EVERY 4 HOURS PRN
Qty: 1 EACH | Refills: 11 | Status: SHIPPED | OUTPATIENT
Start: 2025-06-23

## 2025-06-23 RX ORDER — VARENICLINE TARTRATE 1 MG/1
1 TABLET, FILM COATED ORAL 2 TIMES DAILY
Qty: 60 TABLET | Refills: 1 | Status: SHIPPED | OUTPATIENT
Start: 2025-06-23 | End: 2025-09-21

## 2025-06-23 RX ORDER — VARENICLINE TARTRATE 0.5 MG/1
TABLET, FILM COATED ORAL
Qty: 1 BOX | Refills: 0 | Status: SHIPPED | OUTPATIENT
Start: 2025-06-23

## 2025-06-23 NOTE — PROGRESS NOTES
Patient Name:  Jose Dash                               YOB: 1945  MRN: [id number]                                                Office Visit 6/23/2025    ASSESSMENT AND PLAN:  (Medical Decision Making)    1. Pulmonary nodule 1 cm or greater in diameter  Posterior RUL nodule enlarging and PET avid.  High likelihood of having his 3rd incidence of cancer.      2. Chronic obstructive pulmonary disease, unspecified COPD type (HCC)  Continue Stiolto and albuterol    3. Squamous cell lung cancer, right (HCC)  Increasing the likelihood that RUL nodule is cancer.   - Request for Conference    4. Personal history of tobacco use, presenting hazards to health  Absolute smoking cessation is needed. Total smoking cessation is advised.  Patient's tobacco use confirmed as documented in the medical record.  Discussed various smoking cessation products including pills, patches, inhaler, gum, weaning self off, \"cold turkey\", and smoking cessation classes.  Discussed also with patient disease risk of ongoing smoking including CVA, lung cancer, and heart disease.  At this point, patient's commitment to quitting is high.  15 minutes were spent counseling patient regarding tobacco cessation.      April Ventura MD    Total time for encounter on day of encounter was 33 minutes.  This time includes chart prep, review of tests/procedures, review of other provider's notes, documentation and counseling patient regarding disease process and medications.     ___________________________________________________________________         ______    The patient (or guardian, if applicable) and other individuals in attendance with the patient were advised that Artificial Intelligence will be utilized during this visit to record, process the conversation to generate a clinical note, and support improvement of the AI technology. The patient (or guardian, if applicable) and other individuals in attendance at the appointment

## 2025-06-23 NOTE — PATIENT INSTRUCTIONS
Patient education: Quitting smoking (Beyond the Basics)    Author:  Judith Gallegos MD  Section Editors: MD Kishan Acosta MD, MS  Gray :  Anna Santoyo MD    INTRODUCTION    Cigarette smoking is a major cause of disease and the leading preventable cause of death. In addition, exposure to secondhand smoke increases a person's risk of disease and death. Smoking is also associated with many other non-fatal diseases and problems, including osteoporosis, skin wrinkling, peptic ulcer disease, impotence, and pregnancy complications. Even smoking a small amount, such as one cigarette a day, is associated with increased health risks.    Quitting and staying smoke-free can be a challenge, but many people have done it successfully. This topic review discusses the benefits of stopping smoking and approaches that can help you succeed.    BENEFITS OF QUITTING SMOKING    Quitting smoking has significant and immediate health benefits for men and women of all ages. The sooner you quit, the greater the benefits. People who quit smoking before age 50 reduce their risk of dying over the next 15 years by one-half, as compared with those who continue to smoke.    Reducing your risk of disease    Cardiovascular disease -- Cigarette smoking doubles a person's risk of developing coronary heart disease, a condition that can lead to a heart attack. Quitting smoking can rapidly reduce this risk. One year after stopping smoking, the risk of dying from coronary heart disease is reduced by about one-half and continues to decline over time. Smoking also increases the risk of stroke and peripheral artery disease (a condition in which the blood vessels that carry blood to the legs are blocked or narrowed, causing leg pain). These risks also decrease after a person quits smoking.     Lung disease -- Smoking increases the risk of long-term lung diseases such as chronic obstructive pulmonary disease. While much of the

## 2025-06-24 NOTE — PROGRESS NOTES
Patient: Jose Dash  MRN: [id number]   SSN: [id number]          YOB: 1945   Age: 76 y.o.   Sex: male        Other Providers:  [facility]      DIAGNOSIS: RLL SCC, T1bN0 stage 1A2     PREVIOUS TREATMENT:  1) SBRT to RLL nodule, 5000cGy, completed 11/19/21  2) SBRT to GRICEL nodule, 5000cGy, completed 2/20/24     INTERVAL HISTORY:  Jose Dash is a 79 y.o. male who is here  today for follow up. He has continued to recover from his course  of therapy. He notes no new complaints or concerns. He continues to stay active and his fatigue from treatment has improved. He continues going to the gym daily. No cough or shortness of breath. No difficulty swallowing.      INTERVAL HISTORY:  Since his last visit in radiation oncology Mr. Dash has undergone PT and an injection for L shoulder pain. PET/CT 6/10/25 shows a slowly enlarging 14mm nodule in the posterior right lower lobe, likely a primary lung cancer. There are stable treatment changes in the posterior right lower lobe and a stable 3.5cm abdominal aortic aneurysm. He intends to quit smoking, he had 6 cigarettes yesterday and 1 today.     MEDICATIONS:   Current Outpatient Medications   Medication Sig Dispense Refill    albuterol sulfate HFA (PROVENTIL;VENTOLIN;PROAIR) 108 (90 Base) MCG/ACT inhaler Inhale 2 puffs into the lungs every 4 hours as needed for Shortness of Breath or Wheezing 1 each 11    varenicline (CHANTIX) 0.5 MG tablet Days 1 to 3: 0.5mg once a day Days 4 to 7: 0.5mg twice a day Day 8 and after: 1mg twice a day. 1 box 0    varenicline (CHANTIX) 1 MG tablet Take 1 tablet by mouth 2 times daily 60 tablet 1    metoprolol tartrate (LOPRESSOR) 50 MG tablet Take 1 tablet by mouth 2 times daily 180 tablet 3    rosuvastatin (CRESTOR) 40 MG tablet Take 1 tablet by mouth daily 90 tablet 3    metFORMIN (GLUCOPHAGE-XR) 500 MG extended release tablet Take 2 tablets by mouth daily (with breakfast) 180 tablet 1    levothyroxine

## 2025-06-25 ENCOUNTER — HOSPITAL ENCOUNTER (OUTPATIENT)
Dept: RADIATION ONCOLOGY | Age: 80
Setting detail: RECURRING SERIES
Discharge: HOME | End: 2025-06-28
Payer: MEDICARE

## 2025-06-25 ENCOUNTER — APPOINTMENT (OUTPATIENT)
Dept: URBAN - METROPOLITAN AREA CLINIC 24 | Facility: CLINIC | Age: 80
Setting detail: DERMATOLOGY
End: 2025-06-25

## 2025-06-25 VITALS
BODY MASS INDEX: 25.86 KG/M2 | DIASTOLIC BLOOD PRESSURE: 81 MMHG | TEMPERATURE: 97.6 F | SYSTOLIC BLOOD PRESSURE: 158 MMHG | OXYGEN SATURATION: 93 % | HEART RATE: 73 BPM | WEIGHT: 180.2 LBS

## 2025-06-25 DIAGNOSIS — Z71.89 OTHER SPECIFIED COUNSELING: ICD-10-CM

## 2025-06-25 DIAGNOSIS — L57.8 OTHER SKIN CHANGES DUE TO CHRONIC EXPOSURE TO NONIONIZING RADIATION: ICD-10-CM

## 2025-06-25 DIAGNOSIS — L82.1 OTHER SEBORRHEIC KERATOSIS: ICD-10-CM

## 2025-06-25 DIAGNOSIS — D22 MELANOCYTIC NEVI: ICD-10-CM

## 2025-06-25 DIAGNOSIS — L57.0 ACTINIC KERATOSIS: ICD-10-CM

## 2025-06-25 DIAGNOSIS — D18.0 HEMANGIOMA: ICD-10-CM

## 2025-06-25 PROBLEM — D22.62 MELANOCYTIC NEVI OF LEFT UPPER LIMB, INCLUDING SHOULDER: Status: ACTIVE | Noted: 2025-06-25

## 2025-06-25 PROBLEM — D18.01 HEMANGIOMA OF SKIN AND SUBCUTANEOUS TISSUE: Status: ACTIVE | Noted: 2025-06-25

## 2025-06-25 PROCEDURE — ? SHAVE REMOVAL

## 2025-06-25 PROCEDURE — ? COUNSELING

## 2025-06-25 PROCEDURE — 99211 OFF/OP EST MAY X REQ PHY/QHP: CPT

## 2025-06-25 PROCEDURE — ? LIQUID NITROGEN

## 2025-06-25 RX ORDER — ACETAMINOPHEN 160 MG
2000 TABLET,DISINTEGRATING ORAL DAILY
COMMUNITY

## 2025-06-25 ASSESSMENT — LOCATION DETAILED DESCRIPTION DERM
LOCATION DETAILED: LEFT INFERIOR LATERAL MIDBACK
LOCATION DETAILED: RIGHT LATERAL ABDOMEN
LOCATION DETAILED: PERIUMBILICAL SKIN
LOCATION DETAILED: LEFT LATERAL FOREHEAD
LOCATION DETAILED: LEFT ANTERIOR SHOULDER
LOCATION DETAILED: RIGHT RADIAL DORSAL HAND
LOCATION DETAILED: RIGHT DISTAL DORSAL FOREARM
LOCATION DETAILED: LEFT RADIAL DORSAL HAND
LOCATION DETAILED: RIGHT MEDIAL TRAPEZIAL NECK
LOCATION DETAILED: RIGHT SUPERIOR UPPER BACK
LOCATION DETAILED: RIGHT FOREHEAD
LOCATION DETAILED: RIGHT MID-UPPER BACK

## 2025-06-25 ASSESSMENT — LOCATION SIMPLE DESCRIPTION DERM
LOCATION SIMPLE: RIGHT HAND
LOCATION SIMPLE: LEFT HAND
LOCATION SIMPLE: LEFT SHOULDER
LOCATION SIMPLE: ABDOMEN
LOCATION SIMPLE: POSTERIOR NECK
LOCATION SIMPLE: RIGHT FOREARM
LOCATION SIMPLE: RIGHT UPPER BACK
LOCATION SIMPLE: LEFT FOREHEAD
LOCATION SIMPLE: RIGHT FOREHEAD
LOCATION SIMPLE: LEFT LOWER BACK

## 2025-06-25 ASSESSMENT — LOCATION ZONE DERM
LOCATION ZONE: TRUNK
LOCATION ZONE: HAND
LOCATION ZONE: ARM
LOCATION ZONE: FACE
LOCATION ZONE: NECK

## 2025-06-25 NOTE — PROCEDURE: LIQUID NITROGEN
Detail Level: Detailed
Number Of Freeze-Thaw Cycles: 1 freeze-thaw cycle
Post-Care Instructions: I reviewed with the patient in detail post-care instructions. Patient is to wear sunprotection, and avoid picking at any of the treated lesions. Pt may apply Vaseline to crusted or scabbing areas.
Show Applicator Variable?: Yes
Render Post-Care Instructions In Note?: no
Consent: The patient's consent was obtained including but not limited to risks of crusting, scabbing, blistering, scarring, darker or lighter pigmentary change, recurrence, incomplete removal and infection.
Duration Of Freeze Thaw-Cycle (Seconds): 4

## 2025-06-25 NOTE — PROCEDURE: SHAVE REMOVAL
Medical Necessity Information: It is in your best interest to select a reason for this procedure from the list below. All of these items fulfill various CMS LCD requirements except the new and changing color options.
Medical Necessity Clause: This procedure was medically necessary because the lesion that was treated was:
Lab: [id number]
Lab Facility: [id number]
Detail Level: Detailed
Was A Bandage Applied: Yes
Size Of Lesion In Cm (Required): 0.4
X Size Of Lesion In Cm (Optional): 0
Depth Of Shave: dermis
Biopsy Method: Dermablade
Anesthesia Type: 1% lidocaine with epinephrine
Hemostasis: Drysol
Wound Care: Petrolatum
Render Path Notes In Note?: No
Consent was obtained from the patient. The risks and benefits to therapy were discussed in detail. Specifically, the risks of infection, scarring, bleeding, prolonged wound healing, incomplete removal, allergy to anesthesia, nerve injury and recurrence were addressed. Prior to the procedure, the treatment site was clearly identified and confirmed by the patient. All components of Universal Protocol/PAUSE Rule completed.
Post-Care Instructions: I reviewed with the patient in detail post-care instructions. Patient is to keep the biopsy site dry overnight, and then apply bacitracin twice daily until healed. Patient may apply hydrogen peroxide soaks to remove any crusting.
Notification Instructions: Patient will be notified of pathology results. However, patient instructed to call the office if not contacted within 2 weeks.
Billing Type: Third-Party Bill

## 2025-06-25 NOTE — PROGRESS NOTES
Radiation Oncology - Follow Up        Jose Dash  is a 79 y.o. year old male with Lung  cancer who is following up for:: Assessment from radiation therapy treatment.     Vitals:    06/25/25 1258   BP: (!) 158/81   Pulse: 73   Temp: 97.6 °F (36.4 °C)   SpO2: 93%   Pain:0/10      Assessment:  No complaints.  Discussed at [facility] today.   CONSENTS SIGNED FOR RT.  PT TO CALL [facility] AND LET US KNOW OUTCOME.  NO SIM SCHEDULED TODAY.         Test Results, if applicable:  PSA:  AUA:  PET / CT / PSMA: Pet scan 6-17-25.

## 2025-06-26 ENCOUNTER — TELEPHONE (OUTPATIENT)
Dept: PULMONOLOGY | Age: 80
End: 2025-06-26

## 2025-06-26 ENCOUNTER — CLINICAL DOCUMENTATION (OUTPATIENT)
Dept: PULMONOLOGY | Age: 80
End: 2025-06-26

## 2025-06-26 NOTE — PROGRESS NOTES
Patient presented at thoracic conference by Dr Ventura. Patient in 2021 had pulmonary nodule in RLL:  \"RIGHT LUNG\":  SQUAMOUS CELL CARCINOMA.   Patient developed a pneumothorax and required hospitalization.  He had a GRICEL nodule in 2024 that was not BX but treated with SBRT.  Patient now has PET + RLL nodule.  Patient seeing Dr Tai today and she will discuss presumed stage I R lung non small cell carcinoma SBRT.

## 2025-06-26 NOTE — TELEPHONE ENCOUNTER
I have left patient a message that [facility] and [facility] have come to agreement and to call if any questions.

## 2025-07-01 ENCOUNTER — APPOINTMENT (OUTPATIENT)
Dept: RADIATION ONCOLOGY | Age: 80
End: 2025-07-01
Payer: MEDICARE

## 2025-07-02 ENCOUNTER — TELEPHONE (OUTPATIENT)
Dept: PULMONOLOGY | Age: 80
End: 2025-07-02

## 2025-07-03 ENCOUNTER — HOSPITAL ENCOUNTER (OUTPATIENT)
Dept: RADIATION ONCOLOGY | Age: 80
Setting detail: RECURRING SERIES
Discharge: HOME | End: 2025-07-06
Payer: MEDICARE

## 2025-07-03 PROCEDURE — 77334 RADIATION TREATMENT AID(S): CPT

## 2025-07-16 ENCOUNTER — APPOINTMENT (OUTPATIENT)
Dept: URBAN - METROPOLITAN AREA CLINIC 24 | Facility: CLINIC | Age: 80
Setting detail: DERMATOLOGY
End: 2025-07-16

## 2025-07-16 DIAGNOSIS — D22 MELANOCYTIC NEVI: ICD-10-CM

## 2025-07-16 PROBLEM — D22.62 MELANOCYTIC NEVI OF LEFT UPPER LIMB, INCLUDING SHOULDER: Status: ACTIVE | Noted: 2025-07-16

## 2025-07-16 PROCEDURE — ? EXCISION

## 2025-07-16 ASSESSMENT — LOCATION DETAILED DESCRIPTION DERM: LOCATION DETAILED: LEFT ANTERIOR SHOULDER

## 2025-07-16 ASSESSMENT — LOCATION ZONE DERM: LOCATION ZONE: ARM

## 2025-07-16 ASSESSMENT — LOCATION SIMPLE DESCRIPTION DERM: LOCATION SIMPLE: LEFT SHOULDER

## 2025-07-16 NOTE — PROCEDURE: EXCISION
Medical Necessity Information: It is in your best interest to select a reason for this procedure from the list below. All of these items fulfill various CMS LCD requirements except lesion extends to a margin.
Include Z78.9 (Other Specified Conditions Influencing Health Status) As An Associated Diagnosis?: No
Medical Necessity Clause: This procedure was medically necessary because the lesion that was treated was:
Lab: [id number]
Lab Facility: [id number]
Size Of Lesion In Cm: 0.5
X Size Of Lesion In Cm (Optional): 0.6
Size Of Margin In Cm: 0.3
Anesthesia Volume In Cc: 9
Eye Clamp Note Details: An eye clamp was used during the procedure.
Excision Method: Elliptical
Saucerization Depth: dermis and superficial adipose tissue
Repair Type: Complex
Intermediate / Complex Repair - Final Wound Length In Cm: 3.4
Deep Sutures: 3-0 PDS
Epidermal Sutures: 4-0 Prolene
Suture Removal: 14 days
Suturegard Retention Suture: 2-0 Nylon
Retention Suture Bite Size: 3 mm
Length To Time In Minutes Device Was In Place: 10
Number Of Hemigard Strips Per Side: 1
Complex Requirements: Extensive Undermining Performed?: Yes
Width Of Defect Perpendicular To Closure In Cm (Required): 1.1
Distance Of Undermining In Cm (Required): 1.5
Undermining Type: Entire Wound
Debridement Text: The wound edges were debrided prior to proceeding with the closure to facilitate wound healing.
Helical Rim Text: The closure involved the helical rim.
Vermilion Border Text: The closure involved the vermilion border.
Nostril Rim Text: The closure involved the nostril rim.
Retention Suture Text: Retention sutures were placed to support the closure and prevent dehiscence.
Primary Defect Length (In Cm): 0
Epidermal Closure Graft Donor Site (Optional): simple interrupted
Graft Donor Site Bandage (Optional-Leave Blank If You Don't Want In Note): Steri-strips and a pressure bandage were applied to the donor site.
Detail Level: Detailed
Excision Depth: adipose tissue
Scalpel Size: 15 blade
Anesthesia Type: 1% lidocaine with epinephrine
Additional Anesthesia Volume In Cc: 6
Hemostasis: Electrocautery
Estimated Blood Loss (Cc): minimal
Repair Depth: use same depth as excision depth
Wound Care: Petrolatum
Dressing: dry sterile dressing
Suturegard Intro: Intraoperative tissue expansion was performed, utilizing the SUTUREGARD device, in order to reduce wound tension.
Suturegard Body: The suture ends were repeatedly re-tightened and re-clamped to achieve the desired tissue expansion.
Hemigard Intro: Due to skin fragility and wound tension, it was decided to use HEMIGARD adhesive retention suture devices to permit a linear closure. The skin was cleaned and dried for a 6cm distance away from the wound. Excessive hair, if present, was removed to allow for adhesion.
Hemigard Postcare Instructions: The HEMIGARD strips are to remain completely dry for at least 5-7 days.
Positioning (Leave Blank If You Do Not Want): The patient was placed in a comfortable position exposing the surgical site.
Complex Repair Preamble Text (Leave Blank If You Do Not Want): Extensive wide undermining was performed.
Intermediate Repair Preamble Text (Leave Blank If You Do Not Want): Undermining was performed with blunt dissection.
Fusiform Excision Additional Text (Leave Blank If You Do Not Want): The margin was drawn around the clinically apparent lesion.  A fusiform shape was then drawn on the skin incorporating the lesion and margins.  Incisions were then made along these lines to the appropriate tissue plane and the lesion was extirpated.
Eliptical Excision Additional Text (Leave Blank If You Do Not Want): The margin was drawn around the clinically apparent lesion.  An elliptical shape was then drawn on the skin incorporating the lesion and margins.  Incisions were then made along these lines to the appropriate tissue plane and the lesion was extirpated.
Saucerization Excision Additional Text (Leave Blank If You Do Not Want): The margin was drawn around the clinically apparent lesion.  Incisions were then made along these lines, in a tangential fashion, to the appropriate tissue plane and the lesion was extirpated.
Slit Excision Additional Text (Leave Blank If You Do Not Want): A linear line was drawn on the skin overlying the lesion. An incision was made slowly until the lesion was visualized.  Once visualized, the lesion was removed with blunt dissection.
Excisional Biopsy Additional Text (Leave Blank If You Do Not Want): The margin was drawn around the clinically apparent lesion. An elliptical shape was then drawn on the skin incorporating the lesion and margins.  Incisions were then made along these lines to the appropriate tissue plane and the lesion was extirpated.
Perilesional Excision Additional Text (Leave Blank If You Do Not Want): The margin was drawn around the clinically apparent lesion. Incisions were then made along these lines to the appropriate tissue plane and the lesion was extirpated.
Repair Performed By Another Provider Text (Leave Blank If You Do Not Want): After the tissue was excised the defect was repaired by another provider.
No Repair - Repaired With Adjacent Surgical Defect Text (Leave Blank If You Do Not Want): After the excision the defect was repaired concurrently with another surgical defect which was in close approximation.
Adjacent Tissue Transfer Text: The defect edges were debeveled with a #15 scalpel blade.  Given the location of the defect and the proximity to free margins an adjacent tissue transfer was deemed most appropriate.  Using a sterile surgical marker, an appropriate flap was drawn incorporating the defect and placing the expected incisions within the relaxed skin tension lines where possible.    The area thus outlined was incised deep to adipose tissue with a #15 scalpel blade.  The skin margins were undermined to an appropriate distance in all directions utilizing iris scissors.
Advancement Flap (Single) Text: The defect edges were debeveled with a #15 scalpel blade.  Given the location of the defect and the proximity to free margins a single advancement flap was deemed most appropriate.  Using a sterile surgical marker, an appropriate advancement flap was drawn incorporating the defect and placing the expected incisions within the relaxed skin tension lines where possible.    The area thus outlined was incised deep to adipose tissue with a #15 scalpel blade.  The skin margins were undermined to an appropriate distance in all directions utilizing iris scissors.
Advancement Flap (Double) Text: The defect edges were debeveled with a #15 scalpel blade.  Given the location of the defect and the proximity to free margins a double advancement flap was deemed most appropriate.  Using a sterile surgical marker, the appropriate advancement flaps were drawn incorporating the defect and placing the expected incisions within the relaxed skin tension lines where possible.    The area thus outlined was incised deep to adipose tissue with a #15 scalpel blade.  The skin margins were undermined to an appropriate distance in all directions utilizing iris scissors.
Burow's Advancement Flap Text: The defect edges were debeveled with a #15 scalpel blade.  Given the location of the defect and the proximity to free margins a Burow's advancement flap was deemed most appropriate.  Using a sterile surgical marker, the appropriate advancement flap was drawn incorporating the defect and placing the expected incisions within the relaxed skin tension lines where possible.    The area thus outlined was incised deep to adipose tissue with a #15 scalpel blade.  The skin margins were undermined to an appropriate distance in all directions utilizing iris scissors.
Chonodrocutaneous Helical Advancement Flap Text: The defect edges were debeveled with a #15 scalpel blade.  Given the location of the defect and the proximity to free margins a chondrocutaneous helical advancement flap was deemed most appropriate.  Using a sterile surgical marker, the appropriate advancement flap was drawn incorporating the defect and placing the expected incisions within the relaxed skin tension lines where possible.    The area thus outlined was incised deep to adipose tissue with a #15 scalpel blade.  The skin margins were undermined to an appropriate distance in all directions utilizing iris scissors.
Crescentic Advancement Flap Text: The defect edges were debeveled with a #15 scalpel blade.  Given the location of the defect and the proximity to free margins a crescentic advancement flap was deemed most appropriate.  Using a sterile surgical marker, the appropriate advancement flap was drawn incorporating the defect and placing the expected incisions within the relaxed skin tension lines where possible.    The area thus outlined was incised deep to adipose tissue with a #15 scalpel blade.  The skin margins were undermined to an appropriate distance in all directions utilizing iris scissors.
A-T Advancement Flap Text: The defect edges were debeveled with a #15 scalpel blade.  Given the location of the defect, shape of the defect and the proximity to free margins an A-T advancement flap was deemed most appropriate.  Using a sterile surgical marker, an appropriate advancement flap was drawn incorporating the defect and placing the expected incisions within the relaxed skin tension lines where possible.    The area thus outlined was incised deep to adipose tissue with a #15 scalpel blade.  The skin margins were undermined to an appropriate distance in all directions utilizing iris scissors.
O-T Advancement Flap Text: The defect edges were debeveled with a #15 scalpel blade.  Given the location of the defect, shape of the defect and the proximity to free margins an O-T advancement flap was deemed most appropriate.  Using a sterile surgical marker, an appropriate advancement flap was drawn incorporating the defect and placing the expected incisions within the relaxed skin tension lines where possible.    The area thus outlined was incised deep to adipose tissue with a #15 scalpel blade.  The skin margins were undermined to an appropriate distance in all directions utilizing iris scissors.
O-L Flap Text: The defect edges were debeveled with a #15 scalpel blade.  Given the location of the defect, shape of the defect and the proximity to free margins an O-L flap was deemed most appropriate.  Using a sterile surgical marker, an appropriate advancement flap was drawn incorporating the defect and placing the expected incisions within the relaxed skin tension lines where possible.    The area thus outlined was incised deep to adipose tissue with a #15 scalpel blade.  The skin margins were undermined to an appropriate distance in all directions utilizing iris scissors.
O-Z Flap Text: The defect edges were debeveled with a #15 scalpel blade.  Given the location of the defect, shape of the defect and the proximity to free margins an O-Z flap was deemed most appropriate.  Using a sterile surgical marker, an appropriate transposition flap was drawn incorporating the defect and placing the expected incisions within the relaxed skin tension lines where possible. The area thus outlined was incised deep to adipose tissue with a #15 scalpel blade.  The skin margins were undermined to an appropriate distance in all directions utilizing iris scissors.
Double O-Z Flap Text: The defect edges were debeveled with a #15 scalpel blade.  Given the location of the defect, shape of the defect and the proximity to free margins a Double O-Z flap was deemed most appropriate.  Using a sterile surgical marker, an appropriate transposition flap was drawn incorporating the defect and placing the expected incisions within the relaxed skin tension lines where possible. The area thus outlined was incised deep to adipose tissue with a #15 scalpel blade.  The skin margins were undermined to an appropriate distance in all directions utilizing iris scissors.
V-Y Flap Text: The defect edges were debeveled with a #15 scalpel blade.  Given the location of the defect, shape of the defect and the proximity to free margins a V-Y flap was deemed most appropriate.  Using a sterile surgical marker, an appropriate advancement flap was drawn incorporating the defect and placing the expected incisions within the relaxed skin tension lines where possible.    The area thus outlined was incised deep to adipose tissue with a #15 scalpel blade.  The skin margins were undermined to an appropriate distance in all directions utilizing iris scissors.
Advancement-Rotation Flap Text: The defect edges were debeveled with a #15 scalpel blade.  Given the location of the defect, shape of the defect and the proximity to free margins an advancement-rotation flap was deemed most appropriate.  Using a sterile surgical marker, an appropriate flap was drawn incorporating the defect and placing the expected incisions within the relaxed skin tension lines where possible. The area thus outlined was incised deep to adipose tissue with a #15 scalpel blade.  The skin margins were undermined to an appropriate distance in all directions utilizing iris scissors.
Mercedes Flap Text: The defect edges were debeveled with a #15 scalpel blade.  Given the location of the defect, shape of the defect and the proximity to free margins a Mercedes flap was deemed most appropriate.  Using a sterile surgical marker, an appropriate advancement flap was drawn incorporating the defect and placing the expected incisions within the relaxed skin tension lines where possible. The area thus outlined was incised deep to adipose tissue with a #15 scalpel blade.  The skin margins were undermined to an appropriate distance in all directions utilizing iris scissors.
Modified Advancement Flap Text: The defect edges were debeveled with a #15 scalpel blade.  Given the location of the defect, shape of the defect and the proximity to free margins a modified advancement flap was deemed most appropriate.  Using a sterile surgical marker, an appropriate advancement flap was drawn incorporating the defect and placing the expected incisions within the relaxed skin tension lines where possible.    The area thus outlined was incised deep to adipose tissue with a #15 scalpel blade.  The skin margins were undermined to an appropriate distance in all directions utilizing iris scissors.
Mucosal Advancement Flap Text: Given the location of the defect, shape of the defect and the proximity to free margins a mucosal advancement flap was deemed most appropriate. Incisions were made with a 15 blade scalpel in the appropriate fashion along the cutaneous vermilion border and the mucosal lip. The remaining actinically damaged mucosal tissue was excised.  The mucosal advancement flap was then elevated to the gingival sulcus with care taken to preserve the neurovascular structures and advanced into the primary defect. Care was taken to ensure that precise realignment of the vermilion border was achieved.
Peng Advancement Flap Text: The defect edges were debeveled with a #15 scalpel blade.  Given the location of the defect, shape of the defect and the proximity to free margins a Peng advancement flap was deemed most appropriate.  Using a sterile surgical marker, an appropriate advancement flap was drawn incorporating the defect and placing the expected incisions within the relaxed skin tension lines where possible. The area thus outlined was incised deep to adipose tissue with a #15 scalpel blade.  The skin margins were undermined to an appropriate distance in all directions utilizing iris scissors.
Hatchet Flap Text: The defect edges were debeveled with a #15 scalpel blade.  Given the location of the defect, shape of the defect and the proximity to free margins a hatchet flap was deemed most appropriate.  Using a sterile surgical marker, an appropriate hatchet flap was drawn incorporating the defect and placing the expected incisions within the relaxed skin tension lines where possible.    The area thus outlined was incised deep to adipose tissue with a #15 scalpel blade.  The skin margins were undermined to an appropriate distance in all directions utilizing iris scissors.
Rotation Flap Text: The defect edges were debeveled with a #15 scalpel blade.  Given the location of the defect, shape of the defect and the proximity to free margins a rotation flap was deemed most appropriate.  Using a sterile surgical marker, an appropriate rotation flap was drawn incorporating the defect and placing the expected incisions within the relaxed skin tension lines where possible.    The area thus outlined was incised deep to adipose tissue with a #15 scalpel blade.  The skin margins were undermined to an appropriate distance in all directions utilizing iris scissors.
Bilateral Rotation Flap Text: The defect edges were debeveled with a #15 scalpel blade. Given the location of the defect, shape of the defect and the proximity to free margins a bilateral rotation flap was deemed most appropriate. Using a sterile surgical marker, an appropriate rotation flap was drawn incorporating the defect and placing the expected incisions within the relaxed skin tension lines where possible. The area thus outlined was incised deep to adipose tissue with a #15 scalpel blade. The skin margins were undermined to an appropriate distance in all directions utilizing iris scissors. Following this, the designed flap was carried over into the primary defect and sutured into place.
Spiral Flap Text: The defect edges were debeveled with a #15 scalpel blade.  Given the location of the defect, shape of the defect and the proximity to free margins a spiral flap was deemed most appropriate.  Using a sterile surgical marker, an appropriate rotation flap was drawn incorporating the defect and placing the expected incisions within the relaxed skin tension lines where possible. The area thus outlined was incised deep to adipose tissue with a #15 scalpel blade.  The skin margins were undermined to an appropriate distance in all directions utilizing iris scissors.
Staged Advancement Flap Text: The defect edges were debeveled with a #15 scalpel blade.  Given the location of the defect, shape of the defect and the proximity to free margins a staged advancement flap was deemed most appropriate.  Using a sterile surgical marker, an appropriate advancement flap was drawn incorporating the defect and placing the expected incisions within the relaxed skin tension lines where possible. The area thus outlined was incised deep to adipose tissue with a #15 scalpel blade.  The skin margins were undermined to an appropriate distance in all directions utilizing iris scissors.
Star Wedge Flap Text: The defect edges were debeveled with a #15 scalpel blade.  Given the location of the defect, shape of the defect and the proximity to free margins a star wedge flap was deemed most appropriate.  Using a sterile surgical marker, an appropriate rotation flap was drawn incorporating the defect and placing the expected incisions within the relaxed skin tension lines where possible. The area thus outlined was incised deep to adipose tissue with a #15 scalpel blade.  The skin margins were undermined to an appropriate distance in all directions utilizing iris scissors.
Transposition Flap Text: The defect edges were debeveled with a #15 scalpel blade.  Given the location of the defect and the proximity to free margins a transposition flap was deemed most appropriate.  Using a sterile surgical marker, an appropriate transposition flap was drawn incorporating the defect.    The area thus outlined was incised deep to adipose tissue with a #15 scalpel blade.  The skin margins were undermined to an appropriate distance in all directions utilizing iris scissors.
Muscle Hinge Flap Text: The defect edges were debeveled with a #15 scalpel blade.  Given the size, depth and location of the defect and the proximity to free margins a muscle hinge flap was deemed most appropriate.  Using a sterile surgical marker, an appropriate hinge flap was drawn incorporating the defect. The area thus outlined was incised with a #15 scalpel blade.  The skin margins were undermined to an appropriate distance in all directions utilizing iris scissors.
Mustarde Flap Text: The defect edges were debeveled with a #15 scalpel blade.  Given the size, depth and location of the defect and the proximity to free margins a Mustarde flap was deemed most appropriate.  Using a sterile surgical marker, an appropriate flap was drawn incorporating the defect. The area thus outlined was incised with a #15 scalpel blade.  The skin margins were undermined to an appropriate distance in all directions utilizing iris scissors.
Nasal Turnover Hinge Flap Text: The defect edges were debeveled with a #15 scalpel blade.  Given the size, depth, location of the defect and the defect being full thickness a nasal turnover hinge flap was deemed most appropriate.  Using a sterile surgical marker, an appropriate hinge flap was drawn incorporating the defect. The area thus outlined was incised with a #15 scalpel blade. The flap was designed to recreate the nasal mucosal lining and the alar rim. The skin margins were undermined to an appropriate distance in all directions utilizing iris scissors.
Nasalis-Muscle-Based Myocutaneous Island Pedicle Flap Text: Using a #15 blade, an incision was made around the donor flap to the level of the nasalis muscle. Wide lateral undermining was then performed in both the subcutaneous plane above the nasalis muscle, and in a submuscular plane just above periosteum. This allowed the formation of a free nasalis muscle axial pedicle (based on the angular artery) which was still attached to the actual cutaneous flap, increasing its mobility and vascular viability. Hemostasis was obtained with pinpoint electrocoagulation. The flap was mobilized into position and the pivotal anchor points positioned and stabilized with buried interrupted sutures. Subcutaneous and dermal tissues were closed in a multilayered fashion with sutures. Tissue redundancies were excised, and the epidermal edges were apposed without significant tension and sutured with sutures.
Nasalis Myocutaneous Flap Text: Using a #15 blade, an incision was made around the donor flap to the level of the nasalis muscle. Wide lateral undermining was then performed in both the subcutaneous plane above the nasalis muscle, and in a submuscular plane just above periosteum. This allowed the formation of a free nasalis muscle axial pedicle which was still attached to the actual cutaneous flap, increasing its mobility and vascular viability. Hemostasis was obtained with pinpoint electrocoagulation. The flap was mobilized into position and the pivotal anchor points positioned and stabilized with buried interrupted sutures. Subcutaneous and dermal tissues were closed in a multilayered fashion with sutures. Tissue redundancies were excised, and the epidermal edges were apposed without significant tension and sutured with sutures.
Nasolabial Transposition Flap Text: The defect edges were debeveled with a #15 scalpel blade.  Given the size, depth and location of the defect and the proximity to free margins a nasolabial transposition flap was deemed most appropriate. Using a sterile surgical marker, an appropriate flap was drawn incorporating the defect. The area thus outlined was incised with a #15 scalpel blade. The skin margins were undermined to an appropriate distance in all directions utilizing iris scissors. Following this, the designed flap was carried into the primary defect and sutured into place.
Orbicularis Oris Muscle Flap Text: The defect edges were debeveled with a #15 scalpel blade.  Given that the defect affected the competency of the oral sphincter an orbicularis oris muscle flap was deemed most appropriate to restore this competency and normal muscle function.  Using a sterile surgical marker, an appropriate flap was drawn incorporating the defect. The area thus outlined was incised with a #15 scalpel blade.
Melolabial Transposition Flap Text: The defect edges were debeveled with a #15 scalpel blade.  Given the location of the defect and the proximity to free margins a melolabial flap was deemed most appropriate.  Using a sterile surgical marker, an appropriate melolabial transposition flap was drawn incorporating the defect.    The area thus outlined was incised deep to adipose tissue with a #15 scalpel blade.  The skin margins were undermined to an appropriate distance in all directions utilizing iris scissors.
Rectangular Flap Text: The defect edges were debeveled with a #15 scalpel blade. Given the location of the defect and the proximity to free margins a rectangular flap was deemed most appropriate. Using a sterile surgical marker, an appropriate rectangular flap was drawn incorporating the defect. The area thus outlined was incised deep to adipose tissue with a #15 scalpel blade. The skin margins were undermined to an appropriate distance in all directions utilizing iris scissors. Following this, the designed flap was carried over into the primary defect and sutured into place.
Rhombic Flap Text: The defect edges were debeveled with a #15 scalpel blade.  Given the location of the defect and the proximity to free margins a rhombic flap was deemed most appropriate.  Using a sterile surgical marker, an appropriate rhombic flap was drawn incorporating the defect.    The area thus outlined was incised deep to adipose tissue with a #15 scalpel blade.  The skin margins were undermined to an appropriate distance in all directions utilizing iris scissors.
Rhomboid Transposition Flap Text: The defect edges were debeveled with a #15 scalpel blade.  Given the location of the defect and the proximity to free margins a rhomboid transposition flap was deemed most appropriate.  Using a sterile surgical marker, an appropriate rhomboid flap was drawn incorporating the defect.    The area thus outlined was incised deep to adipose tissue with a #15 scalpel blade.  The skin margins were undermined to an appropriate distance in all directions utilizing iris scissors.
Bi-Rhombic Flap Text: The defect edges were debeveled with a #15 scalpel blade.  Given the location of the defect and the proximity to free margins a bi-rhombic flap was deemed most appropriate.  Using a sterile surgical marker, an appropriate rhombic flap was drawn incorporating the defect. The area thus outlined was incised deep to adipose tissue with a #15 scalpel blade.  The skin margins were undermined to an appropriate distance in all directions utilizing iris scissors.
Helical Rim Advancement Flap Text: The defect edges were debeveled with a #15 blade scalpel.  Given the location of the defect and the proximity to free margins (helical rim) a double helical rim advancement flap was deemed most appropriate.  Using a sterile surgical marker, the appropriate advancement flaps were drawn incorporating the defect and placing the expected incisions between the helical rim and antihelix where possible.  The area thus outlined was incised through and through with a #15 scalpel blade.  With a skin hook and iris scissors, the flaps were gently and sharply undermined and freed up.
Bilateral Helical Rim Advancement Flap Text: The defect edges were debeveled with a #15 blade scalpel.  Given the location of the defect and the proximity to free margins (helical rim) a bilateral helical rim advancement flap was deemed most appropriate.  Using a sterile surgical marker, the appropriate advancement flaps were drawn incorporating the defect and placing the expected incisions between the helical rim and antihelix where possible.  The area thus outlined was incised through and through with a #15 scalpel blade.  With a skin hook and iris scissors, the flaps were gently and sharply undermined and freed up.
Ear Star Wedge Flap Text: The defect edges were debeveled with a #15 blade scalpel.  Given the location of the defect and the proximity to free margins (helical rim) an ear star wedge flap was deemed most appropriate.  Using a sterile surgical marker, the appropriate flap was drawn incorporating the defect and placing the expected incisions between the helical rim and antihelix where possible.  The area thus outlined was incised through and through with a #15 scalpel blade.
Flip-Flop Flap Text: The defect edges were debeveled with a #15 blade scalpel.  Given the location of the defect and the proximity to free margins a flip-flop flap was deemed most appropriate. Using a sterile surgical marker, the appropriate flap was drawn incorporating the defect and placing the expected incisions between the helical rim and antihelix where possible.  The area thus outlined was incised through and through with a #15 scalpel blade. Following this, the designed flap was carried over into the primary defect and sutured into place.
Banner Transposition Flap Text: The defect edges were debeveled with a #15 scalpel blade.  Given the location of the defect and the proximity to free margins a Banner transposition flap was deemed most appropriate.  Using a sterile surgical marker, an appropriate flap drawn around the defect. The area thus outlined was incised deep to adipose tissue with a #15 scalpel blade.  The skin margins were undermined to an appropriate distance in all directions utilizing iris scissors.
Bilobed Flap Text: The defect edges were debeveled with a #15 scalpel blade.  Given the location of the defect and the proximity to free margins a bilobe flap was deemed most appropriate.  Using a sterile surgical marker, an appropriate bilobe flap drawn around the defect.    The area thus outlined was incised deep to adipose tissue with a #15 scalpel blade.  The skin margins were undermined to an appropriate distance in all directions utilizing iris scissors.
Bilobed Transposition Flap Text: The defect edges were debeveled with a #15 scalpel blade.  Given the location of the defect and the proximity to free margins a bilobed transposition flap was deemed most appropriate.  Using a sterile surgical marker, an appropriate bilobe flap drawn around the defect.    The area thus outlined was incised deep to adipose tissue with a #15 scalpel blade.  The skin margins were undermined to an appropriate distance in all directions utilizing iris scissors.
Trilobed Flap Text: The defect edges were debeveled with a #15 scalpel blade.  Given the location of the defect and the proximity to free margins a trilobed flap was deemed most appropriate.  Using a sterile surgical marker, an appropriate trilobed flap drawn around the defect.    The area thus outlined was incised deep to adipose tissue with a #15 scalpel blade.  The skin margins were undermined to an appropriate distance in all directions utilizing iris scissors.
Dorsal Nasal Flap Text: The defect edges were debeveled with a #15 scalpel blade.  Given the location of the defect and the proximity to free margins a dorsal nasal flap was deemed most appropriate.  Using a sterile surgical marker, an appropriate dorsal nasal flap was drawn around the defect.    The area thus outlined was incised deep to adipose tissue with a #15 scalpel blade.  The skin margins were undermined to an appropriate distance in all directions utilizing iris scissors.
Island Pedicle Flap Text: The defect edges were debeveled with a #15 scalpel blade.  Given the location of the defect, shape of the defect and the proximity to free margins an island pedicle advancement flap was deemed most appropriate.  Using a sterile surgical marker, an appropriate advancement flap was drawn incorporating the defect, outlining the appropriate donor tissue and placing the expected incisions within the relaxed skin tension lines where possible.    The area thus outlined was incised deep to adipose tissue with a #15 scalpel blade.  The skin margins were undermined to an appropriate distance in all directions around the primary defect and laterally outward around the island pedicle utilizing iris scissors.  There was minimal undermining beneath the pedicle flap.
Island Pedicle Flap With Canthal Suspension Text: The defect edges were debeveled with a #15 scalpel blade.  Given the location of the defect, shape of the defect and the proximity to free margins an island pedicle advancement flap was deemed most appropriate.  Using a sterile surgical marker, an appropriate advancement flap was drawn incorporating the defect, outlining the appropriate donor tissue and placing the expected incisions within the relaxed skin tension lines where possible. The area thus outlined was incised deep to adipose tissue with a #15 scalpel blade.  The skin margins were undermined to an appropriate distance in all directions around the primary defect and laterally outward around the island pedicle utilizing iris scissors.  There was minimal undermining beneath the pedicle flap. A suspension suture was placed in the canthal tendon to prevent tension and prevent ectropion.
Alar Island Pedicle Flap Text: The defect edges were debeveled with a #15 scalpel blade.  Given the location of the defect, shape of the defect and the proximity to the alar rim an island pedicle advancement flap was deemed most appropriate.  Using a sterile surgical marker, an appropriate advancement flap was drawn incorporating the defect, outlining the appropriate donor tissue and placing the expected incisions within the nasal ala running parallel to the alar rim. The area thus outlined was incised with a #15 scalpel blade.  The skin margins were undermined minimally to an appropriate distance in all directions around the primary defect and laterally outward around the island pedicle utilizing iris scissors.  There was minimal undermining beneath the pedicle flap.
Double Island Pedicle Flap Text: The defect edges were debeveled with a #15 scalpel blade.  Given the location of the defect, shape of the defect and the proximity to free margins a double island pedicle advancement flap was deemed most appropriate.  Using a sterile surgical marker, an appropriate advancement flap was drawn incorporating the defect, outlining the appropriate donor tissue and placing the expected incisions within the relaxed skin tension lines where possible.    The area thus outlined was incised deep to adipose tissue with a #15 scalpel blade.  The skin margins were undermined to an appropriate distance in all directions around the primary defect and laterally outward around the island pedicle utilizing iris scissors.  There was minimal undermining beneath the pedicle flap.
Island Pedicle Flap-Requiring Vessel Identification Text: The defect edges were debeveled with a #15 scalpel blade.  Given the location of the defect, shape of the defect and the proximity to free margins an island pedicle advancement flap was deemed most appropriate.  Using a sterile surgical marker, an appropriate advancement flap was drawn, based on the axial vessel mentioned above, incorporating the defect, outlining the appropriate donor tissue and placing the expected incisions within the relaxed skin tension lines where possible.    The area thus outlined was incised deep to adipose tissue with a #15 scalpel blade.  The skin margins were undermined to an appropriate distance in all directions around the primary defect and laterally outward around the island pedicle utilizing iris scissors.  There was minimal undermining beneath the pedicle flap.
Keystone Flap Text: The defect edges were debeveled with a #15 scalpel blade.  Given the location of the defect, shape of the defect a keystone flap was deemed most appropriate.  Using a sterile surgical marker, an appropriate keystone flap was drawn incorporating the defect, outlining the appropriate donor tissue and placing the expected incisions within the relaxed skin tension lines where possible. The area thus outlined was incised deep to adipose tissue with a #15 scalpel blade.  The skin margins were undermined to an appropriate distance in all directions around the primary defect and laterally outward around the flap utilizing iris scissors.
O-T Plasty Text: The defect edges were debeveled with a #15 scalpel blade.  Given the location of the defect, shape of the defect and the proximity to free margins an O-T plasty was deemed most appropriate.  Using a sterile surgical marker, an appropriate O-T plasty was drawn incorporating the defect and placing the expected incisions within the relaxed skin tension lines where possible.    The area thus outlined was incised deep to adipose tissue with a #15 scalpel blade.  The skin margins were undermined to an appropriate distance in all directions utilizing iris scissors.
O-Z Plasty Text: The defect edges were debeveled with a #15 scalpel blade.  Given the location of the defect, shape of the defect and the proximity to free margins an O-Z plasty (double transposition flap) was deemed most appropriate.  Using a sterile surgical marker, the appropriate transposition flaps were drawn incorporating the defect and placing the expected incisions within the relaxed skin tension lines where possible.    The area thus outlined was incised deep to adipose tissue with a #15 scalpel blade.  The skin margins were undermined to an appropriate distance in all directions utilizing iris scissors.  Hemostasis was achieved with electrocautery.  The flaps were then transposed into place, one clockwise and the other counterclockwise, and anchored with interrupted buried subcutaneous sutures.
Double O-Z Plasty Text: The defect edges were debeveled with a #15 scalpel blade.  Given the location of the defect, shape of the defect and the proximity to free margins a Double O-Z plasty (double transposition flap) was deemed most appropriate.  Using a sterile surgical marker, the appropriate transposition flaps were drawn incorporating the defect and placing the expected incisions within the relaxed skin tension lines where possible. The area thus outlined was incised deep to adipose tissue with a #15 scalpel blade.  The skin margins were undermined to an appropriate distance in all directions utilizing iris scissors.  Hemostasis was achieved with electrocautery.  The flaps were then transposed into place, one clockwise and the other counterclockwise, and anchored with interrupted buried subcutaneous sutures.
V-Y Plasty Text: The defect edges were debeveled with a #15 scalpel blade.  Given the location of the defect, shape of the defect and the proximity to free margins an V-Y advancement flap was deemed most appropriate.  Using a sterile surgical marker, an appropriate advancement flap was drawn incorporating the defect and placing the expected incisions within the relaxed skin tension lines where possible.    The area thus outlined was incised deep to adipose tissue with a #15 scalpel blade.  The skin margins were undermined to an appropriate distance in all directions utilizing iris scissors.
H Plasty Text: Given the location of the defect, shape of the defect and the proximity to free margins a H-plasty was deemed most appropriate for repair.  Using a sterile surgical marker, the appropriate advancement arms of the H-plasty were drawn incorporating the defect and placing the expected incisions within the relaxed skin tension lines where possible. The area thus outlined was incised deep to adipose tissue with a #15 scalpel blade. The skin margins were undermined to an appropriate distance in all directions utilizing iris scissors.  The opposing advancement arms were then advanced into place in opposite direction and anchored with interrupted buried subcutaneous sutures.
W Plasty Text: The lesion was extirpated to the level of the fat with a #15 scalpel blade.  Given the location of the defect, shape of the defect and the proximity to free margins a W-plasty was deemed most appropriate for repair.  Using a sterile surgical marker, the appropriate transposition arms of the W-plasty were drawn incorporating the defect and placing the expected incisions within the relaxed skin tension lines where possible.    The area thus outlined was incised deep to adipose tissue with a #15 scalpel blade.  The skin margins were undermined to an appropriate distance in all directions utilizing iris scissors.  The opposing transposition arms were then transposed into place in opposite direction and anchored with interrupted buried subcutaneous sutures.
Z Plasty Text: The lesion was extirpated to the level of the fat with a #15 scalpel blade.  Given the location of the defect, shape of the defect and the proximity to free margins a Z-plasty was deemed most appropriate for repair.  Using a sterile surgical marker, the appropriate transposition arms of the Z-plasty were drawn incorporating the defect and placing the expected incisions within the relaxed skin tension lines where possible.    The area thus outlined was incised deep to adipose tissue with a #15 scalpel blade.  The skin margins were undermined to an appropriate distance in all directions utilizing iris scissors.  The opposing transposition arms were then transposed into place in opposite direction and anchored with interrupted buried subcutaneous sutures.
Double Z Plasty Text: The lesion was extirpated to the level of the fat with a #15 scalpel blade. Given the location of the defect, shape of the defect and the proximity to free margins a double Z-plasty was deemed most appropriate for repair. Using a sterile surgical marker, the appropriate transposition arms of the double Z-plasty were drawn incorporating the defect and placing the expected incisions within the relaxed skin tension lines where possible. The area thus outlined was incised deep to adipose tissue with a #15 scalpel blade. The skin margins were undermined to an appropriate distance in all directions utilizing iris scissors. The opposing transposition arms were then transposed and carried over into place in opposite direction and anchored with interrupted buried subcutaneous sutures.
Zygomaticofacial Flap Text: Given the location of the defect, shape of the defect and the proximity to free margins a zygomaticofacial flap was deemed most appropriate for repair.  Using a sterile surgical marker, the appropriate flap was drawn incorporating the defect and placing the expected incisions within the relaxed skin tension lines where possible. The area thus outlined was incised deep to adipose tissue with a #15 scalpel blade with preservation of a vascular pedicle.  The skin margins were undermined to an appropriate distance in all directions utilizing iris scissors.  The flap was then placed into the defect and anchored with interrupted buried subcutaneous sutures.
Cheek Interpolation Flap Text: A decision was made to reconstruct the defect utilizing an interpolation axial flap and a staged reconstruction.  A telfa template was made of the defect.  This telfa template was then used to outline the Cheek Interpolation flap.  The donor area for the pedicle flap was then injected with anesthesia.  The flap was excised through the skin and subcutaneous tissue down to the layer of the underlying musculature.  The interpolation flap was carefully excised within this deep plane to maintain its blood supply.  The edges of the donor site were undermined.   The donor site was closed in a primary fashion.  The pedicle was then rotated into position and sutured.  Once the tube was sutured into place, adequate blood supply was confirmed with blanching and refill.  The pedicle was then wrapped with xeroform gauze and dressed appropriately with a telfa and gauze bandage to ensure continued blood supply and protect the attached pedicle.
Cheek-To-Nose Interpolation Flap Text: A decision was made to reconstruct the defect utilizing an interpolation axial flap and a staged reconstruction.  A telfa template was made of the defect.  This telfa template was then used to outline the Cheek-To-Nose Interpolation flap.  The donor area for the pedicle flap was then injected with anesthesia.  The flap was excised through the skin and subcutaneous tissue down to the layer of the underlying musculature.  The interpolation flap was carefully excised within this deep plane to maintain its blood supply.  The edges of the donor site were undermined.   The donor site was closed in a primary fashion.  The pedicle was then rotated into position and sutured.  Once the tube was sutured into place, adequate blood supply was confirmed with blanching and refill.  The pedicle was then wrapped with xeroform gauze and dressed appropriately with a telfa and gauze bandage to ensure continued blood supply and protect the attached pedicle.
Interpolation Flap Text: A decision was made to reconstruct the defect utilizing an interpolation axial flap and a staged reconstruction.  A telfa template was made of the defect.  This telfa template was then used to outline the interpolation flap.  The donor area for the pedicle flap was then injected with anesthesia.  The flap was excised through the skin and subcutaneous tissue down to the layer of the underlying musculature.  The interpolation flap was carefully excised within this deep plane to maintain its blood supply.  The edges of the donor site were undermined.   The donor site was closed in a primary fashion.  The pedicle was then rotated into position and sutured.  Once the tube was sutured into place, adequate blood supply was confirmed with blanching and refill.  The pedicle was then wrapped with xeroform gauze and dressed appropriately with a telfa and gauze bandage to ensure continued blood supply and protect the attached pedicle.
Melolabial Interpolation Flap Text: A decision was made to reconstruct the defect utilizing an interpolation axial flap and a staged reconstruction.  A telfa template was made of the defect.  This telfa template was then used to outline the melolabial interpolation flap.  The donor area for the pedicle flap was then injected with anesthesia.  The flap was excised through the skin and subcutaneous tissue down to the layer of the underlying musculature.  The pedicle flap was carefully excised within this deep plane to maintain its blood supply.  The edges of the donor site were undermined.   The donor site was closed in a primary fashion.  The pedicle was then rotated into position and sutured.  Once the tube was sutured into place, adequate blood supply was confirmed with blanching and refill.  The pedicle was then wrapped with xeroform gauze and dressed appropriately with a telfa and gauze bandage to ensure continued blood supply and protect the attached pedicle.
Mastoid Interpolation Flap Text: A decision was made to reconstruct the defect utilizing an interpolation axial flap and a staged reconstruction.  A telfa template was made of the defect.  This telfa template was then used to outline the mastoid interpolation flap.  The donor area for the pedicle flap was then injected with anesthesia.  The flap was excised through the skin and subcutaneous tissue down to the layer of the underlying musculature.  The pedicle flap was carefully excised within this deep plane to maintain its blood supply.  The edges of the donor site were undermined.   The donor site was closed in a primary fashion.  The pedicle was then rotated into position and sutured.  Once the tube was sutured into place, adequate blood supply was confirmed with blanching and refill.  The pedicle was then wrapped with xeroform gauze and dressed appropriately with a telfa and gauze bandage to ensure continued blood supply and protect the attached pedicle.
Posterior Auricular Interpolation Flap Text: A decision was made to reconstruct the defect utilizing an interpolation axial flap and a staged reconstruction.  A telfa template was made of the defect.  This telfa template was then used to outline the posterior auricular interpolation flap.  The donor area for the pedicle flap was then injected with anesthesia.  The flap was excised through the skin and subcutaneous tissue down to the layer of the underlying musculature.  The pedicle flap was carefully excised within this deep plane to maintain its blood supply.  The edges of the donor site were undermined.   The donor site was closed in a primary fashion.  The pedicle was then rotated into position and sutured.  Once the tube was sutured into place, adequate blood supply was confirmed with blanching and refill.  The pedicle was then wrapped with xeroform gauze and dressed appropriately with a telfa and gauze bandage to ensure continued blood supply and protect the attached pedicle.
Paramedian Forehead Flap Text: A decision was made to reconstruct the defect utilizing an interpolation axial flap and a staged reconstruction.  A telfa template was made of the defect.  This telfa template was then used to outline the paramedian forehead pedicle flap.  The donor area for the pedicle flap was then injected with anesthesia.  The flap was excised through the skin and subcutaneous tissue down to the layer of the underlying musculature.  The pedicle flap was carefully excised within this deep plane to maintain its blood supply.  The edges of the donor site were undermined.   The donor site was closed in a primary fashion.  The pedicle was then rotated into position and sutured.  Once the tube was sutured into place, adequate blood supply was confirmed with blanching and refill.  The pedicle was then wrapped with xeroform gauze and dressed appropriately with a telfa and gauze bandage to ensure continued blood supply and protect the attached pedicle.
Abbe Flap (Upper To Lower Lip) Text: The defect of the lower lip was assessed and measured.  Given the location and size of the defect, an Abbe flap was deemed most appropriate.  Using a sterile surgical marker, an appropriate Abbe flap was measured and drawn on the upper lip. Local anesthesia was then infiltrated.  A scalpel was then used to incise the upper lip through and through the skin, vermilion, muscle and mucosa, leaving the flap pedicled on the opposite side.  The flap was then rotated and transferred to the lower lip defect.  The flap was then sutured into place with a three layer technique, closing the orbicularis oris muscle layer with subcutaneous buried sutures, followed by a mucosal layer and an epidermal layer.
Abbe Flap (Lower To Upper Lip) Text: The defect of the upper lip was assessed and measured.  Given the location and size of the defect, an Abbe flap was deemed most appropriate.  Using a sterile surgical marker, an appropriate Abbe flap was measured and drawn on the lower lip. Local anesthesia was then infiltrated. A scalpel was then used to incise the upper lip through and through the skin, vermilion, muscle and mucosa, leaving the flap pedicled on the opposite side.  The flap was then rotated and transferred to the lower lip defect.  The flap was then sutured into place with a three layer technique, closing the orbicularis oris muscle layer with subcutaneous buried sutures, followed by a mucosal layer and an epidermal layer.
Estlander Flap (Upper To Lower Lip) Text: The defect of the lower lip was assessed and measured.  Given the location and size of the defect, an Estlander flap was deemed most appropriate.  Using a sterile surgical marker, an appropriate Estlander flap was measured and drawn on the upper lip. Local anesthesia was then infiltrated. A scalpel was then used to incise the lateral aspect of the flap, through skin, muscle and mucosa, leaving the flap pedicled medially.  The flap was then rotated and positioned to fill the lower lip defect.  The flap was then sutured into place with a three layer technique, closing the orbicularis oris muscle layer with subcutaneous buried sutures, followed by a mucosal layer and an epidermal layer.
Lip Wedge Excision Repair Text: Given the location of the defect and the proximity to free margins a full thickness wedge repair was deemed most appropriate.  Using a sterile surgical marker, the appropriate repair was drawn incorporating the defect and placing the expected incisions perpendicular to the vermilion border.  The vermilion border was also meticulously outlined to ensure appropriate reapproximation during the repair.  The area thus outlined was incised through and through with a #15 scalpel blade.  The muscularis and dermis were reaproximated with deep sutures following hemostasis. Care was taken to realign the vermilion border before proceeding with the superficial closure.  Once the vermilion was realigned the superfical and mucosal closure was finished.
Ftsg Text: The defect edges were debeveled with a #15 scalpel blade.  Given the location of the defect, shape of the defect and the proximity to free margins a full thickness skin graft was deemed most appropriate.  Using a sterile surgical marker, the primary defect shape was transferred to the donor site. The area thus outlined was incised deep to adipose tissue with a #15 scalpel blade.  The harvested graft was then trimmed of adipose tissue until only dermis and epidermis was left.  The skin margins of the secondary defect were undermined to an appropriate distance in all directions utilizing iris scissors.  The secondary defect was closed with interrupted buried subcutaneous sutures.  The skin edges were then re-apposed with running  sutures.  The skin graft was then placed in the primary defect and oriented appropriately.
Split-Thickness Skin Graft Text: The defect edges were debeveled with a #15 scalpel blade.  Given the location of the defect, shape of the defect and the proximity to free margins a split thickness skin graft was deemed most appropriate.  Using a sterile surgical marker, the primary defect shape was transferred to the donor site. The split thickness graft was then harvested.  The skin graft was then placed in the primary defect and oriented appropriately.
Pinch Graft Text: The defect edges were debeveled with a #15 scalpel blade. Given the location of the defect, shape of the defect and the proximity to free margins a pinch graft was deemed most appropriate. Using a sterile surgical marker, the primary defect shape was transferred to the donor site. The area thus outlined was incised deep to adipose tissue with a #15 scalpel blade.  The harvested graft was then trimmed of adipose tissue until only dermis and epidermis was left. The skin margins of the secondary defect were undermined to an appropriate distance in all directions utilizing iris scissors.  The secondary defect was closed with interrupted buried subcutaneous sutures.  The skin edges were then re-apposed with running  sutures.  The skin graft was then placed in the primary defect and oriented appropriately.
Burow's Graft Text: The defect edges were debeveled with a #15 scalpel blade.  Given the location of the defect, shape of the defect, the proximity to free margins and the presence of a standing cone deformity a Burow's skin graft was deemed most appropriate. The standing cone was removed and this tissue was then trimmed to the shape of the primary defect. The adipose tissue was also removed until only dermis and epidermis were left.  The skin margins of the secondary defect were undermined to an appropriate distance in all directions utilizing iris scissors.  The secondary defect was closed with interrupted buried subcutaneous sutures.  The skin edges were then re-apposed with running  sutures.  The skin graft was then placed in the primary defect and oriented appropriately.
Cartilage Graft Text: The defect edges were debeveled with a #15 scalpel blade.  Given the location of the defect, shape of the defect, the fact the defect involved a full thickness cartilage defect a cartilage graft was deemed most appropriate.  An appropriate donor site was identified, cleansed, and anesthetized. The cartilage graft was then harvested and transferred to the recipient site, oriented appropriately and then sutured into place.  The secondary defect was then repaired using a primary closure.
Composite Graft Text: The defect edges were debeveled with a #15 scalpel blade.  Given the location of the defect, shape of the defect, the proximity to free margins and the fact the defect was full thickness a composite graft was deemed most appropriate.  The defect was outline and then transferred to the donor site.  A full thickness graft was then excised from the donor site. The graft was then placed in the primary defect, oriented appropriately and then sutured into place.  The secondary defect was then repaired using a primary closure.
Epidermal Autograft Text: The defect edges were debeveled with a #15 scalpel blade.  Given the location of the defect, shape of the defect and the proximity to free margins an epidermal autograft was deemed most appropriate.  Using a sterile surgical marker, the primary defect shape was transferred to the donor site. The epidermal graft was then harvested.  The skin graft was then placed in the primary defect and oriented appropriately.
Dermal Autograft Text: The defect edges were debeveled with a #15 scalpel blade.  Given the location of the defect, shape of the defect and the proximity to free margins a dermal autograft was deemed most appropriate.  Using a sterile surgical marker, the primary defect shape was transferred to the donor site. The area thus outlined was incised deep to adipose tissue with a #15 scalpel blade.  The harvested graft was then trimmed of adipose and epidermal tissue until only dermis was left.  The skin graft was then placed in the primary defect and oriented appropriately.
Skin Substitute Text: The defect edges were debeveled with a #15 scalpel blade.  Given the location of the defect, shape of the defect and the proximity to free margins a skin substitute graft was deemed most appropriate.  The graft material was trimmed to fit the size of the defect. The graft was then placed in the primary defect and oriented appropriately.
Tissue Cultured Epidermal Autograft Text: The defect edges were debeveled with a #15 scalpel blade.  Given the location of the defect, shape of the defect and the proximity to free margins a tissue cultured epidermal autograft was deemed most appropriate.  The graft was then trimmed to fit the size of the defect.  The graft was then placed in the primary defect and oriented appropriately.
Xenograft Text: The defect edges were debeveled with a #15 scalpel blade.  Given the location of the defect, shape of the defect and the proximity to free margins a xenograft was deemed most appropriate.  The graft was then trimmed to fit the size of the defect.  The graft was then placed in the primary defect and oriented appropriately.
Purse String (Intermediate) Text: Given the location of the defect and the characteristics of the surrounding skin a purse string intermediate closure was deemed most appropriate.  Undermining was performed circumferentially around the surgical defect.  A purse string suture was then placed and tightened.
Purse String (Simple) Text: Given the location of the defect and the characteristics of the surrounding skin a purse string simple closure was deemed most appropriate.  Undermining was performed circumferentially around the surgical defect.  A purse string suture was then placed and tightened.
Complex Repair And Single Advancement Flap Text: The defect edges were debeveled with a #15 scalpel blade.  The primary defect was closed partially with a complex linear closure.  Given the location of the remaining defect, shape of the defect and the proximity to free margins a single advancement flap was deemed most appropriate for complete closure of the defect.  Using a sterile surgical marker, an appropriate advancement flap was drawn incorporating the defect and placing the expected incisions within the relaxed skin tension lines where possible.    The area thus outlined was incised deep to adipose tissue with a #15 scalpel blade.  The skin margins were undermined to an appropriate distance in all directions utilizing iris scissors.
Complex Repair And Double Advancement Flap Text: The defect edges were debeveled with a #15 scalpel blade.  The primary defect was closed partially with a complex linear closure.  Given the location of the remaining defect, shape of the defect and the proximity to free margins a double advancement flap was deemed most appropriate for complete closure of the defect.  Using a sterile surgical marker, an appropriate advancement flap was drawn incorporating the defect and placing the expected incisions within the relaxed skin tension lines where possible.    The area thus outlined was incised deep to adipose tissue with a #15 scalpel blade.  The skin margins were undermined to an appropriate distance in all directions utilizing iris scissors.
Complex Repair And Modified Advancement Flap Text: The defect edges were debeveled with a #15 scalpel blade.  The primary defect was closed partially with a complex linear closure.  Given the location of the remaining defect, shape of the defect and the proximity to free margins a modified advancement flap was deemed most appropriate for complete closure of the defect.  Using a sterile surgical marker, an appropriate advancement flap was drawn incorporating the defect and placing the expected incisions within the relaxed skin tension lines where possible.    The area thus outlined was incised deep to adipose tissue with a #15 scalpel blade.  The skin margins were undermined to an appropriate distance in all directions utilizing iris scissors.
Complex Repair And A-T Advancement Flap Text: The defect edges were debeveled with a #15 scalpel blade.  The primary defect was closed partially with a complex linear closure.  Given the location of the remaining defect, shape of the defect and the proximity to free margins an A-T advancement flap was deemed most appropriate for complete closure of the defect.  Using a sterile surgical marker, an appropriate advancement flap was drawn incorporating the defect and placing the expected incisions within the relaxed skin tension lines where possible.    The area thus outlined was incised deep to adipose tissue with a #15 scalpel blade.  The skin margins were undermined to an appropriate distance in all directions utilizing iris scissors.
Complex Repair And O-T Advancement Flap Text: The defect edges were debeveled with a #15 scalpel blade.  The primary defect was closed partially with a complex linear closure.  Given the location of the remaining defect, shape of the defect and the proximity to free margins an O-T advancement flap was deemed most appropriate for complete closure of the defect.  Using a sterile surgical marker, an appropriate advancement flap was drawn incorporating the defect and placing the expected incisions within the relaxed skin tension lines where possible.    The area thus outlined was incised deep to adipose tissue with a #15 scalpel blade.  The skin margins were undermined to an appropriate distance in all directions utilizing iris scissors.
Complex Repair And O-L Flap Text: The defect edges were debeveled with a #15 scalpel blade.  The primary defect was closed partially with a complex linear closure.  Given the location of the remaining defect, shape of the defect and the proximity to free margins an O-L flap was deemed most appropriate for complete closure of the defect.  Using a sterile surgical marker, an appropriate flap was drawn incorporating the defect and placing the expected incisions within the relaxed skin tension lines where possible.    The area thus outlined was incised deep to adipose tissue with a #15 scalpel blade.  The skin margins were undermined to an appropriate distance in all directions utilizing iris scissors.
Complex Repair And Bilobe Flap Text: The defect edges were debeveled with a #15 scalpel blade.  The primary defect was closed partially with a complex linear closure.  Given the location of the remaining defect, shape of the defect and the proximity to free margins a bilobe flap was deemed most appropriate for complete closure of the defect.  Using a sterile surgical marker, an appropriate advancement flap was drawn incorporating the defect and placing the expected incisions within the relaxed skin tension lines where possible.    The area thus outlined was incised deep to adipose tissue with a #15 scalpel blade.  The skin margins were undermined to an appropriate distance in all directions utilizing iris scissors.
Complex Repair And Melolabial Flap Text: The defect edges were debeveled with a #15 scalpel blade.  The primary defect was closed partially with a complex linear closure.  Given the location of the remaining defect, shape of the defect and the proximity to free margins a melolabial flap was deemed most appropriate for complete closure of the defect.  Using a sterile surgical marker, an appropriate advancement flap was drawn incorporating the defect and placing the expected incisions within the relaxed skin tension lines where possible.    The area thus outlined was incised deep to adipose tissue with a #15 scalpel blade.  The skin margins were undermined to an appropriate distance in all directions utilizing iris scissors.
Complex Repair And Rotation Flap Text: The defect edges were debeveled with a #15 scalpel blade.  The primary defect was closed partially with a complex linear closure.  Given the location of the remaining defect, shape of the defect and the proximity to free margins a rotation flap was deemed most appropriate for complete closure of the defect.  Using a sterile surgical marker, an appropriate advancement flap was drawn incorporating the defect and placing the expected incisions within the relaxed skin tension lines where possible.    The area thus outlined was incised deep to adipose tissue with a #15 scalpel blade.  The skin margins were undermined to an appropriate distance in all directions utilizing iris scissors.
Complex Repair And Rhombic Flap Text: The defect edges were debeveled with a #15 scalpel blade.  The primary defect was closed partially with a complex linear closure.  Given the location of the remaining defect, shape of the defect and the proximity to free margins a rhombic flap was deemed most appropriate for complete closure of the defect.  Using a sterile surgical marker, an appropriate advancement flap was drawn incorporating the defect and placing the expected incisions within the relaxed skin tension lines where possible.    The area thus outlined was incised deep to adipose tissue with a #15 scalpel blade.  The skin margins were undermined to an appropriate distance in all directions utilizing iris scissors.
Complex Repair And Transposition Flap Text: The defect edges were debeveled with a #15 scalpel blade.  The primary defect was closed partially with a complex linear closure.  Given the location of the remaining defect, shape of the defect and the proximity to free margins a transposition flap was deemed most appropriate for complete closure of the defect.  Using a sterile surgical marker, an appropriate advancement flap was drawn incorporating the defect and placing the expected incisions within the relaxed skin tension lines where possible.    The area thus outlined was incised deep to adipose tissue with a #15 scalpel blade.  The skin margins were undermined to an appropriate distance in all directions utilizing iris scissors.
Complex Repair And V-Y Plasty Text: The defect edges were debeveled with a #15 scalpel blade.  The primary defect was closed partially with a complex linear closure.  Given the location of the remaining defect, shape of the defect and the proximity to free margins a V-Y plasty was deemed most appropriate for complete closure of the defect.  Using a sterile surgical marker, an appropriate advancement flap was drawn incorporating the defect and placing the expected incisions within the relaxed skin tension lines where possible.    The area thus outlined was incised deep to adipose tissue with a #15 scalpel blade.  The skin margins were undermined to an appropriate distance in all directions utilizing iris scissors.
Complex Repair And M Plasty Text: The defect edges were debeveled with a #15 scalpel blade.  The primary defect was closed partially with a complex linear closure.  Given the location of the remaining defect, shape of the defect and the proximity to free margins an M plasty was deemed most appropriate for complete closure of the defect.  Using a sterile surgical marker, an appropriate advancement flap was drawn incorporating the defect and placing the expected incisions within the relaxed skin tension lines where possible.    The area thus outlined was incised deep to adipose tissue with a #15 scalpel blade.  The skin margins were undermined to an appropriate distance in all directions utilizing iris scissors.
Complex Repair And Double M Plasty Text: The defect edges were debeveled with a #15 scalpel blade.  The primary defect was closed partially with a complex linear closure.  Given the location of the remaining defect, shape of the defect and the proximity to free margins a double M plasty was deemed most appropriate for complete closure of the defect.  Using a sterile surgical marker, an appropriate advancement flap was drawn incorporating the defect and placing the expected incisions within the relaxed skin tension lines where possible.    The area thus outlined was incised deep to adipose tissue with a #15 scalpel blade.  The skin margins were undermined to an appropriate distance in all directions utilizing iris scissors.
Complex Repair And W Plasty Text: The defect edges were debeveled with a #15 scalpel blade.  The primary defect was closed partially with a complex linear closure.  Given the location of the remaining defect, shape of the defect and the proximity to free margins a W plasty was deemed most appropriate for complete closure of the defect.  Using a sterile surgical marker, an appropriate advancement flap was drawn incorporating the defect and placing the expected incisions within the relaxed skin tension lines where possible.    The area thus outlined was incised deep to adipose tissue with a #15 scalpel blade.  The skin margins were undermined to an appropriate distance in all directions utilizing iris scissors.
Complex Repair And Z Plasty Text: The defect edges were debeveled with a #15 scalpel blade.  The primary defect was closed partially with a complex linear closure.  Given the location of the remaining defect, shape of the defect and the proximity to free margins a Z plasty was deemed most appropriate for complete closure of the defect.  Using a sterile surgical marker, an appropriate advancement flap was drawn incorporating the defect and placing the expected incisions within the relaxed skin tension lines where possible.    The area thus outlined was incised deep to adipose tissue with a #15 scalpel blade.  The skin margins were undermined to an appropriate distance in all directions utilizing iris scissors.
Complex Repair And Dorsal Nasal Flap Text: The defect edges were debeveled with a #15 scalpel blade.  The primary defect was closed partially with a complex linear closure.  Given the location of the remaining defect, shape of the defect and the proximity to free margins a dorsal nasal flap was deemed most appropriate for complete closure of the defect.  Using a sterile surgical marker, an appropriate flap was drawn incorporating the defect and placing the expected incisions within the relaxed skin tension lines where possible.    The area thus outlined was incised deep to adipose tissue with a #15 scalpel blade.  The skin margins were undermined to an appropriate distance in all directions utilizing iris scissors.
Complex Repair And Ftsg Text: The defect edges were debeveled with a #15 scalpel blade.  The primary defect was closed partially with a complex linear closure.  Given the location of the defect, shape of the defect and the proximity to free margins a full thickness skin graft was deemed most appropriate to repair the remaining defect.  The graft was trimmed to fit the size of the remaining defect.  The graft was then placed in the primary defect, oriented appropriately, and sutured into place.
Complex Repair And Burow's Graft Text: The defect edges were debeveled with a #15 scalpel blade.  The primary defect was closed partially with a complex linear closure.  Given the location of the defect, shape of the defect, the proximity to free margins and the presence of a standing cone deformity a Burow's graft was deemed most appropriate to repair the remaining defect.  The graft was trimmed to fit the size of the remaining defect.  The graft was then placed in the primary defect, oriented appropriately, and sutured into place.
Complex Repair And Split-Thickness Skin Graft Text: The defect edges were debeveled with a #15 scalpel blade.  The primary defect was closed partially with a complex linear closure.  Given the location of the defect, shape of the defect and the proximity to free margins a split thickness skin graft was deemed most appropriate to repair the remaining defect.  The graft was trimmed to fit the size of the remaining defect.  The graft was then placed in the primary defect, oriented appropriately, and sutured into place.
Complex Repair And Epidermal Autograft Text: The defect edges were debeveled with a #15 scalpel blade.  The primary defect was closed partially with a complex linear closure.  Given the location of the defect, shape of the defect and the proximity to free margins an epidermal autograft was deemed most appropriate to repair the remaining defect.  The graft was trimmed to fit the size of the remaining defect.  The graft was then placed in the primary defect, oriented appropriately, and sutured into place.
Complex Repair And Dermal Autograft Text: The defect edges were debeveled with a #15 scalpel blade.  The primary defect was closed partially with a complex linear closure.  Given the location of the defect, shape of the defect and the proximity to free margins an dermal autograft was deemed most appropriate to repair the remaining defect.  The graft was trimmed to fit the size of the remaining defect.  The graft was then placed in the primary defect, oriented appropriately, and sutured into place.
Complex Repair And Tissue Cultured Epidermal Autograft Text: The defect edges were debeveled with a #15 scalpel blade.  The primary defect was closed partially with a complex linear closure.  Given the location of the defect, shape of the defect and the proximity to free margins an tissue cultured epidermal autograft was deemed most appropriate to repair the remaining defect.  The graft was trimmed to fit the size of the remaining defect.  The graft was then placed in the primary defect, oriented appropriately, and sutured into place.
Complex Repair And Xenograft Text: The defect edges were debeveled with a #15 scalpel blade.  The primary defect was closed partially with a complex linear closure.  Given the location of the defect, shape of the defect and the proximity to free margins a xenograft was deemed most appropriate to repair the remaining defect.  The graft was trimmed to fit the size of the remaining defect.  The graft was then placed in the primary defect, oriented appropriately, and sutured into place.
Complex Repair And Skin Substitute Graft Text: The defect edges were debeveled with a #15 scalpel blade.  The primary defect was closed partially with a complex linear closure.  Given the location of the remaining defect, shape of the defect and the proximity to free margins a skin substitute graft was deemed most appropriate to repair the remaining defect.  The graft was trimmed to fit the size of the remaining defect.  The graft was then placed in the primary defect, oriented appropriately, and sutured into place.
Include Anticoagulation In Mohs Note?: Please Select the Appropriate Response
Path Notes (To The Dermatopathologist): Please check margins.
Consent was obtained from the patient. The risks and benefits to therapy were discussed in detail. Specifically, the risks of infection, scarring, bleeding, prolonged wound healing, incomplete removal, allergy to anesthesia, nerve injury and recurrence were addressed. Prior to the procedure, the treatment site was clearly identified and confirmed by the patient. All components of Universal Protocol/PAUSE Rule completed.
Post-Care Instructions: I reviewed with the patient in detail post-care instructions. Patient is not to engage in any heavy lifting, exercise, or swimming for the next 14 days. Should the patient develop any fevers, chills, bleeding, severe pain patient will contact the office immediately.
Home Suture Removal Text: Patient was provided a home suture removal kit and will remove their sutures at home.  If they have any questions or difficulties they will call the office.
Where Do You Want The Question To Include Opioid Counseling Located?: Case Summary Tab
Billing Type: Third-Party Bill
Information: Selecting Yes will display possible errors in your note based on the variables you have selected. This validation is only offered as a suggestion for you. PLEASE NOTE THAT THE VALIDATION TEXT WILL BE REMOVED WHEN YOU FINALIZE YOUR NOTE. IF YOU WANT TO FAX A PRELIMINARY NOTE YOU WILL NEED TO TOGGLE THIS TO 'NO' IF YOU DO NOT WANT IT IN YOUR FAXED NOTE.

## 2025-07-21 ENCOUNTER — HOSPITAL ENCOUNTER (OUTPATIENT)
Dept: RADIATION ONCOLOGY | Age: 80
Setting detail: RECURRING SERIES
Discharge: HOME | End: 2025-07-24
Payer: MEDICARE

## 2025-07-21 LAB
RAD ONC ARIA COURSE FIRST TREATMENT DATE: NORMAL
RAD ONC ARIA COURSE ID: NORMAL
RAD ONC ARIA COURSE INTENT: NORMAL
RAD ONC ARIA COURSE LAST TREATMENT DATE: NORMAL
RAD ONC ARIA COURSE SESSION NUMBER: 1
RAD ONC ARIA COURSE START DATE: NORMAL
RAD ONC ARIA COURSE TREATMENT ELAPSED DAYS: 0
RAD ONC ARIA PLAN FRACTIONS TREATED TO DATE: 1
RAD ONC ARIA PLAN ID: NORMAL
RAD ONC ARIA PLAN PRESCRIBED DOSE PER FRACTION: 10 GY
RAD ONC ARIA PLAN PRIMARY REFERENCE POINT: NORMAL
RAD ONC ARIA PLAN TOTAL FRACTIONS PRESCRIBED: 5
RAD ONC ARIA PLAN TOTAL PRESCRIBED DOSE: 5000 CGY
RAD ONC ARIA REFERENCE POINT DOSAGE GIVEN TO DATE: 10 GY
RAD ONC ARIA REFERENCE POINT ID: NORMAL
RAD ONC ARIA REFERENCE POINT SESSION DOSAGE GIVEN: 10 GY

## 2025-07-21 PROCEDURE — 77373 STRTCTC BDY RAD THER TX DLVR: CPT

## 2025-07-22 ENCOUNTER — HOSPITAL ENCOUNTER (OUTPATIENT)
Dept: RADIATION ONCOLOGY | Age: 80
Setting detail: RECURRING SERIES
Discharge: HOME | End: 2025-07-25
Payer: MEDICARE

## 2025-07-22 LAB
RAD ONC ARIA COURSE FIRST TREATMENT DATE: NORMAL
RAD ONC ARIA COURSE ID: NORMAL
RAD ONC ARIA COURSE INTENT: NORMAL
RAD ONC ARIA COURSE LAST TREATMENT DATE: NORMAL
RAD ONC ARIA COURSE SESSION NUMBER: 2
RAD ONC ARIA COURSE START DATE: NORMAL
RAD ONC ARIA COURSE TREATMENT ELAPSED DAYS: 1
RAD ONC ARIA PLAN FRACTIONS TREATED TO DATE: 2
RAD ONC ARIA PLAN ID: NORMAL
RAD ONC ARIA PLAN PRESCRIBED DOSE PER FRACTION: 10 GY
RAD ONC ARIA PLAN PRIMARY REFERENCE POINT: NORMAL
RAD ONC ARIA PLAN TOTAL FRACTIONS PRESCRIBED: 5
RAD ONC ARIA PLAN TOTAL PRESCRIBED DOSE: 5000 CGY
RAD ONC ARIA REFERENCE POINT DOSAGE GIVEN TO DATE: 20 GY
RAD ONC ARIA REFERENCE POINT ID: NORMAL
RAD ONC ARIA REFERENCE POINT SESSION DOSAGE GIVEN: 10 GY

## 2025-07-22 PROCEDURE — 77373 STRTCTC BDY RAD THER TX DLVR: CPT

## 2025-07-23 ENCOUNTER — HOSPITAL ENCOUNTER (OUTPATIENT)
Dept: RADIATION ONCOLOGY | Age: 80
Setting detail: RECURRING SERIES
Discharge: HOME | End: 2025-07-26
Payer: MEDICARE

## 2025-07-23 LAB
RAD ONC ARIA COURSE FIRST TREATMENT DATE: NORMAL
RAD ONC ARIA COURSE ID: NORMAL
RAD ONC ARIA COURSE INTENT: NORMAL
RAD ONC ARIA COURSE LAST TREATMENT DATE: NORMAL
RAD ONC ARIA COURSE SESSION NUMBER: 3
RAD ONC ARIA COURSE START DATE: NORMAL
RAD ONC ARIA COURSE TREATMENT ELAPSED DAYS: 2
RAD ONC ARIA PLAN FRACTIONS TREATED TO DATE: 3
RAD ONC ARIA PLAN ID: NORMAL
RAD ONC ARIA PLAN PRESCRIBED DOSE PER FRACTION: 10 GY
RAD ONC ARIA PLAN PRIMARY REFERENCE POINT: NORMAL
RAD ONC ARIA PLAN TOTAL FRACTIONS PRESCRIBED: 5
RAD ONC ARIA PLAN TOTAL PRESCRIBED DOSE: 5000 CGY
RAD ONC ARIA REFERENCE POINT DOSAGE GIVEN TO DATE: 30 GY
RAD ONC ARIA REFERENCE POINT ID: NORMAL
RAD ONC ARIA REFERENCE POINT SESSION DOSAGE GIVEN: 10 GY

## 2025-07-23 PROCEDURE — 77373 STRTCTC BDY RAD THER TX DLVR: CPT

## 2025-07-24 ENCOUNTER — HOSPITAL ENCOUNTER (OUTPATIENT)
Dept: RADIATION ONCOLOGY | Age: 80
Setting detail: RECURRING SERIES
Discharge: HOME | End: 2025-07-27
Payer: MEDICARE

## 2025-07-24 LAB
RAD ONC ARIA COURSE FIRST TREATMENT DATE: NORMAL
RAD ONC ARIA COURSE ID: NORMAL
RAD ONC ARIA COURSE INTENT: NORMAL
RAD ONC ARIA COURSE LAST TREATMENT DATE: NORMAL
RAD ONC ARIA COURSE SESSION NUMBER: 4
RAD ONC ARIA COURSE START DATE: NORMAL
RAD ONC ARIA COURSE TREATMENT ELAPSED DAYS: 3
RAD ONC ARIA PLAN FRACTIONS TREATED TO DATE: 4
RAD ONC ARIA PLAN ID: NORMAL
RAD ONC ARIA PLAN PRESCRIBED DOSE PER FRACTION: 10 GY
RAD ONC ARIA PLAN PRIMARY REFERENCE POINT: NORMAL
RAD ONC ARIA PLAN TOTAL FRACTIONS PRESCRIBED: 5
RAD ONC ARIA PLAN TOTAL PRESCRIBED DOSE: 5000 CGY
RAD ONC ARIA REFERENCE POINT DOSAGE GIVEN TO DATE: 40 GY
RAD ONC ARIA REFERENCE POINT ID: NORMAL
RAD ONC ARIA REFERENCE POINT SESSION DOSAGE GIVEN: 10 GY

## 2025-07-24 PROCEDURE — 77373 STRTCTC BDY RAD THER TX DLVR: CPT

## 2025-07-24 NOTE — ON TREATMENT VISIT
[facility] RADIATION ONCOLOGY ON TREATMENT VISIT    Patient: Jose Dash MRN: [id number]  SSN: [id number]    YOB: 1945  Age: 79 y.o.  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 male who is currently receiving definitive SBRT.    Current RT dose: 40/50 Gy in 4/5 fractions.     No concurrent systemic therapy    Subjective:  Week 1: Fatigue    Objective:  There were no vitals filed for this visit.  Pain 0/10    General: Alert and conversant, in NAD  Skin: Intact    Assessment:  Patient is tolerating radiation therapy well with anticipated toxicities.     Plan:  -Continue RT as planned.  -Treatment images reviewed.  -He completes radiation tomorrow. Follow up in 6 months with repeat CT chest or sooner as needed.  -Follow up with pulmonary 10/2/25.  -The patient has a documented plan of care to address pain.  Pain is not present.    Aylin Tai MD  07/24/25

## 2025-07-25 ENCOUNTER — HOSPITAL ENCOUNTER (OUTPATIENT)
Dept: RADIATION ONCOLOGY | Age: 80
Setting detail: RECURRING SERIES
Discharge: HOME | End: 2025-07-28
Payer: MEDICARE

## 2025-07-25 LAB
RAD ONC ARIA COURSE FIRST TREATMENT DATE: NORMAL
RAD ONC ARIA COURSE ID: NORMAL
RAD ONC ARIA COURSE INTENT: NORMAL
RAD ONC ARIA COURSE LAST TREATMENT DATE: NORMAL
RAD ONC ARIA COURSE SESSION NUMBER: 5
RAD ONC ARIA COURSE START DATE: NORMAL
RAD ONC ARIA COURSE TREATMENT ELAPSED DAYS: 4
RAD ONC ARIA PLAN FRACTIONS TREATED TO DATE: 5
RAD ONC ARIA PLAN ID: NORMAL
RAD ONC ARIA PLAN PRESCRIBED DOSE PER FRACTION: 10 GY
RAD ONC ARIA PLAN PRIMARY REFERENCE POINT: NORMAL
RAD ONC ARIA PLAN TOTAL FRACTIONS PRESCRIBED: 5
RAD ONC ARIA PLAN TOTAL PRESCRIBED DOSE: 5000 CGY
RAD ONC ARIA REFERENCE POINT DOSAGE GIVEN TO DATE: 50 GY
RAD ONC ARIA REFERENCE POINT ID: NORMAL
RAD ONC ARIA REFERENCE POINT SESSION DOSAGE GIVEN: 10 GY

## 2025-07-25 PROCEDURE — 77336 RADIATION PHYSICS CONSULT: CPT

## 2025-07-25 PROCEDURE — 77373 STRTCTC BDY RAD THER TX DLVR: CPT

## 2025-07-27 ENCOUNTER — PATIENT MESSAGE (OUTPATIENT)
Dept: FAMILY MEDICINE CLINIC | Facility: CLINIC | Age: 80
End: 2025-07-27

## 2025-07-27 DIAGNOSIS — K21.9 GASTROESOPHAGEAL REFLUX DISEASE WITHOUT ESOPHAGITIS: ICD-10-CM

## 2025-07-27 DIAGNOSIS — K29.00 ACUTE GASTRITIS WITHOUT HEMORRHAGE, UNSPECIFIED GASTRITIS TYPE: ICD-10-CM

## 2025-07-28 DIAGNOSIS — C34.91 SQUAMOUS CELL LUNG CANCER, RIGHT (HCC): Primary | ICD-10-CM

## 2025-07-28 RX ORDER — FAMOTIDINE 40 MG/1
40 TABLET, FILM COATED ORAL EVERY EVENING
Qty: 90 TABLET | Refills: 1 | Status: SHIPPED | OUTPATIENT
Start: 2025-07-28

## 2025-07-28 NOTE — TELEPHONE ENCOUNTER
Famotidine 40 mg last filled on 2/26/25  with 30 and 3 refills.  Next appt is on 11/6/25 with 30 and 3 refills.

## 2025-08-08 ENCOUNTER — PATIENT MESSAGE (OUTPATIENT)
Dept: FAMILY MEDICINE CLINIC | Facility: CLINIC | Age: 80
End: 2025-08-08

## 2025-08-08 DIAGNOSIS — K29.00 ACUTE GASTRITIS WITHOUT HEMORRHAGE, UNSPECIFIED GASTRITIS TYPE: ICD-10-CM

## 2025-08-08 DIAGNOSIS — K21.9 GASTROESOPHAGEAL REFLUX DISEASE WITHOUT ESOPHAGITIS: ICD-10-CM

## 2025-08-11 ENCOUNTER — TELEPHONE (OUTPATIENT)
Dept: FAMILY MEDICINE CLINIC | Facility: CLINIC | Age: 80
End: 2025-08-11

## 2025-08-11 DIAGNOSIS — K29.00 ACUTE GASTRITIS WITHOUT HEMORRHAGE, UNSPECIFIED GASTRITIS TYPE: ICD-10-CM

## 2025-08-11 DIAGNOSIS — K21.9 GASTROESOPHAGEAL REFLUX DISEASE WITHOUT ESOPHAGITIS: ICD-10-CM

## 2025-08-11 RX ORDER — OMEPRAZOLE 40 MG/1
40 CAPSULE, DELAYED RELEASE ORAL DAILY
Qty: 90 CAPSULE | Refills: 2 | Status: SHIPPED | OUTPATIENT
Start: 2025-08-11

## (undated) DEVICE — MOUTHPIECE ENDOSCP 20X27MM --

## (undated) DEVICE — TROCAR: Brand: KII FIOS FIRST ENTRY

## (undated) DEVICE — TUBING, SUCTION, 1/4" X 10', STRAIGHT: Brand: MEDLINE

## (undated) DEVICE — LAPAROSCOPIC TROCAR SLEEVE/SINGLE USE: Brand: KII® SLEEVE

## (undated) DEVICE — KIT PROC W/ 90DEG FIRM TIP EXT WRK CHN LOCATABLE GUID FOR

## (undated) DEVICE — SOL INJ SOD CL0.9% 10ML PREFIL --

## (undated) DEVICE — KIT PROCEDURE SURG T AND A ORAL TOTE

## (undated) DEVICE — (D)PREP SKN CHLRAPRP APPL 26ML -- CONVERT TO ITEM 371833

## (undated) DEVICE — SUTURE PROL SZ 0 L30IN NONABSORBABLE BLU L26MM CT-2 1/2 CIR 8412H

## (undated) DEVICE — SLEEVE RMFG Z-THREAD KII 3/BX -- LAWSON OEM ITEM 280412

## (undated) DEVICE — AGENT HEMSTAT W3XL4IN OXIDIZED REGENERATED CELOS ABSRB FOR

## (undated) DEVICE — CATHETER F BLLN 5CC 22FR INF CTRL 3 W SPEC SIL ALLY AND

## (undated) DEVICE — WARMER SCP STRL DISP

## (undated) DEVICE — NEEDLE HYPO 21GA L1.5IN INTRAMUSCULAR S STL LATCH BVL UP

## (undated) DEVICE — SOLUTION IRRIG 3000ML 0.9% SOD CHL FLX CONT 0797208] ICU MEDICAL INC]

## (undated) DEVICE — CONTAINER SPEC 480ML POLYPR CAP EMP FIX

## (undated) DEVICE — Y-TYPE TUR/BLADDER IRRIGATION SET, REGULATING CLAMP

## (undated) DEVICE — GARMENT,MEDLINE,DVT,INT,CALF,MED, GEN2: Brand: MEDLINE

## (undated) DEVICE — BLADELESS OPTICAL TROCAR WITH FIXATION CANNULA: Brand: VERSAONE

## (undated) DEVICE — UNIVERSAL STAPLER: Brand: ENDO GIA ULTRA

## (undated) DEVICE — SHEET, T, LAPAROTOMY, STERILE: Brand: MEDLINE

## (undated) DEVICE — LOGICUT SCISSOR LENGTH 320MM: Brand: LOGI - LAPAROSCOPIC INSTRUMENT SYSTEM

## (undated) DEVICE — REM POLYHESIVE ADULT PATIENT RETURN ELECTRODE: Brand: VALLEYLAB

## (undated) DEVICE — SUTURE VCRL SZ 0 L54IN ABSRB UD POLYGLACTIN 910 COAT BRAID J608H

## (undated) DEVICE — SINGLE USE SUCTION VALVE MAJ-209: Brand: SINGLE USE SUCTION VALVE (STERILE)

## (undated) DEVICE — STAPLER INT L34CM 60MM LNG ENDOSCP ARTC PWR + ECHELON FLX

## (undated) DEVICE — JELLY LUBRICATING 10GM PREFIL SYR LUBE

## (undated) DEVICE — GENERAL LAPAROSCOPY: Brand: MEDLINE INDUSTRIES, INC.

## (undated) DEVICE — 2000CC GUARDIAN II: Brand: GUARDIAN

## (undated) DEVICE — ADAPTER BRONCHSCP FOR USE W/ OLY EDGE

## (undated) DEVICE — 60 ML SYRINGE REGULAR TIP: Brand: MONOJECT

## (undated) DEVICE — GUARD TEETH AD NYL FOR LARYNSCP POS PROTCT

## (undated) DEVICE — SUTURE PERMAHAND SZ 3-0 L18IN NONABSORBABLE BLK L26MM SH C013D

## (undated) DEVICE — ACCESS PLATFORM FOR MINIMALLY INVASIVE SURGERY.: Brand: GELPORT® LAPAROSCOPIC  SYSTEM

## (undated) DEVICE — BLADELESS OPTICAL TROCAR WITH FIXATION CANNULA: Brand: VERSAPORT

## (undated) DEVICE — PATCH SENS PT FOR ELECTROMAGNETIC NAVIGATION BRONCHSCP SYS

## (undated) DEVICE — BAG DRNGE 4000ML CONT IRRIG ROUNDED TEARDROP SHP DISP

## (undated) DEVICE — APPLIER LIG CLP L13IN 10MM PSTL GRP CONTAIN 15 TI L CLP

## (undated) DEVICE — RELOAD STPL L60MM H1-2.6MM MESENTERY THN TISS WHT 6 ROW

## (undated) DEVICE — PAD,NON-ADHERENT,3X8,STERILE,LF,1/PK: Brand: MEDLINE

## (undated) DEVICE — SUTURE PDS II SZ 1 L36IN ABSRB VLT L48MM CTX 1/2 CIR Z371T

## (undated) DEVICE — SOLUTION IV 1000ML 0.9% SOD CHL

## (undated) DEVICE — SLIM BODY SKIN STAPLER: Brand: APPOSE ULC

## (undated) DEVICE — SOLUTION IRRIG 1000ML STRL H2O USP PLAS POUR BTL

## (undated) DEVICE — SUTURE PDS II SZ 1 L96IN ABSRB VLT TP-1 L65MM 1/2 CIR Z880G

## (undated) DEVICE — DRAPE TWL SURG 16X26IN BLU ORB04] ALLCARE INC]

## (undated) DEVICE — SPONGE LAP 18X18IN STRL -- 5/PK

## (undated) DEVICE — [HIGH FLOW INSUFFLATOR,  DO NOT USE IF PACKAGE IS DAMAGED,  KEEP DRY,  KEEP AWAY FROM SUNLIGHT,  PROTECT FROM HEAT AND RADIOACTIVE SOURCES.]: Brand: PNEUMOSURE

## (undated) DEVICE — SHEARS ENDOSCP L36CM DIA5MM ULTRASONIC CRV TIP W/ ADV

## (undated) DEVICE — KENDALL RADIOLUCENT FOAM MONITORING ELECTRODE RECTANGULAR SHAPE: Brand: KENDALL

## (undated) DEVICE — BLADE ASSEMB CLP HAIR FINE --

## (undated) DEVICE — TRAY PREP DRY W/ PREM GLV 2 APPL 6 SPNG 2 UNDPD 1 OVERWRAP

## (undated) DEVICE — BUTTON SWITCH PENCIL BLADE ELECTRODE, HOLSTER: Brand: EDGE

## (undated) DEVICE — SUTURE VCRL SZ 2-0 L27IN ABSRB UD L26MM SH 1/2 CIR J417H

## (undated) DEVICE — PACK SURGICAL PROCEDURE KIT CYSTOSCOPY TOTE

## (undated) DEVICE — SUTURE VCRL SZ 0 L54IN ABSRB UD LIGAPAK REEL NDL J287G

## (undated) DEVICE — Device

## (undated) DEVICE — TRAY CATH 16F URIN MTR LTX -- CONVERT TO ITEM 363111

## (undated) DEVICE — MINOR SPLIT GENERAL: Brand: MEDLINE INDUSTRIES, INC.

## (undated) DEVICE — SYR 10ML LUER LOK 1/5ML GRAD --

## (undated) DEVICE — SUTURE VCRL SZ 1 L36IN ABSRB UD CTX L48MM 1/2 CIR J977H

## (undated) DEVICE — KIT,ANTI FOG,W/SPONGE & FLUID,SOFT PACK: Brand: MEDLINE

## (undated) DEVICE — SET EXTN L6IN W/ S STL CLMP

## (undated) DEVICE — LAP CHOLE: Brand: MEDLINE INDUSTRIES, INC.

## (undated) DEVICE — AGENT HEMSTAT W2XL14IN OXIDIZED REGENERATED CELOS ABSRB FOR

## (undated) DEVICE — AMD ANTIMICROBIAL NON-ADHERENT PAD,0.2% POLYHEXAMETHYLENE BIGUANIDE HCI (PHMB): Brand: TELFA

## (undated) DEVICE — SINGLE USE ASPIRATION NEEDLE: Brand: SINGLE USE ASPIRATION NEEDLE

## (undated) DEVICE — SUTURE CHROMIC GUT SZ 2-0 L27IN ABSRB BRN L26MM SH 1/2 CIR G123H

## (undated) DEVICE — ATHLETIC SUPPORTER LATEX FREE, LARGE

## (undated) DEVICE — SOLUTION IRRIG 1000ML 0.9% SOD CHL USP POUR PLAS BTL

## (undated) DEVICE — BRONCHOSCOPY PACK: Brand: MEDLINE INDUSTRIES, INC.

## (undated) DEVICE — MAJOR GENERAL: Brand: MEDLINE INDUSTRIES, INC.

## (undated) DEVICE — VISUALIZATION SYSTEM: Brand: CLEARIFY

## (undated) DEVICE — CLIP APPLIER: Brand: ENDO CLIP II

## (undated) DEVICE — Device: Brand: BALLOON

## (undated) DEVICE — POUCH SPEC RETRV SHFT 15MM 13X23CM GRN POLYUR DBL RNG HNDL

## (undated) DEVICE — PREMIUM WET SKIN PREP TRAY: Brand: MEDLINE INDUSTRIES, INC.

## (undated) DEVICE — BINDER ABD M/L H12IN FOR 46-62IN WHT 4 SLD PNL DSGN HOOP

## (undated) DEVICE — STAPLER ENDOSCP 5MM ABS FIX DEV W/ 30 TACKS DISP

## (undated) DEVICE — SPONGE,NEURO,1"X1",XR,STRL,LF,10/PK: Brand: MEDLINE

## (undated) DEVICE — GLOVE ORANGE PI 8 1/2   MSG9085

## (undated) DEVICE — 3M™ IOBAN™ 2 ANTIMICROBIAL INCISE DRAPE 6650EZ: Brand: IOBAN™ 2

## (undated) DEVICE — BRUSH CYTO L120MM DIA1.7MM SHARPENED NDL TIP FWD FACING

## (undated) DEVICE — SUTURE VCRL SZ 3-0 L36IN ABSRB UD L36MM CT-1 1/2 CIR J944H

## (undated) DEVICE — SOLUTION IRRIG 3000ML H2O STRL BAG

## (undated) DEVICE — NEEDLE HYPO 25GA L1.5IN BLU POLYPR HUB S STL REG BVL STR

## (undated) DEVICE — SINGLE USE BIOPSY VALVE MAJ-210: Brand: SINGLE USE BIOPSY VALVE (STERILE)

## (undated) DEVICE — APPLIER CLP M/L SHFT DIA5MM 15 LIG LIGAMAX 5

## (undated) DEVICE — LIQUIBAND RAPID ADHESIVE 36/CS 0.8ML: Brand: MEDLINE